# Patient Record
Sex: MALE | Race: WHITE | NOT HISPANIC OR LATINO | URBAN - METROPOLITAN AREA
[De-identification: names, ages, dates, MRNs, and addresses within clinical notes are randomized per-mention and may not be internally consistent; named-entity substitution may affect disease eponyms.]

---

## 2018-04-17 ENCOUNTER — OUTPATIENT (OUTPATIENT)
Dept: OUTPATIENT SERVICES | Facility: HOSPITAL | Age: 73
LOS: 1 days | End: 2018-04-17
Payer: MEDICARE

## 2018-04-17 DIAGNOSIS — R06.00 DYSPNEA, UNSPECIFIED: ICD-10-CM

## 2018-04-17 DIAGNOSIS — Z96.60 PRESENCE OF UNSPECIFIED ORTHOPEDIC JOINT IMPLANT: Chronic | ICD-10-CM

## 2018-04-17 PROCEDURE — 93017 CV STRESS TEST TRACING ONLY: CPT

## 2018-04-17 PROCEDURE — A9502: CPT

## 2018-04-17 PROCEDURE — 78452 HT MUSCLE IMAGE SPECT MULT: CPT

## 2021-04-28 ENCOUNTER — EMERGENCY (EMERGENCY)
Facility: HOSPITAL | Age: 76
LOS: 1 days | Discharge: HOME | End: 2021-04-28
Attending: EMERGENCY MEDICINE
Payer: MEDICARE

## 2021-04-28 VITALS
HEIGHT: 74 IN | HEART RATE: 69 BPM | RESPIRATION RATE: 18 BRPM | SYSTOLIC BLOOD PRESSURE: 159 MMHG | OXYGEN SATURATION: 97 % | WEIGHT: 98.11 LBS | TEMPERATURE: 98 F | DIASTOLIC BLOOD PRESSURE: 89 MMHG

## 2021-04-28 DIAGNOSIS — Z96.60 PRESENCE OF UNSPECIFIED ORTHOPEDIC JOINT IMPLANT: Chronic | ICD-10-CM

## 2021-04-28 LAB
ALBUMIN SERPL ELPH-MCNC: 4.2 G/DL — SIGNIFICANT CHANGE UP (ref 3.5–5)
ALP SERPL-CCNC: 67 U/L — SIGNIFICANT CHANGE UP (ref 40–120)
ALT FLD-CCNC: 22 U/L DA — SIGNIFICANT CHANGE UP (ref 10–60)
ANION GAP SERPL CALC-SCNC: 8 MMOL/L — SIGNIFICANT CHANGE UP (ref 5–17)
APTT BLD: 45.5 SEC — HIGH (ref 27.5–35.5)
AST SERPL-CCNC: 25 U/L — SIGNIFICANT CHANGE UP (ref 10–40)
BASOPHILS # BLD AUTO: 0.03 K/UL — SIGNIFICANT CHANGE UP (ref 0–0.2)
BASOPHILS NFR BLD AUTO: 0.4 % — SIGNIFICANT CHANGE UP (ref 0–2)
BILIRUB SERPL-MCNC: 0.5 MG/DL — SIGNIFICANT CHANGE UP (ref 0.2–1.2)
BUN SERPL-MCNC: 48 MG/DL — HIGH (ref 7–18)
CALCIUM SERPL-MCNC: 9 MG/DL — SIGNIFICANT CHANGE UP (ref 8.4–10.5)
CHLORIDE SERPL-SCNC: 106 MMOL/L — SIGNIFICANT CHANGE UP (ref 96–108)
CO2 SERPL-SCNC: 23 MMOL/L — SIGNIFICANT CHANGE UP (ref 22–31)
CREAT SERPL-MCNC: 1.52 MG/DL — HIGH (ref 0.5–1.3)
EOSINOPHIL # BLD AUTO: 0.03 K/UL — SIGNIFICANT CHANGE UP (ref 0–0.5)
EOSINOPHIL NFR BLD AUTO: 0.4 % — SIGNIFICANT CHANGE UP (ref 0–6)
GLUCOSE SERPL-MCNC: 155 MG/DL — HIGH (ref 70–99)
HCT VFR BLD CALC: 36.6 % — LOW (ref 39–50)
HGB BLD-MCNC: 11.7 G/DL — LOW (ref 13–17)
IMM GRANULOCYTES NFR BLD AUTO: 1.2 % — SIGNIFICANT CHANGE UP (ref 0–1.5)
INR BLD: 2.7 RATIO — HIGH (ref 0.88–1.16)
LIDOCAIN IGE QN: 178 U/L — SIGNIFICANT CHANGE UP (ref 73–393)
LYMPHOCYTES # BLD AUTO: 0.59 K/UL — LOW (ref 1–3.3)
LYMPHOCYTES # BLD AUTO: 7.6 % — LOW (ref 13–44)
MAGNESIUM SERPL-MCNC: 1.9 MG/DL — SIGNIFICANT CHANGE UP (ref 1.6–2.6)
MCHC RBC-ENTMCNC: 28.1 PG — SIGNIFICANT CHANGE UP (ref 27–34)
MCHC RBC-ENTMCNC: 32 GM/DL — SIGNIFICANT CHANGE UP (ref 32–36)
MCV RBC AUTO: 88 FL — SIGNIFICANT CHANGE UP (ref 80–100)
MONOCYTES # BLD AUTO: 0.32 K/UL — SIGNIFICANT CHANGE UP (ref 0–0.9)
MONOCYTES NFR BLD AUTO: 4.1 % — SIGNIFICANT CHANGE UP (ref 2–14)
NEUTROPHILS # BLD AUTO: 6.67 K/UL — SIGNIFICANT CHANGE UP (ref 1.8–7.4)
NEUTROPHILS NFR BLD AUTO: 86.3 % — HIGH (ref 43–77)
NRBC # BLD: 0 /100 WBCS — SIGNIFICANT CHANGE UP (ref 0–0)
NT-PROBNP SERPL-SCNC: 995 PG/ML — HIGH (ref 0–450)
PLATELET # BLD AUTO: 128 K/UL — LOW (ref 150–400)
POTASSIUM SERPL-MCNC: 4.7 MMOL/L — SIGNIFICANT CHANGE UP (ref 3.5–5.3)
POTASSIUM SERPL-SCNC: 4.7 MMOL/L — SIGNIFICANT CHANGE UP (ref 3.5–5.3)
PROT SERPL-MCNC: 8.2 G/DL — SIGNIFICANT CHANGE UP (ref 6–8.3)
PROTHROM AB SERPL-ACNC: 30.6 SEC — HIGH (ref 10.6–13.6)
RBC # BLD: 4.16 M/UL — LOW (ref 4.2–5.8)
RBC # FLD: 13.4 % — SIGNIFICANT CHANGE UP (ref 10.3–14.5)
SODIUM SERPL-SCNC: 137 MMOL/L — SIGNIFICANT CHANGE UP (ref 135–145)
TROPONIN I SERPL-MCNC: <0.015 NG/ML — SIGNIFICANT CHANGE UP (ref 0–0.04)
WBC # BLD: 7.73 K/UL — SIGNIFICANT CHANGE UP (ref 3.8–10.5)
WBC # FLD AUTO: 7.73 K/UL — SIGNIFICANT CHANGE UP (ref 3.8–10.5)

## 2021-04-28 PROCEDURE — 72125 CT NECK SPINE W/O DYE: CPT

## 2021-04-28 PROCEDURE — 83735 ASSAY OF MAGNESIUM: CPT

## 2021-04-28 PROCEDURE — 72131 CT LUMBAR SPINE W/O DYE: CPT

## 2021-04-28 PROCEDURE — 96374 THER/PROPH/DIAG INJ IV PUSH: CPT

## 2021-04-28 PROCEDURE — 73080 X-RAY EXAM OF ELBOW: CPT | Mod: 26,LT

## 2021-04-28 PROCEDURE — 36415 COLL VENOUS BLD VENIPUNCTURE: CPT

## 2021-04-28 PROCEDURE — 83880 ASSAY OF NATRIURETIC PEPTIDE: CPT

## 2021-04-28 PROCEDURE — 72131 CT LUMBAR SPINE W/O DYE: CPT | Mod: 26,MA

## 2021-04-28 PROCEDURE — 99284 EMERGENCY DEPT VISIT MOD MDM: CPT | Mod: 25

## 2021-04-28 PROCEDURE — 72125 CT NECK SPINE W/O DYE: CPT | Mod: 26

## 2021-04-28 PROCEDURE — 80053 COMPREHEN METABOLIC PANEL: CPT

## 2021-04-28 PROCEDURE — 70450 CT HEAD/BRAIN W/O DYE: CPT | Mod: 26

## 2021-04-28 PROCEDURE — 83690 ASSAY OF LIPASE: CPT

## 2021-04-28 PROCEDURE — 85610 PROTHROMBIN TIME: CPT

## 2021-04-28 PROCEDURE — 73502 X-RAY EXAM HIP UNI 2-3 VIEWS: CPT

## 2021-04-28 PROCEDURE — 85730 THROMBOPLASTIN TIME PARTIAL: CPT

## 2021-04-28 PROCEDURE — 99285 EMERGENCY DEPT VISIT HI MDM: CPT

## 2021-04-28 PROCEDURE — 84484 ASSAY OF TROPONIN QUANT: CPT

## 2021-04-28 PROCEDURE — 70450 CT HEAD/BRAIN W/O DYE: CPT

## 2021-04-28 PROCEDURE — 85025 COMPLETE CBC W/AUTO DIFF WBC: CPT

## 2021-04-28 PROCEDURE — 73502 X-RAY EXAM HIP UNI 2-3 VIEWS: CPT | Mod: 26,LT

## 2021-04-28 PROCEDURE — 73080 X-RAY EXAM OF ELBOW: CPT

## 2021-04-28 RX ORDER — MORPHINE SULFATE 50 MG/1
4 CAPSULE, EXTENDED RELEASE ORAL ONCE
Refills: 0 | Status: DISCONTINUED | OUTPATIENT
Start: 2021-04-28 | End: 2021-04-28

## 2021-04-28 RX ORDER — OXYCODONE AND ACETAMINOPHEN 5; 325 MG/1; MG/1
1 TABLET ORAL
Qty: 15 | Refills: 0
Start: 2021-04-28

## 2021-04-28 RX ORDER — TETANUS TOXOID, REDUCED DIPHTHERIA TOXOID AND ACELLULAR PERTUSSIS VACCINE, ADSORBED 5; 2.5; 8; 8; 2.5 [IU]/.5ML; [IU]/.5ML; UG/.5ML; UG/.5ML; UG/.5ML
0.5 SUSPENSION INTRAMUSCULAR ONCE
Refills: 0 | Status: DISCONTINUED | OUTPATIENT
Start: 2021-04-28 | End: 2021-05-02

## 2021-04-28 RX ADMIN — MORPHINE SULFATE 4 MILLIGRAM(S): 50 CAPSULE, EXTENDED RELEASE ORAL at 17:23

## 2021-04-28 NOTE — ED PROVIDER NOTE - CLINICAL SUMMARY MEDICAL DECISION MAKING FREE TEXT BOX
75 year old male with a mechanical fall here for back pain and hip pain, also noted to have an elbow injury. Patient is noted to be on a blood thinner. Will obtain CT imaging of the head and lumbar spine, X-rays of the left elbow, and reassess.

## 2021-04-28 NOTE — ED PROVIDER NOTE - PROGRESS NOTE DETAILS
Educated pt on results, pain meds, f/u. Answered q's. Pt ambulatory with cane which is his baseline. Return instructions verbalized by patient.

## 2021-04-28 NOTE — ED PROVIDER NOTE - MUSCULOSKELETAL, MLM
Abrasion with swelling to the left elbow. Full range of motion of the elbow. Nonspecific left-sided paraspinal lumbar tenderness.

## 2021-04-28 NOTE — ED PROVIDER NOTE - OBJECTIVE STATEMENT
75 year old male with PMHx of type II diabetes, atrial fibrilltion (on Coumadin), chronic back pain, hypertension, sciatica, osteoarthritis of the right hip presents to the ED with complaints of back pain and left hip pain S/P a trip and fall in his driveway. Patient reports that he needed assistance from his neighbor in order to get up after the fall, and notes that at his baseline he uses a cane to ambulate. Patient states that he is currently on Motrin for pain, however endorses that he has been on Oxycodone in the past. Patient otherwise denies any head trauma, chest pain, shortness of breath, fevers, bowel or bladder symptoms, and all other acute complaints. NKDA.

## 2021-04-28 NOTE — ED PROVIDER NOTE - PMH
Atrial fibrillation    Cataract  right eye  Diabetes    Hypertension    Osteoarthritis of right hip    Sciatica    Vertigo

## 2021-04-28 NOTE — ED PROVIDER NOTE - PATIENT PORTAL LINK FT
You can access the FollowMyHealth Patient Portal offered by [facility] by registering at the following website: [email or web address]. By joining [facility]’s FollowMyHealth portal, you will also be able to view your health information using other applications (apps) compatible with our system.

## 2021-04-28 NOTE — ED PROVIDER NOTE - NSFOLLOWUPINSTRUCTIONS_ED_ALL_ED_FT
Lumbar Radiculopathy    WHAT YOU NEED TO KNOW:    What is lumbar radiculopathy? Lumbar radiculopathy is a painful condition that happens when a nerve in your lumbar spine (lower back) is pinched or irritated. Nerves control feeling and movement in your body.    What causes lumbar radiculopathy? You may get a pinched nerve in your lumbar spine if you have disc damage. Discs are natural, spongy cushions between your vertebrae (back bones) that allow your spine to move. Your discs may move out of place and pinch the nerve in your spine. Your risk for a pinched nerve and lumbar radiculopathy increases if:   •You smoke.      •You have diabetes, a spinal infection, or a growth in your spine.      •You are overweight.      •You are male.      •You are elderly.      What are the signs and symptoms of lumbar radiculopathy? You may have any of the following:  •Pain that moves from your lower back to your buttocks, groin, and the back of your leg. The pain is often felt below your knee. Your pain may worsen when you cough, sneeze, stand, or sit.       •Numbness, weakness, or tingling in your back or legs.      How is lumbar radiculopathy diagnosed? Your healthcare provider will examine you and ask about your family history of back and leg pain. He may also test you for weakness, numbness, or tingling in your back, buttocks, and legs. Your healthcare provider may ask you to lie on your back and lift your leg to locate your pain. You may have any of the following:   •Blood tests: You may need blood taken to give healthcare providers information about how your body is working. The blood may be taken from your hand, arm, or IV.       •Magnetic resonance imaging (MRI): An MRI machine is used to take a picture of your lower back. Your healthcare provider will use this picture to check for problems and changes in your back bones, nerves, and discs. You will need to lie still during this test. The MRI machine contains a very powerful magnet. Never enter the MRI room with any metal objects. This can cause serious injury. Tell your healthcare provider if you have any metal implants in your body.      •X-ray: An x-ray is a picture of your lower back. A lumbar x-ray may show signs of infection or other problems with your spine.      •An electromyography (EMG) test measures the electrical activity of your muscles at rest and with movement.      •Computed tomography (CT) scan: A special x-ray machine uses a computer to take pictures of your lower back. It may be used to look at your bones, discs, and nerves. You may be given dye in your IV to help improve the pictures. Tell your healthcare provider if you are allergic to shellfish, or have other allergies or medical conditions. People who are allergic to iodine or shellfish (lobster, crab, or shrimp) may be allergic to some dyes.      How is lumbar radiculopathy treated? Treatment of lumbar radiculopathy may reduce pain and swelling, and improve your ability to walk and do your normal activities. Ask your healthcare provider for more information about these and other treatments for lumbar radiculopathy:   •Medicines:?NSAIDs, such as ibuprofen, help decrease swelling, pain, and fever. This medicine is available with or without a doctor's order. NSAIDs can cause stomach bleeding or kidney problems in certain people. If you take blood thinner medicine, always ask your healthcare provider if NSAIDs are safe for you. Always read the medicine label and follow directions.      ?Muscle relaxers help decrease pain and muscle spasms.      ?Opioids: This is a strong medicine given to reduce severe pain. It is also called narcotic pain medicine. Take this medicine exactly as directed by your healthcare provider.      ?Oral steroids: Steroids may be given to reduce swelling and pain.      ?Steroid injections: Healthcare providers may give you steroid medicine through a needle (shot) into your lumbar spine. This may help decrease your nerve pain and swelling. You may need more than 1 injection if your symptoms do not improve after the first treatment.       •Physical therapy: Your healthcare provider may suggest physical therapy. Your physical therapist may teach you certain exercises to improve posture (the way you stand and sit), flexibility, and strength in your lower back. He may also teach you how to remain safely active and avoid further injury. Follow the exercise plan given to you by your physical therapist.      •Transcutaneous electrical nerve stimulation: This treatment, called TENS, stimulates your nerves and may decrease your pain. Wires are attached to pads. The pads are attached to your skin. The wires send a mild current through your nerves.       •Surgery: You may need surgery to relieve your pinched nerve if your condition has not improved within 4 to 6 weeks. You may also need it if you have lumbar radiculopathy more than once.      What are the risks of treatment for lumbar radiculopathy?   •Without treatment, your pain may worsen. The pinched and swollen nerve may lead to problems when you walk or move. In severe cases, you may lose control of your urine or bowel movements. Bedrest can make your symptoms worse.       •Pain medicines can cause vomiting, upset stomach, constipation (large, hard bowel movements that are difficult to pass), or kidney or liver problems. Opioid medicine may be addictive (hard to quit taking once you start). Oral steroids and steroid injections may have side effects, such as facial redness, fluid retention (water weight gain), and mood changes. Steroid injections may be painful and can cause severe headaches, infections, allergic reactions, or nerve damage. Surgery risks include delayed problems with healing, spinal or bladder infection, damage to the spinal cord or other nerves, and ongoing pain. In rare cases, you could become paralyzed (not able to move your arms or legs).      How can I care for myself when I have lumbar radiculopathy?   •Stay active: It is best to be active when you have lumbar radiculopathy. Your healthcare provider may tell you to take walks to ease yourself back into your daily routine. Avoid long periods of bed rest. Bed rest could worsen your symptoms. Do not move in ways that increase your pain. Ask your healthcare provider for more information about the best ways to stay active.       •Use ice or heat packs: Use ice or heat packs on the sore area of your body to decrease the pain and swelling. Put ice in a plastic bag covered with a towel on your low back. Cover heated items with a towel to avoid burns. Use ice and heat as directed.      •Avoid heavy lifting: Your condition may worsen if you lift heavy things. Avoid lifting if possible.      •Maintain a healthy weight: Excess body weight may strain your back. Talk with your healthcare provider about ways to lose excess weight if you are overweight.       When should I contact my healthcare provider?   •Your pain does not improve within 1 to 3 weeks after treatment.       •Your pain and weakness keep you from your normal activities at work, home, or school.       •You lose more than 10 pounds in 6 months without trying.      •You become depressed or sad because of the pain.      •You have questions or concerns about your condition or care.      When should I seek immediate care or call 911?   •You have a fever greater than 100.4°F for longer than 2 days.      •You have new, severe back or leg pain, or your pain spreads to both legs.      •You have any new signs of numbness or weakness, especially in your lower back, legs, arms, or genital area.      •You have new trouble controlling your urine and bowel movements.      •You do not feel like your bladder empties when you urinate.       CARE AGREEMENT:    You have the right to help plan your care. Learn about your health condition and how it may be treated. Discuss treatment options with your healthcare providers to decide what care you want to receive. You always have the right to refuse treatment.

## 2021-12-19 ENCOUNTER — INPATIENT (INPATIENT)
Facility: HOSPITAL | Age: 76
LOS: 18 days | Discharge: NURSING, REHAB OR LONG-TERM CARE | DRG: 853 | End: 2022-01-07
Attending: HOSPITALIST | Admitting: HOSPITALIST
Payer: MEDICARE

## 2021-12-19 VITALS
RESPIRATION RATE: 16 BRPM | DIASTOLIC BLOOD PRESSURE: 77 MMHG | SYSTOLIC BLOOD PRESSURE: 143 MMHG | HEIGHT: 74 IN | HEART RATE: 103 BPM | OXYGEN SATURATION: 97 % | WEIGHT: 179.9 LBS

## 2021-12-19 DIAGNOSIS — Z96.60 PRESENCE OF UNSPECIFIED ORTHOPEDIC JOINT IMPLANT: Chronic | ICD-10-CM

## 2021-12-19 DIAGNOSIS — A41.9 SEPSIS, UNSPECIFIED ORGANISM: ICD-10-CM

## 2021-12-19 LAB
ALBUMIN SERPL ELPH-MCNC: 3 G/DL — LOW (ref 3.5–5)
ALP SERPL-CCNC: 82 U/L — SIGNIFICANT CHANGE UP (ref 40–120)
ALT FLD-CCNC: 11 U/L DA — SIGNIFICANT CHANGE UP (ref 10–60)
ANION GAP SERPL CALC-SCNC: 9 MMOL/L — SIGNIFICANT CHANGE UP (ref 5–17)
ANISOCYTOSIS BLD QL: SLIGHT — SIGNIFICANT CHANGE UP
APPEARANCE UR: CLEAR — SIGNIFICANT CHANGE UP
APTT BLD: 30.6 SEC — SIGNIFICANT CHANGE UP (ref 27.5–35.5)
AST SERPL-CCNC: 11 U/L — SIGNIFICANT CHANGE UP (ref 10–40)
BASE EXCESS BLDV CALC-SCNC: -0.2 MMOL/L — SIGNIFICANT CHANGE UP
BASOPHILS # BLD AUTO: 0 K/UL — SIGNIFICANT CHANGE UP (ref 0–0.2)
BASOPHILS NFR BLD AUTO: 0 % — SIGNIFICANT CHANGE UP (ref 0–2)
BILIRUB SERPL-MCNC: 0.6 MG/DL — SIGNIFICANT CHANGE UP (ref 0.2–1.2)
BILIRUB UR-MCNC: NEGATIVE — SIGNIFICANT CHANGE UP
BUN SERPL-MCNC: 49 MG/DL — HIGH (ref 7–18)
CALCIUM SERPL-MCNC: 9 MG/DL — SIGNIFICANT CHANGE UP (ref 8.4–10.5)
CHLORIDE SERPL-SCNC: 108 MMOL/L — SIGNIFICANT CHANGE UP (ref 96–108)
CK SERPL-CCNC: 46 U/L — SIGNIFICANT CHANGE UP (ref 35–232)
CO2 SERPL-SCNC: 22 MMOL/L — SIGNIFICANT CHANGE UP (ref 22–31)
COLOR SPEC: YELLOW — SIGNIFICANT CHANGE UP
CREAT SERPL-MCNC: 1.87 MG/DL — HIGH (ref 0.5–1.3)
DIFF PNL FLD: ABNORMAL
EOSINOPHIL # BLD AUTO: 0 K/UL — SIGNIFICANT CHANGE UP (ref 0–0.5)
EOSINOPHIL NFR BLD AUTO: 0 % — SIGNIFICANT CHANGE UP (ref 0–6)
EPI CELLS # UR: SIGNIFICANT CHANGE UP /HPF
ETHANOL SERPL-MCNC: <3 MG/DL — SIGNIFICANT CHANGE UP (ref 0–10)
GLUCOSE SERPL-MCNC: 269 MG/DL — HIGH (ref 70–99)
GLUCOSE UR QL: 250
HCO3 BLDV-SCNC: 24 MMOL/L — SIGNIFICANT CHANGE UP (ref 22–29)
HCT VFR BLD CALC: 30.4 % — LOW (ref 39–50)
HGB BLD-MCNC: 9.8 G/DL — LOW (ref 13–17)
HOROWITZ INDEX BLDV+IHG-RTO: 21 — SIGNIFICANT CHANGE UP
HYPOCHROMIA BLD QL: SLIGHT — SIGNIFICANT CHANGE UP
INR BLD: 1.25 RATIO — HIGH (ref 0.88–1.16)
KETONES UR-MCNC: NEGATIVE — SIGNIFICANT CHANGE UP
LACTATE SERPL-SCNC: 2.2 MMOL/L — HIGH (ref 0.7–2)
LEUKOCYTE ESTERASE UR-ACNC: NEGATIVE — SIGNIFICANT CHANGE UP
LYMPHOCYTES # BLD AUTO: 0.17 K/UL — LOW (ref 1–3.3)
LYMPHOCYTES # BLD AUTO: 3 % — LOW (ref 13–44)
MANUAL SMEAR VERIFICATION: SIGNIFICANT CHANGE UP
MCHC RBC-ENTMCNC: 28.2 PG — SIGNIFICANT CHANGE UP (ref 27–34)
MCHC RBC-ENTMCNC: 32.2 GM/DL — SIGNIFICANT CHANGE UP (ref 32–36)
MCV RBC AUTO: 87.6 FL — SIGNIFICANT CHANGE UP (ref 80–100)
MICROCYTES BLD QL: SLIGHT — SIGNIFICANT CHANGE UP
MONOCYTES # BLD AUTO: 0.11 K/UL — SIGNIFICANT CHANGE UP (ref 0–0.9)
MONOCYTES NFR BLD AUTO: 2 % — SIGNIFICANT CHANGE UP (ref 2–14)
NEUTROPHILS # BLD AUTO: 5.44 K/UL — SIGNIFICANT CHANGE UP (ref 1.8–7.4)
NEUTROPHILS NFR BLD AUTO: 84 % — HIGH (ref 43–77)
NEUTS BAND # BLD: 11 % — HIGH (ref 0–8)
NITRITE UR-MCNC: NEGATIVE — SIGNIFICANT CHANGE UP
NRBC # BLD: 0 /100 — SIGNIFICANT CHANGE UP (ref 0–0)
PCO2 BLDV: 37 MMHG — LOW (ref 42–55)
PH BLDV: 7.42 — SIGNIFICANT CHANGE UP (ref 7.32–7.43)
PH UR: 5 — SIGNIFICANT CHANGE UP (ref 5–8)
PLAT MORPH BLD: NORMAL — SIGNIFICANT CHANGE UP
PLATELET # BLD AUTO: 133 K/UL — LOW (ref 150–400)
PO2 BLDV: 40 MMHG — SIGNIFICANT CHANGE UP
POIKILOCYTOSIS BLD QL AUTO: SLIGHT — SIGNIFICANT CHANGE UP
POLYCHROMASIA BLD QL SMEAR: SLIGHT — SIGNIFICANT CHANGE UP
POTASSIUM SERPL-MCNC: 4.4 MMOL/L — SIGNIFICANT CHANGE UP (ref 3.5–5.3)
POTASSIUM SERPL-SCNC: 4.4 MMOL/L — SIGNIFICANT CHANGE UP (ref 3.5–5.3)
PROT SERPL-MCNC: 7.8 G/DL — SIGNIFICANT CHANGE UP (ref 6–8.3)
PROT UR-MCNC: 100
PROTHROM AB SERPL-ACNC: 14.7 SEC — HIGH (ref 10.6–13.6)
RAPID RVP RESULT: SIGNIFICANT CHANGE UP
RBC # BLD: 3.47 M/UL — LOW (ref 4.2–5.8)
RBC # FLD: 12.6 % — SIGNIFICANT CHANGE UP (ref 10.3–14.5)
RBC BLD AUTO: ABNORMAL
RBC CASTS # UR COMP ASSIST: SIGNIFICANT CHANGE UP /HPF (ref 0–2)
SAO2 % BLDV: 73 % — SIGNIFICANT CHANGE UP
SARS-COV-2 RNA SPEC QL NAA+PROBE: SIGNIFICANT CHANGE UP
SODIUM SERPL-SCNC: 139 MMOL/L — SIGNIFICANT CHANGE UP (ref 135–145)
SP GR SPEC: 1.01 — SIGNIFICANT CHANGE UP (ref 1.01–1.02)
SPHEROCYTES BLD QL SMEAR: SLIGHT — SIGNIFICANT CHANGE UP
UROBILINOGEN FLD QL: NEGATIVE — SIGNIFICANT CHANGE UP
WBC # BLD: 5.73 K/UL — SIGNIFICANT CHANGE UP (ref 3.8–10.5)
WBC # FLD AUTO: 5.73 K/UL — SIGNIFICANT CHANGE UP (ref 3.8–10.5)
WBC UR QL: SIGNIFICANT CHANGE UP /HPF (ref 0–5)

## 2021-12-19 PROCEDURE — 99223 1ST HOSP IP/OBS HIGH 75: CPT

## 2021-12-19 PROCEDURE — 73620 X-RAY EXAM OF FOOT: CPT | Mod: 26,LT

## 2021-12-19 PROCEDURE — 73590 X-RAY EXAM OF LOWER LEG: CPT | Mod: 26,LT

## 2021-12-19 PROCEDURE — 99285 EMERGENCY DEPT VISIT HI MDM: CPT

## 2021-12-19 PROCEDURE — 71045 X-RAY EXAM CHEST 1 VIEW: CPT | Mod: 26

## 2021-12-19 RX ORDER — SODIUM CHLORIDE 9 MG/ML
2500 INJECTION INTRAMUSCULAR; INTRAVENOUS; SUBCUTANEOUS ONCE
Refills: 0 | Status: COMPLETED | OUTPATIENT
Start: 2021-12-19 | End: 2021-12-19

## 2021-12-19 RX ORDER — ACETAMINOPHEN 500 MG
975 TABLET ORAL ONCE
Refills: 0 | Status: COMPLETED | OUTPATIENT
Start: 2021-12-19 | End: 2021-12-19

## 2021-12-19 RX ORDER — HYDROMORPHONE HYDROCHLORIDE 2 MG/ML
0.25 INJECTION INTRAMUSCULAR; INTRAVENOUS; SUBCUTANEOUS ONCE
Refills: 0 | Status: DISCONTINUED | OUTPATIENT
Start: 2021-12-19 | End: 2021-12-19

## 2021-12-19 RX ADMIN — HYDROMORPHONE HYDROCHLORIDE 0.25 MILLIGRAM(S): 2 INJECTION INTRAMUSCULAR; INTRAVENOUS; SUBCUTANEOUS at 23:59

## 2021-12-19 RX ADMIN — HYDROMORPHONE HYDROCHLORIDE 0.25 MILLIGRAM(S): 2 INJECTION INTRAMUSCULAR; INTRAVENOUS; SUBCUTANEOUS at 22:53

## 2021-12-19 RX ADMIN — Medication 975 MILLIGRAM(S): at 23:59

## 2021-12-19 RX ADMIN — SODIUM CHLORIDE 2500 MILLILITER(S): 9 INJECTION INTRAMUSCULAR; INTRAVENOUS; SUBCUTANEOUS at 20:06

## 2021-12-19 RX ADMIN — Medication 975 MILLIGRAM(S): at 21:05

## 2021-12-19 RX ADMIN — SODIUM CHLORIDE 2500 MILLILITER(S): 9 INJECTION INTRAMUSCULAR; INTRAVENOUS; SUBCUTANEOUS at 22:47

## 2021-12-19 NOTE — H&P ADULT - ASSESSMENT
Patient is a 76 year-old male from home with past medical history of atrial fibrillation (was on coumadin until 1month ago), hypertension, hyperlipidemia, diabetes mellitus presents to ED for AMS. Admitted for AMS and diabetic foot ulcer

## 2021-12-19 NOTE — H&P ADULT - PROBLEM SELECTOR PLAN 1
delirium vs dementia   Likely 2/2 infection  Follow Blood cultures and urine cultures  Will start on Unasyn and Vancomycin for foot ulcer   Monitor for mental status changes   Keep electrolytes wnl   Aspiration and fall precautions Acute transient confusional state  Likely 2/2 infection  Follow Blood cultures and urine cultures  Will start on Unasyn and Vancomycin for foot ulcer   Monitor for mental status changes   Keep electrolytes wnl   Aspiration and fall precautions

## 2021-12-19 NOTE — H&P ADULT - PROBLEM SELECTOR PLAN 2
Left foot ulcer  Will start on vanc and unasyn  Will need MRI to r/o osteoarthritis   Follow blood cultures   Podiatry consulted Acute transient confusional state  Likely 2/2 infection  Follow Blood cultures and urine cultures  Will start on Unasyn and Vancomycin for foot ulcer   Monitor for mental status changes   Keep electrolytes wnl   Aspiration and fall precautions

## 2021-12-19 NOTE — ED PROVIDER NOTE - WR ORDER ID 1
CP x a couple of weeks. Called pcp and was sent here today. Pt states pain happens at night or in the evening. Laying down makes it worse. Today pain is along the clavical does radiate along the upper chest and down left arm.
[id number]

## 2021-12-19 NOTE — ED PROVIDER NOTE - OBJECTIVE STATEMENT
76 year old male with PMHx of type II diabetes, atrial fibrilltion (on Coumadin), chronic back pain, hypertension, sciatica, osteoarthritis of the right hip presents to the ED with complaints of ams. pt states feeling off. not taking coumadin, no fever, no n/v, no cp, no leg pain.

## 2021-12-19 NOTE — H&P ADULT - ATTENDING COMMENTS
Pt seen at bedside.  Case discussed with MAR.  76 year old man with PMH of poorly controlled DM2, A-fib on OAC, HTN brought in on account of changes in mental status in addition to left foot swelling and redness. He was febrile in the ED.    Vital Signs Last 24 Hrs  T(C): 37.4 (19 Dec 2021 20:49), Max: 39 (19 Dec 2021 18:42)  T(F): 99.4 (19 Dec 2021 20:49), Max: 102.2 (19 Dec 2021 18:42)  HR: 91 (19 Dec 2021 20:49) (91 - 103)  BP: 110/68 (19 Dec 2021 20:49) (110/68 - 143/77)  RR: 18 (19 Dec 2021 20:49) (16 - 18)  SpO2: 96% (19 Dec 2021 20:49) (96% - 97%)    Labs                         9.8    5.73  )-----------( 133      ( 19 Dec 2021 19:15 )             30.4     ESR - 95    12-19    139  |  108  |  49<H>  ----------------------------<  269<H>  4.4   |  22  |  1.87<H>    Ca    9.0      19 Dec 2021 19:15    TPro  7.8  /  Alb  3.0<L>  /  TBili  0.6  /  DBili  x   /  AST  11  /  ALT  11  /  AlkPhos  82  12-19    Glycosuria - 250    X rays   Left foot and leg - unremarkable    Impression   - Acute encephalopathy likely septic   - Left foot soft tissue infection   - Hyperglycemia from poorly controlled DM2  - PATTY on CKD 3b  - Anemia - likely of chronic disease    Plan   - Admit to Medicine   - Sepsis work up   - Empiric antibiotics for soft tissue infection   - IV fluid 0.9% NS @ 125cc/hour  - Short acting insulin therapy; A1c,   - Renal U/S  - Podiatry consult   - MRI foot Pt seen at bedside.  Case discussed with MAR.  76 year old man with PMH of poorly controlled DM2, A-fib on OAC, HTN brought in on account of changes in mental status in addition to left foot swelling and redness. He was febrile in the ED.    Vital Signs Last 24 Hrs  T(C): 37.4 (19 Dec 2021 20:49), Max: 39 (19 Dec 2021 18:42)  T(F): 99.4 (19 Dec 2021 20:49), Max: 102.2 (19 Dec 2021 18:42)  HR: 91 (19 Dec 2021 20:49) (91 - 103)  BP: 110/68 (19 Dec 2021 20:49) (110/68 - 143/77)  RR: 18 (19 Dec 2021 20:49) (16 - 18)  SpO2: 96% (19 Dec 2021 20:49) (96% - 97%)    Labs                         9.8    5.73  )-----------( 133      ( 19 Dec 2021 19:15 )             30.4     ESR - 95    12-19    139  |  108  |  49<H>  ----------------------------<  269<H>  4.4   |  22  |  1.87<H>    Ca    9.0      19 Dec 2021 19:15    TPro  7.8  /  Alb  3.0<L>  /  TBili  0.6  /  DBili  x   /  AST  11  /  ALT  11  /  Alk Phos  82  12-19    Glycosuria - 250    X rays   Left foot and leg - unremarkable    Impression   - Acute encephalopathy likely septic   - Left foot cellulitis   - Diabetic foot ulcer infection  - Hyperglycemia from poorly controlled DM2  - PATTY on CKD 3b  - Anemia - likely of chronic disease    Plan   - Admit to Medicine   - Sepsis work up   - Empiric antibiotics for soft tissue infection   - IV fluid 0.9% NS @ 125cc/hour  - Short acting insulin therapy; A1c,   - Renal U/S  - Podiatry consult and ID consult  - Would consider MRI foot if okay per podiatry team  - Counselling on the need to resume and stay compliant on coumadin; no bridging needed  - Follow up with PCP and nephrologist in clinic for CKD management    All other plans for chronic medical conditions management as above

## 2021-12-19 NOTE — H&P ADULT - NSICDXFAMILYHX_GEN_ALL_CORE_FT
FAMILY HISTORY:  Sibling  Still living? Unknown  Family history of breast cancer in sister, Age at diagnosis: Age Unknown  Family history of coronary artery disease, Age at diagnosis: Age Unknown

## 2021-12-19 NOTE — ED ADULT NURSE NOTE - ED STAT RN HANDOFF DETAILS 3
pt.remained  stable.denies pain transfer to holding area report given to charlee galan. not in distress

## 2021-12-19 NOTE — H&P ADULT - PROBLEM SELECTOR PLAN 7
Continue home medication simvastatin On oral hypoglycemics at home  Hold OHA  Will start on sliding scale  Would calculate long acting insulin in AM  FS ACHS

## 2021-12-19 NOTE — ED ADULT NURSE NOTE - OBJECTIVE STATEMENT
Pt BIB EMS for AMS. Upon assessment, pt is A&OX3, resting in bed, left plantar foot diabetic foot ulcer noted, foot swollen and red. Area moist. No acute distress noted, denies chest pain, no shortness of breath indicated.

## 2021-12-19 NOTE — H&P ADULT - PROBLEM SELECTOR PLAN 10
IMPROVE VTE Individual Risk Assessment  RISK                                                                Points  [  ] Previous VTE                                                  3  [  ] Thrombophilia                                               2  [  ] Lower limb paralysis                                      2        (unable to hold up >15 seconds)    [  ] Current Cancer                                              2         (within 6 months)  [x  ] Immobilization > 24 hrs                                1  [  ] ICU/CCU stay > 24 hours                              1  [x  ] Age > 60                                                      1  IMPROVE VTE Score _________2, -- for DVT proph  Warfarin

## 2021-12-19 NOTE — H&P ADULT - NSHPPHYSICALEXAM_GEN_ALL_CORE
LOS: 1d    VITALS:   T(C): 36.4 (12-20-21 @ 00:01), Max: 39 (12-19-21 @ 18:42)  HR: 73 (12-20-21 @ 00:01) (73 - 103)  BP: 115/72 (12-20-21 @ 00:01) (110/68 - 143/77)  RR: 17 (12-20-21 @ 00:01) (16 - 18)  SpO2: 99% (12-20-21 @ 00:01) (96% - 99%)    GENERAL: NAD, lying in bed comfortably  HEAD:  Atraumatic, Normocephalic  EYES: EOMI, PERRLA, conjunctiva and sclera clear  ENT: Moist mucous membranes  NECK: Supple, No JVD  CHEST/LUNG: Clear to auscultation bilaterally; No rales, rhonchi, wheezing, or rubs. Unlabored respirations  HEART: Regular rate and rhythm; No murmurs, rubs, or gallops  ABDOMEN: BSx4; Soft, nontender, nondistended  EXTREMITIES: left foot ulcer 2 cm in diameter on medial side of the planter surface. Surrounded by erythema, warmth.   2+ Peripheral Pulses, brisk capillary refill. No clubbing, cyanosis  NERVOUS SYSTEM:  A&Ox3, no focal deficits   SKIN: No rashes or lesions

## 2021-12-19 NOTE — H&P ADULT - PROBLEM SELECTOR PLAN 5
On oral hypoglycemics at home  Will start on sliding scale  FS ACHS likely prerenal  s/p 2.5L IVF in ED  Monitor BMP daily  Avoid nephrotoxins  Continue gentle hydration

## 2021-12-19 NOTE — ED PROVIDER NOTE - PROGRESS NOTE DETAILS
mcneal: work up shows no acute oste or gas on xr. lactate 2.2 normal wbc. cxr clear. pt stable  spoke with podiatry and will see in am.  admit to med for sepsis possibly from bacteremia. pt with diabetic foot ulcer of left.

## 2021-12-19 NOTE — H&P ADULT - PROBLEM SELECTOR PLAN 6
On lisinopril and furosemide at home  Will hold lasix due to PATTY  Continue lisinopril Was on coumadin but discontinued by himself  Will order 5mg coumadin daily  Monitor INR daily

## 2021-12-19 NOTE — ED PROVIDER NOTE - CLINICAL SUMMARY MEDICAL DECISION MAKING FREE TEXT BOX
76 year old male with PMHx of type II diabetes, atrial fibrilltion (on Coumadin), chronic back pain, hypertension, sciatica, osteoarthritis of the right hip presents to the ED with complaints of ams. pt states feeling off. not taking coumadin, no fever, no n/v, no cp, no leg pain.     ams likely uti r/o pna vs lytes imbalance. clinically no meningeal sign to suggest encephalitis. sepsis work up and admit 76 year old male with PMHx of type II diabetes, atrial fibrilltion (on Coumadin), chronic back pain, hypertension, sciatica, osteoarthritis of the right hip presents to the ED with complaints of ams. pt states feeling off. not taking coumadin, no fever, no n/v, no cp, no leg pain.     ams likely bacteremia from left foot Dm ulcer vs uti r/o pna vs lytes imbalance. clinically no meningeal sign to suggest encephalitis. sepsis work up and admit. no sign of compartment sx.

## 2021-12-19 NOTE — H&P ADULT - PROBLEM SELECTOR PLAN 8
IMPROVE VTE Individual Risk Assessment  RISK                                                                Points  [  ] Previous VTE                                                  3  [  ] Thrombophilia                                               2  [  ] Lower limb paralysis                                      2        (unable to hold up >15 seconds)    [  ] Current Cancer                                              2         (within 6 months)  [x  ] Immobilization > 24 hrs                                1  [  ] ICU/CCU stay > 24 hours                              1  [x  ] Age > 60                                                      1  IMPROVE VTE Score _________2, -- for DVT proph  Warfarin On lisinopril and furosemide at home  Will hold Lasix and lisinopril until PATTY improves.

## 2021-12-19 NOTE — H&P ADULT - PROBLEM SELECTOR PLAN 3
likely prerenal  s/p 2.5L IVF in ED  Monitor BMP daily  Avoid nephrotoxins Left foot ulcer infection   Will start on vanc and unasyn  Will need MRI to r/o osteoarthritis   Follow blood cultures   Podiatry consulted

## 2021-12-19 NOTE — H&P ADULT - NSICDXPASTMEDICALHX_GEN_ALL_CORE_FT
PAST MEDICAL HISTORY:  Atrial fibrillation     Cataract right eye    Diabetes     Hypertension     Osteoarthritis of right hip     Sciatica     Vertigo

## 2021-12-19 NOTE — ED ADULT NURSE NOTE - ED STAT RN HANDOFF DETAILS
Report given to CHELE Chiu. Pt resting in bed, no acute distress noted, denies chest pain, no shortness of breath indicated.

## 2021-12-19 NOTE — H&P ADULT - HISTORY OF PRESENT ILLNESS
Patient is a 76 year-old male from home with past medical history of atrial fibrillation (was on coumadin until 1month ago), hypertension, hyperlipidemia, diabetes mellitus presents to ED for AMS. Pt states that today he started feeling freezing cold and was shivering. Talked to wife for more information and she stated that patient was feeling very cold though house was warm. He stopped shivering for a while and the started again. During his second episode of shivering from feeling extremely cold he got very confused and could not talk, understand what she was saying and was not making sense. After a while the shivering stopped and he regained his normal mental status. Denies fever, chest pain. abdominal pain, diarrhea, constipation. Pt states he has incontinence and an ulcer on his left foot. He states his podiatrist scraped a callus which caused the ulcer. He has diabetic neuropathy so does not feel pain around the ulcer. He did notice the left foot got more erythematous today.   He stopped taking warfarin 1 month ago as he states it was too much of an hassle to get INR checked and avoid eating certain foods.

## 2021-12-19 NOTE — ED PROVIDER NOTE - CARDIAC, MLM
tachy rate, regular rhythm.  Heart sounds S1, S2.  No murmurs, rubs or gallops. no leg swelling or redness, + varicose vein tachy rate, regular rhythm.  Heart sounds S1, S2.  No murmurs, rubs or gallops. no leg swelling or redness, + varicose vein, + foot medial DM ulcer and erythema/swelling, no crepitus

## 2021-12-20 DIAGNOSIS — I10 ESSENTIAL (PRIMARY) HYPERTENSION: ICD-10-CM

## 2021-12-20 DIAGNOSIS — E11.65 TYPE 2 DIABETES MELLITUS WITH HYPERGLYCEMIA: ICD-10-CM

## 2021-12-20 DIAGNOSIS — L03.116 CELLULITIS OF LEFT LOWER LIMB: ICD-10-CM

## 2021-12-20 DIAGNOSIS — E87.5 HYPERKALEMIA: ICD-10-CM

## 2021-12-20 DIAGNOSIS — L03.90 CELLULITIS, UNSPECIFIED: ICD-10-CM

## 2021-12-20 DIAGNOSIS — E11.9 TYPE 2 DIABETES MELLITUS WITHOUT COMPLICATIONS: ICD-10-CM

## 2021-12-20 DIAGNOSIS — G93.41 METABOLIC ENCEPHALOPATHY: ICD-10-CM

## 2021-12-20 DIAGNOSIS — E78.5 HYPERLIPIDEMIA, UNSPECIFIED: ICD-10-CM

## 2021-12-20 DIAGNOSIS — I48.91 UNSPECIFIED ATRIAL FIBRILLATION: ICD-10-CM

## 2021-12-20 DIAGNOSIS — Z29.9 ENCOUNTER FOR PROPHYLACTIC MEASURES, UNSPECIFIED: ICD-10-CM

## 2021-12-20 DIAGNOSIS — R41.82 ALTERED MENTAL STATUS, UNSPECIFIED: ICD-10-CM

## 2021-12-20 DIAGNOSIS — E11.621 TYPE 2 DIABETES MELLITUS WITH FOOT ULCER: ICD-10-CM

## 2021-12-20 DIAGNOSIS — N17.9 ACUTE KIDNEY FAILURE, UNSPECIFIED: ICD-10-CM

## 2021-12-20 LAB
ALBUMIN SERPL ELPH-MCNC: 2.8 G/DL — LOW (ref 3.5–5)
ALP SERPL-CCNC: 70 U/L — SIGNIFICANT CHANGE UP (ref 40–120)
ALT FLD-CCNC: 10 U/L DA — SIGNIFICANT CHANGE UP (ref 10–60)
ANION GAP SERPL CALC-SCNC: 5 MMOL/L — SIGNIFICANT CHANGE UP (ref 5–17)
APTT BLD: 33.8 SEC — SIGNIFICANT CHANGE UP (ref 27.5–35.5)
AST SERPL-CCNC: 12 U/L — SIGNIFICANT CHANGE UP (ref 10–40)
BILIRUB SERPL-MCNC: 0.4 MG/DL — SIGNIFICANT CHANGE UP (ref 0.2–1.2)
BUN SERPL-MCNC: 51 MG/DL — HIGH (ref 7–18)
CALCIUM SERPL-MCNC: 9 MG/DL — SIGNIFICANT CHANGE UP (ref 8.4–10.5)
CHLORIDE SERPL-SCNC: 110 MMOL/L — HIGH (ref 96–108)
CO2 SERPL-SCNC: 26 MMOL/L — SIGNIFICANT CHANGE UP (ref 22–31)
CREAT SERPL-MCNC: 1.8 MG/DL — HIGH (ref 0.5–1.3)
CRP SERPL-MCNC: 107 MG/L — HIGH
CRP SERPL-MCNC: 94 MG/L — HIGH
ERYTHROCYTE [SEDIMENTATION RATE] IN BLOOD: 86 MM/HR — HIGH (ref 0–20)
GLUCOSE BLDC GLUCOMTR-MCNC: 189 MG/DL — HIGH (ref 70–99)
GLUCOSE BLDC GLUCOMTR-MCNC: 229 MG/DL — HIGH (ref 70–99)
GLUCOSE BLDC GLUCOMTR-MCNC: 245 MG/DL — HIGH (ref 70–99)
GLUCOSE BLDC GLUCOMTR-MCNC: 259 MG/DL — HIGH (ref 70–99)
GLUCOSE BLDC GLUCOMTR-MCNC: 99 MG/DL — SIGNIFICANT CHANGE UP (ref 70–99)
GLUCOSE SERPL-MCNC: 228 MG/DL — HIGH (ref 70–99)
HCT VFR BLD CALC: 28.5 % — LOW (ref 39–50)
HCV AB S/CO SERPL IA: 0.11 S/CO — SIGNIFICANT CHANGE UP (ref 0–0.99)
HCV AB SERPL-IMP: SIGNIFICANT CHANGE UP
HGB BLD-MCNC: 9 G/DL — LOW (ref 13–17)
INR BLD: 1.36 RATIO — HIGH (ref 0.88–1.16)
LACTATE SERPL-SCNC: 1 MMOL/L — SIGNIFICANT CHANGE UP (ref 0.7–2)
MAGNESIUM SERPL-MCNC: 2.5 MG/DL — SIGNIFICANT CHANGE UP (ref 1.6–2.6)
MCHC RBC-ENTMCNC: 28.1 PG — SIGNIFICANT CHANGE UP (ref 27–34)
MCHC RBC-ENTMCNC: 31.6 GM/DL — LOW (ref 32–36)
MCV RBC AUTO: 89.1 FL — SIGNIFICANT CHANGE UP (ref 80–100)
NRBC # BLD: 0 /100 WBCS — SIGNIFICANT CHANGE UP (ref 0–0)
PHOSPHATE SERPL-MCNC: 3.8 MG/DL — SIGNIFICANT CHANGE UP (ref 2.5–4.5)
PLATELET # BLD AUTO: 116 K/UL — LOW (ref 150–400)
POTASSIUM SERPL-MCNC: 5.7 MMOL/L — HIGH (ref 3.5–5.3)
POTASSIUM SERPL-SCNC: 5.7 MMOL/L — HIGH (ref 3.5–5.3)
PROT SERPL-MCNC: 7.2 G/DL — SIGNIFICANT CHANGE UP (ref 6–8.3)
PROTHROM AB SERPL-ACNC: 16 SEC — HIGH (ref 10.6–13.6)
RBC # BLD: 3.2 M/UL — LOW (ref 4.2–5.8)
RBC # FLD: 12.7 % — SIGNIFICANT CHANGE UP (ref 10.3–14.5)
SODIUM SERPL-SCNC: 141 MMOL/L — SIGNIFICANT CHANGE UP (ref 135–145)
WBC # BLD: 8.23 K/UL — SIGNIFICANT CHANGE UP (ref 3.8–10.5)
WBC # FLD AUTO: 8.23 K/UL — SIGNIFICANT CHANGE UP (ref 3.8–10.5)

## 2021-12-20 PROCEDURE — 73718 MRI LOWER EXTREMITY W/O DYE: CPT | Mod: 26,LT

## 2021-12-20 PROCEDURE — 99233 SBSQ HOSP IP/OBS HIGH 50: CPT

## 2021-12-20 PROCEDURE — 99223 1ST HOSP IP/OBS HIGH 75: CPT

## 2021-12-20 RX ORDER — BUPROPION HYDROCHLORIDE 150 MG/1
150 TABLET, EXTENDED RELEASE ORAL DAILY
Refills: 0 | Status: DISCONTINUED | OUTPATIENT
Start: 2021-12-20 | End: 2022-01-07

## 2021-12-20 RX ORDER — WARFARIN SODIUM 2.5 MG/1
5 TABLET ORAL AT BEDTIME
Refills: 0 | Status: DISCONTINUED | OUTPATIENT
Start: 2021-12-20 | End: 2021-12-20

## 2021-12-20 RX ORDER — SODIUM CHLORIDE 9 MG/ML
1000 INJECTION INTRAMUSCULAR; INTRAVENOUS; SUBCUTANEOUS
Refills: 0 | Status: DISCONTINUED | OUTPATIENT
Start: 2021-12-20 | End: 2021-12-23

## 2021-12-20 RX ORDER — HEPARIN SODIUM 5000 [USP'U]/ML
5000 INJECTION INTRAVENOUS; SUBCUTANEOUS EVERY 8 HOURS
Refills: 0 | Status: DISCONTINUED | OUTPATIENT
Start: 2021-12-20 | End: 2021-12-21

## 2021-12-20 RX ORDER — SODIUM ZIRCONIUM CYCLOSILICATE 10 G/10G
10 POWDER, FOR SUSPENSION ORAL ONCE
Refills: 0 | Status: COMPLETED | OUTPATIENT
Start: 2021-12-20 | End: 2021-12-20

## 2021-12-20 RX ORDER — GABAPENTIN 400 MG/1
800 CAPSULE ORAL THREE TIMES A DAY
Refills: 0 | Status: DISCONTINUED | OUTPATIENT
Start: 2021-12-20 | End: 2022-01-07

## 2021-12-20 RX ORDER — AMPICILLIN SODIUM AND SULBACTAM SODIUM 250; 125 MG/ML; MG/ML
3 INJECTION, POWDER, FOR SUSPENSION INTRAMUSCULAR; INTRAVENOUS EVERY 6 HOURS
Refills: 0 | Status: DISCONTINUED | OUTPATIENT
Start: 2021-12-20 | End: 2021-12-24

## 2021-12-20 RX ORDER — OXYCODONE AND ACETAMINOPHEN 5; 325 MG/1; MG/1
1 TABLET ORAL EVERY 6 HOURS
Refills: 0 | Status: DISCONTINUED | OUTPATIENT
Start: 2021-12-20 | End: 2021-12-27

## 2021-12-20 RX ORDER — LIDOCAINE HCL 20 MG/ML
10 VIAL (ML) INJECTION ONCE
Refills: 0 | Status: COMPLETED | OUTPATIENT
Start: 2021-12-20 | End: 2021-12-20

## 2021-12-20 RX ORDER — LISINOPRIL 2.5 MG/1
2.5 TABLET ORAL DAILY
Refills: 0 | Status: DISCONTINUED | OUTPATIENT
Start: 2021-12-20 | End: 2022-01-06

## 2021-12-20 RX ORDER — ATORVASTATIN CALCIUM 80 MG/1
40 TABLET, FILM COATED ORAL AT BEDTIME
Refills: 0 | Status: DISCONTINUED | OUTPATIENT
Start: 2021-12-20 | End: 2022-01-07

## 2021-12-20 RX ORDER — INSULIN LISPRO 100/ML
VIAL (ML) SUBCUTANEOUS
Refills: 0 | Status: DISCONTINUED | OUTPATIENT
Start: 2021-12-20 | End: 2021-12-21

## 2021-12-20 RX ORDER — AMPICILLIN SODIUM AND SULBACTAM SODIUM 250; 125 MG/ML; MG/ML
INJECTION, POWDER, FOR SUSPENSION INTRAMUSCULAR; INTRAVENOUS
Refills: 0 | Status: DISCONTINUED | OUTPATIENT
Start: 2021-12-20 | End: 2021-12-24

## 2021-12-20 RX ORDER — VANCOMYCIN HCL 1 G
1250 VIAL (EA) INTRAVENOUS EVERY 24 HOURS
Refills: 0 | Status: DISCONTINUED | OUTPATIENT
Start: 2021-12-20 | End: 2021-12-21

## 2021-12-20 RX ORDER — ACETAMINOPHEN 500 MG
650 TABLET ORAL EVERY 6 HOURS
Refills: 0 | Status: DISCONTINUED | OUTPATIENT
Start: 2021-12-20 | End: 2022-01-07

## 2021-12-20 RX ORDER — AMPICILLIN SODIUM AND SULBACTAM SODIUM 250; 125 MG/ML; MG/ML
3 INJECTION, POWDER, FOR SUSPENSION INTRAMUSCULAR; INTRAVENOUS ONCE
Refills: 0 | Status: COMPLETED | OUTPATIENT
Start: 2021-12-20 | End: 2021-12-20

## 2021-12-20 RX ADMIN — AMPICILLIN SODIUM AND SULBACTAM SODIUM 200 GRAM(S): 250; 125 INJECTION, POWDER, FOR SUSPENSION INTRAMUSCULAR; INTRAVENOUS at 14:13

## 2021-12-20 RX ADMIN — BUPROPION HYDROCHLORIDE 150 MILLIGRAM(S): 150 TABLET, EXTENDED RELEASE ORAL at 11:11

## 2021-12-20 RX ADMIN — OXYCODONE AND ACETAMINOPHEN 1 TABLET(S): 5; 325 TABLET ORAL at 02:14

## 2021-12-20 RX ADMIN — Medication 166.67 MILLIGRAM(S): at 05:02

## 2021-12-20 RX ADMIN — GABAPENTIN 800 MILLIGRAM(S): 400 CAPSULE ORAL at 14:13

## 2021-12-20 RX ADMIN — Medication 2: at 08:23

## 2021-12-20 RX ADMIN — LISINOPRIL 2.5 MILLIGRAM(S): 2.5 TABLET ORAL at 05:09

## 2021-12-20 RX ADMIN — SODIUM ZIRCONIUM CYCLOSILICATE 10 GRAM(S): 10 POWDER, FOR SUSPENSION ORAL at 08:36

## 2021-12-20 RX ADMIN — ATORVASTATIN CALCIUM 40 MILLIGRAM(S): 80 TABLET, FILM COATED ORAL at 21:10

## 2021-12-20 RX ADMIN — AMPICILLIN SODIUM AND SULBACTAM SODIUM 200 GRAM(S): 250; 125 INJECTION, POWDER, FOR SUSPENSION INTRAMUSCULAR; INTRAVENOUS at 07:39

## 2021-12-20 RX ADMIN — OXYCODONE AND ACETAMINOPHEN 1 TABLET(S): 5; 325 TABLET ORAL at 20:13

## 2021-12-20 RX ADMIN — Medication 1: at 11:57

## 2021-12-20 RX ADMIN — OXYCODONE AND ACETAMINOPHEN 1 TABLET(S): 5; 325 TABLET ORAL at 20:45

## 2021-12-20 RX ADMIN — HEPARIN SODIUM 5000 UNIT(S): 5000 INJECTION INTRAVENOUS; SUBCUTANEOUS at 21:09

## 2021-12-20 RX ADMIN — AMPICILLIN SODIUM AND SULBACTAM SODIUM 200 GRAM(S): 250; 125 INJECTION, POWDER, FOR SUSPENSION INTRAMUSCULAR; INTRAVENOUS at 02:14

## 2021-12-20 RX ADMIN — GABAPENTIN 800 MILLIGRAM(S): 400 CAPSULE ORAL at 21:11

## 2021-12-20 RX ADMIN — Medication 650 MILLIGRAM(S): at 21:38

## 2021-12-20 RX ADMIN — GABAPENTIN 800 MILLIGRAM(S): 400 CAPSULE ORAL at 05:09

## 2021-12-20 RX ADMIN — OXYCODONE AND ACETAMINOPHEN 1 TABLET(S): 5; 325 TABLET ORAL at 02:44

## 2021-12-20 RX ADMIN — HEPARIN SODIUM 5000 UNIT(S): 5000 INJECTION INTRAVENOUS; SUBCUTANEOUS at 14:13

## 2021-12-20 RX ADMIN — AMPICILLIN SODIUM AND SULBACTAM SODIUM 200 GRAM(S): 250; 125 INJECTION, POWDER, FOR SUSPENSION INTRAMUSCULAR; INTRAVENOUS at 21:09

## 2021-12-20 NOTE — PROGRESS NOTE ADULT - PROBLEM SELECTOR PLAN 6
-rate controlled   -pt was on Coumadin and stopped taking for about a month   -cont to hold Coumadin for now since pt is likely to ne be noncompliant with the regimen   -consider adding a DOAC on discharge -rate controlled   -pt was on Coumadin and stopped taking for about a month   -cont to hold Coumadin for now since pt is likely to be  noncompliant with the regimen   -consider adding a DOAC on discharge

## 2021-12-20 NOTE — CONSULT NOTE ADULT - ASSESSMENT
Pt is a 76 year old male from home with PMHx of atrial fibrillation (was on coumadin until 1month ago), hypertension, hyperlipidemia, DM, who is admitted for acute encephalopathy secondary to diabetic foot ulcer. Pt received one dose of levaquine in ED and switched to vancomycin and amp/sulbactam since admission. Xray of left foot without evidence of osteomyelitis. Pt scheduled for MRI of left foot. Pt without elevated WBC or fever. Sed rate of 86; C-reactive of 107. Pt mental status significantly improved now.     #1 left foot cellulitis secondary to diabetic foot ulcer (osteomyelitis?)   -- decrease vancomycin 1g q24hr   -- obtain vancomycin trough around 3rd dose  -- continue amp/sulbactam 3g q6hr  -- f/u blood cultures   -- f/u MRI of left foot    #2 acute encephalopathy - resolved now    Pt is a 76 year old male from home with PMHx of atrial fibrillation (was on coumadin until 1month ago), hypertension, hyperlipidemia, DM, who is admitted for acute encephalopathy secondary to diabetic foot ulcer. Pt received one dose of levaquine in ED and switched to vancomycin and amp/sulbactam since admission. Xray of left foot without evidence of osteomyelitis. Pt scheduled for MRI of left foot. Pt without elevated WBC or fever. Sed rate of 86; C-reactive of 107. Pt mental status significantly improved now.     #1 left foot cellulitis secondary to diabetic foot ulcer (osteomyelitis?)   -- decrease vancomycin to 1g q24hr   -- obtain vancomycin trough around 3rd dose  -- continue amp/sulbactam 3g q6hr  -- f/u blood cultures   -- f/u MRI of left foot    #2 acute encephalopathy - resolved now    Pt is a 76 year old male from home with PMHx of atrial fibrillation, hypertension, hyperlipidemia, DM admitted for sepsis with acute encephalopathy secondary to diabetic foot ulcer. Pt received one dose of levaquine in ED and switched to vancomycin and amp/sulbactam. Xray of left foot with multiple metatarsal Fx but without radiographic evidence of osteomyelitis. Concerned about osteomyelitis in conjunction with cellulitis near the ulcer. Pt scheduled for MRI of left foot.     #1 left foot cellulitis secondary to diabetic foot ulcer with concern for osteomyelitis   -- decrease vancomycin to 1g q24hr   -- obtain vancomycin trough around 3rd dose  -- continue amp/sulbactam 3g q6hr  -- f/u blood cultures   -- MRI of left foot    #2 acute encephalopathy - resolved     Discussed above with medical team, including DR. Leiva.

## 2021-12-20 NOTE — PROGRESS NOTE ADULT - PROBLEM SELECTOR PLAN 7
-poorly controlled diabetes with vascular complication   -on Metformin and Glimepiride at home   -cont ISSC   -diabetic diet

## 2021-12-20 NOTE — PROGRESS NOTE ADULT - PROBLEM SELECTOR PLAN 5
-PATTY on baseline stage 3 CKD likely pre renal   -s/p IVF in ED   -avoid nephrotoxins   -mon renal function

## 2021-12-20 NOTE — CONSULT NOTE ADULT - SUBJECTIVE AND OBJECTIVE BOX
Patient is a 76y old  Male who presents with a chief complaint of AMS (20 Dec 2021 14:19)    Podiatry HPI: 77 y/o male with a PMHx of DM, HTN, A Fib, Sciatica, Cataract, OA of Right Hip presented to the ED for altered mental status. Podiatry was consulted for Left Foot Cellulitis/Wound. Patient states that his podiatrist was debriding a callus on the Left Foot and the podiatrist went too deep which caused the initial wound about 3 weeks ago and since then it has been getting worse. Denies any pain to the Left Foot. States that he cannot feel any painful stimuli to his left foot due to diabetic neuropathy. States that he would like to get back on his feet and exercise because that is how he is able to maintain his sugar levels and patient states that he has always had a left foot flat arch. Denies any trauma to the area. Patient states that yesterday he was having chills but today he denies any constitutional symptoms of N/V/C/F/SOB. Denies any other pedal complaints at this moment.       HPI:  Patient is a 76 year-old male from home with past medical history of atrial fibrillation (was on coumadin until 1month ago), hypertension, hyperlipidemia, diabetes mellitus presents to ED for AMS. Pt states that today he started feeling freezing cold and was shivering. Talked to wife for more information and she stated that patient was feeling very cold though house was warm. He stopped shivering for a while and the started again. During his second episode of shivering from feeling extremely cold he got very confused and could not talk, understand what she was saying and was not making sense. After a while the shivering stopped and he regained his normal mental status. Denies fever, chest pain. abdominal pain, diarrhea, constipation. Pt states he has incontinence and an ulcer on his left foot. He states his podiatrist scraped a callus which caused the ulcer. He has diabetic neuropathy so does not feel pain around the ulcer. He did notice the left foot got more erythematous today.   He stopped taking warfarin 1 month ago as he states it was too much of an hassle to get INR checked and avoid eating certain foods.   (19 Dec 2021 22:54)      PMH:Diabetes    Hypertension    Osteoarthritis of right hip    Cataract    Atrial fibrillation    Vertigo    Sciatica      Allergies: No Known Allergies    Medications: ampicillin/sulbactam  IVPB 3 Gram(s) IV Intermittent every 6 hours  ampicillin/sulbactam  IVPB      atorvastatin 40 milliGRAM(s) Oral at bedtime  buPROPion XL (24-Hour) . 150 milliGRAM(s) Oral daily  gabapentin 800 milliGRAM(s) Oral three times a day  heparin   Injectable 5000 Unit(s) SubCutaneous every 8 hours  insulin lispro (ADMELOG) corrective regimen sliding scale   SubCutaneous three times a day before meals  lidocaine 1% Injectable 10 milliLiter(s) Local Injection once  lisinopril 2.5 milliGRAM(s) Oral daily  oxycodone    5 mG/acetaminophen 325 mG 1 Tablet(s) Oral every 6 hours PRN  vancomycin  IVPB 1250 milliGRAM(s) IV Intermittent every 24 hours    FH:Family history of coronary artery disease (Sibling)    Family history of breast cancer in sister (Sibling)      PSX: S/P total hip arthroplasty      SH: Social History:  1 glass of wine/beer with diner (19 Dec 2021 22:54)      Vital Signs Last 24 Hrs  T(C): 36.7 (20 Dec 2021 16:00), Max: 37.4 (19 Dec 2021 20:49)  T(F): 98.1 (20 Dec 2021 16:00), Max: 99.4 (19 Dec 2021 20:49)  HR: 65 (20 Dec 2021 16:00) (51 - 91)  BP: 103/63 (20 Dec 2021 16:00) (103/63 - 121/69)  BP(mean): 76 (20 Dec 2021 16:00) (76 - 76)  RR: 18 (20 Dec 2021 16:00) (17 - 18)  SpO2: 98% (20 Dec 2021 16:00) (96% - 99%)    LABS                        9.0    8.23  )-----------( 116      ( 20 Dec 2021 06:15 )             28.5               12-20    141  |  110<H>  |  51<H>  ----------------------------<  228<H>  5.7<H>   |  26  |  1.80<H>    Ca    9.0      20 Dec 2021 06:15  Phos  3.8     12-20  Mg     2.5     12-20    TPro  7.2  /  Alb  2.8<L>  /  TBili  0.4  /  DBili  x   /  AST  12  /  ALT  10  /  AlkPhos  70  12-20     C-Reactive Protein, Serum: 107 mg/L (12-20-21 @ 09:36)  Sedimentation Rate, Erythrocyte: 86 mm/Hr (12-20-21 @ 06:15)  WBC Count: 8.23 K/uL (12-20-21 @ 06:15)  C-Reactive Protein, Serum: 94 mg/L (12-20-21 @ 04:01)  WBC Count: 5.73 K/uL (12-19-21 @ 19:15)  Sedimentation Rate, Erythrocyte: 95 mm/Hr (12-19-21 @ 19:15)      PHYSICAL EXAM    LE Focused:    Vasc:  DP/PT pulses were palpable, bilaterally. Generalized edema noted to the Left Foot  Derm:  Left Plantar medial arch wound noted measuring 3cm x 3cm x 3cm with 5cm tunnelling noted to the surrounding area with +ptb and mild fluctuance noted to the surrounding wound. Left Foot Wound has Redness, increased warmth noted, mild malodor, purulent drainage, streaking up to the left ankle and plantar distal/lateral arch.   Post Bedside Incision and Drainage (12/20  Neuro:  Protective sensation absent, bilaterally.   MSK: No pain on palpation to the Left Foot Wound. Denies any calf pain, bilaterally.     Imaging:     EXAM:  MR FOOT LT                          PROCEDURE DATE:  12/20/2021      INTERPRETATION:  LEFT FOOT MRI    CLINICAL INFORMATION: Left foot wound. Eval for osteomyelitis.  TECHNIQUE: Multiplanar, multisequence MRI was obtained of the left foot.    COMPARISON: Left foot MRI 12/23/2015.    FINDINGS:    Plantar soft tissue wound is present at the level of the first   tarsometatarsal articulation with tract extending to the bone. There is   edema along the plantar aspect of the distal medial cuneiform and base of   the first metatarsal with preservation of the T1 marrow signal. There is   moderate tarsometatarsal arthrosis and arthrosis of the articulation of   the navicular and lateral cuneiform. There are subchondral fractures of   the first second and third metatarsal heads. There is diffuse increased   muscle signal, most consistent with denervation. There is diffuse   subcutaneous edema.    IMPRESSION:    Plantar soft tissue wound with tract extending to the undersurface of the   first tarsometatarsal articulation.  Edema within the base of the first tarsometatarsal articulation adjacent   to the tract with preservation of the T1 marrow signal, which may   represent sequela of early osteomyelitis.  First second and third metatarsal head subchondral fractures.      Cultures:   Culture pending

## 2021-12-20 NOTE — PROGRESS NOTE ADULT - PROBLEM SELECTOR PLAN 1
-likely due to left foot diabetic foot ulcer   -cont Vanco and Unasyn   -f/u cultures   -cont local wound care   -ID Dr. Maxwell   -Podiatry consult

## 2021-12-20 NOTE — PROGRESS NOTE ADULT - ASSESSMENT
P76 year-old male from home with past medical history of atrial fibrillation (was on coumadin until 1month ago), hypertension, hyperlipidemia, diabetes mellitus presents to ED for AMS. Admitted for AMS and diabetic foot ulcer, started on abx, ID and Podiatry consult

## 2021-12-20 NOTE — PROGRESS NOTE ADULT - ATTENDING COMMENTS
A/P# Metabolic toxic encephalopathy # Uncontrolled DM # Afib # PATTY on CKD Stage 3 # Fracture Left Foot    -mental status back to normal, per patient and his partner he has been having memory loss for last 6 months now-out patient Neurology work up for early dementia.   -ID consult appreciated cw vanco and unasyn- renally dose all meds  -ivf  -MRI to r.o osteomyelitis  -podiatry consult- for foot fractures.- likely chronic in nature

## 2021-12-20 NOTE — PROGRESS NOTE ADULT - PROBLEM SELECTOR PLAN 8
-controlled  -on lisinopril and furosemide at home  -cont Lisinopril, hold Lasix for borderline BP, restart when feasible   -mon BP

## 2021-12-20 NOTE — CONSULT NOTE ADULT - ASSESSMENT
A:  Cellulitis, Left Foot and Ankle.  Abscess, Left Plantar Foot  Left Foot Plantar Wound  Diabetic Neuropathy, b/l  DM    Plan:  Patient examined and chart reviewed  Explained all clinical findings to the patient to the patient's satisfaction.  Educated the patient about the importance of glycemic control in wound healing  Xrays reviewed  MRI reviewed  Cultures pending  Verbal consent attained  Aseptic incision and drainage of left foot plantar arch wound was performed down to and including subcutaneous tissue using a sterile #15 blade with excisional debridement of non-viable soft tissue and upon expressed, 3cc's of purulent drainage was noted.  Flushed the Left Foot incision site with 100cc's of sterile saline and betadine.  Patient tolerated the procedure well.  Dressed the left foot with iodoform packing, DSD, gauze, dianna, and ace bandage.  Patient to keep the dressings clean, dry, and intact  Recommend Left Lower Extremity elevation.  Recommend antibiotics per ID team for Left Foot Cellulitis and Left Foot Wound  Podiatry will follow in house.  Discussed with Dr. Hart and seen bedside with Dr. Henson.      A:  Cellulitis, Left Foot and Ankle.  Abscess, Left Plantar Foot  Left Foot Plantar Wound  Diabetic Neuropathy, b/l  DM    Plan:  Patient examined and chart reviewed  Explained all clinical findings to the patient to the patient's satisfaction.  Educated the patient about the importance of glycemic control in wound healing  Xrays reviewed  MRI reviewed  Cultures pending  Verbal consent attained  Aseptic incision and drainage of left foot plantar arch wound was performed down to and including subcutaneous tissue using a sterile #15 blade with excisional debridement of non-viable soft tissue and upon expressed, 3cc's of purulent drainage was noted.  Flushed the Left Foot incision site with 100cc's of sterile saline and betadine.  Patient tolerated the procedure well.  Dressed the left foot with iodoform packing, DSD, gauze, dianna, and ace bandage.  Patient to keep the dressings clean, dry, and intact  Recommend Left Lower Extremity elevation.  Recommend antibiotics per ID team for Left Foot Cellulitis and Left Foot Wound  Podiatry will follow in house.  Discussed with Dr. Hart      A:  Cellulitis, Left Foot and Ankle.  Abscess, Left Plantar Foot  Left Foot Plantar Wound  Diabetic Neuropathy, b/l  DM    Plan:  Patient examined and chart reviewed  Explained all clinical findings to the patient to the patient's satisfaction.  Educated the patient about the importance of glycemic control in wound healing  Xrays reviewed  MRI reviewed  Cultures pending  Verbal consent attained  Aseptic incision and drainage of left foot plantar arch wound was performed down to and including subcutaneous tissue using a sterile #15 blade with excisional debridement of non-viable soft tissue and upon expressed, 3cc's of purulent drainage was noted.  Flushed the Left Foot incision site with 100cc's of sterile saline and betadine.  Patient tolerated the procedure well.  Dressed the left foot with iodoform packing, DSD, gauze, dianna, and ace bandage.  Patient to keep the dressings clean, dry, and intact  Recommend Left Lower Extremity elevation.  Recommend antibiotics per ID team for Left Foot Cellulitis and Left Foot Wound  Podiatry will follow in house.  Discussed with Dr. Hart and seen bedside with Dr. Henson

## 2021-12-20 NOTE — PROGRESS NOTE ADULT - SUBJECTIVE AND OBJECTIVE BOX
Patient is a 76y old  Male who presents with a chief complaint of AMS (19 Dec 2021 22:54)    OVERNIGHT EVENTS: no acute events overnight, sitting up in bed eating breakfast,  offering no new complaints     REVIEW OF SYSTEMS:  CONSTITUTIONAL: No fever, chills  NECK: No pain or stiffness  RESPIRATORY: No cough, SOB  CARDIOVASCULAR: No chest pain, palpitations  GASTROINTESTINAL: No abdominal pain. No nausea, vomiting, or diarrhea  GENITOURINARY: No dysuria  NEUROLOGICAL: No HA  MUSCULOSKELETAL: left foot ulcer       T(C): 36.6 (21 @ 08:00), Max: 39 (21 @ 18:42)  HR: 55 (21 @ 08:00) (51 - 103)  BP: 107/64 (21 @ 08:00) (105/66 - 143/77)  RR: 17 (21 @ 08:00) (16 - 18)  SpO2: 98% (21 @ 08:00) (96% - 99%)  Wt(kg): --Vital Signs Last 24 Hrs  T(C): 36.6 (20 Dec 2021 08:00), Max: 39 (19 Dec 2021 18:42)  T(F): 97.8 (20 Dec 2021 08:00), Max: 102.2 (19 Dec 2021 18:42)  HR: 55 (20 Dec 2021 08:00) (51 - 103)  BP: 107/64 (20 Dec 2021 08:00) (105/66 - 143/77)  BP(mean): --  RR: 17 (20 Dec 2021 08:00) (16 - 18)  SpO2: 98% (20 Dec 2021 08:00) (96% - 99%)    MEDICATIONS  (STANDING):  ampicillin/sulbactam  IVPB 3 Gram(s) IV Intermittent every 6 hours  ampicillin/sulbactam  IVPB      atorvastatin 40 milliGRAM(s) Oral at bedtime  buPROPion XL (24-Hour) . 150 milliGRAM(s) Oral daily  gabapentin 800 milliGRAM(s) Oral three times a day  insulin lispro (ADMELOG) corrective regimen sliding scale   SubCutaneous three times a day before meals  lisinopril 2.5 milliGRAM(s) Oral daily  vancomycin  IVPB 1250 milliGRAM(s) IV Intermittent every 24 hours    MEDICATIONS  (PRN):  oxycodone    5 mG/acetaminophen 325 mG 1 Tablet(s) Oral every 6 hours PRN Moderate Pain      PHYSICAL EXAM:  GENERAL: NAD  EYES: clear conjunctiva  ENMT: Moist mucous membranes  NECK: Supple, No JVD  CHEST/LUNG: Clear to auscultation bilaterally; No rales, rhonchi, wheezing, or rubs  HEART: S1, S2, Regular rate and rhythm  ABDOMEN: Soft, Nontender, Nondistended; Bowel sounds present  NEURO: Alert & Oriented X3, no focal neuro deficit   EXTREMITIES: No LE edema, no calf tenderness  SKIN: left foot ulcer with occlusive dsg     Consultant(s) Notes Reviewed:  [x ] YES  [ ] NO  Care Discussed with Consultants/Other Providers [ x] YES  [ ] NO    LABS:                        9.0    8.23  )-----------( 116      ( 20 Dec 2021 06:15 )             28.5     12-20    141  |  110<H>  |  51<H>  ----------------------------<  228<H>  5.7<H>   |  26  |  1.80<H>    Ca    9.0      20 Dec 2021 06:15  Phos  3.8     12-20  Mg     2.5     12-20    TPro  7.2  /  Alb  2.8<L>  /  TBili  0.4  /  DBili  x   /  AST  12  /  ALT  10  /  AlkPhos  70  12-20    PT/INR - ( 20 Dec 2021 06:15 )   PT: 16.0 sec;   INR: 1.36 ratio       PTT - ( 20 Dec 2021 06:15 )  PTT:33.8 sec  CAPILLARY BLOOD GLUCOSE    POCT Blood Glucose.: 229 mg/dL (20 Dec 2021 07:53)  POCT Blood Glucose.: 259 mg/dL (20 Dec 2021 02:10)    Urinalysis Basic - ( 19 Dec 2021 19:29 )    Color: Yellow / Appearance: Clear / S.015 / pH: x  Gluc: x / Ketone: Negative  / Bili: Negative / Urobili: Negative   Blood: x / Protein: 100 / Nitrite: Negative   Leuk Esterase: Negative / RBC: 0-2 /HPF / WBC 0-2 /HPF   Sq Epi: x / Non Sq Epi: Few /HPF / Bacteria: x      RADIOLOGY & ADDITIONAL TESTS:        Imaging Personally Reviewed:  [ ] YES  [ ] NO

## 2021-12-20 NOTE — PATIENT PROFILE ADULT - FALL HARM RISK - HARM RISK INTERVENTIONS

## 2021-12-20 NOTE — CONSULT NOTE ADULT - SUBJECTIVE AND OBJECTIVE BOX
HPI/course in hospital:   Pt is a 76 year old male from home with PMHx of atrial fibrillation (was on coumadin until 1month ago), hypertension, hyperlipidemia, DM, presented with [facility] ED for AMS. Pt states that he started feeling freezing cold and was shivering yesterday afternoon at home. He stopped shivering for unknown period of time and shivering started again. During his second episode of shivering, he felt extremely cold, became confused, unable to understand or talk to his girlfriend. After awhile the shivering stopped and he regained his normal mental status. Pt denies fever, chest pain, abdominal pain, diarrhea, constipation. Pt states he has incontinence and a diabetic ulcer on his left foot. He states his podiatrist scraped a callus which caused the ulcer approximately 3-4 weeks ago. He has diabetic neuropathy that he does not sensation around the ulcer. Pt ambulates with a cane and states he has no sacral or buttock breakdown.     Of note, patient stopped taking Coumadin 1 month ago as he states it was too much of an hassle to get INR checked and avoid eating certain foods.        PAST MEDICAL & SURGICAL HISTORY:  Diabetes  Hypertension  Osteoarthritis of right hip  Cataract, right eye  Atrial fibrillation (not on Coumadin over 1 month)  Vertigo  Chronic back pain  Sciatica    S/P total hip arthroplasty  S/p Abdominoplasty      MEDS:  ampicillin/sulbactam  IVPB 3 Gram(s) IV Intermittent every 6 hours (D1)  ampicillin/sulbactam  IVPB      atorvastatin 40 milliGRAM(s) Oral at bedtime  buPROPion XL (24-Hour) . 150 milliGRAM(s) Oral daily  gabapentin 800 milliGRAM(s) Oral three times a day  heparin   Injectable 5000 Unit(s) SubCutaneous every 8 hours  insulin lispro (ADMELOG) corrective regimen sliding scale   SubCutaneous three times a day before meals  lidocaine 1% Injectable 10 milliLiter(s) Local Injection once  lisinopril 2.5 milliGRAM(s) Oral daily  oxycodone    5 mG/acetaminophen 325 mG 1 Tablet(s) Oral every 6 hours PRN  vancomycin  IVPB 1250 milliGRAM(s) IV Intermittent every 24 hours (D1)    ALLERGIES  No Known Allergies    SOCIAL HISTORY: Currently on disability and lives with his girlfriend; denies smoking cigarette or illicit drug use; drinks 1 glass of wine or beer every night.     FAMILY HISTORY:  Family history of coronary artery disease (Sibling)  Family history of breast cancer in sister (Sibling)      ROS:     General: no fever or chills    Skin: no rashes  	  HEENT: no complaints    Respiratory and Thorax: no SOB or cough  	  Cardiovascular: no CP	    Gastrointestinal: no N, V, diarrhea, constipation	     Genitourinary: no c/o urinary urgency, frequency or pain    Musculoskeletal:	 + chronic lower back pain; no feet pain; see HPI      Endocrine: DM    PHYSICAL EXAM:    Vital Signs Last 24 Hrs  T(C): 36.7 (20 Dec 2021 11:13), Max: 39 (19 Dec 2021 18:42)  T(F): 98 (20 Dec 2021 11:13), Max: 102.2 (19 Dec 2021 18:42)  HR: 57 (20 Dec 2021 11:13) (51 - 103)  BP: 121/69 (20 Dec 2021 11:13) (105/66 - 143/77)  BP(mean): --  RR: 17 (20 Dec 2021 11:13) (16 - 18)  SpO2: 98% (20 Dec 2021 11:13) (96% - 99%)      Gen: WD WN W male in NAD    HEENT: NC/AT; conj. clear; mouth - good oral hygiene    Neck: supple    Lymph Nodes: no enlarged submand, cervical or supraclav LNs    Back: no verteb or CVAT tenderness    Chest/Thorax: clear to auscultation    Cardiovascular: S1 S2 irregular beats with no murmurs, gallops, rubs    Gastrointestinal: soft, nontender with BS present; scar noted on lower abd (tummy tuck scar)    Genitourinary: no diaz     Extremities: stasis changes noted on bilateral lower extremities; onychomycosis on bilateral big toes   LLE: centered ulcer with drainage on medial aspect of plantar surface with crusted surrounding; warmth and erythema extending 6cm from the base to superior and dorsum; no tenderness to palpation  RLE: no erythema or swelling    Neurological: A+O x3; Cr n grossly intact;     Skin: no rashes     Psychiatric: affect appropriate    LABS/DIAGNOSTIC TESTS:                          9.0    8.23  )-----------( 116      ( 20 Dec 2021 06:15 )             28.5     WBC Count: 8.23 K/uL (-20 @ 06:15)  WBC Count: 5.73 K/uL ( @ 19:15)          141  |  110<H>  |  51<H>  ----------------------------<  228<H>  5.7<H>   |  26  |  1.80<H>    Ca    9.0      20 Dec 2021 06:15  Phos  3.8       Mg     2.5         TPro  7.2  /  Alb  2.8<L>  /  TBili  0.4  /  DBili  x   /  AST  12  /  ALT  10  /  AlkPhos  70  12    Urinalysis Basic - ( 19 Dec 2021 19:29 )    Color: Yellow / Appearance: Clear / S.015 / pH: x  Gluc: x / Ketone: Negative  / Bili: Negative / Urobili: Negative   Blood: x / Protein: 100 / Nitrite: Negative   Leuk Esterase: Negative / RBC: 0-2 /HPF / WBC 0-2 /HPF   Sq Epi: x / Non Sq Epi: Few /HPF / Bacteria: x    LIVER FUNCTIONS - ( 20 Dec 2021 06:15 )  Alb: 2.8 g/dL / Pro: 7.2 g/dL / ALK PHOS: 70 U/L / ALT: 10 U/L DA / AST: 12 U/L / GGT: x           PT/INR - ( 20 Dec 2021 06:15 )   PT: 16.0 sec;   INR: 1.36 ratio      PTT - ( 20 Dec 2021 06:15 )  PTT:33.8 sec    LACTATE:Lactate, Blood: 1.0 mmol/L ( @ 00:18)  Lactate, Blood: 2.2 mmol/L ( @ 19:15)    Sedimentation Rate, Erythrocyte (21 @ 06:15)    Sedimentation Rate, Erythrocyte: 86 mm/Hr  Sedimentation Rate, Erythrocyte (21 @ 19:15)    Sedimentation Rate, Erythrocyte: 95 mm/Hr    C-Reactive Protein, Serum (21 @ 09:36)    C-Reactive Protein, Serum: 107 mg/L  C-Reactive Protein, Serum (21 @ 04:01)    C-Reactive Protein, Serum: 94 mg/L      RADIOLOGY  < from: Xray Chest 1 View- PORTABLE-Urgent (12.19.21 @ 21:09) >    ACC: [id number] EXAM:  XR FOOT 2 VIEWS LT                        ACC: [id number] EXAM:  XR CHEST PORTABLE URGENT 1V                        ACC: [id number] EXAM:  XR TIB FIB AP LAT 2 VIEWS LT                          PROCEDURE DATE:  2021        INTERPRETATION:  Left tib-fib, left foot, and chest. Patient has sepsis.   Concern is gas gangrene.    Left tib-fib. 2 views. 4 images.    Arterial calcifications. Slight degeneration in the knee.    No bone destruction or fracture.    Left foot. 3 views. Arterial calcification.    There is degeneration again seen of the tarsometatarsal junction.    There are small nondisplaced fractures of the distal aspects of the   second and third metatarsals which are new since 2015. These   fractures may not be acute.    There is first MP and IP degeneration.    There is an acute appearing fracture of the proximal anterior corner of   the proximal phalanx of the fifth left toe.    No visible tracking air.    AP chest on 2021 at 8:49 PM. 2 images.    Heart magnified by technique.    Lungs are clear.    IMPRESSION: Chest unremarkable.    There are fractures of the distal aspects of the second, third, and   tarsals and the proximal phalanx of the fifth toe.    The fifth toe fracture is acute. The other fractures could be subacute.                   HPI/course in hospital:   Pt is a 76 year old male from home with PMHx of atrial fibrillation (was on coumadin until 1month ago), hypertension, hyperlipidemia, DM, presented with [facility] ED for AMS. Pt states that he started feeling freezing cold and was shivering 1 day PTA. He stopped shivering for unknown period of time and shivering started again. During his second episode of shivering, he felt extremely cold, became confused, unable to understand or talk to his girlfriend. After awhile the shivering stopped and he regained his normal mental status. Pt denies fever, chest pain, abdominal pain, diarrhea, constipation. Pt states he has incontinence and a diabetic ulcer on his left foot. He states his podiatrist scraped a callus which caused the ulcer approximately 3-4 weeks ago. He has diabetic neuropathy with no pain around the ulcer. Pt ambulates with a cane or walker and states he has no sacral or buttock breakdown.     Of note, patient stopped taking Coumadin 1 month ago as he said it was too much of an hassle to get INR checked and avoid eating certain foods.  Dx with sepsis in the ED and given levofloxacin, which was since changed to vancomycin and ampicillin/sulbactam.       PAST MEDICAL & SURGICAL HISTORY:  Diabetes  Hypertension  Osteoarthritis of right hip  Cataract, right eye  Atrial fibrillation (not on Coumadin over 1 month)  Vertigo  Chronic back pain  Sciatica    S/P total hip arthroplasty  S/p Abdominoplasty      MEDS:  ampicillin/sulbactam  IVPB 3 Gram(s) IV Intermittent every 6 hours (D1)  ampicillin/sulbactam  IVPB      atorvastatin 40 milliGRAM(s) Oral at bedtime  buPROPion XL (24-Hour) . 150 milliGRAM(s) Oral daily  gabapentin 800 milliGRAM(s) Oral three times a day  heparin   Injectable 5000 Unit(s) SubCutaneous every 8 hours  insulin lispro (ADMELOG) corrective regimen sliding scale   SubCutaneous three times a day before meals  lidocaine 1% Injectable 10 milliLiter(s) Local Injection once  lisinopril 2.5 milliGRAM(s) Oral daily  oxycodone    5 mG/acetaminophen 325 mG 1 Tablet(s) Oral every 6 hours PRN  vancomycin  IVPB 1250 milliGRAM(s) IV Intermittent every 24 hours (D1)    ALLERGIES  No Known Allergies    SOCIAL HISTORY: Currently on disability and lives with his girlfriend; denies smoking cigarette or illicit drug use; drinks 1 glass of wine or beer every night.     FAMILY HISTORY:  Family history of coronary artery disease (Sibling)  Family history of breast cancer in sister (Sibling)      ROS:     General: no fever or chills    Skin: no rashes  	  HEENT: no complaints    Respiratory and Thorax: no SOB or cough  	  Cardiovascular: no CP	    Gastrointestinal: no N, V, diarrhea, constipation	     Genitourinary: no c/o urinary urgency, frequency or pain; + incontinence    Musculoskeletal:	 + chronic lower back pain; no feet pain; see HPI      Endocrine: DM      PHYSICAL EXAM:    Vital Signs Last 24 Hrs  T(C): 36.7 (20 Dec 2021 11:13), Max: 39 (19 Dec 2021 18:42)  T(F): 98 (20 Dec 2021 11:13), Max: 102.2 (19 Dec 2021 18:42)  HR: 57 (20 Dec 2021 11:13) (51 - 103)  BP: 121/69 (20 Dec 2021 11:13) (105/66 - 143/77)  BP(mean): --  RR: 17 (20 Dec 2021 11:13) (16 - 18)  SpO2: 98% (20 Dec 2021 11:13) (96% - 99%)      Gen: WD WN W male in NAD    HEENT: NC/AT; conj. clear; mouth - good oral hygiene    Neck: supple    Lymph Nodes: no enlarged submand, cervical or supraclav LNs    Back: no vertebral or CVA tenderness    Chest/Thorax: clear to auscultation    Cardiovascular: S1 S2 irregular beats with no murmurs, gallops, rubs    Gastrointestinal: soft, nontender with BS present; scar noted on lower abd (tummy tuck scar)    Genitourinary: no diaz     Extremities: hyperpigmented changes noted on bilateral lower extremities on tibia; onychomycosis on bilateral  toes   LLE: on medial aspect of foot near the 1st metatarsal is an ulcer measuring approx. [id number] with overlying exudate and crusting around margins of the ulcer; warmth and erythema extending about 6cm superiorly and into the dorsum of the foot; + erythema and warmth also extending inferiorly from the ulcer; no tenderness to palpation  RLE: no erythema or swelling    Neurological: A+O x3; Cr n grossly intact; toes downgoing    Skin: no rashes     Psychiatric: affect appropriate      LABS/DIAGNOSTIC TESTS:                        9.0    8.23  )-----------( 116      ( 20 Dec 2021 06:15 )             28.5     WBC Count: 8.23 K/uL ( @ 06:15)  WBC Count: 5.73 K/uL ( @ 19:15)          141  |  110<H>  |  51<H>  ----------------------------<  228<H>  5.7<H>   |  26  |  1.80<H>    Ca    9.0      20 Dec 2021 06:15  Phos  3.8       Mg     2.5         TPro  7.2  /  Alb  2.8<L>  /  TBili  0.4  /  DBili  x   /  AST  12  /  ALT  10  /  AlkPhos  70      Urinalysis Basic - ( 19 Dec 2021 19:29 )    Color: Yellow / Appearance: Clear / S.015 / pH: x  Gluc: x / Ketone: Negative  / Bili: Negative / Urobili: Negative   Blood: x / Protein: 100 / Nitrite: Negative   Leuk Esterase: Negative / RBC: 0-2 /HPF / WBC 0-2 /HPF   Sq Epi: x / Non Sq Epi: Few /HPF / Bacteria: x    LIVER FUNCTIONS - ( 20 Dec 2021 06:15 )  Alb: 2.8 g/dL / Pro: 7.2 g/dL / ALK PHOS: 70 U/L / ALT: 10 U/L DA / AST: 12 U/L / GGT: x           PT/INR - ( 20 Dec 2021 06:15 )   PT: 16.0 sec;   INR: 1.36 ratio      PTT - ( 20 Dec 2021 06:15 )  PTT:33.8 sec    LACTATE:Lactate, Blood: 1.0 mmol/L ( @ 00:18)  Lactate, Blood: 2.2 mmol/L ( @ 19:15)    Sedimentation Rate, Erythrocyte (21 @ 06:15)    Sedimentation Rate, Erythrocyte: 86 mm/Hr  Sedimentation Rate, Erythrocyte (21 @ 19:15)    Sedimentation Rate, Erythrocyte: 95 mm/Hr    C-Reactive Protein, Serum (21 @ 09:36)    C-Reactive Protein, Serum: 107 mg/L  C-Reactive Protein, Serum (21 @ 04:01)    C-Reactive Protein, Serum: 94 mg/L      RADIOLOGY  < from: Xray Chest 1 View- PORTABLE-Urgent (21 @ 21:09) >    ACC: [id number] EXAM:  XR FOOT 2 VIEWS LT                        ACC: [id number] EXAM:  XR CHEST PORTABLE URGENT 1V                        ACC: [id number] EXAM:  XR TIB FIB AP LAT 2 VIEWS LT                          PROCEDURE DATE:  2021        INTERPRETATION:  Left tib-fib, left foot, and chest. Patient has sepsis.   Concern is gas gangrene.    Left tib-fib. 2 views. 4 images.    Arterial calcifications. Slight degeneration in the knee.    No bone destruction or fracture.    Left foot. 3 views. Arterial calcification.    There is degeneration again seen of the tarsometatarsal junction.    There are small nondisplaced fractures of the distal aspects of the   second and third metatarsals which are new since 2015. These   fractures may not be acute.    There is first MP and IP degeneration.    There is an acute appearing fracture of the proximal anterior corner of   the proximal phalanx of the fifth left toe.    No visible tracking air.    AP chest on 2021 at 8:49 PM. 2 images.    Heart magnified by technique.    Lungs are clear.    IMPRESSION: Chest unremarkable.    There are fractures of the distal aspects of the second, third, and   tarsals and the proximal phalanx of the fifth toe.    The fifth toe fracture is acute. The other fractures could be subacute.

## 2021-12-21 ENCOUNTER — TRANSCRIPTION ENCOUNTER (OUTPATIENT)
Age: 76
End: 2021-12-21

## 2021-12-21 DIAGNOSIS — R78.81 BACTEREMIA: ICD-10-CM

## 2021-12-21 DIAGNOSIS — G93.40 ENCEPHALOPATHY, UNSPECIFIED: ICD-10-CM

## 2021-12-21 DIAGNOSIS — D64.9 ANEMIA, UNSPECIFIED: ICD-10-CM

## 2021-12-21 LAB
-  STREPTOCOCCUS SP. (NOT GRP A, B OR S PNEUMONIAE): SIGNIFICANT CHANGE UP
A1C WITH ESTIMATED AVERAGE GLUCOSE RESULT: 6.2 % — HIGH (ref 4–5.6)
ALBUMIN SERPL ELPH-MCNC: 2.7 G/DL — LOW (ref 3.5–5)
ALP SERPL-CCNC: 60 U/L — SIGNIFICANT CHANGE UP (ref 40–120)
ALT FLD-CCNC: 11 U/L DA — SIGNIFICANT CHANGE UP (ref 10–60)
ANION GAP SERPL CALC-SCNC: 7 MMOL/L — SIGNIFICANT CHANGE UP (ref 5–17)
AST SERPL-CCNC: 15 U/L — SIGNIFICANT CHANGE UP (ref 10–40)
BILIRUB SERPL-MCNC: 0.4 MG/DL — SIGNIFICANT CHANGE UP (ref 0.2–1.2)
BUN SERPL-MCNC: 33 MG/DL — HIGH (ref 7–18)
CALCIUM SERPL-MCNC: 8.8 MG/DL — SIGNIFICANT CHANGE UP (ref 8.4–10.5)
CHLORIDE SERPL-SCNC: 108 MMOL/L — SIGNIFICANT CHANGE UP (ref 96–108)
CO2 SERPL-SCNC: 24 MMOL/L — SIGNIFICANT CHANGE UP (ref 22–31)
CREAT SERPL-MCNC: 1.36 MG/DL — HIGH (ref 0.5–1.3)
CULTURE RESULTS: SIGNIFICANT CHANGE UP
ESTIMATED AVERAGE GLUCOSE: 131 MG/DL — HIGH (ref 68–114)
GLUCOSE BLDC GLUCOMTR-MCNC: 173 MG/DL — HIGH (ref 70–99)
GLUCOSE BLDC GLUCOMTR-MCNC: 188 MG/DL — HIGH (ref 70–99)
GLUCOSE BLDC GLUCOMTR-MCNC: 197 MG/DL — HIGH (ref 70–99)
GLUCOSE BLDC GLUCOMTR-MCNC: 203 MG/DL — HIGH (ref 70–99)
GLUCOSE SERPL-MCNC: 174 MG/DL — HIGH (ref 70–99)
GRAM STN FLD: SIGNIFICANT CHANGE UP
HCT VFR BLD CALC: 26.4 % — LOW (ref 39–50)
HGB BLD-MCNC: 8.3 G/DL — LOW (ref 13–17)
IRON SATN MFR SERPL: 16 UG/DL — LOW (ref 65–170)
IRON SATN MFR SERPL: 6 % — LOW (ref 20–55)
MCHC RBC-ENTMCNC: 28 PG — SIGNIFICANT CHANGE UP (ref 27–34)
MCHC RBC-ENTMCNC: 31.4 GM/DL — LOW (ref 32–36)
MCV RBC AUTO: 89.2 FL — SIGNIFICANT CHANGE UP (ref 80–100)
METHOD TYPE: SIGNIFICANT CHANGE UP
METHOD TYPE: SIGNIFICANT CHANGE UP
MSSA DNA SPEC QL NAA+PROBE: SIGNIFICANT CHANGE UP
NRBC # BLD: 0 /100 WBCS — SIGNIFICANT CHANGE UP (ref 0–0)
PLATELET # BLD AUTO: 112 K/UL — LOW (ref 150–400)
POTASSIUM SERPL-MCNC: 4.5 MMOL/L — SIGNIFICANT CHANGE UP (ref 3.5–5.3)
POTASSIUM SERPL-SCNC: 4.5 MMOL/L — SIGNIFICANT CHANGE UP (ref 3.5–5.3)
PROT SERPL-MCNC: 6.8 G/DL — SIGNIFICANT CHANGE UP (ref 6–8.3)
RBC # BLD: 2.96 M/UL — LOW (ref 4.2–5.8)
RBC # FLD: 12.6 % — SIGNIFICANT CHANGE UP (ref 10.3–14.5)
SODIUM SERPL-SCNC: 139 MMOL/L — SIGNIFICANT CHANGE UP (ref 135–145)
SPECIMEN SOURCE: SIGNIFICANT CHANGE UP
SPECIMEN SOURCE: SIGNIFICANT CHANGE UP
TIBC SERPL-MCNC: 281 UG/DL — SIGNIFICANT CHANGE UP (ref 250–450)
UIBC SERPL-MCNC: 265 UG/DL — SIGNIFICANT CHANGE UP (ref 110–370)
WBC # BLD: 4.89 K/UL — SIGNIFICANT CHANGE UP (ref 3.8–10.5)
WBC # FLD AUTO: 4.89 K/UL — SIGNIFICANT CHANGE UP (ref 3.8–10.5)

## 2021-12-21 PROCEDURE — 99233 SBSQ HOSP IP/OBS HIGH 50: CPT

## 2021-12-21 PROCEDURE — 99233 SBSQ HOSP IP/OBS HIGH 50: CPT | Mod: GC

## 2021-12-21 PROCEDURE — 72148 MRI LUMBAR SPINE W/O DYE: CPT | Mod: 26

## 2021-12-21 RX ORDER — BUPROPION HYDROCHLORIDE 150 MG/1
0 TABLET, EXTENDED RELEASE ORAL
Qty: 0 | Refills: 2 | DISCHARGE

## 2021-12-21 RX ORDER — FERROUS SULFATE 325(65) MG
325 TABLET ORAL
Refills: 0 | Status: DISCONTINUED | OUTPATIENT
Start: 2021-12-21 | End: 2022-01-02

## 2021-12-21 RX ORDER — POLYETHYLENE GLYCOL 3350 17 G/17G
17 POWDER, FOR SOLUTION ORAL DAILY
Refills: 0 | Status: DISCONTINUED | OUTPATIENT
Start: 2021-12-21 | End: 2022-01-07

## 2021-12-21 RX ORDER — GLIMEPIRIDE 1 MG
0 TABLET ORAL
Qty: 0 | Refills: 0 | DISCHARGE

## 2021-12-21 RX ORDER — SIMVASTATIN 20 MG/1
0 TABLET, FILM COATED ORAL
Qty: 0 | Refills: 1 | DISCHARGE

## 2021-12-21 RX ORDER — METFORMIN HYDROCHLORIDE 850 MG/1
0 TABLET ORAL
Qty: 0 | Refills: 1 | DISCHARGE

## 2021-12-21 RX ORDER — LISINOPRIL 2.5 MG/1
0 TABLET ORAL
Qty: 0 | Refills: 1 | DISCHARGE

## 2021-12-21 RX ORDER — SENNA PLUS 8.6 MG/1
2 TABLET ORAL AT BEDTIME
Refills: 0 | Status: DISCONTINUED | OUTPATIENT
Start: 2021-12-21 | End: 2022-01-07

## 2021-12-21 RX ORDER — INSULIN LISPRO 100/ML
VIAL (ML) SUBCUTANEOUS AT BEDTIME
Refills: 0 | Status: DISCONTINUED | OUTPATIENT
Start: 2021-12-21 | End: 2022-01-07

## 2021-12-21 RX ORDER — FUROSEMIDE 40 MG
0 TABLET ORAL
Qty: 0 | Refills: 0 | DISCHARGE

## 2021-12-21 RX ORDER — INSULIN LISPRO 100/ML
2 VIAL (ML) SUBCUTANEOUS ONCE
Refills: 0 | Status: COMPLETED | OUTPATIENT
Start: 2021-12-21 | End: 2021-12-21

## 2021-12-21 RX ORDER — ASCORBIC ACID 60 MG
500 TABLET,CHEWABLE ORAL DAILY
Refills: 0 | Status: DISCONTINUED | OUTPATIENT
Start: 2021-12-21 | End: 2022-01-07

## 2021-12-21 RX ORDER — INSULIN LISPRO 100/ML
VIAL (ML) SUBCUTANEOUS
Refills: 0 | Status: DISCONTINUED | OUTPATIENT
Start: 2021-12-21 | End: 2022-01-07

## 2021-12-21 RX ORDER — GABAPENTIN 400 MG/1
0 CAPSULE ORAL
Qty: 0 | Refills: 1 | DISCHARGE

## 2021-12-21 RX ADMIN — AMPICILLIN SODIUM AND SULBACTAM SODIUM 200 GRAM(S): 250; 125 INJECTION, POWDER, FOR SUSPENSION INTRAMUSCULAR; INTRAVENOUS at 21:00

## 2021-12-21 RX ADMIN — Medication 2 UNIT(S): at 08:26

## 2021-12-21 RX ADMIN — BUPROPION HYDROCHLORIDE 150 MILLIGRAM(S): 150 TABLET, EXTENDED RELEASE ORAL at 11:40

## 2021-12-21 RX ADMIN — AMPICILLIN SODIUM AND SULBACTAM SODIUM 200 GRAM(S): 250; 125 INJECTION, POWDER, FOR SUSPENSION INTRAMUSCULAR; INTRAVENOUS at 08:37

## 2021-12-21 RX ADMIN — SODIUM CHLORIDE 70 MILLILITER(S): 9 INJECTION INTRAMUSCULAR; INTRAVENOUS; SUBCUTANEOUS at 14:06

## 2021-12-21 RX ADMIN — HEPARIN SODIUM 5000 UNIT(S): 5000 INJECTION INTRAVENOUS; SUBCUTANEOUS at 13:55

## 2021-12-21 RX ADMIN — Medication 325 MILLIGRAM(S): at 17:59

## 2021-12-21 RX ADMIN — GABAPENTIN 800 MILLIGRAM(S): 400 CAPSULE ORAL at 06:49

## 2021-12-21 RX ADMIN — Medication 166.67 MILLIGRAM(S): at 06:51

## 2021-12-21 RX ADMIN — AMPICILLIN SODIUM AND SULBACTAM SODIUM 200 GRAM(S): 250; 125 INJECTION, POWDER, FOR SUSPENSION INTRAMUSCULAR; INTRAVENOUS at 14:02

## 2021-12-21 RX ADMIN — HEPARIN SODIUM 5000 UNIT(S): 5000 INJECTION INTRAVENOUS; SUBCUTANEOUS at 06:51

## 2021-12-21 RX ADMIN — GABAPENTIN 800 MILLIGRAM(S): 400 CAPSULE ORAL at 13:59

## 2021-12-21 RX ADMIN — AMPICILLIN SODIUM AND SULBACTAM SODIUM 200 GRAM(S): 250; 125 INJECTION, POWDER, FOR SUSPENSION INTRAMUSCULAR; INTRAVENOUS at 02:52

## 2021-12-21 RX ADMIN — Medication 1: at 11:39

## 2021-12-21 RX ADMIN — ATORVASTATIN CALCIUM 40 MILLIGRAM(S): 80 TABLET, FILM COATED ORAL at 23:25

## 2021-12-21 RX ADMIN — Medication 1: at 17:14

## 2021-12-21 RX ADMIN — Medication 500 MILLIGRAM(S): at 17:59

## 2021-12-21 RX ADMIN — GABAPENTIN 800 MILLIGRAM(S): 400 CAPSULE ORAL at 23:24

## 2021-12-21 NOTE — PROGRESS NOTE ADULT - SUBJECTIVE AND OBJECTIVE BOX
Patient is a 76y old  Male who presents with a chief complaint of AMS (20 Dec 2021 14:19)    Podiatry Interval HPI: Patient seen bedside in no acute distress. Pt denies any pain to the left foot. States that his body is feverish in the morning but denies any N/V/C/SOB. Denies any other pedal complaints.     Podiatry HPI: 77 y/o male with a PMHx of DM, HTN, A Fib, Sciatica, Cataract, OA of Right Hip presented to the ED for altered mental status. Podiatry was consulted for Left Foot Cellulitis/Wound. Patient states that his podiatrist was debriding a callus on the Left Foot and the podiatrist went too deep which caused the initial wound about 3 weeks ago and since then it has been getting worse. Denies any pain to the Left Foot. States that he cannot feel any painful stimuli to his left foot due to diabetic neuropathy. States that he would like to get back on his feet and exercise because that is how he is able to maintain his sugar levels and patient states that he has always had a left foot flat arch. Denies any trauma to the area. Patient states that yesterday he was having chills but today he denies any constitutional symptoms of N/V/C/F/SOB. Denies any other pedal complaints at this moment.       HPI:  Patient is a 76 year-old male from home with past medical history of atrial fibrillation (was on coumadin until 1month ago), hypertension, hyperlipidemia, diabetes mellitus presents to ED for AMS. Pt states that today he started feeling freezing cold and was shivering. Talked to wife for more information and she stated that patient was feeling very cold though house was warm. He stopped shivering for a while and the started again. During his second episode of shivering from feeling extremely cold he got very confused and could not talk, understand what she was saying and was not making sense. After a while the shivering stopped and he regained his normal mental status. Denies fever, chest pain. abdominal pain, diarrhea, constipation. Pt states he has incontinence and an ulcer on his left foot. He states his podiatrist scraped a callus which caused the ulcer. He has diabetic neuropathy so does not feel pain around the ulcer. He did notice the left foot got more erythematous today.   He stopped taking warfarin 1 month ago as he states it was too much of an hassle to get INR checked and avoid eating certain foods.   (19 Dec 2021 22:54)      PMH:Diabetes    Hypertension    Osteoarthritis of right hip    Cataract    Atrial fibrillation    Vertigo    Sciatica      Allergies: No Known Allergies    Medications acetaminophen     Tablet .. 650 milliGRAM(s) Oral every 6 hours PRN  ampicillin/sulbactam  IVPB      ampicillin/sulbactam  IVPB 3 Gram(s) IV Intermittent every 6 hours  ascorbic acid 500 milliGRAM(s) Oral daily  atorvastatin 40 milliGRAM(s) Oral at bedtime  buPROPion XL (24-Hour) . 150 milliGRAM(s) Oral daily  ferrous    sulfate 325 milliGRAM(s) Oral two times a day  gabapentin 800 milliGRAM(s) Oral three times a day  heparin   Injectable 5000 Unit(s) SubCutaneous every 8 hours  insulin lispro (ADMELOG) corrective regimen sliding scale   SubCutaneous three times a day before meals  insulin lispro (ADMELOG) corrective regimen sliding scale   SubCutaneous at bedtime  lidocaine 1% Injectable 10 milliLiter(s) Local Injection once  lisinopril 2.5 milliGRAM(s) Oral daily  oxycodone    5 mG/acetaminophen 325 mG 1 Tablet(s) Oral every 6 hours PRN  polyethylene glycol 3350 17 Gram(s) Oral daily  senna 2 Tablet(s) Oral at bedtime  sodium chloride 0.9%. 1000 milliLiter(s) IV Continuous <Continuous>    FHFamily history of coronary artery disease (Sibling)    Family history of breast cancer in sister (Sibling)    ,   PMHDiabetes    Hypertension  Osteoarthritis of right hip  Cataract  Atrial fibrillation  Vertigo  Sciatica  PSHS/P total hip arthroplasty      Labs                          8.3    4.89  )-----------( 112      ( 21 Dec 2021 08:16 )             26.4      12-21    139  |  108  |  33<H>  ----------------------------<  174<H>  4.5   |  24  |  1.36<H>    Ca    8.8      21 Dec 2021 08:16  Phos  3.8     12-20  Mg     2.5     12-20    TPro  6.8  /  Alb  2.7<L>  /  TBili  0.4  /  DBili  x   /  AST  15  /  ALT  11  /  AlkPhos  60  12-21     Vital Signs Last 24 Hrs  T(C): 37.8 (21 Dec 2021 14:27), Max: 39.2 (20 Dec 2021 20:48)  T(F): 100.1 (21 Dec 2021 14:27), Max: 102.5 (20 Dec 2021 20:48)  HR: 83 (21 Dec 2021 14:27) (73 - 95)  BP: 155/80 (21 Dec 2021 14:27) (105/64 - 155/80)  BP(mean): 81 (20 Dec 2021 20:48) (81 - 81)  RR: 18 (21 Dec 2021 14:27) (17 - 18)  SpO2: 98% (21 Dec 2021 14:27) (95% - 99%)  Sedimentation Rate, Erythrocyte: 86 mm/Hr (12-20-21 @ 06:15)  Sedimentation Rate, Erythrocyte: 95 mm/Hr (12-19-21 @ 19:15)         C-Reactive Protein, Serum: 107 mg/L (12-20-21 @ 09:36)  C-Reactive Protein, Serum: 94 mg/L (12-20-21 @ 04:01)  C-Reactive Protein, Serum: 107 mg/L (12-20-21 @ 09:36)  C-Reactive Protein, Serum: 94 mg/L (12-20-21 @ 04:01)  Sedimentation Rate, Erythrocyte: 86 mm/Hr (12-20-21 @ 06:15)  Sedimentation Rate, Erythrocyte: 95 mm/Hr (12-19-21 @ 19:15)  WBC Count: 4.89 K/uL (12-21-21 @ 08:16)  WBC Count: 8.23 K/uL (12-20-21 @ 06:15)  WBC Count: 5.73 K/uL (12-19-21 @ 19:15)        PHYSICAL EXAM  LE Focused:    Vasc:  DP/PT pulses were palpable, bilaterally. Generalized edema noted to the Left Foot  Derm: Left Plantar medial arch wound noted measuring 3cm x 3cm x 3cm with 5cm tunnelling noted to the surrounding area with +PTB. Redness and Warmth noted to the left foot with no malodor, no purulent drainage noted upon expression today. Streaking up to the left ankle and plantar distal/lateral arch of the left foot. Edema was noted to have decreased from yesterday to the left foot.    12/20/21: Left Plantar medial arch wound noted measuring 3cm x 3cm x 3cm with 5cm tunnelling noted to the surrounding area with +ptb and mild fluctuance noted to the surrounding wound. Left Foot Wound has Redness, increased warmth noted, mild malodor, purulent drainage, streaking up to the left ankle and plantar distal/lateral arch.   Post Bedside Incision and Drainage (12/20/21)  Neuro:  Protective sensation absent, bilaterally.   MSK: No pain on palpation to the Left Foot Wound. Denies any calf pain, bilaterally.     Imaging:     EXAM:  MR FOOT LT                          PROCEDURE DATE:  12/20/2021      INTERPRETATION:  LEFT FOOT MRI    CLINICAL INFORMATION: Left foot wound. Eval for osteomyelitis.  TECHNIQUE: Multiplanar, multisequence MRI was obtained of the left foot.    COMPARISON: Left foot MRI 12/23/2015.    FINDINGS:    Plantar soft tissue wound is present at the level of the first   tarsometatarsal articulation with tract extending to the bone. There is   edema along the plantar aspect of the distal medial cuneiform and base of   the first metatarsal with preservation of the T1 marrow signal. There is   moderate tarsometatarsal arthrosis and arthrosis of the articulation of   the navicular and lateral cuneiform. There are subchondral fractures of   the first second and third metatarsal heads. There is diffuse increased   muscle signal, most consistent with denervation. There is diffuse   subcutaneous edema.    IMPRESSION:    Plantar soft tissue wound with tract extending to the undersurface of the   first tarsometatarsal articulation.  Edema within the base of the first tarsometatarsal articulation adjacent   to the tract with preservation of the T1 marrow signal, which may   represent sequela of early osteomyelitis.  First second and third metatarsal head subchondral fractures.    Cultures:   Culture pending

## 2021-12-21 NOTE — PROGRESS NOTE ADULT - SUBJECTIVE AND OBJECTIVE BOX
PGY-1 Progress Note discussed with attending    PAGER #: [[phone]] TILL 5:00 PM  PLEASE CONTACT ON CALL TEAM:   - On Call Team (Please refer to Brie) FROM 5:00 PM - 8:30PM  - Nightfloat Team FROM 8:30 -7:30 AM    INTERVAL HPI/OVERNIGHT EVENTS: 76 y.o male was evaluated laying in bed comfortably. Pt denies pain to Left foot. Denies N/V/F/C/SOB. Pt states he has been having BW. Denies pain and burning upon urination.       REVIEW OF SYSTEMS:  CONSTITUTIONAL: No fever, weight loss, or fatigue  RESPIRATORY: No cough, wheezing, chills or hemoptysis; No shortness of breath  CARDIOVASCULAR: No chest pain, palpitations, dizziness, or leg swelling  GASTROINTESTINAL: No abdominal pain. No nausea, vomiting, or hematemesis; No diarrhea or constipation. No melena or hematochezia.  GENITOURINARY: No dysuria or hematuria, urinary frequency  NEUROLOGICAL: No headaches, memory loss, loss of strength, numbness, or tremors  SKIN: Left Foot wound, DSD intact     MEDICATIONS  (STANDING):  ampicillin/sulbactam  IVPB      ampicillin/sulbactam  IVPB 3 Gram(s) IV Intermittent every 6 hours  atorvastatin 40 milliGRAM(s) Oral at bedtime  buPROPion XL (24-Hour) . 150 milliGRAM(s) Oral daily  gabapentin 800 milliGRAM(s) Oral three times a day  heparin   Injectable 5000 Unit(s) SubCutaneous every 8 hours  insulin lispro (ADMELOG) corrective regimen sliding scale   SubCutaneous three times a day before meals  insulin lispro (ADMELOG) corrective regimen sliding scale   SubCutaneous at bedtime  lidocaine 1% Injectable 10 milliLiter(s) Local Injection once  lisinopril 2.5 milliGRAM(s) Oral daily  sodium chloride 0.9%. 1000 milliLiter(s) (70 mL/Hr) IV Continuous <Continuous>    MEDICATIONS  (PRN):  acetaminophen     Tablet .. 650 milliGRAM(s) Oral every 6 hours PRN Temp greater or equal to 38C (100.4F)  oxycodone    5 mG/acetaminophen 325 mG 1 Tablet(s) Oral every 6 hours PRN Moderate Pain      Vital Signs Last 24 Hrs  T(C): 37.1 (21 Dec 2021 05:26), Max: 39.2 (20 Dec 2021 20:48)  T(F): 98.8 (21 Dec 2021 05:26), Max: 102.5 (20 Dec 2021 20:48)  HR: 73 (21 Dec 2021 05:26) (65 - 95)  BP: 105/64 (21 Dec 2021 05:26) (103/63 - 133/65)  BP(mean): 81 (20 Dec 2021 20:48) (76 - 81)  RR: 17 (21 Dec 2021 05:26) (17 - 18)  SpO2: 99% (21 Dec 2021 05:26) (95% - 99%)    PHYSICAL EXAMINATION:  GENERAL: NAD, well built  HEAD:  Atraumatic, Normocephalic  EYES:  conjunctiva and sclera clear  NECK: Supple, No JVD, Normal thyroid  CHEST/LUNG: Clear to auscultation. Clear to percussion bilaterally; No rales, rhonchi, wheezing, or rubs  HEART: Regular rate and rhythm; No murmurs, rubs, or gallops  ABDOMEN: Soft, Nontender, Nondistended; Bowel sounds present  NERVOUS SYSTEM:  Alert & Oriented X3,    EXTREMITIES:  2+ Peripheral Pulses, No clubbing, cyanosis, or edema  SKIN: Left foot wound, DSD intact                           8.3    4.89  )-----------( 112      ( 21 Dec 2021 08:16 )             26.4     12-21    139  |  108  |  33<H>  ----------------------------<  174<H>  4.5   |  24  |  1.36<H>    Ca    8.8      21 Dec 2021 08:16  Phos  3.8     12-20  Mg     2.5     12-20    TPro  6.8  /  Alb  2.7<L>  /  TBili  0.4  /  DBili  x   /  AST  15  /  ALT  11  /  AlkPhos  60  12-21    LIVER FUNCTIONS - ( 21 Dec 2021 08:16 )  Alb: 2.7 g/dL / Pro: 6.8 g/dL / ALK PHOS: 60 U/L / ALT: 11 U/L DA / AST: 15 U/L / GGT: x           CARDIAC MARKERS ( 19 Dec 2021 19:15 )  x     / x     / 46 U/L / x     / x          PT/INR - ( 20 Dec 2021 06:15 )   PT: 16.0 sec;   INR: 1.36 ratio         PTT - ( 20 Dec 2021 06:15 )  PTT:33.8 sec      Culture - Blood (collected 20 Dec 2021 05:34)  Source: .Blood Blood-Peripheral  Gram Stain (21 Dec 2021 10:36):    Growth in aerobic bottle: Gram Positive Cocci in Clusters    Growth in anaerobic bottle: Gram positive cocci in pairs  Preliminary Report (21 Dec 2021 10:37):    Growth in aerobic bottle: Gram Positive Cocci in Clusters    Growth in anaerobic bottle: Gram positive cocci in pairs    ***Blood Panel PCR results on this specimen are available    approximately 3 hours after the Gram stain result.***    Gram stain, PCR, and/or culture results may not always    correspond due to difference in methodologies.    ************************************************************    This PCR assay was performed by multiplex PCR. This    Assay tests for 66 bacterial and resistance gene targets.    Please refer to the [facility] test directory    at [email or web address] for details.  Organism: Blood Culture PCR (21 Dec 2021 07:24)  Organism: Blood Culture PCR (21 Dec 2021 07:24)    Culture - Blood (collected 20 Dec 2021 05:34)  Source: .Blood Blood-Peripheral  Preliminary Report (21 Dec 2021 06:01):    No growth to date.    Culture - Urine (collected 20 Dec 2021 04:57)  Source: Clean Catch Clean Catch (Midstream)  Final Report (21 Dec 2021 08:24):    <10,000 CFU/mL Normal Urogenital Melsisa        I&O's Summary      CAPILLARY BLOOD GLUCOSE      POCT Blood Glucose.: 197 mg/dL (21 Dec 2021 11:06)    CAPILLARY BLOOD GLUCOSE      POCT Blood Glucose.: 197 mg/dL (21 Dec 2021 11:06)  POCT Blood Glucose.: 203 mg/dL (21 Dec 2021 07:38)  POCT Blood Glucose.: 245 mg/dL (20 Dec 2021 21:19)  POCT Blood Glucose.: 99 mg/dL (20 Dec 2021 17:33)      RADIOLOGY & ADDITIONAL TESTS:    PROCEDURE DATE:  12/20/2021      INTERPRETATION:  LEFT FOOT MRI    CLINICAL INFORMATION: Left foot wound. Eval for osteomyelitis.  TECHNIQUE: Multiplanar, multisequence MRI was obtained of the left foot.    COMPARISON: Left foot MRI 12/23/2015.    FINDINGS:    Plantar soft tissue wound is present at the level of the first   tarsometatarsal articulation with tract extending to the bone. There is   edema along the plantar aspect of the distal medial cuneiform and base of   the first metatarsal with preservation of the T1 marrow signal. There is   moderate tarsometatarsal arthrosis and arthrosis of the articulation of   the navicular and lateral cuneiform. There are subchondral fractures of   the first second and third metatarsal heads. There is diffuse increased   muscle signal, most consistent with denervation. There is diffuse   subcutaneous edema.    IMPRESSION:    Plantar soft tissue wound with tract extending to the undersurface of the   first tarsometatarsal articulation.  Edema within the base of the first tarsometatarsal articulation adjacent   to the tract with preservation of the T1 marrow signal, which may   represent sequela of early osteomyelitis.  First second and third metatarsal head subchondral fractures.    PROCEDURE DATE:  12/19/2021      INTERPRETATION:  Left tib-fib, left foot, and chest. Patient has sepsis.   Concern is gas gangrene.    Left tib-fib. 2 views. 4 images.    Arterial calcifications. Slight degeneration in the knee.    No bone destruction or fracture.    Left foot. 3 views. Arterial calcification.    There is degeneration again seen of the tarsometatarsal junction.    There are small nondisplaced fractures of the distal aspects of the   second and third metatarsals which are new since December 22, 2015. These   fractures may not be acute.    There is first MP and IP degeneration.    There is an acute appearing fracture of the proximal anterior corner of   the proximal phalanx of the fifth left toe.    No visible tracking air.    AP chest on December 19, 2021 at 8:49 PM. 2 images.    Heart magnified by technique.    Lungs are clear.    IMPRESSION: Chest unremarkable.    There are fractures of the distal aspects of the second, third, and   tarsals and the proximal phalanx of the fifth toe.    The fifth toe fracture is acute. The other fractures could be subacute.    Arterial calcification. A note was posted on the [facility] ED discrepancy   site on December 20 at 11:22 AM.             PGY-1 Progress Note discussed with attending    PAGER #: [[phone]] TILL 5:00 PM  PLEASE CONTACT ON CALL TEAM:   - On Call Team (Please refer to Brie) FROM 5:00 PM - 8:30PM  - Nightfloat Team FROM 8:30 -7:30 AM    INTERVAL HPI/OVERNIGHT EVENTS: 76 y.o male was evaluated laying in bed comfortably. Pt denies pain to Left foot. Denies N/V/F/C/SOB. Pt states he has been having BW. Denies pain and burning upon urination.       REVIEW OF SYSTEMS:  CONSTITUTIONAL: No fever, weight loss, or fatigue  RESPIRATORY: No cough, wheezing, chills or hemoptysis; No shortness of breath  CARDIOVASCULAR: No chest pain, palpitations, dizziness, or leg swelling  GASTROINTESTINAL: No abdominal pain. No nausea, vomiting, or hematemesis; No diarrhea or constipation. No melena or hematochezia.  GENITOURINARY: No dysuria or hematuria, urinary frequency  NEUROLOGICAL: No headaches, memory loss, loss of strength, numbness, or tremors  SKIN: Left Foot wound, DSD intact     MEDICATIONS  (STANDING):  ampicillin/sulbactam  IVPB      ampicillin/sulbactam  IVPB 3 Gram(s) IV Intermittent every 6 hours  atorvastatin 40 milliGRAM(s) Oral at bedtime  buPROPion XL (24-Hour) . 150 milliGRAM(s) Oral daily  gabapentin 800 milliGRAM(s) Oral three times a day  heparin   Injectable 5000 Unit(s) SubCutaneous every 8 hours  insulin lispro (ADMELOG) corrective regimen sliding scale   SubCutaneous three times a day before meals  insulin lispro (ADMELOG) corrective regimen sliding scale   SubCutaneous at bedtime  lidocaine 1% Injectable 10 milliLiter(s) Local Injection once  lisinopril 2.5 milliGRAM(s) Oral daily  sodium chloride 0.9%. 1000 milliLiter(s) (70 mL/Hr) IV Continuous <Continuous>    MEDICATIONS  (PRN):  acetaminophen     Tablet .. 650 milliGRAM(s) Oral every 6 hours PRN Temp greater or equal to 38C (100.4F)  oxycodone    5 mG/acetaminophen 325 mG 1 Tablet(s) Oral every 6 hours PRN Moderate Pain      Vital Signs Last 24 Hrs  T(C): 37.1 (21 Dec 2021 05:26), Max: 39.2 (20 Dec 2021 20:48)  T(F): 98.8 (21 Dec 2021 05:26), Max: 102.5 (20 Dec 2021 20:48)  HR: 73 (21 Dec 2021 05:26) (65 - 95)  BP: 105/64 (21 Dec 2021 05:26) (103/63 - 133/65)  BP(mean): 81 (20 Dec 2021 20:48) (76 - 81)  RR: 17 (21 Dec 2021 05:26) (17 - 18)  SpO2: 99% (21 Dec 2021 05:26) (95% - 99%)    PHYSICAL EXAMINATION:  GENERAL: NAD, well built, sat well on RA  HEAD:  Atraumatic, Normocephalic  EYES:  conjunctiva and sclera clear  NECK: Supple, No JVD, Normal thyroid  CHEST/LUNG: Clear to auscultation. Clear to percussion bilaterally; No rales, rhonchi, wheezing, or rubs  HEART: Regular rate and rhythm; No murmurs, rubs, or gallops  ABDOMEN: Soft, Nontender, Nondistended; Bowel sounds present  NERVOUS SYSTEM:  Alert & Oriented X3, no neuro deficits     EXTREMITIES:  2+ Peripheral Pulses, No clubbing, cyanosis, or edema  SKIN: Left foot wound, DSD intact                           8.3    4.89  )-----------( 112      ( 21 Dec 2021 08:16 )             26.4     12-21    139  |  108  |  33<H>  ----------------------------<  174<H>  4.5   |  24  |  1.36<H>    Ca    8.8      21 Dec 2021 08:16  Phos  3.8     12-20  Mg     2.5     12-20    TPro  6.8  /  Alb  2.7<L>  /  TBili  0.4  /  DBili  x   /  AST  15  /  ALT  11  /  AlkPhos  60  12-21    LIVER FUNCTIONS - ( 21 Dec 2021 08:16 )  Alb: 2.7 g/dL / Pro: 6.8 g/dL / ALK PHOS: 60 U/L / ALT: 11 U/L DA / AST: 15 U/L / GGT: x           CARDIAC MARKERS ( 19 Dec 2021 19:15 )  x     / x     / 46 U/L / x     / x          PT/INR - ( 20 Dec 2021 06:15 )   PT: 16.0 sec;   INR: 1.36 ratio         PTT - ( 20 Dec 2021 06:15 )  PTT:33.8 sec      Culture - Blood (collected 20 Dec 2021 05:34)  Source: .Blood Blood-Peripheral  Gram Stain (21 Dec 2021 10:36):    Growth in aerobic bottle: Gram Positive Cocci in Clusters    Growth in anaerobic bottle: Gram positive cocci in pairs  Preliminary Report (21 Dec 2021 10:37):    Growth in aerobic bottle: Gram Positive Cocci in Clusters    Growth in anaerobic bottle: Gram positive cocci in pairs    ***Blood Panel PCR results on this specimen are available    approximately 3 hours after the Gram stain result.***    Gram stain, PCR, and/or culture results may not always    correspond due to difference in methodologies.    ************************************************************    This PCR assay was performed by multiplex PCR. This    Assay tests for 66 bacterial and resistance gene targets.    Please refer to the [facility] test directory    at [email or web address] for details.  Organism: Blood Culture PCR (21 Dec 2021 07:24)  Organism: Blood Culture PCR (21 Dec 2021 07:24)    Culture - Blood (collected 20 Dec 2021 05:34)  Source: .Blood Blood-Peripheral  Preliminary Report (21 Dec 2021 06:01):    No growth to date.    Culture - Urine (collected 20 Dec 2021 04:57)  Source: Clean Catch Clean Catch (Midstream)  Final Report (21 Dec 2021 08:24):    <10,000 CFU/mL Normal Urogenital Melissa        I&O's Summary      CAPILLARY BLOOD GLUCOSE      POCT Blood Glucose.: 197 mg/dL (21 Dec 2021 11:06)    CAPILLARY BLOOD GLUCOSE      POCT Blood Glucose.: 197 mg/dL (21 Dec 2021 11:06)  POCT Blood Glucose.: 203 mg/dL (21 Dec 2021 07:38)  POCT Blood Glucose.: 245 mg/dL (20 Dec 2021 21:19)  POCT Blood Glucose.: 99 mg/dL (20 Dec 2021 17:33)      RADIOLOGY & ADDITIONAL TESTS:    PROCEDURE DATE:  12/20/2021      INTERPRETATION:  LEFT FOOT MRI    CLINICAL INFORMATION: Left foot wound. Eval for osteomyelitis.  TECHNIQUE: Multiplanar, multisequence MRI was obtained of the left foot.    COMPARISON: Left foot MRI 12/23/2015.    FINDINGS:    Plantar soft tissue wound is present at the level of the first   tarsometatarsal articulation with tract extending to the bone. There is   edema along the plantar aspect of the distal medial cuneiform and base of   the first metatarsal with preservation of the T1 marrow signal. There is   moderate tarsometatarsal arthrosis and arthrosis of the articulation of   the navicular and lateral cuneiform. There are subchondral fractures of   the first second and third metatarsal heads. There is diffuse increased   muscle signal, most consistent with denervation. There is diffuse   subcutaneous edema.    IMPRESSION:    Plantar soft tissue wound with tract extending to the undersurface of the   first tarsometatarsal articulation.  Edema within the base of the first tarsometatarsal articulation adjacent   to the tract with preservation of the T1 marrow signal, which may   represent sequela of early osteomyelitis.  First second and third metatarsal head subchondral fractures.    PROCEDURE DATE:  12/19/2021      INTERPRETATION:  Left tib-fib, left foot, and chest. Patient has sepsis.   Concern is gas gangrene.    Left tib-fib. 2 views. 4 images.    Arterial calcifications. Slight degeneration in the knee.    No bone destruction or fracture.    Left foot. 3 views. Arterial calcification.    There is degeneration again seen of the tarsometatarsal junction.    There are small nondisplaced fractures of the distal aspects of the   second and third metatarsals which are new since December 22, 2015. These   fractures may not be acute.    There is first MP and IP degeneration.    There is an acute appearing fracture of the proximal anterior corner of   the proximal phalanx of the fifth left toe.    No visible tracking air.    AP chest on December 19, 2021 at 8:49 PM. 2 images.    Heart magnified by technique.    Lungs are clear.    IMPRESSION: Chest unremarkable.    There are fractures of the distal aspects of the second, third, and   tarsals and the proximal phalanx of the fifth toe.    The fifth toe fracture is acute. The other fractures could be subacute.    Arterial calcification. A note was posted on the [facility] ED discrepancy   site on December 20 at 11:22 AM.

## 2021-12-21 NOTE — PROGRESS NOTE ADULT - ASSESSMENT
P76 year-old male from home with past medical history of atrial fibrillation (was on coumadin until 1month ago), hypertension, hyperlipidemia, diabetes mellitus presents to ED for AMS. Admitted for AMS and diabetic foot ulcer, started on abx, ID and Podiatry consult  76 year-old male has past medical history of atrial fibrillation, hypertension, hyperlipidemia, diabetes mellitus was admitted to medicine for AMS and left diabetic foot ulcer

## 2021-12-21 NOTE — PROGRESS NOTE ADULT - PROBLEM SELECTOR PLAN 2
-plan as above  -Resolved - Patient with cellulitis of left foot and Charcot foot in diabetes   - C/w Unasyn for now

## 2021-12-21 NOTE — PROGRESS NOTE ADULT - PROBLEM SELECTOR PLAN 5
-PATTY on baseline stage 3 CKD likely pre renal   -s/p IVF in ED   -avoid nephrotoxins   -mon renal function - On admission, patient with Cr 1.8  - PATTY most likely pre renal in the setting of acute infection, poor oral intake,   - Monitor I/O's daily  - Avoid nephrotoxins, NSAIDS, ACEI and ARBS  - Monitor BMP daily - On admission, patient was admitted with acute infectious encephalopathy  - Resolved, mental status back to baseline AAOx3

## 2021-12-21 NOTE — PROGRESS NOTE ADULT - PROBLEM SELECTOR PLAN 10
-cont home dose Statin IMPROVE VTE Individual Risk Assessment  RISK                                                                Points  [  ] Previous VTE                                                  3  [  ] Thrombophilia                                               2  [  ] Lower limb paralysis                                      2        (unable to hold up >15 seconds)    [  ] Current Cancer                                              2         (within 6 months)  [  ] Immobilization > 24 hrs                                1  [  ] ICU/CCU stay > 24 hours                              1  [  ] Age > 60                                                      1  IMPROVE VTE Score _________    PPI for GI prophylaxis  Heparin for DVT PPX - Patient has hx of hyperlipidemia on Statin at home   - C/w Statin

## 2021-12-21 NOTE — PROGRESS NOTE ADULT - PROBLEM SELECTOR PLAN 4
-plan as above - On admission, patient was admitted with acute infectious encephalopathy  - Resolved, mental status back to baseline AAOx3 - Patient noted to have low hgb 8-9  - Iron panel compatible with KISHOR  - Started on Ferrous sulfate and vitamin C  - Bowel regimen   - Monitor for any signs of bleeding

## 2021-12-21 NOTE — PROGRESS NOTE ADULT - ASSESSMENT
A:  Cellulitis, Left Foot and Ankle.  Abscess, Left Plantar Foot  Left Foot Plantar Wound  Diabetic Neuropathy, b/l  DM  S/P bedside I&D (12/20/21)    Plan:  -Patient examined and chart reviewed  -Explained all clinical findings to the patient to the patient's satisfaction.  -Educated the patient about the importance of glycemic control in wound healing  -Xrays reviewed  -MRI reviewed  -Cultures pending  -Left Foot was Flushed with 100cc's of sterile saline and betadine.  -Dressed the left foot with iodoform packing, DSD, gauze, dianna, and ace bandage.  -Patient to keep the dressings clean, dry, and intact  -Recommend Left Lower Extremity elevation.  -Recommend antibiotics per ID team for Left Foot Cellulitis and Left Foot Wound  -Podiatry will follow in house.  -Discussed with attending Dr. Hart

## 2021-12-21 NOTE — PROGRESS NOTE ADULT - SUBJECTIVE AND OBJECTIVE BOX
Subjective/objective: Pt c/o chronic back pain. Had elevated temp of 102.5 last night.       MEDS  acetaminophen     Tablet .. 650 milliGRAM(s) Oral every 6 hours PRN  ampicillin/sulbactam  IVPB      ampicillin/sulbactam  IVPB 3 Gram(s) IV Intermittent every 6 hours (D2)  atorvastatin 40 milliGRAM(s) Oral at bedtime  buPROPion XL (24-Hour) . 150 milliGRAM(s) Oral daily  gabapentin 800 milliGRAM(s) Oral three times a day  heparin   Injectable 5000 Unit(s) SubCutaneous every 8 hours  insulin lispro (ADMELOG) corrective regimen sliding scale   SubCutaneous three times a day before meals  insulin lispro (ADMELOG) corrective regimen sliding scale   SubCutaneous at bedtime  lidocaine 1% Injectable 10 milliLiter(s) Local Injection once  lisinopril 2.5 milliGRAM(s) Oral daily  oxycodone    5 mG/acetaminophen 325 mG 1 Tablet(s) Oral every 6 hours PRN  sodium chloride 0.9%. 1000 milliLiter(s) IV Continuous <Continuous>      PHYSICAL EXAM:    Vital Signs Last 24 Hrs  T(C): 37.1 (21 Dec 2021 05:26), Max: 39.2 (20 Dec 2021 20:48)  T(F): 98.8 (21 Dec 2021 05:26), Max: 102.5 (20 Dec 2021 20:48)  HR: 73 (21 Dec 2021 05:26) (57 - 95)  BP: 105/64 (21 Dec 2021 05:26) (103/63 - 133/65)  BP(mean): 81 (20 Dec 2021 20:48) (76 - 81)  RR: 17 (21 Dec 2021 05:26) (17 - 18)  SpO2: 99% (21 Dec 2021 05:26) (95% - 99%)    GEN: WD WN W male in NAD    HEENT: NC/AT    CHEST/Respiratory: clear to auscultation    Cardiovascular: S1 S2 irregular beats with no murmurs, gallops, rubs    Gastrointestinal: soft, nontender, BS present, minimal tenderness to LLQ on palpation; scar noted on lower abd (tummy tuck scar)    Genitourinary: no diaz     Extremities: hyperpigmented changes noted on bilateral lower extremities on tibia; onychomycosis on bilateral toes   LLE: covered with dressing; no tenderness to palpation  RLE: no erythema or swelling    Neurological: A+O x3    Skin: no rashes      LABS/DIAGNOSTIC TESTS                            8.3    4.89  )-----------( 112      ( 21 Dec 2021 08:16 )             26.4       WBC Count: 4.89 K/uL ( @ 08:16)  WBC Count: 8.23 K/uL ( @ 06:15)  WBC Count: 5.73 K/uL ( @ 19:15)      12    141  |  110<H>  |  51<H>  ----------------------------<  228<H>  5.7<H>   |  26  |  1.80<H>    Ca    9.0      20 Dec 2021 06:15  Phos  3.8       Mg     2.5         TPro  7.2  /  Alb  2.8<L>  /  TBili  0.4  /  DBili  x   /  AST  12  /  ALT  10  /  AlkPhos  70  12      Urinalysis Basic - ( 19 Dec 2021 19:29 )    Color: Yellow / Appearance: Clear / S.015 / pH: x  Gluc: x / Ketone: Negative  / Bili: Negative / Urobili: Negative   Blood: x / Protein: 100 / Nitrite: Negative   Leuk Esterase: Negative / RBC: 0-2 /HPF / WBC 0-2 /HPF   Sq Epi: x / Non Sq Epi: Few /HPF / Bacteria: x    PT/INR - ( 20 Dec 2021 06:15 )   PT: 16.0 sec;   INR: 1.36 ratio      PTT - ( 20 Dec 2021 06:15 )  PTT:33.8 sec    CULTURES      Culture - Blood (collected 21 @ 05:34)  Source: .Blood Blood-Peripheral  Gram Stain (21 @ 05:15):    Growth in aerobic bottle: Gram Positive Cocci in Clusters  Preliminary Report (21 @ 05:15):    Growth in aerobic bottle: Gram Positive Cocci in Clusters    ***Blood Panel PCR results on this specimen are available    approximately 3 hours after the Gram stain result.***    Gram stain, PCR, and/or culture results may not always    correspond due to difference in methodologies.    ************************************************************    This PCR assay was performed by multiplex PCR. This    Assay tests for 66 bacterial and resistance gene targets.    Please refer to the [facility] test directory    at [email or web address] for details.  Organism: Blood Culture PCR (21 @ 07:24)  Organism: Blood Culture PCR (21 @ 07:24)      -  Staphylococcus aureus: Detec Any isolate of Staphylococcus aureus from a blood culture is NOT considered a contaminant.      Method Type: PCR    Culture - Blood (collected 21 @ 05:34)  Source: .Blood Blood-Peripheral  Preliminary Report (21 @ 06:01):    No growth to date.    Culture - Urine (collected 21 @ 04:57)  Source: Clean Catch Clean Catch (Midstream)  Final Report (21 @ 08:24):    <10,000 CFU/mL Normal Urogenital Melissa      RADIOLOGY  < from: MR Foot No Cont, Left (21 @ 17:42) >    ACC: [id number] EXAM:  MR FOOT LT                          PROCEDURE DATE:  2021        INTERPRETATION:  LEFT FOOT MRI    CLINICAL INFORMATION: Left foot wound. Eval for osteomyelitis.  TECHNIQUE: Multiplanar, multisequence MRI was obtained of the left foot.    COMPARISON: Left foot MRI 2015.    FINDINGS:    Plantar soft tissue wound is present at the level of the first   tarsometatarsal articulation with tract extending to the bone. There is   edema along the plantar aspect of the distal medial cuneiform and base of   the first metatarsal with preservation of the T1 marrow signal. There is   moderate tarsometatarsal arthrosis and arthrosis of the articulation of   the navicular and lateral cuneiform. There are subchondral fractures of   the first second and third metatarsal heads. There is diffuse increased   muscle signal, most consistent with denervation. There is diffuse   subcutaneous edema.    IMPRESSION:    Plantar soft tissue wound with tract extending to the undersurface of the   first tarsometatarsal articulation.  Edema within the base of the first tarsometatarsal articulation adjacent   to the tract with preservation of the T1 marrow signal, which may   represent sequela of early osteomyelitis.  First second and third metatarsal head subchondral fractures.                   Subjective/objective: Pt c/o chronic back pain which is no worse than baseline.  Had elevated temp of 102.5 last night. Underwent debridement yesterday of L foot by Podiatry.       MEDS  acetaminophen     Tablet .. 650 milliGRAM(s) Oral every 6 hours PRN  ampicillin/sulbactam  IVPB      ampicillin/sulbactam  IVPB 3 Gram(s) IV Intermittent every 6 hours (D2)  atorvastatin 40 milliGRAM(s) Oral at bedtime  buPROPion XL (24-Hour) . 150 milliGRAM(s) Oral daily  gabapentin 800 milliGRAM(s) Oral three times a day  heparin   Injectable 5000 Unit(s) SubCutaneous every 8 hours  insulin lispro (ADMELOG) corrective regimen sliding scale   SubCutaneous three times a day before meals  insulin lispro (ADMELOG) corrective regimen sliding scale   SubCutaneous at bedtime  lidocaine 1% Injectable 10 milliLiter(s) Local Injection once  lisinopril 2.5 milliGRAM(s) Oral daily  oxycodone    5 mG/acetaminophen 325 mG 1 Tablet(s) Oral every 6 hours PRN  sodium chloride 0.9%. 1000 milliLiter(s) IV Continuous <Continuous>      PHYSICAL EXAM:    Vital Signs Last 24 Hrs  T(C): 37.1 (21 Dec 2021 05:26), Max: 39.2 (20 Dec 2021 20:48)  T(F): 98.8 (21 Dec 2021 05:26), Max: 102.5 (20 Dec 2021 20:48)  HR: 73 (21 Dec 2021 05:26) (57 - 95)  BP: 105/64 (21 Dec 2021 05:26) (103/63 - 133/65)  BP(mean): 81 (20 Dec 2021 20:48) (76 - 81)  RR: 17 (21 Dec 2021 05:26) (17 - 18)  SpO2: 99% (21 Dec 2021 05:26) (95% - 99%)    GEN: WD WN W male in NAD    HEENT: NC/AT    CHEST/Respiratory: clear to auscultation    Cardiovascular: S1 S2 irregular beats with no murmurs, gallops, rubs    Gastrointestinal: soft, nontender, BS present, minimal tenderness to LLQ on palpation; scar noted on lower abd (tummy tuck scar)    Genitourinary: no diaz     Extremities: hyperpigmented changes noted on bilateral lower extremities on tibia; onychomycosis on bilateral toes   LLE: open, clean site of debridement of ulcer on medial aspect of foot near the 1st metatarsal; "wick" in place; + erythema and edema extending about 6cm superiorly, into the dorsum of the foot, and inferiorly into foot; no tenderness to palpation  RLE: no erythema or swelling    Neurological: A+O x3    Skin: no rashes      LABS/DIAGNOSTIC TESTS                            8.3    4.89  )-----------( 112      ( 21 Dec 2021 08:16 )             26.4       WBC Count: 4.89 K/uL ( @ 08:16)  WBC Count: 8.23 K/uL ( @ 06:15)  WBC Count: 5.73 K/uL ( @ 19:15)      12    141  |  110<H>  |  51<H>  ----------------------------<  228<H>  5.7<H>   |  26  |  1.80<H>    Ca    9.0      20 Dec 2021 06:15  Phos  3.8       Mg     2.5         TPro  7.2  /  Alb  2.8<L>  /  TBili  0.4  /  DBili  x   /  AST  12  /  ALT  10  /  AlkPhos  70  12-      Urinalysis Basic - ( 19 Dec 2021 19:29 )    Color: Yellow / Appearance: Clear / S.015 / pH: x  Gluc: x / Ketone: Negative  / Bili: Negative / Urobili: Negative   Blood: x / Protein: 100 / Nitrite: Negative   Leuk Esterase: Negative / RBC: 0-2 /HPF / WBC 0-2 /HPF   Sq Epi: x / Non Sq Epi: Few /HPF / Bacteria: x    PT/INR - ( 20 Dec 2021 06:15 )   PT: 16.0 sec;   INR: 1.36 ratio      PTT - ( 20 Dec 2021 06:15 )  PTT:33.8 sec    CULTURES      Culture - Blood (collected 21 @ 05:34)  Source: .Blood Blood-Peripheral  Gram Stain (21 @ 05:15):    Growth in aerobic bottle: Gram Positive Cocci in Clusters  Preliminary Report (21 @ 05:15):    Growth in aerobic bottle: Gram Positive Cocci in Clusters    ***Blood Panel PCR results on this specimen are available    approximately 3 hours after the Gram stain result.***    Gram stain, PCR, and/or culture results may not always    correspond due to difference in methodologies.    ************************************************************    This PCR assay was performed by multiplex PCR. This    Assay tests for 66 bacterial and resistance gene targets.    Please refer to the [facility] test directory    at [email or web address] for details.  Organism: Blood Culture PCR (21 @ 07:24)  Organism: Blood Culture PCR (21 @ 07:24)      -  Staphylococcus aureus: Detec Any isolate of Staphylococcus aureus from a blood culture is NOT considered a contaminant.      Method Type: PCR    Culture - Blood (collected 21 @ 05:34)  Source: .Blood Blood-Peripheral  Preliminary Report (21 @ 06:01):    No growth to date.    Culture - Urine (collected 21 @ 04:57)  Source: Clean Catch Clean Catch (Midstream)  Final Report (21 @ 08:24):    <10,000 CFU/mL Normal Urogenital Melissa      RADIOLOGY  < from: MR Foot No Cont, Left (21 @ 17:42) >    ACC: [id number] EXAM:  MR FOOT LT                          PROCEDURE DATE:  2021        INTERPRETATION:  LEFT FOOT MRI    CLINICAL INFORMATION: Left foot wound. Eval for osteomyelitis.  TECHNIQUE: Multiplanar, multisequence MRI was obtained of the left foot.    COMPARISON: Left foot MRI 2015.    FINDINGS:    Plantar soft tissue wound is present at the level of the first   tarsometatarsal articulation with tract extending to the bone. There is   edema along the plantar aspect of the distal medial cuneiform and base of   the first metatarsal with preservation of the T1 marrow signal. There is   moderate tarsometatarsal arthrosis and arthrosis of the articulation of   the navicular and lateral cuneiform. There are subchondral fractures of   the first second and third metatarsal heads. There is diffuse increased   muscle signal, most consistent with denervation. There is diffuse   subcutaneous edema.    IMPRESSION:    Plantar soft tissue wound with tract extending to the undersurface of the   first tarsometatarsal articulation.  Edema within the base of the first tarsometatarsal articulation adjacent   to the tract with preservation of the T1 marrow signal, which may   represent sequela of early osteomyelitis.  First second and third metatarsal head subchondral fractures.

## 2021-12-21 NOTE — PHARMACOTHERAPY INTERVENTION NOTE - COMMENTS
75 y/o m on Unasyn for skin infx, as biofire came positive for MSSA the recommendation was to switch to cefazolin to improve coverage

## 2021-12-21 NOTE — PROGRESS NOTE ADULT - PROBLEM SELECTOR PLAN 7
-poorly controlled diabetes with vascular complication   -on Metformin and Glimepiride at home   -cont ISSC   -diabetic diet - Patient has hx of DM o  - Holding home po medications  - C/w HSS  - Fingerstick before meals and at bedtime  - DASH diet with consistent carb  - Target -180 - Patient has hx of atrial fibrillation not on any meds at home    - EKG A fib rate controlled  - Continue to monitor

## 2021-12-21 NOTE — PROGRESS NOTE ADULT - ASSESSMENT
Pt is a 76 year old male from home with PMHx of atrial fibrillation, hypertension, hyperlipidemia, DM admitted for sepsis with acute encephalopathy secondary to diabetic foot ulcer. Pt received one dose of levaquine in ED and switched to vancomycin and amp/sulbactam. Xray of left foot with multiple metatarsal Fx but without radiographic evidence of osteomyelitis. MRI of left foot with early osteomyelitis, multiple metatarsal Fx and plantar soft tissue wound. Concerned about osteomyelitis in conjunction with cellulitis near the ulcer. One set of blood culture growing Staphylococcus aureus (pending susceptibilities).     #1 left foot cellulitis secondary to diabetic foot ulcer with concern for osteomyelitis   -- continue vancomycin to 1g q24hr   -- obtain vancomycin trough around 3rd dose  -- continue amp/sulbactam 3g q6hr  -- f/u blood cultures     #2 acute encephalopathy - resolved  Pt is a 76 year old male from home with PMHx of atrial fibrillation, hypertension, hyperlipidemia, DM admitted for sepsis with acute encephalopathy secondary to diabetic foot ulcer. Pt received one dose of levaquine in ED and switched to vancomycin and amp/sulbactam. Xray of left foot with multiple metatarsal Fx but without radiographic evidence of osteomyelitis. MRI of left foot with early osteomyelitis, multiple metatarsal Fx and plantar soft tissue wound. Concerned about osteomyelitis in conjunction with cellulitis near the ulcer. One set of blood culture growing Staphylococcus aureus (pending susceptibilities).     #1 left foot cellulitis secondary to diabetic foot ulcer with concern for osteomyelitis   -- d/c vancomycin   -- continue amp/sulbactam 3g q6hr  -- f/u final result of blood cultures     #2 acute encephalopathy - resolved     #3 chronic back pain  -- further evaluation with imaging recommend Pt is a 76 year old male from home with PMHx of atrial fibrillation, hypertension, hyperlipidemia, DM admitted for sepsis with acute encephalopathy secondary to diabetic foot ulcer. Pt received one dose of levaquine in ED and switched to vancomycin and amp/sulbactam. Xray of left foot with multiple metatarsal Fx but without radiographic evidence of osteomyelitis. However, MRI of left foot with early osteomyelitis of the 1st tarsometarsal site, multiple metatarsal Fx and diffuse SQ edema. One set of blood culture growing Staphylococcus aureus (no MRSA detected on rapid BCID with final susceptibilities pending). In view of S. aureus in the BC, pt will need extended Rx with IV Ab as well as echocardiogram. In addition, although pt not c/o worsening back pain, wd consider MRI.    #1 left foot cellulitis secondary to diabetic foot ulcer with osteomyelitis   -- d/c vancomycin   -- continue amp/sulbactam 3g q6hr for now  -- f/u final result of blood cultures   --repeat BCs in AM to check for clearing.  --echocardiogram  --consider MRI of back     #2 acute encephalopathy - resolved     #3 chronic back pain  -- further evaluation with imaging recommended    Discussed above with medical team, including DR. Grijalva.

## 2021-12-21 NOTE — CHART NOTE - NSCHARTNOTEFT_GEN_A_CORE
Patient was seen bedside in no acute distress. Explained to the patient that having a left foot washout with incision and drainage of the abscess is important in trying to heal the left foot wound/infection. Patient is amendable for surgery tomorrow (12/22/21) for a Left Foot Washout with Incision and Drainage of the abscess as a add on case with the official time still pending. Will call the OR tomorrow morning to see if there are any earlier spots available.      Request Medical Optimization per medicine team for tomorrow's surgery.  NPO ordered  COVID PCR ordered.    Discussed with attending Dr. Hart Patient was seen bedside in no acute distress. Explained to the patient that having a left foot washout with incision and drainage of the abscess is important in trying to heal the left foot wound/infection. Patient is amendable for surgery tomorrow (12/22/21) for a Left Foot Washout with Incision and Drainage of the abscess as a add on case with the official time still pending. Will call the OR tomorrow morning to see if there are any earlier spots available.      Request Medical Optimization per medicine team for tomorrow's surgery.  NPO ordered  COVID PCR ordered.  Spoke with Dr. Avilez in regards to patient's surgery tomorrow and Dr. Avilez is aware  Discussed with attending Dr. Hart

## 2021-12-21 NOTE — PROGRESS NOTE ADULT - PROBLEM SELECTOR PLAN 8
-controlled  -on lisinopril and furosemide at home  -cont Lisinopril, hold Lasix for borderline BP, restart when feasible   -mon BP - Patient has history of Hypertension on lisinopril at home   - C/w home meds with parameters  - DASH diet  - Monitor BP and adjust meds as needed - Patient has hx of DM o  - Holding home po medications  - C/w HSS  - Fingerstick before meals and at bedtime  - DASH diet with consistent carb  - Target -180

## 2021-12-21 NOTE — PROGRESS NOTE ADULT - PROBLEM SELECTOR PLAN 1
-likely due to left foot diabetic foot ulcer   -d/c vanc  -C/w Unasyn   -f/u Urine cultures and blood cultures    -cont local wound care as per podiatry   -As per ID Dr. Maxwell- d/c vanc, continue with Unasyn, f/u on blood cultures   -Podiatry consult- f/u on cultures, MRI results, LWC - Patient with MSSA bacteremia most likely secondary to left foot cellulitis/abscess  - MR left foot showed Plantar soft tissue wound with tract extending to the undersurface of the   first tarsometatarsal articulation. Edema within the base of the first tarsometatarsal articulation may represent sequela of early osteomyelitis. First second and third metatarsal head subchondral fractures.  - Bcx MSSA, awaiting sensitivities   - Repeat Bcx in the am   - Vancomycin stopped as per ID recommendations  - C/w Unasyn for now   - F/u MRI lumbosacral to r/o any pathology related to bacteremia   - F/u echo to r/o endocarditis  - ID Dr Maxwell  - Podiatry on board   - Monitor for any signs of hemodynamic instability - Patient with MSSA bacteremia most likely secondary to left foot cellulitis/abscess  - MR left foot showed Plantar soft tissue wound with tract extending to the undersurface of the   first tarsometatarsal articulation. Edema within the base of the first tarsometatarsal articulation may represent sequela of early osteomyelitis. First second and third metatarsal head subchondral fractures.  - Bcx MSSA, awaiting sensitivities, ESR and CRP elevated    - Repeat Bcx in the am   - Vancomycin stopped as per ID recommendations  - C/w Unasyn for now   - F/u MRI lumbosacral to r/o any pathology related to bacteremia   - F/u echo to r/o endocarditis  - ID Dr Maxwell  - Podiatry on board   - Monitor for any signs of hemodynamic instability

## 2021-12-21 NOTE — PROGRESS NOTE ADULT - PROBLEM SELECTOR PLAN 9
-s/p Lokelma   -f/u repeat in AM, Potassium is 4.5 (12/21)  -Resolved - Patient has hx of hyperlipidemia on Statin at home   - C/w Statin - Patient has history of Hypertension on lisinopril at home   - C/w home meds with parameters  - DASH diet  - Monitor BP and adjust meds as needed

## 2021-12-21 NOTE — PROGRESS NOTE ADULT - PROBLEM SELECTOR PLAN 11
-dvt ppx: Heparin IMPROVE VTE Individual Risk Assessment  RISK                                                                Points  [  ] Previous VTE                                                  3  [  ] Thrombophilia                                               2  [  ] Lower limb paralysis                                      2        (unable to hold up >15 seconds)    [  ] Current Cancer                                              2         (within 6 months)  [  ] Immobilization > 24 hrs                                1  [  ] ICU/CCU stay > 24 hours                              1  [  ] Age > 60                                                      1  IMPROVE VTE Score _________    PPI for GI prophylaxis  Heparin for DVT PPX

## 2021-12-21 NOTE — PROGRESS NOTE ADULT - PROBLEM SELECTOR PROBLEM 7
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

## 2021-12-21 NOTE — CHART NOTE - NSCHARTNOTEFT_GEN_A_CORE
Notified by podiatry that patient is an OR add-on for tomorrow, possibly in the a.m.     Will make NPO after midnight and hold morning heparin.  Primary to follow-up for official medical clearance. Notified by podiatry that patient is an OR add-on for Left Foot Washout with Incision and Drainage of the abscess tomorrow, possibly in the a.m.    Will make NPO after midnight and hold morning heparin.  Primary to follow-up for official medical clearance.

## 2021-12-21 NOTE — PROGRESS NOTE ADULT - PROBLEM SELECTOR PLAN 6
-rate controlled   -pt was on Coumadin and stopped taking for about a month   -cont to hold Coumadin for now since pt is likely to be  noncompliant with the regimen   -consider adding a DOAC on discharge - Patient has hx of atrial fibrillation not on any meds at home    - EKG A fib rate controlled  - Continue to monitor - On admission, patient with Cr 1.8  - PATTY most likely pre renal in the setting of acute infection, poor oral intake,   - Monitor I/O's daily  - Avoid nephrotoxins, NSAIDS, ACEI and ARBS  - Monitor BMP daily

## 2021-12-22 ENCOUNTER — TRANSCRIPTION ENCOUNTER (OUTPATIENT)
Age: 76
End: 2021-12-22

## 2021-12-22 ENCOUNTER — RESULT REVIEW (OUTPATIENT)
Age: 76
End: 2021-12-22

## 2021-12-22 LAB
-  AMPICILLIN/SULBACTAM: SIGNIFICANT CHANGE UP
-  CEFAZOLIN: SIGNIFICANT CHANGE UP
-  CLINDAMYCIN: SIGNIFICANT CHANGE UP
-  ERYTHROMYCIN: SIGNIFICANT CHANGE UP
-  GENTAMICIN: SIGNIFICANT CHANGE UP
-  OXACILLIN: SIGNIFICANT CHANGE UP
-  RIFAMPIN: SIGNIFICANT CHANGE UP
-  TETRACYCLINE: SIGNIFICANT CHANGE UP
-  TRIMETHOPRIM/SULFAMETHOXAZOLE: SIGNIFICANT CHANGE UP
-  VANCOMYCIN: SIGNIFICANT CHANGE UP
A1C WITH ESTIMATED AVERAGE GLUCOSE RESULT: 6.3 % — HIGH (ref 4–5.6)
ANION GAP SERPL CALC-SCNC: 9 MMOL/L — SIGNIFICANT CHANGE UP (ref 5–17)
BUN SERPL-MCNC: 22 MG/DL — HIGH (ref 7–18)
CALCIUM SERPL-MCNC: 9.2 MG/DL — SIGNIFICANT CHANGE UP (ref 8.4–10.5)
CHLORIDE SERPL-SCNC: 109 MMOL/L — HIGH (ref 96–108)
CO2 SERPL-SCNC: 24 MMOL/L — SIGNIFICANT CHANGE UP (ref 22–31)
CREAT SERPL-MCNC: 1.22 MG/DL — SIGNIFICANT CHANGE UP (ref 0.5–1.3)
ESTIMATED AVERAGE GLUCOSE: 134 MG/DL — HIGH (ref 68–114)
FOLATE SERPL-MCNC: 7.6 NG/ML — SIGNIFICANT CHANGE UP
GLUCOSE BLDC GLUCOMTR-MCNC: 138 MG/DL — HIGH (ref 70–99)
GLUCOSE BLDC GLUCOMTR-MCNC: 152 MG/DL — HIGH (ref 70–99)
GLUCOSE BLDC GLUCOMTR-MCNC: 169 MG/DL — HIGH (ref 70–99)
GLUCOSE BLDC GLUCOMTR-MCNC: 176 MG/DL — HIGH (ref 70–99)
GLUCOSE SERPL-MCNC: 145 MG/DL — HIGH (ref 70–99)
HCT VFR BLD CALC: 22.7 % — LOW (ref 39–50)
HCT VFR BLD CALC: 24.8 % — LOW (ref 39–50)
HGB BLD-MCNC: 7.3 G/DL — LOW (ref 13–17)
HGB BLD-MCNC: 7.9 G/DL — LOW (ref 13–17)
MAGNESIUM SERPL-MCNC: 2.1 MG/DL — SIGNIFICANT CHANGE UP (ref 1.6–2.6)
MCHC RBC-ENTMCNC: 28.2 PG — SIGNIFICANT CHANGE UP (ref 27–34)
MCHC RBC-ENTMCNC: 28.3 PG — SIGNIFICANT CHANGE UP (ref 27–34)
MCHC RBC-ENTMCNC: 31.9 GM/DL — LOW (ref 32–36)
MCHC RBC-ENTMCNC: 32.2 GM/DL — SIGNIFICANT CHANGE UP (ref 32–36)
MCV RBC AUTO: 88 FL — SIGNIFICANT CHANGE UP (ref 80–100)
MCV RBC AUTO: 88.6 FL — SIGNIFICANT CHANGE UP (ref 80–100)
METHOD TYPE: SIGNIFICANT CHANGE UP
NRBC # BLD: 0 /100 WBCS — SIGNIFICANT CHANGE UP (ref 0–0)
NRBC # BLD: 0 /100 WBCS — SIGNIFICANT CHANGE UP (ref 0–0)
PHOSPHATE SERPL-MCNC: 2.7 MG/DL — SIGNIFICANT CHANGE UP (ref 2.5–4.5)
PLATELET # BLD AUTO: 109 K/UL — LOW (ref 150–400)
PLATELET # BLD AUTO: 118 K/UL — LOW (ref 150–400)
POTASSIUM SERPL-MCNC: 4.3 MMOL/L — SIGNIFICANT CHANGE UP (ref 3.5–5.3)
POTASSIUM SERPL-SCNC: 4.3 MMOL/L — SIGNIFICANT CHANGE UP (ref 3.5–5.3)
RBC # BLD: 2.58 M/UL — LOW (ref 4.2–5.8)
RBC # BLD: 2.8 M/UL — LOW (ref 4.2–5.8)
RBC # FLD: 12.6 % — SIGNIFICANT CHANGE UP (ref 10.3–14.5)
RBC # FLD: 12.8 % — SIGNIFICANT CHANGE UP (ref 10.3–14.5)
SARS-COV-2 RNA SPEC QL NAA+PROBE: SIGNIFICANT CHANGE UP
SODIUM SERPL-SCNC: 142 MMOL/L — SIGNIFICANT CHANGE UP (ref 135–145)
VIT B12 SERPL-MCNC: 236 PG/ML — SIGNIFICANT CHANGE UP (ref 232–1245)
WBC # BLD: 3.42 K/UL — LOW (ref 3.8–10.5)
WBC # BLD: 4.31 K/UL — SIGNIFICANT CHANGE UP (ref 3.8–10.5)
WBC # FLD AUTO: 3.42 K/UL — LOW (ref 3.8–10.5)
WBC # FLD AUTO: 4.31 K/UL — SIGNIFICANT CHANGE UP (ref 3.8–10.5)

## 2021-12-22 PROCEDURE — 88304 TISSUE EXAM BY PATHOLOGIST: CPT | Mod: 26

## 2021-12-22 PROCEDURE — 99233 SBSQ HOSP IP/OBS HIGH 50: CPT | Mod: GC

## 2021-12-22 PROCEDURE — 88311 DECALCIFY TISSUE: CPT | Mod: 26

## 2021-12-22 PROCEDURE — 88305 TISSUE EXAM BY PATHOLOGIST: CPT | Mod: 26

## 2021-12-22 RX ORDER — CHLORHEXIDINE GLUCONATE 213 G/1000ML
1 SOLUTION TOPICAL
Refills: 0 | Status: DISCONTINUED | OUTPATIENT
Start: 2021-12-22 | End: 2022-01-07

## 2021-12-22 RX ORDER — ONDANSETRON 8 MG/1
4 TABLET, FILM COATED ORAL ONCE
Refills: 0 | Status: DISCONTINUED | OUTPATIENT
Start: 2021-12-22 | End: 2021-12-23

## 2021-12-22 RX ORDER — SODIUM CHLORIDE 9 MG/ML
1000 INJECTION, SOLUTION INTRAVENOUS
Refills: 0 | Status: DISCONTINUED | OUTPATIENT
Start: 2021-12-22 | End: 2021-12-28

## 2021-12-22 RX ORDER — SODIUM CHLORIDE 9 MG/ML
1000 INJECTION, SOLUTION INTRAVENOUS
Refills: 0 | Status: DISCONTINUED | OUTPATIENT
Start: 2021-12-22 | End: 2021-12-23

## 2021-12-22 RX ORDER — HYDROMORPHONE HYDROCHLORIDE 2 MG/ML
0.5 INJECTION INTRAMUSCULAR; INTRAVENOUS; SUBCUTANEOUS
Refills: 0 | Status: DISCONTINUED | OUTPATIENT
Start: 2021-12-22 | End: 2021-12-23

## 2021-12-22 RX ADMIN — GABAPENTIN 800 MILLIGRAM(S): 400 CAPSULE ORAL at 06:46

## 2021-12-22 RX ADMIN — AMPICILLIN SODIUM AND SULBACTAM SODIUM 200 GRAM(S): 250; 125 INJECTION, POWDER, FOR SUSPENSION INTRAMUSCULAR; INTRAVENOUS at 14:31

## 2021-12-22 RX ADMIN — Medication 325 MILLIGRAM(S): at 06:46

## 2021-12-22 RX ADMIN — AMPICILLIN SODIUM AND SULBACTAM SODIUM 200 GRAM(S): 250; 125 INJECTION, POWDER, FOR SUSPENSION INTRAMUSCULAR; INTRAVENOUS at 20:12

## 2021-12-22 RX ADMIN — AMPICILLIN SODIUM AND SULBACTAM SODIUM 200 GRAM(S): 250; 125 INJECTION, POWDER, FOR SUSPENSION INTRAMUSCULAR; INTRAVENOUS at 01:57

## 2021-12-22 RX ADMIN — AMPICILLIN SODIUM AND SULBACTAM SODIUM 200 GRAM(S): 250; 125 INJECTION, POWDER, FOR SUSPENSION INTRAMUSCULAR; INTRAVENOUS at 08:06

## 2021-12-22 RX ADMIN — LISINOPRIL 2.5 MILLIGRAM(S): 2.5 TABLET ORAL at 06:46

## 2021-12-22 NOTE — PROGRESS NOTE ADULT - PROBLEM SELECTOR PLAN 7
- Patient has hx of atrial fibrillation not on any meds at home    - EKG A fib rate controlled  - Continue to monitor - Patient has hx of atrial fibrillation not on any meds at home    - EKG A fib rate controlled  - CHADSVASC2 4 points   - Will hold off on AC for now as patient is planned for I & D today  - Continue to monitor

## 2021-12-22 NOTE — PROGRESS NOTE ADULT - ATTENDING COMMENTS
Patient seen and examined with Housestaff this morning    76 year-old male has past medical history of atrial fibrillation, hypertension, hyperlipidemia, diabetes mellitus was admitted to medicine for Acute alteration in mentation and left diabetic foot ulcer noted to have plantar ulcer with bone destruction and fractures    Patient is comfortable in no acute distress. No significant pain in foot; has neuropathy. DM for more than 15-20 yrs;     Vital Signs Last 24 Hrs  T(C): 36.7 (22 Dec 2021 05:21), Max: 38.1 (21 Dec 2021 20:20)  T(F): 98 (22 Dec 2021 05:21), Max: 100.6 (21 Dec 2021 20:20)  HR: 73 (22 Dec 2021 05:21) (73 - 83)  BP: 134/75 (22 Dec 2021 05:21) (134/75 - 157/80)  RR: 17 (22 Dec 2021 05:21) (17 - 18)  SpO2: 100% (22 Dec 2021 05:21) (98% - 100%)    P/E: As above  Psych: AAO x3; baseline mentation   Neuro: No gross focal deficits; Power and sensation intact  CVS: S1S2 present, regular, no edema  Resp: BLAE+, No wheeze or Rhonchi  GI: Soft, BS+, Non tender, non distended  Extr: No  calf tenderness B/L Lower extremities  left foot plantar aspect ulcer with distorted anatomy;   Skin: Warm and moist without any rashes    Labs:                                 7.9    4.31  )-----------( 109      ( 22 Dec 2021 07:55 )             24.8   12-22    142  |  109<H>  |  22<H>  ----------------------------<  145<H>  4.3   |  24  |  1.22    Ca    9.2      22 Dec 2021 07:55  Phos  2.7     12-22  Mg     2.1     12-22    TPro  6.8  /  Alb  2.7<L>  /  TBili  0.4  /  DBili  x   /  AST  15  /  ALT  11  /  AlkPhos  60  12-21      Iron with Total Binding Capacity (12.21.21 @ 12:28)   Iron - Total Binding Capacity.: 281 ug/dL   % Saturation, Iron: 6 %   Iron Total, Serum: 16 ug/dL   Unsaturated Iron Binding Capacity: 265 ug/dL     Culture - Blood (12.20.21 @ 05:34)   - Staphylococcus aureus: Detec Any isolate of Staphylococcus aureus from a blood culture is NOT considered a contaminant.   - Streptococcus sp. (Not Grp A, B or S pneumoniae): Detec   Gram Stain:   Growth in aerobic bottle: Gram Positive Cocci in Clusters   Growth in anaerobic bottle: Gram positive cocci in pairs     MR Foot No Cont, Left (12.20.21 @ 17:42)   IMPRESSION:   Plantar soft tissue wound with tract extending to the undersurface of the first tarsometatarsal articulation.  Edema within the base of the first tarsometatarsal articulation adjacent to the tract with preservation of the T1 marrow signal, which may represent sequela of early osteomyelitis.  First second and third metatarsal head subchondral fractures.    D/D:  Sepsis with Bacteremia due to Gram positive Cocci Strep and staphylococcus (MSSA)  Diabetic foot ulcer with Charcot's joint concerning for Osteomyelitis Acute vs Chronic  Fracture Left Foot  Acute Encephalopathy likely infectious and metabolic resolved  Uncontrolled Type 2 DM   PATTY due to Sepsis resolved  Afib     Plan:  mental status back to normal, reported memory loss for last 6 months now-  Continue IVF  MRI findings noted; d/w Podiatry  Podiatry consult appreciated; d/w PGY1 Dr. Rosado at bedside  ID Consult d/w Dr. Maxwell recommend MRI Back/ TTE;   Advised to D/C Vanco and continue Unasyn; Repeat Blod Cultures AM  Likely source of Bacteremia is the foot infection in my opinion  Patient will likely need PICC line and prolonged antibiotics  DVT ppx    Discussed with Patient findings and plan of care  Discussed with PGY1 Dr. Alvarado/ PGY2 DR. Pham and MS3 and RN Patient seen and examined with Housestaff this morning    76 year-old male has past medical history of atrial fibrillation, hypertension, hyperlipidemia, diabetes mellitus was admitted to medicine for Acute alteration in mentation and left diabetic foot ulcer noted to have plantar ulcer with bone destruction and fractures    Patient is comfortable in no acute distress. No significant pain in foot; has neuropathy. DM for more than 15-20 yrs;     Vital Signs Last 24 Hrs  T(C): 36.7 (22 Dec 2021 05:21), Max: 38.1 (21 Dec 2021 20:20)  T(F): 98 (22 Dec 2021 05:21), Max: 100.6 (21 Dec 2021 20:20)  HR: 73 (22 Dec 2021 05:21) (73 - 83)  BP: 134/75 (22 Dec 2021 05:21) (134/75 - 157/80)  RR: 17 (22 Dec 2021 05:21) (17 - 18)  SpO2: 100% (22 Dec 2021 05:21) (98% - 100%)    P/E: As above  Psych: AAO x3; baseline mentation   Neuro: No gross focal deficits; Power and sensation intact  CVS: S1S2 present, regular, no edema  Resp: BLAE+, No wheeze or Rhonchi  GI: Soft, BS+, Non tender, non distended  Extr: No  calf tenderness B/L Lower extremities  left foot plantar aspect ulcer with distorted anatomy;   Skin: Warm and moist without any rashes    Labs:                                 7.9    4.31  )-----------( 109      ( 22 Dec 2021 07:55 )             24.8   12-22    142  |  109<H>  |  22<H>  ----------------------------<  145<H>  4.3   |  24  |  1.22    Ca    9.2      22 Dec 2021 07:55  Phos  2.7     12-22  Mg     2.1     12-22    TPro  6.8  /  Alb  2.7<L>  /  TBili  0.4  /  DBili  x   /  AST  15  /  ALT  11  /  AlkPhos  60  12-21      Iron with Total Binding Capacity (12.21.21 @ 12:28)   Iron - Total Binding Capacity.: 281 ug/dL   % Saturation, Iron: 6 %   Iron Total, Serum: 16 ug/dL   Unsaturated Iron Binding Capacity: 265 ug/dL     Culture - Blood (12.20.21 @ 05:34)   - Staphylococcus aureus: Detec Any isolate of Staphylococcus aureus from a blood culture is NOT considered a contaminant.   - Streptococcus sp. (Not Grp A, B or S pneumoniae): Detec   Gram Stain:   Growth in aerobic bottle: Gram Positive Cocci in Clusters   Growth in anaerobic bottle: Gram positive cocci in pairs     MR Foot No Cont, Left (12.20.21 @ 17:42)   IMPRESSION:   Plantar soft tissue wound with tract extending to the undersurface of the first tarsometatarsal articulation.  Edema within the base of the first tarsometatarsal articulation adjacent to the tract with preservation of the T1 marrow signal, which may represent sequela of early osteomyelitis.  First second and third metatarsal head subchondral fractures.    D/D:  Sepsis with Bacteremia due to Gram positive Cocci Strep and staphylococcus (MSSA)  Diabetic foot ulcer with Charcot's joint concerning for Osteomyelitis Acute vs Chronic  Fracture Left Foot  Acute Encephalopathy likely infectious and metabolic resolved  Uncontrolled Type 2 DM   PATTY due to Sepsis resolved  Afib     Plan:  Podiatry follow up noted; Planned for OR for I&D;   Continue Unasyn; d/w ID Dr. Maxwell; agree with I&D; Procedure is warranted as his source of Bacteremia is likely the foot.   Patient is optimized at intermediate risk for planned low risk procedure (I&D)   Continue IVF  Follow up TTE and MRI Back as per ID  mental status back to normal, reported memory loss for last 6 months now-  Repeat Blood cultures send this morning  Likely source of Bacteremia is the foot infection in my opinion  Patient will likely need PICC line and prolonged antibiotics  DVT ppx resume Lovenox once returns form OR    Discussed with Patient findings and plan of care  Discussed with PGY1 Dr. Way and / PGY2 DR. Pham and MS3 and RN    I will be away 12/24/21- 12/27/21; Dr. Shetty/ Hospitalist colleague to assume care Patient seen and examined with Housestaff this morning    76 year-old male has past medical history of atrial fibrillation, hypertension, hyperlipidemia, diabetes mellitus was admitted to medicine for Acute alteration in mentation and left diabetic foot ulcer noted to have plantar ulcer with bone destruction and fractures    Patient is comfortable in no acute distress. No significant pain in foot; has neuropathy. DM for more than 15-20 yrs;     Vital Signs Last 24 Hrs  T(C): 36.7 (22 Dec 2021 05:21), Max: 38.1 (21 Dec 2021 20:20)  T(F): 98 (22 Dec 2021 05:21), Max: 100.6 (21 Dec 2021 20:20)  HR: 73 (22 Dec 2021 05:21) (73 - 83)  BP: 134/75 (22 Dec 2021 05:21) (134/75 - 157/80)  RR: 17 (22 Dec 2021 05:21) (17 - 18)  SpO2: 100% (22 Dec 2021 05:21) (98% - 100%)    P/E: As above  Psych: AAO x3; baseline mentation   Neuro: No gross focal deficits; Power and sensation intact  CVS: S1S2 present, regular, no edema  Resp: BLAE+, No wheeze or Rhonchi  GI: Soft, BS+, Non tender, non distended  Extr: No  calf tenderness B/L Lower extremities  left foot plantar aspect ulcer with distorted anatomy;   Skin: Warm and moist without any rashes    Labs:                                 7.9    4.31  )-----------( 109      ( 22 Dec 2021 07:55 )             24.8   12-22    142  |  109<H>  |  22<H>  ----------------------------<  145<H>  4.3   |  24  |  1.22    Ca    9.2      22 Dec 2021 07:55  Phos  2.7     12-22  Mg     2.1     12-22    TPro  6.8  /  Alb  2.7<L>  /  TBili  0.4  /  DBili  x   /  AST  15  /  ALT  11  /  AlkPhos  60  12-21      Iron with Total Binding Capacity (12.21.21 @ 12:28)   Iron - Total Binding Capacity.: 281 ug/dL   % Saturation, Iron: 6 %   Iron Total, Serum: 16 ug/dL   Unsaturated Iron Binding Capacity: 265 ug/dL     Culture - Blood (12.20.21 @ 05:34)   - Staphylococcus aureus: Detec Any isolate of Staphylococcus aureus from a blood culture is NOT considered a contaminant.   - Streptococcus sp. (Not Grp A, B or S pneumoniae): Detec   Gram Stain:   Growth in aerobic bottle: Gram Positive Cocci in Clusters   Growth in anaerobic bottle: Gram positive cocci in pairs     MR Foot No Cont, Left (12.20.21 @ 17:42)   IMPRESSION:   Plantar soft tissue wound with tract extending to the undersurface of the first tarsometatarsal articulation.  Edema within the base of the first tarsometatarsal articulation adjacent to the tract with preservation of the T1 marrow signal, which may represent sequela of early osteomyelitis.  First second and third metatarsal head subchondral fractures.    MR Lumbar Spine No Cont (12.21.21 @ 18:36)     IMPRESSION: Degenerative changes described above  Grade 1 anterolisthesis seen involving L5-S1 level with associated lysis defects suspected.    D/D:  Sepsis with Bacteremia due to Gram positive Cocci Strep and staphylococcus (MSSA)  Diabetic foot ulcer with Charcot's joint concerning for Osteomyelitis Acute vs Chronic  Fracture Left Foot  Acute Encephalopathy likely infectious and metabolic resolved  Uncontrolled Type 2 DM   PATTY due to Sepsis resolved  Afib     Plan:  Podiatry follow up noted; Planned for OR for I&D;   Continue Unasyn; d/w ID Dr. Maxwell; agree with I&D; Procedure is warranted as his source of Bacteremia is likely the foot.   Patient is optimized at intermediate risk for planned low risk procedure (I&D)   Continue IVF  Follow up TTE and MRI Back as per ID  MRI negative for any acute pathology  mental status back to normal, reported memory loss for last 6 months now-  Repeat Blood cultures send this morning  Likely source of Bacteremia is the foot infection in my opinion  Patient will likely need PICC line and prolonged antibiotics  DVT ppx resume Lovenox once returns form OR    Discussed with Patient findings and plan of care  Discussed with PGY1 Dr. Way and / PGY2 DR. Pham and MS3 and RN    I will be away 12/23/21- 12/27/21; Dr. Shetty/ Hospitalist colleague to assume care

## 2021-12-22 NOTE — DISCHARGE NOTE PROVIDER - NSDCMRMEDTOKEN_GEN_ALL_CORE_FT
BUPROPION HCL  MG TABLET: each orally once a day  FUROSEMIDE 20MG TAB: tab(s) orally once a day  GABAPENTIN 800 MG TABLET: each orally 3 times a day  GLIMEPIRIDE 1 MG TABLET: each orally 2 times a day  LISINOPRIL 2.5 MG TABLET: each orally once a day  METFORMIN HCL 1,000 MG TABLET: each orally 2 times a day  NAPROXEN 500 MG TABLET: each orally 2 times a day  SIMVASTATIN 40 MG TABLET: each orally once a day   BUPROPION HCL  MG TABLET: each orally once a day  Eliquis 5 mg oral tablet: 1 tab(s) orally 2 times a day   FUROSEMIDE 20MG TAB: tab(s) orally once a day  GABAPENTIN 800 MG TABLET: each orally 3 times a day  GLIMEPIRIDE 1 MG TABLET: each orally 2 times a day  LISINOPRIL 2.5 MG TABLET: each orally once a day  METFORMIN HCL 1,000 MG TABLET: each orally 2 times a day  NAPROXEN 500 MG TABLET: each orally 2 times a day  SIMVASTATIN 40 MG TABLET: each orally once a day   apixaban 5 mg oral tablet: 1 tab(s) orally every 12 hours  ascorbic acid 500 mg oral tablet: 1 tab(s) orally once a day  buPROPion 150 mg/24 hours (XL) oral tablet, extended release: 1 tab(s) orally once a day  ceFAZolin: 2 gram(s) intravenous every 8 hours till January 19/2022  famotidine 20 mg oral tablet: 1 tab(s) orally once a day  ferrous sulfate 325 mg (65 mg elemental iron) oral tablet: 1 tab(s) orally 2 times a day  FUROSEMIDE 20MG TAB: tab(s) orally once a day  gabapentin 400 mg oral capsule: 2 cap(s) orally 3 times a day  GLIMEPIRIDE 1 MG TABLET: each orally 2 times a day  LISINOPRIL 2.5 MG TABLET: each orally once a day  melatonin 5 mg oral tablet: 1 tab(s) orally once a day (at bedtime)  METFORMIN HCL 1,000 MG TABLET: each orally 2 times a day  NAPROXEN 500 MG TABLET: each orally 2 times a day  ocular lubricant ophthalmic solution: 1 drop(s) to each affected eye 2 times a day  polyethylene glycol 3350 oral powder for reconstitution: 17 gram(s) orally once a day  senna oral tablet: 2 tab(s) orally once a day (at bedtime)  SIMVASTATIN 40 MG TABLET: each orally once a day   apixaban 5 mg oral tablet: 1 tab(s) orally every 12 hours  ascorbic acid 500 mg oral tablet: 1 tab(s) orally once a day  buPROPion 150 mg/24 hours (XL) oral tablet, extended release: 1 tab(s) orally once a day  ceFAZolin: 2 gram(s) intravenous every 8 hours till January 19/2022  famotidine 20 mg oral tablet: 1 tab(s) orally once a day  ferrous sulfate 325 mg (65 mg elemental iron) oral tablet: 1 tab(s) orally 2 times a day  gabapentin 400 mg oral capsule: 2 cap(s) orally 3 times a day  GLIMEPIRIDE 1 MG TABLET: each orally 2 times a day  levoFLOXacin 750 mg oral tablet: 1 tab(s) orally every 24 hours till January 21/2022  LISINOPRIL 2.5 MG TABLET: each orally once a day  melatonin 5 mg oral tablet: 1 tab(s) orally once a day (at bedtime)  METFORMIN HCL 1,000 MG TABLET: each orally 2 times a day  ocular lubricant ophthalmic solution: 1 drop(s) to each affected eye 2 times a day  polyethylene glycol 3350 oral powder for reconstitution: 17 gram(s) orally once a day  senna oral tablet: 2 tab(s) orally once a day (at bedtime)  SIMVASTATIN 40 MG TABLET: each orally once a day   apixaban 5 mg oral tablet: 1 tab(s) orally every 12 hours  ascorbic acid 500 mg oral tablet: 1 tab(s) orally once a day  buPROPion 150 mg/24 hours (XL) oral tablet, extended release: 1 tab(s) orally once a day  ceFAZolin: 2 gram(s) intravenous every 8 hours till January 1/23/2022  famotidine 20 mg oral tablet: 1 tab(s) orally once a day  ferrous sulfate 325 mg (65 mg elemental iron) oral tablet: 1 tab(s) orally 2 times a day  gabapentin 400 mg oral capsule: 2 cap(s) orally 3 times a day  GLIMEPIRIDE 1 MG TABLET: each orally 2 times a day  levoFLOXacin 750 mg oral tablet: 1 tab(s) orally once a day until 1/24/2022  LISINOPRIL 2.5 MG TABLET: each orally once a day  melatonin 5 mg oral tablet: 1 tab(s) orally once a day (at bedtime)  METFORMIN HCL 1,000 MG TABLET: each orally 2 times a day  ocular lubricant ophthalmic solution: 1 drop(s) to each affected eye 2 times a day  polyethylene glycol 3350 oral powder for reconstitution: 17 gram(s) orally once a day  senna oral tablet: 2 tab(s) orally once a day (at bedtime)  SIMVASTATIN 40 MG TABLET: each orally once a day

## 2021-12-22 NOTE — DISCHARGE NOTE PROVIDER - NSDCPNSUBOBJ_GEN_ALL_CORE
feels well, Wound vac delivered at [facility]  Stable to dc to SNF  complete atbx at [facility] till 1/19/22

## 2021-12-22 NOTE — DISCHARGE NOTE PROVIDER - HOSPITAL COURSE
76 year-old male from home with past medical history of atrial fibrillation (not on coumadin- self stopped), hypertension, hyperlipidemia, diabetes mellitus, anemia, CKD presented to ED for AMS admitted to medicine for acute encephalopathy secondary to bacteremia from left foot cellulitis/abscess, ID on board. Patient sees podiatrist as outpatient.      76 year-old male from home with past medical history of atrial fibrillation (not on coumadin- self stopped), hypertension, hyperlipidemia, diabetes mellitus, anemia, CKD presented to ED for AMS admitted to medicine for acute encephalopathy secondary to bacteremia from left foot cellulitis/abscess. In the ED, pt was afebrile. AMS resolved. Pt received Unasyn and vancomycin in the ED. Dr. Maxwell, Infectious Disease was consulted who recommended to conitnue with Unasyn, follow up on Spine MRI to r/o possible source, blood cultures, urine cultures, and MRI of the foot. Xrays of the left foot were done in the ED which demonstrated no visible tracking air. MRI of the left foot was done which depicted Plantar soft tissue wound with tract extending to the undersurface of the first tarsometatarsal articulation. Edema within the base of the first tarsometatarsal articulation adjacent to the tract with preservation of the T1 marrow signal, which may represent sequela of early osteomyelitis. First second and third metatarsal head subchondral fractures. As per ID recommendations, MRI of the spine was done which revealed Degenerative changes and grade 1 anterolisthesis seen involving L5-S1 level with associated lysis defects suspected. Blood cultures grew MSSA, pt was started on Unasyn for this. Blood cultures were repeated which showed ******. Urine cultures were negative for any growth of bacteria. An Echocardiogram was done to rule out endocarditis, as per results it recommended ORLY to r/o endocarditis. Podiatry was consulted for the left foot wound. Podiatry took the pt for incision and drainage on 12/22. Bone biopsy form the surgery revealed *******. Pt wsa noted to have a low hemoglobin value of 8-9. Iron panel was compatible with KISHOR and pt was started on ferrous sulfate and vitamin C. Upon admission, pts Creatinine level was 1.8, it downtrended to 1.2, The PATTY was most likely pre-renal in the setting of acute infection and poor oral intake. Nephrotoxins such as NSAIDS, ACEI, and ARBS were avoided during the course of admission. Pt has a history of A-fib. For this problem EKG was done which demonstrated A fib rate controlled. No medication was given for this problem as pt does not take medication for this problem at home. For Diabetes, pt was put on a sliding scale and blood sugar levels were monitored. For HTN, pt takes Lisinopril at home, this was continued during the hospital course. Pt was continued on a atorvastatin for hyperlipidemia.   AS per PT, *************  Pt will be discharged on **** antibiotics. Pt will follow up outpatient with Dr. Hart for continued management of the Left foot wound a with their respective PCP for continued management.      76 year-old male from home with past medical history of atrial fibrillation (not on coumadin- self stopped), hypertension, hyperlipidemia, diabetes mellitus, anemia, CKD presented to ED for AMS admitted to medicine for acute encephalopathy secondary to bacteremia from left foot cellulitis/abscess. In the ED, pt was afebrile. AMS resolved. Pt received Unasyn and vancomycin in the ED. Dr. Maxwell, Infectious Disease was consulted who recommended to conitnue with Unasyn, follow up on Spine MRI to r/o possible source, blood cultures, urine cultures, and MRI of the foot. Xrays of the left foot were done in the ED which demonstrated no visible tracking air. MRI of the left foot was done which depicted Plantar soft tissue wound with tract extending to the undersurface of the first tarsometatarsal articulation. Edema within the base of the first tarsometatarsal articulation adjacent to the tract with preservation of the T1 marrow signal, which may represent sequela of early osteomyelitis. First second and third metatarsal head subchondral fractures. As per ID recommendations, MRI of the spine was done which revealed Degenerative changes and grade 1 anterolisthesis seen involving L5-S1 level with associated lysis defects suspected. Blood cultures grew MSSA, pt was started on Unasyn for this. Blood cultures were repeated which showed ******. Urine cultures were negative for any growth of bacteria. An Echocardiogram was done to rule out endocarditis, as per results it recommended ORLY to r/o endocarditis. Podiatry was consulted for the left foot wound. Podiatry took the pt for incision and drainage on 12/22. Bone biopsy form the surgery revealed *******. Pt wsa noted to have a low hemoglobin value of 8-9. Iron panel was compatible with KISHOR and pt was started on ferrous sulfate and vitamin C. Upon admission, pts Creatinine level was 1.8, it downtrended to 1.2, The PATTY was most likely pre-renal in the setting of acute infection and poor oral intake. Nephrotoxins such as NSAIDS, ACEI, and ARBS were avoided during the course of admission. Pt has a history of A-fib. For this problem EKG was done which demonstrated A fib rate controlled. Pt was given Lovenox for this problem. Pt is to follow up with Dr. Liz, Cardiologist, for continued management of the A-fib. For Diabetes, pt was put on a sliding scale and blood sugar levels were monitored. For HTN, pt takes Lisinopril at home, this was continued during the hospital course. Pt was continued on a atorvastatin for hyperlipidemia.   AS per PT, *************  Pt will be discharged on **** antibiotics. Pt will follow up outpatient with Dr. Hart for continued management of the Left foot wound a with their respective PCP for continued management.      76 year-old male from home with past medical history of atrial fibrillation (not on coumadin- self stopped), hypertension, hyperlipidemia, diabetes mellitus, anemia, CKD presented to ED for AMS admitted to medicine for acute encephalopathy secondary to bacteremia from left foot cellulitis/abscess. In the ED, pt was afebrile. AMS resolved. Pt received Unasyn and vancomycin in the ED. MRI of the left foot was done which depicted Plantar soft tissue wound with tract extending to the undersurface of the first tarsometatarsal articulation. Edema within the base of the first tarsometatarsal articulation adjacent to the tract with preservation of the T1 marrow signal, which may represent sequela of early osteomyelitis. First second and third metatarsal head subchondral fractures, blood cultures grew Staph and Strep, Dr. Maxwell, Infectious Disease was consulted who recommended to continue with Unasyn, Lumbosacral Spine MRI showed degenerative changes and grade 1 anterolisthesis seen involving L5-S1 level with associated lysis defects suspected., TTE and ORLY showed no vegetations, negative for endocarditis, repeated blood cultures 12/22 negative, changed antibiotic to Cefazolin 2 grams q 8hrs till 1/19/21.  Podiatry on board, patient is s/p incision and drainage on 12/22. Bone biopsy form the surgery revealed *******. Pt wsa noted to have a low hemoglobin value of 8-9. Iron panel was compatible with KISHOR and pt was started on ferrous sulfate and vitamin C., hemoglobin dropped to <7 due to acute blood loss form left foot s/p 2 units of PRBC. For PATTY was most likely pre-renal in the setting of acute infection and poor oral intake, creatinine downtrended s/p IV fluids. For A-fib started on full dose Lovenox, no BB, NOAC Eliquis covered by insurance. For Diabetes, HTN, HLD continued on home meds.   PT recommends PRINCE.  Pt will be discharged on Cefazolin 2 grams q8 hrs till 1/19/21. Pt to follow up outpatient with Dr. Hart for continued management of the Left foot wound and PCP Dr Buchanan .      76 year-old male from home with past medical history of atrial fibrillation (not on coumadin- self stopped), hypertension, hyperlipidemia, diabetes mellitus, anemia, CKD presented to ED for AMS admitted to medicine for acute encephalopathy secondary to bacteremia from left foot cellulitis/abscess.   For AMS, patient received Unasyn and vancomycin in the ED, resolved after staring antibiotics and IV hydration.   For left foot cellulitis/abscess/OM, MRI of the left foot showed depicted Plantar soft tissue wound with tract extending to the undersurface of the first tarsometatarsal articulation. Edema within the base of the first tarsometatarsal articulation adjacent to the tract with preservation of the T1 marrow signal, which may represent sequela of early osteomyelitis. First second and third metatarsal head subchondral fractures, blood cultures grew Staph and Strep (gram positive bacteremia), Dr. Maxwell, Infectious Disease recommended to continue with Unasyn, Lumbosacral Spine MRI showed degenerative changes and grade 1 anterolisthesis seen involving L5-S1 level with associated lysis defects suspected,  no infection found, TTE and ORLY showed no vegetations, negative for endocarditis, repeated blood cultures 12/22 were negative, antibiotic was changed to Cefazolin 2 grams q 8hrs till 1/19/21.  Podiatry on board, patient is s/p incision and drainage on 12/22. Bone biopsy form the surgery revealed *******.   Pt was noted to have a low hemoglobin value of 8-9. Iron panel was compatible with KISHOR started on ferrous sulfate and vitamin C, hemoglobin dropped to <7 due to acute blood loss form left foot s/p 2 units of PRBC.   For PATTY was most likely pre-renal in the setting of acute infection and poor oral intake, creatinine downtrended s/p IV fluids.   For A-fib started on full dose Lovenox, no BB, NOAC Eliquis covered by insurance.   For Diabetes, HTN, HLD continued on home meds.   PT recommends PRINCE.  Pt will be discharged on Cefazolin 2 grams q8 hrs till 1/19/21. Pt to follow up outpatient with Dr. Hart for continued management of the Left foot wound and PCP Dr Buchanan .      76 year-old male from home with past medical history of atrial fibrillation (not on coumadin- self stopped), hypertension, hyperlipidemia, diabetes mellitus, anemia, CKD presented to ED for AMS admitted to medicine for acute encephalopathy secondary to bacteremia from left foot cellulitis/abscess.   For AMS, patient received Unasyn and vancomycin in the ED, resolved after staring antibiotics and IV hydration.   For left foot cellulitis/abscess/OM, MRI of the left foot showed depicted Plantar soft tissue wound with tract extending to the undersurface of the first tarsometatarsal articulation. Edema within the base of the first tarsometatarsal articulation adjacent to the tract with preservation of the T1 marrow signal, which may represent sequela of early osteomyelitis. First second and third metatarsal head subchondral fractures, blood cultures grew Staph and Strep (gram positive bacteremia), Dr. Maxwell, Infectious Disease recommended to continue with Unasyn, Lumbosacral Spine MRI showed degenerative changes and grade 1 anterolisthesis seen involving L5-S1 level with associated lysis defects suspected,  no infection found, TTE and ORLY showed no vegetations, negative for endocarditis, repeated blood cultures 12/22 were negative, antibiotic was changed to Cefazolin 2 grams q 8hrs till 1/19/21.  Podiatry on board, patient is s/p incision and drainage on 12/22. Bone biopsy form the surgery revealed Tendon, left foot, excision: Fibro-tendinous tissue with ischemic and degenerative changes. Bone, left foot, biopsy: Bone fragment with resorptive changes and focal myelofibrosis. Negative for acute osteomyelitis.   Pt was noted to have a low hemoglobin value of 8-9. Iron panel was compatible with KISHOR started on ferrous sulfate and vitamin C, hemoglobin dropped to <7 due to acute blood loss form left foot s/p 2 units of PRBC.   For PATTY was most likely pre-renal in the setting of acute infection and poor oral intake, creatinine downtrended s/p IV fluids.   For A-fib started on full dose Lovenox, no BB, NOAC Eliquis covered by insurance.   For Diabetes, HTN, HLD continued on home meds.   PT recommends PRINCE.  Pt will be discharged on Cefazolin 2 grams q8 hrs till 1/19/21. Pt to follow up outpatient with Dr. Hart for continued management of the Left foot wound and PCP Dr Buchanan.      76 year-old male from home with past medical history of atrial fibrillation (not on coumadin- self stopped), hypertension, hyperlipidemia, diabetes mellitus, anemia, CKD presented to ED for AMS admitted to medicine for acute encephalopathy secondary to bacteremia from left foot cellulitis/abscess.   For AMS, patient received Unasyn and vancomycin in the ED, resolved after staring antibiotics and IV hydration.   For left foot cellulitis/abscess/OM, X ray of left foot showed There are fractures of the distal aspects of the second, third, and tarsals and the proximal phalanx of the fifth toe. The fifth toe fracture is acute. The other fractures could be subacute. MRI of the left foot showed depicted Plantar soft tissue wound with tract extending to the undersurface of the first tarsometatarsal articulation. Edema within the base of the first tarsometatarsal articulation adjacent to the tract with preservation of the T1 marrow signal, which may represent sequela of early osteomyelitis. First second and third metatarsal head subchondral fractures, blood cultures grew Staph and Strep (gram positive bacteremia), Dr. Maxwell, Infectious Disease recommended to continue with Unasyn, Lumbosacral Spine MRI showed degenerative changes and grade 1 anterolisthesis seen involving L5-S1 level with associated lysis defects suspected,  no infection found, TTE and ORLY showed no vegetations, negative for endocarditis, repeated blood cultures 12/22 were negative, antibiotic was changed to Cefazolin 2 grams q 8hrs till 1/19/21.  Podiatry on board, patient is s/p incision and drainage on 12/22. Bone biopsy form the surgery revealed Tendon, left foot, excision: Fibro-tendinous tissue with ischemic and degenerative changes. Bone, left foot, biopsy: Bone fragment with resorptive changes and focal myelofibrosis. Negative for acute osteomyelitis.   Pt was noted to have a low hemoglobin value of 8-9. Iron panel was compatible with KISHOR started on ferrous sulfate and vitamin C, hemoglobin dropped to <7 due to acute blood loss form left foot s/p 2 units of PRBC.   For PATTY was most likely pre-renal in the setting of acute infection and poor oral intake, creatinine downtrended s/p IV fluids.   For A-fib started on full dose Lovenox, no BB, NOAC Eliquis covered by insurance.   For Diabetes, HTN, HLD continued on home meds.   PT recommends PRINCE.  Pt will be discharged on Cefazolin 2 grams q8 hrs till 1/19/21. Pt to follow up outpatient with Dr. Hart for continued management of the Left foot wound and PCP Dr Buchanan.      76 year-old male from home with past medical history of atrial fibrillation (not on coumadin- self stopped), hypertension, hyperlipidemia, diabetes mellitus, anemia, CKD presented to ED for AMS admitted to medicine for acute encephalopathy secondary to bacteremia from left foot cellulitis/abscess.   For AMS, patient received Unasyn and vancomycin in the ED, resolved after staring antibiotics and IV hydration.   For left foot cellulitis/abscess/OM, X ray of left foot showed There are fractures of the distal aspects of the second, third, and tarsals and the proximal phalanx of the fifth toe. The fifth toe fracture is acute. The other fractures could be subacute. MRI of the left foot showed depicted Plantar soft tissue wound with tract extending to the undersurface of the first tarsometatarsal articulation. Edema within the base of the first tarsometatarsal articulation adjacent to the tract with preservation of the T1 marrow signal, which may represent sequela of early osteomyelitis. First second and third metatarsal head subchondral fractures, blood cultures grew Staph and Strep (gram positive bacteremia), Dr. Maxwell, Infectious Disease recommended to continue with Unasyn, Lumbosacral Spine MRI showed degenerative changes and grade 1 anterolisthesis seen involving L5-S1 level with associated lysis defects suspected,  no infection found, TTE and ORLY showed no vegetations, negative for endocarditis, repeated blood cultures 12/22 were negative, antibiotic was changed to Cefazolin 2 grams q 8hrs for 4 weeks till 1/19/21 and Levaquin 750 mg daily for 4 weeks till 1/21/22.  Podiatry on board, patient is s/p incision and drainage on 12/22. Bone biopsy form the surgery revealed Tendon, left foot, excision: Fibro-tendinous tissue with ischemic and degenerative changes. Bone, left foot, biopsy: Bone fragment with resorptive changes and focal myelofibrosis. Negative for acute osteomyelitis.   Pt was noted to have a low hemoglobin value of 8-9. Iron panel was compatible with KISHOR started on ferrous sulfate and vitamin C, hemoglobin dropped to <7 due to acute blood loss form left foot s/p 2 units of PRBC.   For PATTY was most likely pre-renal in the setting of acute infection and poor oral intake, creatinine downtrended s/p IV fluids.   For A-fib started on full dose Lovenox, no BB, NOAC Eliquis covered by insurance.   For Diabetes, HTN, HLD continued on home meds.   PT recommends PRINCE.  Pt will be discharged on Cefazolin 2 grams q8 hrs till 1/19/21. Pt to follow up outpatient with Dr. Hart for continued management of the Left foot wound and PCP Dr Buchanan.      76 year-old male from home with past medical history of atrial fibrillation (not on coumadin- self stopped), hypertension, hyperlipidemia, diabetes mellitus, anemia, CKD presented to ED for AMS admitted to medicine for acute encephalopathy secondary to bacteremia from left foot cellulitis/abscess.   For AMS, patient received Unasyn and vancomycin in the ED, resolved after staring antibiotics and IV hydration.   For left foot cellulitis/abscess/OM, X ray of left foot showed There are fractures of the distal aspects of the second, third, and tarsals and the proximal phalanx of the fifth toe. The fifth toe fracture is acute. The other fractures could be subacute. MRI of the left foot showed depicted Plantar soft tissue wound with tract extending to the undersurface of the first tarsometatarsal articulation. Edema within the base of the first tarsometatarsal articulation adjacent to the tract with preservation of the T1 marrow signal, which may represent sequela of early osteomyelitis. First second and third metatarsal head subchondral fractures, blood cultures grew Staph and Strep (gram positive bacteremia), Dr. Maxwell, Infectious Disease recommended to continue with Unasyn, Lumbosacral Spine MRI showed degenerative changes and grade 1 anterolisthesis seen involving L5-S1 level with associated lysis defects suspected,  no infection found, TTE and ORLY showed no vegetations, negative for endocarditis, repeated blood cultures 12/22 were negative, antibiotic was changed to Cefazolin 2 grams q 8hrs for 4 weeks till 1/19/21 and Levaquin 750 mg daily for 4 weeks till 1/21/22.  Podiatry on board, patient is s/p incision and drainage on 12/22. Bone biopsy form the surgery revealed Tendon, left foot, excision: Fibro-tendinous tissue with ischemic and degenerative changes. Bone, left foot, biopsy: Bone fragment with resorptive changes and focal myelofibrosis. Negative for acute osteomyelitis.   Pt was noted to have a low hemoglobin value of 8-9. Iron panel was compatible with KISHOR started on ferrous sulfate and vitamin C, hemoglobin dropped to <7 due to acute blood loss form left foot s/p 2 units of PRBC.   For PATTY was most likely pre-renal in the setting of acute infection and poor oral intake, creatinine downtrended s/p IV fluids.   For A-fib started on full dose Lovenox, no BB, NOAC Eliquis covered by insurance.   For Diabetes, HTN, HLD continued on home meds.   PT recommends PRINCE.  Pt will be discharged on Cefazolin 2 grams q8 hrs till 1/19/21. Pt to follow up outpatient with Dr. Hart for continued management of the Left foot wound and PCP Dr Buchanan.   Wound care orders as follows:  Pt to have wound vac changed every 2 days including black foam with Tegaderm 4x4s, abd, Alyse and ACE. Pt to be nonwb to Left foot. Pt to keep dressing clean, dry and intact.        Pt is a 76 year old male from home with PMHx of atrial fibrillation, hypertension, hyperlipidemia, DM admitted for sepsis with acute encephalopathy secondary to diabetic foot ulcer. Pt received one dose of levaquine in ED and switched to vancomycin (d/c'ed 12/21) and amp/sulbactam (d/c'ed today). Xray of left foot with multiple metatarsal Fx but without radiographic evidence of osteomyelitis. However, MRI of left foot with early osteomyelitis of the 1st tarsometatarsal site, multiple metatarsal Fx and diffuse SQ edema. BCxs from 12/20 growing methicillin susceptible S. aureus, Streptococcus mitis/oralis group, and Alpha hemolytic strep. Pt underwent bedside debridement of L foot on 12/20 by Podiatry. Surgical swab 12/20 of left foot wound with Alpha hemolytic strep, S. aureus, Citrobacter koseri, Veillonella parvula. Pt underwent OR debridement of left foot 12/22. Bone Cx from OR with no growth to date. Repeat surgical swab no growth to date. Repeat BC 12/22 negative.  Pt will need at least 4wks of Ab from the time of 12/22 debridement to Rx bacteremia and osteomyelitis. ID Recommended cefazolin to cover  MSSA and strep from initial BC.  Pt had a LUE single lumen picc placed on 1/4 to complete Cefazolin 2 g q 8hrs for 4 weeks starting from 12/22 - 1/19/22.  Pt is otherwise stable for D/C with wound vac. Pt will follow up with Dr Hart at OP.   Pt is medically optimized for discharge.  Please note that this a brief summary of hospital course please refer to daily progress notes and consult notes for full course and events    Wound vac dressign was changed by podiatry day for discharge.   dressing recs from podiatry:  - Pt to WB to heel on left foot with surgical shoe    -C/w leg elevation   -WCO: Wound vac changed every other day using black foam with Tegaderm 4x4s, abd, dianna and ACE on the left foot surgical site

## 2021-12-22 NOTE — PROGRESS NOTE ADULT - PROBLEM SELECTOR PLAN 5
- On admission, patient was admitted with acute infectious encephalopathy  - Resolved, mental status back to baseline AAOx3

## 2021-12-22 NOTE — BRIEF OPERATIVE NOTE - NSICDXBRIEFPROCEDURE_GEN_ALL_CORE_FT
PROCEDURES:  Incision and drainage of abscess of left foot 22-Dec-2021 22:19:32  Diony Khan  Open biopsy, bone, foot 22-Dec-2021 22:19:51  Diony Khan

## 2021-12-22 NOTE — DISCHARGE NOTE PROVIDER - CARE PROVIDER_API CALL
Gurinder Hart (DPM)  Podiatric Medicine and Surgery  [address] [place] [address]  Phone: [phone]  Fax: [phone]  Follow Up Time: 1 week   Gurinder Hart (DPM)  Podiatric Medicine and Surgery  [address] [place] [address]  Phone: [phone]  Fax: [phone]  Follow Up Time: 1 week    Shania Liz  INTERNAL MEDICINE  [address] [place] [address]  Phone: [phone]  Fax: [phone]  Follow Up Time: 1 week   Gurinder Hart (DPM)  Podiatric Medicine and Surgery  [address] [place] [address]  Phone: [phone]  Fax: [phone]  Follow Up Time: 1 week    Shania Liz  INTERNAL MEDICINE  [address] [place] [address]  Phone: [phone]  Fax: [phone]  Follow Up Time: 1 week    Michelle Buchanan (MD; MPH)  Internal Medicine; Preventive Medicine  [address] [place] [address]  Phone: [phone]  Fax: [phone]  Follow Up Time: 1 week

## 2021-12-22 NOTE — PROGRESS NOTE ADULT - PROBLEM SELECTOR PLAN 11
IMPROVE VTE Individual Risk Assessment  RISK                                                                Points  [  ] Previous VTE                                                  3  [  ] Thrombophilia                                               2  [  ] Lower limb paralysis                                      2        (unable to hold up >15 seconds)    [  ] Current Cancer                                              2         (within 6 months)  [  ] Immobilization > 24 hrs                                1  [  ] ICU/CCU stay > 24 hours                              1  [  ] Age > 60                                                      1  IMPROVE VTE Score _________    PPI for GI prophylaxis  Heparin for DVT PPX

## 2021-12-22 NOTE — PROGRESS NOTE ADULT - ASSESSMENT
76 year-old male has past medical history of atrial fibrillation, hypertension, hyperlipidemia, diabetes mellitus was admitted to medicine for AMS and left diabetic foot ulcer

## 2021-12-22 NOTE — PROGRESS NOTE ADULT - SUBJECTIVE AND OBJECTIVE BOX
PGY-1 Progress Note discussed with attending    PAGER #: [[phone]] TILL 5:00 PM  PLEASE CONTACT ON CALL TEAM:   - On Call Team (Please refer to Brie) FROM 5:00 PM - 8:30PM  - Nightfloat Team FROM 8:30 -7:30 AM    INTERVAL HPI/OVERNIGHT EVENTS: 76 y.o male was evaluated resting comfortably in bed. Pt denies pain, N/V/F/C/SOB. Pt is amendable for I&D from Podiatry today.       REVIEW OF SYSTEMS:  CONSTITUTIONAL: No fever, weight loss, or fatigue  RESPIRATORY: No cough, wheezing, chills or hemoptysis; No shortness of breath  CARDIOVASCULAR: No chest pain, palpitations, dizziness, or leg swelling  GASTROINTESTINAL: No abdominal pain. No nausea, vomiting, or hematemesis; No diarrhea or constipation. No melena or hematochezia.  GENITOURINARY: No dysuria or hematuria, urinary frequency  NEUROLOGICAL: No headaches, memory loss, loss of strength, numbness, or tremors  SKIN: R Foot wound, DSD intact     MEDICATIONS  (STANDING):  ampicillin/sulbactam  IVPB      ampicillin/sulbactam  IVPB 3 Gram(s) IV Intermittent every 6 hours  ascorbic acid 500 milliGRAM(s) Oral daily  atorvastatin 40 milliGRAM(s) Oral at bedtime  buPROPion XL (24-Hour) . 150 milliGRAM(s) Oral daily  ferrous    sulfate 325 milliGRAM(s) Oral two times a day  gabapentin 800 milliGRAM(s) Oral three times a day  insulin lispro (ADMELOG) corrective regimen sliding scale   SubCutaneous three times a day before meals  insulin lispro (ADMELOG) corrective regimen sliding scale   SubCutaneous at bedtime  lidocaine 1% Injectable 10 milliLiter(s) Local Injection once  lisinopril 2.5 milliGRAM(s) Oral daily  polyethylene glycol 3350 17 Gram(s) Oral daily  senna 2 Tablet(s) Oral at bedtime  sodium chloride 0.9%. 1000 milliLiter(s) (70 mL/Hr) IV Continuous <Continuous>    MEDICATIONS  (PRN):  acetaminophen     Tablet .. 650 milliGRAM(s) Oral every 6 hours PRN Temp greater or equal to 38C (100.4F)  oxycodone    5 mG/acetaminophen 325 mG 1 Tablet(s) Oral every 6 hours PRN Moderate Pain      Vital Signs Last 24 Hrs  T(C): 36.7 (22 Dec 2021 05:21), Max: 38.1 (21 Dec 2021 20:20)  T(F): 98 (22 Dec 2021 05:21), Max: 100.6 (21 Dec 2021 20:20)  HR: 73 (22 Dec 2021 05:21) (73 - 83)  BP: 134/75 (22 Dec 2021 05:21) (134/75 - 157/80)  BP(mean): --  RR: 17 (22 Dec 2021 05:21) (17 - 18)  SpO2: 100% (22 Dec 2021 05:21) (98% - 100%)    PHYSICAL EXAMINATION:  GENERAL: NAD, well built  HEAD:  Atraumatic, Normocephalic  EYES:  conjunctiva and sclera clear  NECK: Supple, No JVD, Normal thyroid  CHEST/LUNG: Clear to auscultation. Clear to percussion bilaterally; No rales, rhonchi, wheezing, or rubs  HEART: Regular rate and rhythm; No murmurs, rubs, or gallops  ABDOMEN: Soft, Nontender, Nondistended; Bowel sounds present  NERVOUS SYSTEM:  Alert & Oriented X3,    EXTREMITIES:  2+ Peripheral Pulses, No clubbing, cyanosis, or edema  SKIN: warm dry                          7.9    4.31  )-----------( 109      ( 22 Dec 2021 07:55 )             24.8     12-22    142  |  109<H>  |  22<H>  ----------------------------<  145<H>  4.3   |  24  |  1.22    Ca    9.2      22 Dec 2021 07:55  Phos  2.7     12-22  Mg     2.1     12-22    TPro  6.8  /  Alb  2.7<L>  /  TBili  0.4  /  DBili  x   /  AST  15  /  ALT  11  /  AlkPhos  60  12-21    LIVER FUNCTIONS - ( 21 Dec 2021 08:16 )  Alb: 2.7 g/dL / Pro: 6.8 g/dL / ALK PHOS: 60 U/L / ALT: 11 U/L DA / AST: 15 U/L / GGT: x             Culture - Surgical Swab (collected 20 Dec 2021 21:08)  Source: .Surgical Swab Left foot wound  Preliminary Report (21 Dec 2021 17:28):    Few Alpha hemolytic strep "Susceptibilities not performed"    Culture - Blood (collected 20 Dec 2021 05:34)  Source: .Blood Blood-Peripheral  Gram Stain (21 Dec 2021 10:36):    Growth in aerobic bottle: Gram Positive Cocci in Clusters    Growth in anaerobic bottle: Gram positive cocci in pairs  Preliminary Report (21 Dec 2021 10:37):    Growth in aerobic bottle: Gram Positive Cocci in Clusters    Growth in anaerobic bottle: Gram positive cocci in pairs    ***Blood Panel PCR results on this specimen are available    approximately 3 hours after the Gram stain result.***    Gram stain, PCR, and/or culture results may not always    correspond due to difference in methodologies.    ************************************************************    This PCR assay was performed by multiplex PCR. This    Assay tests for 66 bacterial and resistance gene targets.    Please refer to the [facility] test directory    at [email or web address] for details.  Organism: Blood Culture PCR  Blood Culture PCR (21 Dec 2021 13:14)  Organism: Blood Culture PCR (21 Dec 2021 13:14)  Organism: Blood Culture PCR (21 Dec 2021 07:24)    Culture - Blood (collected 20 Dec 2021 05:34)  Source: .Blood Blood-Peripheral  Gram Stain (21 Dec 2021 22:53):    Growth in aerobic bottle: Gram positive cocci in pairs  Preliminary Report (21 Dec 2021 22:53):    Growth in aerobic bottle: Gram positive cocci in pairs    Culture - Urine (collected 20 Dec 2021 04:57)  Source: Clean Catch Clean Catch (Midstream)  Final Report (21 Dec 2021 08:24):    <10,000 CFU/mL Normal Urogenital Melissa        I&O's Summary      CAPILLARY BLOOD GLUCOSE      POCT Blood Glucose.: 169 mg/dL (22 Dec 2021 07:52)    CAPILLARY BLOOD GLUCOSE      POCT Blood Glucose.: 169 mg/dL (22 Dec 2021 07:52)  POCT Blood Glucose.: 173 mg/dL (21 Dec 2021 21:34)  POCT Blood Glucose.: 188 mg/dL (21 Dec 2021 16:15)  POCT Blood Glucose.: 197 mg/dL (21 Dec 2021 11:06)      RADIOLOGY & ADDITIONAL TESTS:    INTERPRETATION:  LEFT FOOT MRI    CLINICAL INFORMATION: Left foot wound. Eval for osteomyelitis.  TECHNIQUE: Multiplanar, multisequence MRI was obtained of the left foot.    COMPARISON: Left foot MRI 12/23/2015.    FINDINGS:    Plantar soft tissue wound is present at the level of the first   tarsometatarsal articulation with tract extending to the bone. There is   edema along the plantar aspect of the distal medial cuneiform and base of   the first metatarsal with preservation of the T1 marrow signal. There is   moderate tarsometatarsal arthrosis and arthrosis of the articulation of   the navicular and lateral cuneiform. There are subchondral fractures of   the first second and third metatarsal heads. There is diffuse increased   muscle signal, most consistent with denervation. There is diffuse   subcutaneous edema.    IMPRESSION:    Plantar soft tissue wound with tract extending to the undersurface of the   first tarsometatarsal articulation.  Edema within the base of the first tarsometatarsal articulation adjacent   to the tract with preservation of the T1 marrow signal, which may   represent sequela of early osteomyelitis.  First second and third metatarsal head subchondral fractures.    PROCEDURE DATE:  12/19/2021          INTERPRETATION:  Left tib-fib, left foot, and chest. Patient has sepsis.   Concern is gas gangrene.    Left tib-fib. 2 views. 4 images.    Arterial calcifications. Slight degeneration in the knee.    No bone destruction or fracture.    Left foot. 3 views. Arterial calcification.    There is degeneration again seen of the tarsometatarsal junction.    There are small nondisplaced fractures of the distal aspects of the   second and third metatarsals which are new since December 22, 2015. These   fractures may not be acute.    There is first MP and IP degeneration.    There is an acute appearing fracture of the proximal anterior corner of   the proximal phalanx of the fifth left toe.    No visible tracking air.    AP chest on December 19, 2021 at 8:49 PM. 2 images.    Heart magnified by technique.    Lungs are clear.    IMPRESSION: Chest unremarkable.    There are fractures of the distal aspects of the second, third, and   tarsals and the proximal phalanx of the fifth toe.    The fifth toe fracture is acute. The other fractures could be subacute.    Arterial calcification. A note was posted on the [facility] ED discrepancy   site on December 20 at 11:22 AM.               PGY-1 Progress Note discussed with attending    PAGER #: [[phone]] TILL 5:00 PM  PLEASE CONTACT ON CALL TEAM:   - On Call Team (Please refer to Brie) FROM 5:00 PM - 8:30PM  - Nightfloat Team FROM 8:30 -7:30 AM    INTERVAL HPI/OVERNIGHT EVENTS: 76 y.o male was evaluated resting comfortably in bed. Pt denies pain, N/V/F/C/SOB. Pt is amendable for I&D from Podiatry today.       MEDICATIONS  (STANDING):  ampicillin/sulbactam  IVPB      ampicillin/sulbactam  IVPB 3 Gram(s) IV Intermittent every 6 hours  ascorbic acid 500 milliGRAM(s) Oral daily  atorvastatin 40 milliGRAM(s) Oral at bedtime  buPROPion XL (24-Hour) . 150 milliGRAM(s) Oral daily  ferrous    sulfate 325 milliGRAM(s) Oral two times a day  gabapentin 800 milliGRAM(s) Oral three times a day  insulin lispro (ADMELOG) corrective regimen sliding scale   SubCutaneous three times a day before meals  insulin lispro (ADMELOG) corrective regimen sliding scale   SubCutaneous at bedtime  lidocaine 1% Injectable 10 milliLiter(s) Local Injection once  lisinopril 2.5 milliGRAM(s) Oral daily  polyethylene glycol 3350 17 Gram(s) Oral daily  senna 2 Tablet(s) Oral at bedtime  sodium chloride 0.9%. 1000 milliLiter(s) (70 mL/Hr) IV Continuous <Continuous>    MEDICATIONS  (PRN):  acetaminophen     Tablet .. 650 milliGRAM(s) Oral every 6 hours PRN Temp greater or equal to 38C (100.4F)  oxycodone    5 mG/acetaminophen 325 mG 1 Tablet(s) Oral every 6 hours PRN Moderate Pain      Vital Signs Last 24 Hrs  T(C): 36.7 (22 Dec 2021 05:21), Max: 38.1 (21 Dec 2021 20:20)  T(F): 98 (22 Dec 2021 05:21), Max: 100.6 (21 Dec 2021 20:20)  HR: 73 (22 Dec 2021 05:21) (73 - 83)  BP: 134/75 (22 Dec 2021 05:21) (134/75 - 157/80)  BP(mean): --  RR: 17 (22 Dec 2021 05:21) (17 - 18)  SpO2: 100% (22 Dec 2021 05:21) (98% - 100%)    PHYSICAL EXAMINATION:  GENERAL: NAD, well built, sat well on RA  HEAD:  Atraumatic, Normocephalic  EYES:  conjunctiva and sclera clear  NECK: Supple, No JVD, Normal thyroid  CHEST/LUNG: Clear to auscultation. Clear to percussion bilaterally; No rales, rhonchi, wheezing, or rubs  HEART: Regular rate and rhythm; No murmurs, rubs, or gallops  ABDOMEN: Soft, Nontender, Nondistended; Bowel sounds present  NERVOUS SYSTEM:  Alert & Oriented X3, no neuro deficits     EXTREMITIES:  2+ Peripheral Pulses, No clubbing, cyanosis, or edema  SKIN: Left foot wound, DSD intact                           7.9    4.31  )-----------( 109      ( 22 Dec 2021 07:55 )             24.8     12-22    142  |  109<H>  |  22<H>  ----------------------------<  145<H>  4.3   |  24  |  1.22    Ca    9.2      22 Dec 2021 07:55  Phos  2.7     12-22  Mg     2.1     12-22    TPro  6.8  /  Alb  2.7<L>  /  TBili  0.4  /  DBili  x   /  AST  15  /  ALT  11  /  AlkPhos  60  12-21    LIVER FUNCTIONS - ( 21 Dec 2021 08:16 )  Alb: 2.7 g/dL / Pro: 6.8 g/dL / ALK PHOS: 60 U/L / ALT: 11 U/L DA / AST: 15 U/L / GGT: x             Culture - Surgical Swab (collected 20 Dec 2021 21:08)  Source: .Surgical Swab Left foot wound  Preliminary Report (21 Dec 2021 17:28):    Few Alpha hemolytic strep "Susceptibilities not performed"    Culture - Blood (collected 20 Dec 2021 05:34)  Source: .Blood Blood-Peripheral  Gram Stain (21 Dec 2021 10:36):    Growth in aerobic bottle: Gram Positive Cocci in Clusters    Growth in anaerobic bottle: Gram positive cocci in pairs  Preliminary Report (21 Dec 2021 10:37):    Growth in aerobic bottle: Gram Positive Cocci in Clusters    Growth in anaerobic bottle: Gram positive cocci in pairs    ***Blood Panel PCR results on this specimen are available    approximately 3 hours after the Gram stain result.***    Gram stain, PCR, and/or culture results may not always    correspond due to difference in methodologies.    ************************************************************    This PCR assay was performed by multiplex PCR. This    Assay tests for 66 bacterial and resistance gene targets.    Please refer to the [facility] test directory    at [email or web address] for details.  Organism: Blood Culture PCR  Blood Culture PCR (21 Dec 2021 13:14)  Organism: Blood Culture PCR (21 Dec 2021 13:14)  Organism: Blood Culture PCR (21 Dec 2021 07:24)    Culture - Blood (collected 20 Dec 2021 05:34)  Source: .Blood Blood-Peripheral  Gram Stain (21 Dec 2021 22:53):    Growth in aerobic bottle: Gram positive cocci in pairs  Preliminary Report (21 Dec 2021 22:53):    Growth in aerobic bottle: Gram positive cocci in pairs    Culture - Urine (collected 20 Dec 2021 04:57)  Source: Clean Catch Clean Catch (Midstream)  Final Report (21 Dec 2021 08:24):    <10,000 CFU/mL Normal Urogenital Melissa        I&O's Summary      CAPILLARY BLOOD GLUCOSE      POCT Blood Glucose.: 169 mg/dL (22 Dec 2021 07:52)    CAPILLARY BLOOD GLUCOSE      POCT Blood Glucose.: 169 mg/dL (22 Dec 2021 07:52)  POCT Blood Glucose.: 173 mg/dL (21 Dec 2021 21:34)  POCT Blood Glucose.: 188 mg/dL (21 Dec 2021 16:15)  POCT Blood Glucose.: 197 mg/dL (21 Dec 2021 11:06)      RADIOLOGY & ADDITIONAL TESTS:    INTERPRETATION:  LEFT FOOT MRI    CLINICAL INFORMATION: Left foot wound. Eval for osteomyelitis.  TECHNIQUE: Multiplanar, multisequence MRI was obtained of the left foot.    COMPARISON: Left foot MRI 12/23/2015.    FINDINGS:    Plantar soft tissue wound is present at the level of the first   tarsometatarsal articulation with tract extending to the bone. There is   edema along the plantar aspect of the distal medial cuneiform and base of   the first metatarsal with preservation of the T1 marrow signal. There is   moderate tarsometatarsal arthrosis and arthrosis of the articulation of   the navicular and lateral cuneiform. There are subchondral fractures of   the first second and third metatarsal heads. There is diffuse increased   muscle signal, most consistent with denervation. There is diffuse   subcutaneous edema.    IMPRESSION:    Plantar soft tissue wound with tract extending to the undersurface of the   first tarsometatarsal articulation.  Edema within the base of the first tarsometatarsal articulation adjacent   to the tract with preservation of the T1 marrow signal, which may   represent sequela of early osteomyelitis.  First second and third metatarsal head subchondral fractures.    PROCEDURE DATE:  12/19/2021          INTERPRETATION:  Left tib-fib, left foot, and chest. Patient has sepsis.   Concern is gas gangrene.    Left tib-fib. 2 views. 4 images.    Arterial calcifications. Slight degeneration in the knee.    No bone destruction or fracture.    Left foot. 3 views. Arterial calcification.    There is degeneration again seen of the tarsometatarsal junction.    There are small nondisplaced fractures of the distal aspects of the   second and third metatarsals which are new since December 22, 2015. These   fractures may not be acute.    There is first MP and IP degeneration.    There is an acute appearing fracture of the proximal anterior corner of   the proximal phalanx of the fifth left toe.    No visible tracking air.    AP chest on December 19, 2021 at 8:49 PM. 2 images.    Heart magnified by technique.    Lungs are clear.    IMPRESSION: Chest unremarkable.    There are fractures of the distal aspects of the second, third, and   tarsals and the proximal phalanx of the fifth toe.    The fifth toe fracture is acute. The other fractures could be subacute.    Arterial calcification. A note was posted on the [email or web address] ED discrepancy   site on December 20 at 11:22 AM.               PGY-1 Progress Note discussed with attending    PAGER #: [[phone]] TILL 5:00 PM  PLEASE CONTACT ON CALL TEAM:   - On Call Team (Please refer to Brie) FROM 5:00 PM - 8:30PM  - Nightfloat Team FROM 8:30 -7:30 AM    INTERVAL HPI/OVERNIGHT EVENTS: 76 y.o male was evaluated resting comfortably in bed. Pt denies pain, N/V/F/C/SOB. Pt is amendable for I&D from Podiatry today.       MEDICATIONS  (STANDING):  ampicillin/sulbactam  IVPB      ampicillin/sulbactam  IVPB 3 Gram(s) IV Intermittent every 6 hours  ascorbic acid 500 milliGRAM(s) Oral daily  atorvastatin 40 milliGRAM(s) Oral at bedtime  buPROPion XL (24-Hour) . 150 milliGRAM(s) Oral daily  ferrous    sulfate 325 milliGRAM(s) Oral two times a day  gabapentin 800 milliGRAM(s) Oral three times a day  insulin lispro (ADMELOG) corrective regimen sliding scale   SubCutaneous three times a day before meals  insulin lispro (ADMELOG) corrective regimen sliding scale   SubCutaneous at bedtime  lidocaine 1% Injectable 10 milliLiter(s) Local Injection once  lisinopril 2.5 milliGRAM(s) Oral daily  polyethylene glycol 3350 17 Gram(s) Oral daily  senna 2 Tablet(s) Oral at bedtime  sodium chloride 0.9%. 1000 milliLiter(s) (70 mL/Hr) IV Continuous <Continuous>    MEDICATIONS  (PRN):  acetaminophen     Tablet .. 650 milliGRAM(s) Oral every 6 hours PRN Temp greater or equal to 38C (100.4F)  oxycodone    5 mG/acetaminophen 325 mG 1 Tablet(s) Oral every 6 hours PRN Moderate Pain      Vital Signs Last 24 Hrs  T(C): 36.7 (22 Dec 2021 05:21), Max: 38.1 (21 Dec 2021 20:20)  T(F): 98 (22 Dec 2021 05:21), Max: 100.6 (21 Dec 2021 20:20)  HR: 73 (22 Dec 2021 05:21) (73 - 83)  BP: 134/75 (22 Dec 2021 05:21) (134/75 - 157/80)  BP(mean): --  RR: 17 (22 Dec 2021 05:21) (17 - 18)  SpO2: 100% (22 Dec 2021 05:21) (98% - 100%)    PHYSICAL EXAMINATION:  GENERAL: NAD, well built, sat well on RA, pale  HEAD:  Atraumatic, Normocephalic  EYES:  conjunctiva and sclera clear  NECK: Supple, No JVD, Normal thyroid  CHEST/LUNG: Clear to auscultation. Clear to percussion bilaterally; No rales, rhonchi, wheezing, or rubs  HEART: Regular rate and rhythm; No murmurs, rubs, or gallops  ABDOMEN: Soft, Nontender, Nondistended; Bowel sounds present  NERVOUS SYSTEM:  Alert & Oriented X3, no neuro deficits     EXTREMITIES:  2+ Peripheral Pulses, No clubbing, cyanosis, or edema  SKIN: Left foot wound, DSD intact                           7.9    4.31  )-----------( 109      ( 22 Dec 2021 07:55 )             24.8     12-22    142  |  109<H>  |  22<H>  ----------------------------<  145<H>  4.3   |  24  |  1.22    Ca    9.2      22 Dec 2021 07:55  Phos  2.7     12-22  Mg     2.1     12-22    TPro  6.8  /  Alb  2.7<L>  /  TBili  0.4  /  DBili  x   /  AST  15  /  ALT  11  /  AlkPhos  60  12-21    LIVER FUNCTIONS - ( 21 Dec 2021 08:16 )  Alb: 2.7 g/dL / Pro: 6.8 g/dL / ALK PHOS: 60 U/L / ALT: 11 U/L DA / AST: 15 U/L / GGT: x             Culture - Surgical Swab (collected 20 Dec 2021 21:08)  Source: .Surgical Swab Left foot wound  Preliminary Report (21 Dec 2021 17:28):    Few Alpha hemolytic strep "Susceptibilities not performed"    Culture - Blood (collected 20 Dec 2021 05:34)  Source: .Blood Blood-Peripheral  Gram Stain (21 Dec 2021 10:36):    Growth in aerobic bottle: Gram Positive Cocci in Clusters    Growth in anaerobic bottle: Gram positive cocci in pairs  Preliminary Report (21 Dec 2021 10:37):    Growth in aerobic bottle: Gram Positive Cocci in Clusters    Growth in anaerobic bottle: Gram positive cocci in pairs    ***Blood Panel PCR results on this specimen are available    approximately 3 hours after the Gram stain result.***    Gram stain, PCR, and/or culture results may not always    correspond due to difference in methodologies.    ************************************************************    This PCR assay was performed by multiplex PCR. This    Assay tests for 66 bacterial and resistance gene targets.    Please refer to the [facility] test directory    at [email or web address] for details.  Organism: Blood Culture PCR  Blood Culture PCR (21 Dec 2021 13:14)  Organism: Blood Culture PCR (21 Dec 2021 13:14)  Organism: Blood Culture PCR (21 Dec 2021 07:24)    Culture - Blood (collected 20 Dec 2021 05:34)  Source: .Blood Blood-Peripheral  Gram Stain (21 Dec 2021 22:53):    Growth in aerobic bottle: Gram positive cocci in pairs  Preliminary Report (21 Dec 2021 22:53):    Growth in aerobic bottle: Gram positive cocci in pairs    Culture - Urine (collected 20 Dec 2021 04:57)  Source: Clean Catch Clean Catch (Midstream)  Final Report (21 Dec 2021 08:24):    <10,000 CFU/mL Normal Urogenital Melissa        I&O's Summary      CAPILLARY BLOOD GLUCOSE      POCT Blood Glucose.: 169 mg/dL (22 Dec 2021 07:52)    CAPILLARY BLOOD GLUCOSE      POCT Blood Glucose.: 169 mg/dL (22 Dec 2021 07:52)  POCT Blood Glucose.: 173 mg/dL (21 Dec 2021 21:34)  POCT Blood Glucose.: 188 mg/dL (21 Dec 2021 16:15)  POCT Blood Glucose.: 197 mg/dL (21 Dec 2021 11:06)      RADIOLOGY & ADDITIONAL TESTS:    INTERPRETATION:  LEFT FOOT MRI    CLINICAL INFORMATION: Left foot wound. Eval for osteomyelitis.  TECHNIQUE: Multiplanar, multisequence MRI was obtained of the left foot.    COMPARISON: Left foot MRI 12/23/2015.    FINDINGS:    Plantar soft tissue wound is present at the level of the first   tarsometatarsal articulation with tract extending to the bone. There is   edema along the plantar aspect of the distal medial cuneiform and base of   the first metatarsal with preservation of the T1 marrow signal. There is   moderate tarsometatarsal arthrosis and arthrosis of the articulation of   the navicular and lateral cuneiform. There are subchondral fractures of   the first second and third metatarsal heads. There is diffuse increased   muscle signal, most consistent with denervation. There is diffuse   subcutaneous edema.    IMPRESSION:    Plantar soft tissue wound with tract extending to the undersurface of the   first tarsometatarsal articulation.  Edema within the base of the first tarsometatarsal articulation adjacent   to the tract with preservation of the T1 marrow signal, which may   represent sequela of early osteomyelitis.  First second and third metatarsal head subchondral fractures.    PROCEDURE DATE:  12/19/2021          INTERPRETATION:  Left tib-fib, left foot, and chest. Patient has sepsis.   Concern is gas gangrene.    Left tib-fib. 2 views. 4 images.    Arterial calcifications. Slight degeneration in the knee.    No bone destruction or fracture.    Left foot. 3 views. Arterial calcification.    There is degeneration again seen of the tarsometatarsal junction.    There are small nondisplaced fractures of the distal aspects of the   second and third metatarsals which are new since December 22, 2015. These   fractures may not be acute.    There is first MP and IP degeneration.    There is an acute appearing fracture of the proximal anterior corner of   the proximal phalanx of the fifth left toe.    No visible tracking air.    AP chest on December 19, 2021 at 8:49 PM. 2 images.    Heart magnified by technique.    Lungs are clear.    IMPRESSION: Chest unremarkable.    There are fractures of the distal aspects of the second, third, and   tarsals and the proximal phalanx of the fifth toe.    The fifth toe fracture is acute. The other fractures could be subacute.    Arterial calcification. A note was posted on the [facility] ED discrepancy   site on December 20 at 11:22 AM.

## 2021-12-22 NOTE — PROGRESS NOTE ADULT - PROBLEM SELECTOR PLAN 1
- Patient with MSSA bacteremia most likely secondary to left foot cellulitis/abscess  - MR left foot showed Plantar soft tissue wound with tract extending to the undersurface of the   first tarsometatarsal articulation. Edema within the base of the first tarsometatarsal articulation may represent sequela of early osteomyelitis. First second and third metatarsal head subchondral fractures.  - Bcx MSSA, awaiting sensitivities, ESR and CRP elevated    - Repeat Bcx in the am   - Vancomycin stopped as per ID recommendations  - C/w Unasyn for now   - F/u MRI lumbosacral to r/o any pathology related to bacteremia   - F/u echo to r/o endocarditis  - ID Dr Maxwell  - Podiatry on board   -Plan for I&D for today by Podiatry   - Monitor for any signs of hemodynamic instability - Patient with MSSA and Streptococcus bacteremia most likely secondary to left foot cellulitis/abscess  - MR left foot showed Plantar soft tissue wound with tract extending to the undersurface of the first tarsometatarsal articulation. Edema within the base of the first tarsometatarsal articulation may represent sequela of early osteomyelitis. First second and third metatarsal head subchondral fractures.  - Bcx MSSA, Streptococcus, ESR and CRP elevated    - Repeat Bcx testing  - Off Vancomycin, c/w Unasyn for now   - MRI lumbosacral no acute findings   - F/u echo to r/o endocarditis  - ID Dr Maxwell  - Podiatry on board, planned for I & D for today by Podiatry   - Monitor for any signs of hemodynamic instability - Patient with MSSA and Streptococcus bacteremia most likely secondary to left foot cellulitis/abscess  - MR left foot showed Plantar soft tissue wound with tract extending to the undersurface of the first tarsometatarsal articulation. Edema within the base of the first tarsometatarsal articulation may represent sequela of early osteomyelitis. First second and third metatarsal head subchondral fractures.  - Bcx MSSA, Streptococcus, ESR and CRP elevated    - Repeat Bcx testing  - Off Vancomycin, c/w Unasyn for now   - MRI lumbosacral no acute findings   - F/u echo to r/o endocarditis  - ID Dr Maxwell on board  - Podiatry on board, planned for I & D for today by Podiatry   - Monitor for any signs of hemodynamic instability

## 2021-12-22 NOTE — PROGRESS NOTE ADULT - ASSESSMENT
Pt is a 76 year old male from home with PMHx of atrial fibrillation, hypertension, hyperlipidemia, DM admitted for sepsis with acute encephalopathy secondary to diabetic foot ulcer. Pt received one dose of levaquine in ED and switched to vancomycin and amp/sulbactam. Xray of left foot with multiple metatarsal Fx but without radiographic evidence of osteomyelitis. However, MRI of left foot with early osteomyelitis of the 1st tarsometatarsal site, multiple metatarsal Fx and diffuse SQ edema. BCxs from 12/20 growing Staphylococcus aureus (no MRSA detected on rapid BCID with final susceptibilities pending), Streptococcus mitis/oralis group, Alpha hemolytic strep. Surgical swab of left foot wound with Alpha hemolytic strep. In view of S. aureus in the BC, pt will need extended Rx with IV Ab as well as echocardiogram. Pt underwent bedside debridement of L foot on 12/20 by Podiatry.     #1 left foot cellulitis secondary to diabetic foot ulcer with osteomyelitis   -- continue amp/sulbactam 3g q6hr for now  -- f/u final result of blood cultures from 12/20   -- f/u blood Cx from 12/22 to check for clearing    -- echocardiogram    #2 acute encephalopathy - resolved     #3 chronic back pain -- MRI of back showing degenerative changes   Pt is a 76 year old male from home with PMHx of atrial fibrillation, hypertension, hyperlipidemia, DM admitted for sepsis with acute encephalopathy secondary to diabetic foot ulcer. Pt received one dose of levaquine in ED and switched to vancomycin (d/c'ed yesterday) and amp/sulbactam. Xray of left foot with multiple metatarsal Fx but without radiographic evidence of osteomyelitis. However, MRI of left foot with early osteomyelitis of the 1st tarsometatarsal site, multiple metatarsal Fx and diffuse SQ edema. BCxs from 12/20 growing Staphylococcus aureus (no MRSA detected on rapid BCID with final susceptibilities pending), Streptococcus mitis/oralis group, Alpha hemolytic strep. Surgical swab of left foot wound with Alpha hemolytic strep. In view of S. aureus in the BC, pt will need extended Rx with IV Ab as well as echocardiogram. Pt underwent bedside debridement of L foot on 12/20 by Podiatry.     #1 left foot cellulitis secondary to diabetic foot ulcer with osteomyelitis   -- continue amp/sulbactam 3g q6hr for now  -- f/u final result of blood cultures from 12/20   -- f/u blood Cx from 12/22 to check for clearing    -- echocardiogram    #2 acute encephalopathy - resolved     #3 chronic back pain -- MRI of back showing degenerative changes   Pt is a 76 year old male from home with PMHx of atrial fibrillation, hypertension, hyperlipidemia, DM admitted for sepsis with acute encephalopathy secondary to diabetic foot ulcer. Pt received one dose of levaquine in ED and switched to vancomycin (d/c'ed yesterday) and amp/sulbactam. Xray of left foot with multiple metatarsal Fx but without radiographic evidence of osteomyelitis. However, MRI of left foot with early osteomyelitis of the 1st tarsometatarsal site, multiple metatarsal Fx and diffuse SQ edema. BCxs from 12/20 growing Staphylococcus aureus (no MRSA detected on rapid BCID with final susceptibilities pending), Streptococcus mitis/oralis group, and Alpha hemolytic strep. Pt underwent bedside debridement of L foot on 12/20 by Podiatry. Surgical swab of left foot wound with Alpha hemolytic strep. In view of S. aureus and strep in the BC, wd recommend echocardiogram to evaluate heart valves. Scheduled for OR debridement which should include removal and Cx of infected bone.  MRI of back showing degenerative changes and no osteo. Pt will need at least 4wks of IV Ab from the time of today's debridement.     #1 left foot cellulitis secondary to diabetic foot ulcer with osteomyelitis.   -- continue amp/sulbactam 3g q6hr for now   -- f/u blood Cx from 12/22 to check for clearing    -- echocardiogram  --OR specimens for Cx/path    #2 acute encephalopathy - resolved     #3 chronic back pain -- MRI of back showing degenerative changes    Discussed above with medical team, including Dr. Grijalva.

## 2021-12-22 NOTE — BRIEF OPERATIVE NOTE - NSICDXBRIEFPREOP_GEN_ALL_CORE_FT
PRE-OP DIAGNOSIS:  Abscess 22-Dec-2021 22:20:05  Diony Khan  Osteomyelitis 22-Dec-2021 22:21:23  Diony Khan

## 2021-12-22 NOTE — PROGRESS NOTE ADULT - PROBLEM SELECTOR PLAN 8
- Patient has hx of DM o  - Holding home po medications  - C/w HSS  - Fingerstick before meals and at bedtime  - DASH diet with consistent carb  - [employer] -180 - Patient has hx of DM   - Holding home po medications  - C/w HSS  - Fingerstick before meals and at bedtime  - DASH diet with consistent carb  - [employer] -180

## 2021-12-22 NOTE — PROGRESS NOTE ADULT - ASSESSMENT
A:  Cellulitis, Left Foot and Ankle.  Abscess, Left Plantar Foot  Left Foot Plantar Wound  Diabetic Neuropathy, b/l  DM  S/P bedside I&D (12/20/21)    Plan:  -Patient examined and chart reviewed  -Explained all clinical findings to the patient to the patient's satisfaction.  -Educated the patient about the importance of glycemic control in wound healing  -Xrays reviewed  -MRI reviewed  -Cultures - hemolytic strep   -Kept dressing intact to L foot  Pt to go to OR this evening 12/22 around 530-6pm with Dr. Hart for L foot incision and drainage   Pt medically optimized  Pt NPO since midnight 12/21  Pt covid negative 12/21  -Patient to keep the dressings clean, dry, and intact  -Recommend Left Lower Extremity elevation.  -Recommend antibiotics per ID team for Left Foot Cellulitis and Left Foot Wound  -Podiatry will follow in house.  -Discussed with attending Dr. Hart

## 2021-12-22 NOTE — PROGRESS NOTE ADULT - SUBJECTIVE AND OBJECTIVE BOX
Subjective/objective: Pt reports feeling tired today. Had elevated temp of 100.6 last night. Pt scheduled for left foot washout with I&D today.         MEDS  acetaminophen     Tablet .. 650 milliGRAM(s) Oral every 6 hours PRN  ampicillin/sulbactam  IVPB      ampicillin/sulbactam  IVPB 3 Gram(s) IV Intermittent every 6 hours (D3)  ascorbic acid 500 milliGRAM(s) Oral daily  atorvastatin 40 milliGRAM(s) Oral at bedtime  buPROPion XL (24-Hour) . 150 milliGRAM(s) Oral daily  ferrous    sulfate 325 milliGRAM(s) Oral two times a day  gabapentin 800 milliGRAM(s) Oral three times a day  insulin lispro (ADMELOG) corrective regimen sliding scale   SubCutaneous three times a day before meals  insulin lispro (ADMELOG) corrective regimen sliding scale   SubCutaneous at bedtime  lidocaine 1% Injectable 10 milliLiter(s) Local Injection once  lisinopril 2.5 milliGRAM(s) Oral daily  oxycodone    5 mG/acetaminophen 325 mG 1 Tablet(s) Oral every 6 hours PRN  polyethylene glycol 3350 17 Gram(s) Oral daily  senna 2 Tablet(s) Oral at bedtime  sodium chloride 0.9%. 1000 milliLiter(s) IV Continuous <Continuous>      PHYSICAL EXAM:    Vital Signs Last 24 Hrs  T(C): 36.7 (22 Dec 2021 05:21), Max: 38.1 (21 Dec 2021 20:20)  T(F): 98 (22 Dec 2021 05:21), Max: 100.6 (21 Dec 2021 20:20)  HR: 73 (22 Dec 2021 05:21) (73 - 83)  BP: 134/75 (22 Dec 2021 05:21) (134/75 - 157/80)  BP(mean): --  RR: 17 (22 Dec 2021 05:21) (17 - 18)  SpO2: 100% (22 Dec 2021 05:21) (98% - 100%)    GEN: WD WN W male in NAD    HEENT: NC/AT    CHEST/Respiratory: clear to auscultation    Cardiovascular: S1 S2 irregular beats with no murmurs, gallops, rubs    Gastrointestinal: soft, nontender, BS present, no tenderness; scar noted on lower abd (tummy tuck scar)    Genitourinary: no diaz     Extremities: hyperpigmented changes noted on bilateral lower extremities on tibia; onychomycosis on bilateral toes   LLE: covered with dressing; no tenderness to palpation  RLE: no erythema or swelling    Neurological: A+O x3    Skin: no rashes    LABS/DIAGNOSTIC TESTS                            7.9    4.31  )-----------( 109      ( 22 Dec 2021 07:55 )             24.8       WBC Count: 4.31 K/uL (12-22 @ 07:55)  WBC Count: 4.89 K/uL (12-21 @ 08:16)  WBC Count: 8.23 K/uL (12-20 @ 06:15)  WBC Count: 5.73 K/uL (12-19 @ 19:15)      12-22    142  |  109<H>  |  22<H>  ----------------------------<  145<H>  4.3   |  24  |  1.22    Ca    9.2      22 Dec 2021 07:55  Phos  2.7     12-22  Mg     2.1     12-22    TPro  6.8  /  Alb  2.7<L>  /  TBili  0.4  /  DBili  x   /  AST  15  /  ALT  11  /  AlkPhos  60  12-21      CULTURES    Culture - Surgical Swab (collected 12-20-21 @ 21:08)  Source: .Surgical Swab Left foot wound  Preliminary Report (12-21-21 @ 17:28):    Few Alpha hemolytic strep "Susceptibilities not performed"    Culture - Blood (collected 12-20-21 @ 05:34)  Source: .Blood Blood-Peripheral  Gram Stain (12-21-21 @ 10:36):    Growth in aerobic bottle: Gram Positive Cocci in Clusters    Growth in anaerobic bottle: Gram positive cocci in pairs  Preliminary Report (12-22-21 @ 10:27):    Growth in aerobic bottle: Staphylococcus aureus    Growth in anaerobic bottle: Streptococcus mitis/oralis group    Alpha hemolytic strep    (not Strep. pneumoniae or Enterococcus)    Single set isolate, possible contaminant. Contact    Microbiology if susceptibility testing clinically    indicated.    ***Blood Panel PCR results on this specimen are available    approximately 3 hours after the Gram stain result.***    Gram stain, PCR, and/or culture results may not always    correspond due to difference in methodologies.    ************************************************************    This PCR assay was performed by multiplex PCR. This    Assay tests for 66 bacterial and resistance gene targets.    Please refer to the [facility] test directory    at [email or web address] for details.  Organism: Blood Culture PCR  Blood Culture PCR (12-21-21 @ 13:14)  Organism: Blood Culture PCR (12-21-21 @ 13:14)      -  Streptococcus sp. (Not Grp A, B or S pneumoniae): Detec      Method Type: PCR  Organism: Blood Culture PCR (12-21-21 @ 07:24)      -  Staphylococcus aureus: Detec Any isolate of Staphylococcus aureus from a blood culture is NOT considered a contaminant.      Method Type: PCR    Culture - Blood (collected 12-20-21 @ 05:34)  Source: .Blood Blood-Peripheral  Gram Stain (12-21-21 @ 22:53):    Growth in aerobic bottle: Gram positive cocci in pairs  Preliminary Report (12-21-21 @ 22:53):    Growth in aerobic bottle: Gram positive cocci in pairs    Culture - Urine (collected 12-20-21 @ 04:57)  Source: Clean Catch Clean Catch (Midstream)  Final Report (12-21-21 @ 08:24):    <10,000 CFU/mL Normal Urogenital Melissa    RADIOLOGY  < from: MR Lumbar Spine No Cont (12.21.21 @ 18:36) >    ACC: [id number] EXAM:  MR SPINE LUMBAR                          PROCEDURE DATE:  12/21/2021      INTERPRETATION:  Clinical indication: Bacteremia. Back pain.    MRI of the lumbar spine was performed using sagittal T1-T2 and STIR   sequence. AxialT1-T2 and coronal T1-weighted sequence was performed.    Loss of the normal lumbar lordosis is seen. This could be due to   positioning or muscle spasm.    There is evidence of a grade 1 anterolisthesis seen involving L5 and S1   with bilateral lysisdefects suspected. This can be confirmed with plain   film.    Disc desiccation is seen involving the L1-2, L3-4 and L5-S1 levels. These   findings are secondary to chronic degenerative change    T12-L1: Normal.    L1-2: Bilateral hypertrophic facet joint change are seen. Moderate   narrowing of both neural foramen.    L2-3: Bilateral hypertrophic facet joint changes. No significant commas   of the spinal canal or either neural foramen.    L3-4: Disc bulge and bilateral hypertrophic facet joint changes. Mild   narrowing of the spinal canal. Moderate narrowing of both neural foramen    L4-5: Disc bulge and bilateral hypertrophic facet changes. No significant   commas of the spinal canal or either neural foramen    L5-S1: Bilateral hypertrophicfacet changes are seen. Severe narrowing of   both neural foramen    The conus ends at L2 and appears normal    Evaluation of the paraspinal soft tissues is limited by lack of IV   contrast though grossly normal    IMPRESSION: Degenerative changes described above    Grade 1 anterolisthesis seen involving L5-S1 level with associated lysis   defects suspected.                   Subjective/objective: Pt reports feeling tired today. Had elevated temp of 100.6 last night. Pt scheduled for left foot washout with I&D today.       MEDS  acetaminophen     Tablet .. 650 milliGRAM(s) Oral every 6 hours PRN  ampicillin/sulbactam  IVPB      ampicillin/sulbactam  IVPB 3 Gram(s) IV Intermittent every 6 hours (D3)  ascorbic acid 500 milliGRAM(s) Oral daily  atorvastatin 40 milliGRAM(s) Oral at bedtime  buPROPion XL (24-Hour) . 150 milliGRAM(s) Oral daily  ferrous    sulfate 325 milliGRAM(s) Oral two times a day  gabapentin 800 milliGRAM(s) Oral three times a day  insulin lispro (ADMELOG) corrective regimen sliding scale   SubCutaneous three times a day before meals  insulin lispro (ADMELOG) corrective regimen sliding scale   SubCutaneous at bedtime  lidocaine 1% Injectable 10 milliLiter(s) Local Injection once  lisinopril 2.5 milliGRAM(s) Oral daily  oxycodone    5 mG/acetaminophen 325 mG 1 Tablet(s) Oral every 6 hours PRN  polyethylene glycol 3350 17 Gram(s) Oral daily  senna 2 Tablet(s) Oral at bedtime  sodium chloride 0.9%. 1000 milliLiter(s) IV Continuous <Continuous>      PHYSICAL EXAM:    Vital Signs Last 24 Hrs  T(C): 36.7 (22 Dec 2021 05:21), Max: 38.1 (21 Dec 2021 20:20)  T(F): 98 (22 Dec 2021 05:21), Max: 100.6 (21 Dec 2021 20:20)  HR: 73 (22 Dec 2021 05:21) (73 - 83)  BP: 134/75 (22 Dec 2021 05:21) (134/75 - 157/80)  BP(mean): --  RR: 17 (22 Dec 2021 05:21) (17 - 18)  SpO2: 100% (22 Dec 2021 05:21) (98% - 100%)    GEN: WD WN W male in NAD    HEENT: NC/AT    CHEST/Respiratory: clear to auscultation    Cardiovascular: S1 S2 irregular beats with no murmurs, gallops, rubs    Gastrointestinal: soft, nontender, BS present; scar noted on lower abd (tummy tuck scar)    Genitourinary: no diaz     Extremities: hyperpigmented changes noted on bilateral lower extremities on tibia; onychomycosis on bilateral toes   LLE: covered with dressing; no tenderness to palpation  RLE: no erythema or swelling    Neurological: A+O x3    Skin: no rashes    LABS/DIAGNOSTIC TESTS                            7.9    4.31  )-----------( 109      ( 22 Dec 2021 07:55 )             24.8       WBC Count: 4.31 K/uL (12-22 @ 07:55)  WBC Count: 4.89 K/uL (12-21 @ 08:16)  WBC Count: 8.23 K/uL (12-20 @ 06:15)  WBC Count: 5.73 K/uL (12-19 @ 19:15)      12-22    142  |  109<H>  |  22<H>  ----------------------------<  145<H>  4.3   |  24  |  1.22    Ca    9.2      22 Dec 2021 07:55  Phos  2.7     12-22  Mg     2.1     12-22    TPro  6.8  /  Alb  2.7<L>  /  TBili  0.4  /  DBili  x   /  AST  15  /  ALT  11  /  AlkPhos  60  12-21      CULTURES    Culture - Surgical Swab (collected 12-20-21 @ 21:08)  Source: .Surgical Swab Left foot wound  Preliminary Report (12-21-21 @ 17:28):    Few Alpha hemolytic strep "Susceptibilities not performed"    Culture - Blood (collected 12-20-21 @ 05:34)  Source: .Blood Blood-Peripheral  Gram Stain (12-21-21 @ 10:36):    Growth in aerobic bottle: Gram Positive Cocci in Clusters    Growth in anaerobic bottle: Gram positive cocci in pairs  Preliminary Report (12-22-21 @ 10:27):    Growth in aerobic bottle: Staphylococcus aureus    Growth in anaerobic bottle: Streptococcus mitis/oralis group    Alpha hemolytic strep    (not Strep. pneumoniae or Enterococcus)    Single set isolate, possible contaminant. Contact    Microbiology if susceptibility testing clinically    indicated.    ***Blood Panel PCR results on this specimen are available    approximately 3 hours after the Gram stain result.***    Gram stain, PCR, and/or culture results may not always    correspond due to difference in methodologies.    ************************************************************    This PCR assay was performed by multiplex PCR. This    Assay tests for 66 bacterial and resistance gene targets.    Please refer to the [facility] test directory    at [email or web address] for details.  Organism: Blood Culture PCR  Blood Culture PCR (12-21-21 @ 13:14)  Organism: Blood Culture PCR (12-21-21 @ 13:14)      -  Streptococcus sp. (Not Grp A, B or S pneumoniae): Detec      Method Type: PCR  Organism: Blood Culture PCR (12-21-21 @ 07:24)      -  Staphylococcus aureus: Detec Any isolate of Staphylococcus aureus from a blood culture is NOT considered a contaminant.      Method Type: PCR    Culture - Blood (collected 12-20-21 @ 05:34)  Source: .Blood Blood-Peripheral  Gram Stain (12-21-21 @ 22:53):    Growth in aerobic bottle: Gram positive cocci in pairs  Preliminary Report (12-21-21 @ 22:53):    Growth in aerobic bottle: Gram positive cocci in pairs    Culture - Urine (collected 12-20-21 @ 04:57)  Source: Clean Catch Clean Catch (Midstream)  Final Report (12-21-21 @ 08:24):    <10,000 CFU/mL Normal Urogenital Melissa    RADIOLOGY  < from: MR Lumbar Spine No Cont (12.21.21 @ 18:36) >    ACC: [id number] EXAM:  MR SPINE LUMBAR                          PROCEDURE DATE:  12/21/2021      INTERPRETATION:  Clinical indication: Bacteremia. Back pain.    MRI of the lumbar spine was performed using sagittal T1-T2 and STIR   sequence. AxialT1-T2 and coronal T1-weighted sequence was performed.    Loss of the normal lumbar lordosis is seen. This could be due to   positioning or muscle spasm.    There is evidence of a grade 1 anterolisthesis seen involving L5 and S1   with bilateral lysisdefects suspected. This can be confirmed with plain   film.    Disc desiccation is seen involving the L1-2, L3-4 and L5-S1 levels. These   findings are secondary to chronic degenerative change    T12-L1: Normal.    L1-2: Bilateral hypertrophic facet joint change are seen. Moderate   narrowing of both neural foramen.    L2-3: Bilateral hypertrophic facet joint changes. No significant commas   of the spinal canal or either neural foramen.    L3-4: Disc bulge and bilateral hypertrophic facet joint changes. Mild   narrowing of the spinal canal. Moderate narrowing of both neural foramen    L4-5: Disc bulge and bilateral hypertrophic facet changes. No significant   commas of the spinal canal or either neural foramen    L5-S1: Bilateral hypertrophicfacet changes are seen. Severe narrowing of   both neural foramen    The conus ends at L2 and appears normal    Evaluation of the paraspinal soft tissues is limited by lack of IV   contrast though grossly normal    IMPRESSION: Degenerative changes described above    Grade 1 anterolisthesis seen involving L5-S1 level with associated lysis   defects suspected.                   Subjective/objective: Pt reports feeling tired today. Had elevated temp of 100.6 last night. Pt scheduled for left foot I&D today in the OR.       MEDS  acetaminophen     Tablet .. 650 milliGRAM(s) Oral every 6 hours PRN  ampicillin/sulbactam  IVPB      ampicillin/sulbactam  IVPB 3 Gram(s) IV Intermittent every 6 hours (D3)  ascorbic acid 500 milliGRAM(s) Oral daily  atorvastatin 40 milliGRAM(s) Oral at bedtime  buPROPion XL (24-Hour) . 150 milliGRAM(s) Oral daily  ferrous    sulfate 325 milliGRAM(s) Oral two times a day  gabapentin 800 milliGRAM(s) Oral three times a day  insulin lispro (ADMELOG) corrective regimen sliding scale   SubCutaneous three times a day before meals  insulin lispro (ADMELOG) corrective regimen sliding scale   SubCutaneous at bedtime  lidocaine 1% Injectable 10 milliLiter(s) Local Injection once  lisinopril 2.5 milliGRAM(s) Oral daily  oxycodone    5 mG/acetaminophen 325 mG 1 Tablet(s) Oral every 6 hours PRN  polyethylene glycol 3350 17 Gram(s) Oral daily  senna 2 Tablet(s) Oral at bedtime  sodium chloride 0.9%. 1000 milliLiter(s) IV Continuous <Continuous>      PHYSICAL EXAM:    Vital Signs Last 24 Hrs  T(C): 36.7 (22 Dec 2021 05:21), Max: 38.1 (21 Dec 2021 20:20)  T(F): 98 (22 Dec 2021 05:21), Max: 100.6 (21 Dec 2021 20:20)  HR: 73 (22 Dec 2021 05:21) (73 - 83)  BP: 134/75 (22 Dec 2021 05:21) (134/75 - 157/80)  BP(mean): --  RR: 17 (22 Dec 2021 05:21) (17 - 18)  SpO2: 100% (22 Dec 2021 05:21) (98% - 100%)    GEN: WD WN W male in NAD    HEENT: NC/AT    CHEST/Respiratory: clear to auscultation    Cardiovascular: S1 S2 irregular beats with no murmurs, gallops, rubs    Gastrointestinal: soft, nontender, BS present; scar noted on lower abd (tummy tuck scar)    Genitourinary: no diaz     Extremities: hyperpigmented changes noted on bilateral lower extremities on tibia; onychomycosis on bilateral toes   LLE: covered with dressing; no tenderness to palpation  RLE: no erythema or swelling    Neurological: A+O x3    Skin: no rashes      LABS/DIAGNOSTIC TESTS                        7.9    4.31  )-----------( 109      ( 22 Dec 2021 07:55 )             24.8       WBC Count: 4.31 K/uL (12-22 @ 07:55)  WBC Count: 4.89 K/uL (12-21 @ 08:16)  WBC Count: 8.23 K/uL (12-20 @ 06:15)  WBC Count: 5.73 K/uL (12-19 @ 19:15)      12-22    142  |  109<H>  |  22<H>  ----------------------------<  145<H>  4.3   |  24  |  1.22    Ca    9.2      22 Dec 2021 07:55  Phos  2.7     12-22  Mg     2.1     12-22    TPro  6.8  /  Alb  2.7<L>  /  TBili  0.4  /  DBili  x   /  AST  15  /  ALT  11  /  AlkPhos  60  12-21      CULTURES    Culture - Surgical Swab (collected 12-20-21 @ 21:08)  Source: .Surgical Swab Left foot wound  Preliminary Report (12-21-21 @ 17:28):    Few Alpha hemolytic strep "Susceptibilities not performed"    Culture - Blood (collected 12-20-21 @ 05:34)  Source: .Blood Blood-Peripheral  Gram Stain (12-21-21 @ 10:36):    Growth in aerobic bottle: Gram Positive Cocci in Clusters    Growth in anaerobic bottle: Gram positive cocci in pairs  Preliminary Report (12-22-21 @ 10:27):    Growth in aerobic bottle: Staphylococcus aureus    Growth in anaerobic bottle: Streptococcus mitis/oralis group    Alpha hemolytic strep    (not Strep. pneumoniae or Enterococcus)    Single set isolate, possible contaminant. Contact    Microbiology if susceptibility testing clinically    indicated.    ***Blood Panel PCR results on this specimen are available    approximately 3 hours after the Gram stain result.***    Gram stain, PCR, and/or culture results may not always    correspond due to difference in methodologies.    ************************************************************    This PCR assay was performed by multiplex PCR. This    Assay tests for 66 bacterial and resistance gene targets.    Please refer to the [facility] test directory    at [email or web address] for details.  Organism: Blood Culture PCR  Blood Culture PCR (12-21-21 @ 13:14)  Organism: Blood Culture PCR (12-21-21 @ 13:14)      -  Streptococcus sp. (Not Grp A, B or S pneumoniae): Detec      Method Type: PCR  Organism: Blood Culture PCR (12-21-21 @ 07:24)      -  Staphylococcus aureus: Detec Any isolate of Staphylococcus aureus from a blood culture is NOT considered a contaminant.      Method Type: PCR    Culture - Blood (collected 12-20-21 @ 05:34)  Source: .Blood Blood-Peripheral  Gram Stain (12-21-21 @ 22:53):    Growth in aerobic bottle: Gram positive cocci in pairs  Preliminary Report (12-21-21 @ 22:53):    Growth in aerobic bottle: Gram positive cocci in pairs    Culture - Urine (collected 12-20-21 @ 04:57)  Source: Clean Catch Clean Catch (Midstream)  Final Report (12-21-21 @ 08:24):    <10,000 CFU/mL Normal Urogenital Melissa    RADIOLOGY  < from: MR Lumbar Spine No Cont (12.21.21 @ 18:36) >    ACC: [id number] EXAM:  MR SPINE LUMBAR                          PROCEDURE DATE:  12/21/2021      INTERPRETATION:  Clinical indication: Bacteremia. Back pain.    MRI of the lumbar spine was performed using sagittal T1-T2 and STIR   sequence. AxialT1-T2 and coronal T1-weighted sequence was performed.    Loss of the normal lumbar lordosis is seen. This could be due to   positioning or muscle spasm.    There is evidence of a grade 1 anterolisthesis seen involving L5 and S1   with bilateral lysisdefects suspected. This can be confirmed with plain   film.    Disc desiccation is seen involving the L1-2, L3-4 and L5-S1 levels. These   findings are secondary to chronic degenerative change    T12-L1: Normal.    L1-2: Bilateral hypertrophic facet joint change are seen. Moderate   narrowing of both neural foramen.    L2-3: Bilateral hypertrophic facet joint changes. No significant commas   of the spinal canal or either neural foramen.    L3-4: Disc bulge and bilateral hypertrophic facet joint changes. Mild   narrowing of the spinal canal. Moderate narrowing of both neural foramen    L4-5: Disc bulge and bilateral hypertrophic facet changes. No significant   commas of the spinal canal or either neural foramen    L5-S1: Bilateral hypertrophicfacet changes are seen. Severe narrowing of   both neural foramen    The conus ends at L2 and appears normal    Evaluation of the paraspinal soft tissues is limited by lack of IV   contrast though grossly normal    IMPRESSION: Degenerative changes described above    Grade 1 anterolisthesis seen involving L5-S1 level with associated lysis   defects suspected.

## 2021-12-22 NOTE — DISCHARGE NOTE PROVIDER - CARE PROVIDERS DIRECT ADDRESSES
,[email or web address]

## 2021-12-22 NOTE — DISCHARGE NOTE PROVIDER - NSDCCPCAREPLAN_GEN_ALL_CORE_FT
PRINCIPAL DISCHARGE DIAGNOSIS  Diagnosis: Bacteremia  Assessment and Plan of Treatment:       SECONDARY DISCHARGE DIAGNOSES  Diagnosis: Cellulitis of left foot  Assessment and Plan of Treatment:     Diagnosis: Acute osteomyelitis  Assessment and Plan of Treatment:     Diagnosis: Diabetes mellitus  Assessment and Plan of Treatment:     Diagnosis: Hypertension  Assessment and Plan of Treatment:     Diagnosis: Acute kidney injury superimposed on CKD  Assessment and Plan of Treatment:     Diagnosis: Atrial fibrillation  Assessment and Plan of Treatment:      PRINCIPAL DISCHARGE DIAGNOSIS  Diagnosis: Cellulitis of left foot  Assessment and Plan of Treatment: You presented with an infection in your left foot. For this an xray was done of your foot which showed      SECONDARY DISCHARGE DIAGNOSES  Diagnosis: Acute osteomyelitis  Assessment and Plan of Treatment:     Diagnosis: Diabetes mellitus  Assessment and Plan of Treatment:     Diagnosis: Hypertension  Assessment and Plan of Treatment:     Diagnosis: Acute kidney injury superimposed on CKD  Assessment and Plan of Treatment:     Diagnosis: Atrial fibrillation  Assessment and Plan of Treatment:     Diagnosis: Cellulitis of left foot  Assessment and Plan of Treatment:      PRINCIPAL DISCHARGE DIAGNOSIS  Diagnosis: Cellulitis of left foot  Assessment and Plan of Treatment: You presented with an infection in your left foot. For this an xray was done of your foot which showed no visible tracking air. MRI of the left foot was done which depicted sequela of early osteomyelitis (bone infection). Infectious disease was consultted.  As per ID recommendations, MRI of the spine was done which revealed Degenerative changes. There was no infection in your back. Blood cultures grew MSSA, you were started on Unasyn for this.  Podiatry was consulted who took you for an incision and drainage of the infection. A bone sample was taken, which grew ***. For this ID recommended ***** antibtioc. You will take this for a total of **** days. Please follow up with Dr. Hart, the podiatrist for continued management.      SECONDARY DISCHARGE DIAGNOSES  Diagnosis: Encephalopathy acute  Assessment and Plan of Treatment: You presented with altered mental status. It was most likely due to the infection in your foot. This problem resolved during the course of your admission.    Diagnosis: Acute osteomyelitis  Assessment and Plan of Treatment: You have a left foot infection. For this an MRI was done. MRI showed early sequele of osteomyeltis (bone infection). For this you were given Unasyn antibitocs for a total of ** days. Podiatry was consulted. Podiatry did and Incision and drainage on your left foot. You will be discharged on *** antibiotics for a total of *** days. DRESSING CHANGE INSTRUCTIONS- please clean the area with sterile saline. Apply 4x4 gauze, ABD pad, secure with dianna. Apply ACE over the dressing. Physical therapy evaluated you. They recommended **********.  Please follow with your podiatrist, Dr. Hart, for continued care.    Diagnosis: Diabetes mellitus  Assessment and Plan of Treatment: You have diabetes. You hemaglobin A1c value is 6.3%. For this problem, you were taking Metformin and Glimepiride at home. You will continue taking these at home. Please follow up with your PCP for continued management.    Diagnosis: Hypertension  Assessment and Plan of Treatment: You have high blood pressure. For this problem, you were treated with Lisinopril. You will continue your home medication for this problem. Please follow up with your PCP for continued management.    Diagnosis: Acute kidney injury superimposed on CKD  Assessment and Plan of Treatment: You presented to the hospital with a Creantine level of 1.8. Your creatnine levels improved to a 1.2. You poor kindey function was most likely due to the infection.  This issue resolved. Any NSAIDS, ACEI, and ARBS were avoided. Please follow up with your PCP for continued management.    Diagnosis: Atrial fibrillation  Assessment and Plan of Treatment: You have a history of Atrial fibirillation. For this problem you were given Lovenox. You will be discharged on *****. Please follow with the cardiologist ****** for this problem. Please follow up with your PCP for continued management.    Diagnosis: Hyperlipemia  Assessment and Plan of Treatment: You have hyperlipidemia. For this problem you were given atorvastatin during the course of your admission. Please continue taking your home medication for this problem. Please follow up with your PCP for continued management.    Diagnosis: Cellulitis of left foot  Assessment and Plan of Treatment:      PRINCIPAL DISCHARGE DIAGNOSIS  Diagnosis: Gram-positive bacteremia  Assessment and Plan of Treatment: You were diagnosed with Gram positive bacteremia Staphylococcus and Sterptococcus which means presence on bacteria in your blood.      SECONDARY DISCHARGE DIAGNOSES  Diagnosis: Encephalopathy acute  Assessment and Plan of Treatment: You presented with altered mental status. It was most likely due to the infection in your left foot. This problem resolved during the course of your admission while being managed with IV antibiotics.    Diagnosis: Cellulitis of left foot  Assessment and Plan of Treatment: You presented with an infection in your left foot. For this an xray was done of your foot which showed no visible tracking air. MRI of the left foot was done which depicted sequela of early osteomyelitis (bone infection). Infectious disease was consultted.  As per ID recommendations, MRI of the spine was done which revealed Degenerative changes. There was no infection in your back. Blood cultures grew MSSA, you were started on Unasyn for this.  Podiatry was consulted who took you for an incision and drainage of the infection. A bone sample was taken, which grew ***. For this ID recommended ***** antibtioc. You will take this for a total of **** days. Please follow up with Dr. Hart, the podiatrist for continued management.    Diagnosis: Acute osteomyelitis  Assessment and Plan of Treatment: You have a left foot infection. For this an MRI was done. MRI showed early sequele of osteomyeltis (bone infection). For this you were given Unasyn antibitocs for a total of ** days. Podiatry was consulted. Podiatry did and Incision and drainage on your left foot. You will be discharged on *** antibiotics for a total of *** days. DRESSING CHANGE INSTRUCTIONS- please clean the area with sterile saline. Apply 4x4 gauze, ABD pad, secure with dianna. Apply ACE over the dressing. Physical therapy evaluated you. They recommended **********.  Please follow with your podiatrist, Dr. Hart, for continued care.    Diagnosis: Anemia  Assessment and Plan of Treatment: You have anemia of blood loss secondary to open left foot wound status post I & D on 12/22/21. Your hemoglobin dropped from 9 to <7. You were transfused with 2 units of Packed red blood cells to help improve your hemoglobin.    Diagnosis: Diabetes mellitus  Assessment and Plan of Treatment: You have diabetes. You hemaglobin A1c value was 6.3%. You are taking Metformin and Glimepiride at home. You need to continue monitoring your blood sugar levels closely and maintain healthy lifestyle by eating healthy diabetic regimen, weight loss and exercise regularly as tolerated.  Please continue to take your HOME medications and follow up with your PCP/Endocrinologist within a week of discharge.      Diagnosis: Hypertension  Assessment and Plan of Treatment: You have high blood pressure. On this admission, your Blood Pressure was adequately controlled with LISINOPRIL. Your blood pressure target is 120/80, maintain healthy lifestyle, low salt diet, avoid fatty food, weight loss, stay active as tolerated 30 mins X 3 times per week.  Notify your doctor if you have any of the following symptoms:   (Dizziness, Lightheadedness, Blurry vision, Headache, Chest pain, Shortness of breath.)  Please continue taking your HOME medications and follow-up with your PCP in 1 week from discharge to adjust medications as needed.      Diagnosis: Acute kidney injury superimposed on CKD  Assessment and Plan of Treatment: You presented to the hospital with a Creantine level of 1.8. Your creatnine level improved to a 1.2. You poor kindey function was most likely due to active infection and dehydration. Please follow up with your PCP in a week from discharge.      Diagnosis: Atrial fibrillation  Assessment and Plan of Treatment: You have a history of Atrial fibirillation not on any BETA BLOCKER OR ANTICOAGULATION at home. You were seen by Cardiologist who recommended    Diagnosis: Hyperlipemia  Assessment and Plan of Treatment: You have history of Hyperlipidemia. Please continue to take your HOME medication and maintain healthy lifestyle, low fat diet, exercise regularly and check your lipid levels routinely.   Please follow up with your PCP in 1 week from discharge.       PRINCIPAL DISCHARGE DIAGNOSIS  Diagnosis: Gram-positive bacteremia  Assessment and Plan of Treatment: You were diagnosed with Gram positive bacteremia Staphylococcus and Sterptococcus which means presence on bacteria in your blood. You had an ultrasound of heart which showed normal pumping function of the heart and no vegetations meaning negative for infection of your heart valves. You had MRI of lumbosacral spine which was negative for any spine infection. You were treated with IV antibiotics CEFAZOLIN 2 GRAM EVERY 8 HOURS FOR 3 WEEKS TILL 1/19/22. Please follow up with Dr Buchanan and Podiatry Dr Hart in a week from discharge.      SECONDARY DISCHARGE DIAGNOSES  Diagnosis: Encephalopathy acute  Assessment and Plan of Treatment: You presented with altered mental status. It was most likely due to the infection in your left foot. This problem resolved during the course of your admission while being managed with IV antibiotics.    Diagnosis: Cellulitis of left foot  Assessment and Plan of Treatment: You presented with an infection in your left foot. Xray of left foot showed no visible tracking air. MRI of the left foot showed depicted sequela of early osteomyelitis (bone infection). Infectious disease was consultted.  As per ID recommendations, MRI of the spine was done which revealed Degenerative changes. There was no infection in your back. Blood cultures grew Staphylococcus and Streptococcus manaaged with IV antibiotics.  Podiatry was consulted who took you for an incision and drainage of the infection. A bone sample was taken, which grew ***. For this, ID recommended CEFAZOLIN 2 GRAM EVERY 8 HOURS FOR 3 WEEKS TILL 1/19/22. You will take this for a total of 3 weeks. Please follow up with Dr. Hart, the podiatrist for continued management.    Diagnosis: Acute osteomyelitis  Assessment and Plan of Treatment: You have a left foot infection. For this an MRI was done. MRI showed early sequele of osteomyeltis (bone infection). For this you were given Unasyn antibitocs for a total of ** days. Podiatry was consulted. Podiatry did and Incision and drainage on your left foot. You will be discharged onCEFAZOLIN 2 GRAM EVERY 8 HOURS FOR 3 WEEKS TILL 1/19/22 for a total of 3 weeks. DRESSING CHANGE INSTRUCTIONS- please clean the area with sterile saline. Apply 4x4 gauze, ABD pad, secure with dianna. Apply ACE over the dressing. Physical therapy evaluated you. They recommended Rehab.  Please follow with your podiatrist, Dr. Hart, for continued care.    Diagnosis: Anemia  Assessment and Plan of Treatment: You have anemia of blood loss secondary to open left foot wound status post Incision & Drainage on 12/22/21. Your hemoglobin dropped from 9 to <7. You were transfused with 2 units of Packed red blood cells to help improve your hemoglobin.    Diagnosis: Atrial fibrillation  Assessment and Plan of Treatment: You have a history of Atrial fibirillation not on any BETA BLOCKER OR ANTICOAGULATION at home. You were seen by Cardiologist who recommended full dose anticoagulation with ELIQUIS 5 MG TWICE A DAY. Your insurance covers this prescription. Please follow up with your PCP and Cardiologist in a week from discharge.      Diagnosis: Acute kidney injury superimposed on CKD  Assessment and Plan of Treatment: You presented to the hospital with a Creantine level of 1.8. Your creatnine level improved to a 1.2. You poor kindey function was most likely due to active infection and dehydration. Please follow up with your PCP in a week from discharge.      Diagnosis: Diabetes mellitus  Assessment and Plan of Treatment: You have diabetes. You hemaglobin A1c value was 6.3%. You are taking Metformin and Glimepiride at home. You need to continue monitoring your blood sugar levels closely and maintain healthy lifestyle by eating healthy diabetic regimen, weight loss and exercise regularly as tolerated.  Please continue to take your HOME medications and follow up with your PCP/Endocrinologist within a week of discharge.      Diagnosis: Hypertension  Assessment and Plan of Treatment: You have high blood pressure. On this admission, your Blood Pressure was adequately controlled with LISINOPRIL. Your blood pressure target is 120/80, maintain healthy lifestyle, low salt diet, avoid fatty food, weight loss, stay active as tolerated 30 mins X 3 times per week.  Notify your doctor if you have any of the following symptoms:   (Dizziness, Lightheadedness, Blurry vision, Headache, Chest pain, Shortness of breath.)  Please continue taking your HOME medications and follow-up with your PCP in 1 week from discharge to adjust medications as needed.      Diagnosis: Hyperlipemia  Assessment and Plan of Treatment: You have history of Hyperlipidemia. Please continue to take your HOME medication and maintain healthy lifestyle, low fat diet, exercise regularly and check your lipid levels routinely.   Please follow up with your PCP in 1 week from discharge.       PRINCIPAL DISCHARGE DIAGNOSIS  Diagnosis: Gram-positive bacteremia  Assessment and Plan of Treatment: You were diagnosed with Gram positive bacteremia Staphylococcus and Sterptococcus which means presence on bacteria in your blood. You had an ultrasound of heart which showed normal pumping function of the heart and no vegetations meaning negative for infection of your heart valves. You had MRI of lumbosacral spine which was negative for any spine infection. You were treated with IV antibiotics CEFAZOLIN 2 GRAM EVERY 8 HOURS FOR 3 WEEKS TILL 1/19/22. Please follow up with Dr Buchanan and Podiatry Dr Hart in a week from discharge.      SECONDARY DISCHARGE DIAGNOSES  Diagnosis: Encephalopathy acute  Assessment and Plan of Treatment: You presented with altered mental status. It was most likely due to the infection in your left foot. This problem resolved during the course of your admission while being managed with IV antibiotics.    Diagnosis: Cellulitis of left foot  Assessment and Plan of Treatment: You presented with an infection in your left foot. Xray of left foot showed no visible tracking air. MRI of the left foot showed depicted sequela of early osteomyelitis (bone infection). Infectious disease was consultted.  As per ID recommendations, MRI of the spine was done which revealed Degenerative changes. There was no infection in your back. Blood cultures grew Staphylococcus and Streptococcus manaaged with IV antibiotics.  Podiatry was consulted who took you for an incision and drainage of the infection. Bone sample showed tendon, left foot, excision: Fibro-tendinous tissue with ischemic and degenerative changes. Bone, left foot, biopsy: Bone fragment with resorptive changes and focal myelofibrosis. Negative for acute osteomyelitis. For this, ID recommended CEFAZOLIN 2 GRAM EVERY 8 HOURS FOR 3 WEEKS TILL 1/19/22. You will take this for a total of 3 weeks. Please follow up with Dr. Hart, the podiatrist for continued management.    Diagnosis: Acute osteomyelitis  Assessment and Plan of Treatment: You have a left foot infection. For this an MRI was done. MRI showed early sequele of osteomyeltis (bone infection). For this you were given Unasyn antibitocs for a total of ** days. Podiatry was consulted. Podiatry did and Incision and drainage on your left foot. You will be discharged onCEFAZOLIN 2 GRAM EVERY 8 HOURS FOR 3 WEEKS TILL 1/19/22 for a total of 3 weeks. DRESSING CHANGE INSTRUCTIONS- please clean the area with sterile saline. Apply 4x4 gauze, ABD pad, secure with dianna. Apply ACE over the dressing. Physical therapy evaluated you. They recommended Rehab.  Please follow with your podiatrist, Dr. Hart, for continued care.    Diagnosis: Anemia  Assessment and Plan of Treatment: You have anemia of blood loss secondary to open left foot wound status post Incision & Drainage on 12/22/21. Your hemoglobin dropped from 9 to <7. You were transfused with 2 units of Packed red blood cells to help improve your hemoglobin.    Diagnosis: Atrial fibrillation  Assessment and Plan of Treatment: You have a history of Atrial fibirillation not on any BETA BLOCKER OR ANTICOAGULATION at home. You were seen by Cardiologist who recommended full dose anticoagulation with ELIQUIS 5 MG TWICE A DAY. Your insurance covers this prescription. Please follow up with your PCP and Cardiologist in a week from discharge.      Diagnosis: Acute kidney injury superimposed on CKD  Assessment and Plan of Treatment: You presented to the hospital with a Creantine level of 1.8. Your creatnine level improved to a 1.2. You poor kindey function was most likely due to active infection and dehydration. Please follow up with your PCP in a week from discharge.      Diagnosis: Diabetes mellitus  Assessment and Plan of Treatment: You have diabetes. You hemaglobin A1c value was 6.3%. You are taking Metformin and Glimepiride at home. You need to continue monitoring your blood sugar levels closely and maintain healthy lifestyle by eating healthy diabetic regimen, weight loss and exercise regularly as tolerated.  Please continue to take your HOME medications and follow up with your PCP/Endocrinologist within a week of discharge.      Diagnosis: Hypertension  Assessment and Plan of Treatment: You have high blood pressure. On this admission, your Blood Pressure was adequately controlled with LISINOPRIL. Your blood pressure target is 120/80, maintain healthy lifestyle, low salt diet, avoid fatty food, weight loss, stay active as tolerated 30 mins X 3 times per week.  Notify your doctor if you have any of the following symptoms:   (Dizziness, Lightheadedness, Blurry vision, Headache, Chest pain, Shortness of breath.)  Please continue taking your HOME medications and follow-up with your PCP in 1 week from discharge to adjust medications as needed.      Diagnosis: Hyperlipemia  Assessment and Plan of Treatment: You have history of Hyperlipidemia. Please continue to take your HOME medication and maintain healthy lifestyle, low fat diet, exercise regularly and check your lipid levels routinely.   Please follow up with your PCP in 1 week from discharge.       PRINCIPAL DISCHARGE DIAGNOSIS  Diagnosis: Gram-positive bacteremia  Assessment and Plan of Treatment: You were diagnosed with Gram positive bacteremia Staphylococcus and Sterptococcus which means presence on bacteria in your blood. You had an ultrasound of heart which showed normal pumping function of the heart and no vegetations meaning negative for infection of your heart valves. You had MRI of lumbosacral spine which was negative for any spine infection. You were treated with IV antibiotics CEFAZOLIN 2 GRAM EVERY 8 HOURS FOR 3 WEEKS TILL 1/19/22. Please follow up with Dr Buchanan and Podiatry Dr Hart in a week from discharge.      SECONDARY DISCHARGE DIAGNOSES  Diagnosis: Encephalopathy acute  Assessment and Plan of Treatment: You presented with altered mental status. It was most likely due to the infection in your left foot. This problem resolved during the course of your admission while being managed with IV antibiotics.    Diagnosis: Cellulitis of left foot  Assessment and Plan of Treatment: You presented with an infection in your left foot. Xray of left foot showed no visible tracking air. MRI of the left foot showed depicted sequela of early osteomyelitis (bone infection). Infectious disease was consulted.  As per ID recommendations, MRI of the spine was done which revealed Degenerative changes. There was no infection in your back. Blood cultures grew Staphylococcus and Streptococcus manaaged with IV antibiotics.  Podiatry was consulted who took you for an incision and drainage of the infection. Bone sample showed tendon, left foot, excision: Fibro-tendinous tissue with ischemic and degenerative changes. Bone, left foot, biopsy: Bone fragment with resorptive changes and focal myelofibrosis. Negative for acute osteomyelitis. For this, ID recommended CEFAZOLIN 2 GRAM EVERY 8 HOURS FOR 3 WEEKS TO BE COMPLETED ON 1/19/22.  Please follow up with Dr. Hart, the podiatrist for continued management.    Diagnosis: Acute osteomyelitis  Assessment and Plan of Treatment: You have a left foot infection. For this an MRI was done. MRI showed early sequelae of osteomyeltis (bone infection). For this you were given IV antibitocs for a total of 3 weeks. Podiatry was consulted who did Incision and drainage on your left foot on 12/23/21. Tendon, left foot, excision: Fibro-tendinous tissue with ischemic and degenerative changes. Bone, left foot, biopsy: Bone fragment with resorptive changes and focal myelofibrosis. Negative for acute osteomyelitis. You will be discharged on CEFAZOLIN 2 GRAM EVERY 8 HOURS FOR 3 WEEKS TO BE COMPLETED ON 1/19/22.   DRESSING CHANGE INSTRUCTIONS- please clean the area with sterile saline. Apply 4x4 gauze, ABD pad, secure with dianna. Apply ACE over the dressing. Physical therapy evaluated you. They recommended Rehab.  Please follow with your podiatrist, Dr. Hart, for continued care.    Diagnosis: Anemia  Assessment and Plan of Treatment: You have anemia of blood loss secondary to open left foot wound status post Incision & Drainage on 12/23/21. Your hemoglobin dropped from 9 to <7. You were transfused with 2 units of Packed red blood cells to help improve your hemoglobin.    Diagnosis: Atrial fibrillation  Assessment and Plan of Treatment: You have a history of Atrial fibirillation not on any BETA BLOCKER OR ANTICOAGULATION at home. You were seen by Cardiologist who recommended full dose anticoagulation with ELIQUIS 5 MG TWICE A DAY. Your insurance covers this prescription. Please follow up with your PCP and Cardiologist in a week from discharge.      Diagnosis: Acute kidney injury superimposed on CKD  Assessment and Plan of Treatment: You presented to the hospital with a Creantine level of 1.8. Your creatnine level improved to a 1.2. You poor kindey function was most likely due to active infection and dehydration. Please follow up with your PCP in a week from discharge.      Diagnosis: Diabetes mellitus  Assessment and Plan of Treatment: You have diabetes. You hemaglobin A1c value was 6.3%. You are taking Metformin and Glimepiride at home. You need to continue monitoring your blood sugar levels closely and maintain healthy lifestyle by eating healthy diabetic regimen, weight loss and exercise regularly as tolerated.  Please continue to take your HOME medications and follow up with your PCP/Endocrinologist within a week of discharge.      Diagnosis: Hypertension  Assessment and Plan of Treatment: You have high blood pressure. On this admission, your Blood Pressure was adequately controlled with LISINOPRIL. Your blood pressure target is 120/80, maintain healthy lifestyle, low salt diet, avoid fatty food, weight loss, stay active as tolerated 30 mins X 3 times per week.  Notify your doctor if you have any of the following symptoms:   (Dizziness, Lightheadedness, Blurry vision, Headache, Chest pain, Shortness of breath.)  Please continue taking your HOME medications and follow-up with your PCP in 1 week from discharge to adjust medications as needed.      Diagnosis: Hyperlipemia  Assessment and Plan of Treatment: You have history of Hyperlipidemia. Please continue to take your HOME medication and maintain healthy lifestyle, low fat diet, exercise regularly and check your lipid levels routinely.   Please follow up with your PCP in 1 week from discharge.       PRINCIPAL DISCHARGE DIAGNOSIS  Diagnosis: Gram-positive bacteremia  Assessment and Plan of Treatment: You were diagnosed with Gram positive bacteremia Staphylococcus and Sterptococcus which means presence on bacteria in your blood. You had an ultrasound of heart which showed normal pumping function of the heart and no vegetations meaning negative for infection of your heart valves. You had MRI of lumbosacral spine which was negative for any spine infection. You were treated with IV antibiotics CEFAZOLIN 2 GRAMS EVERY 8 HOURS FOR A TOTAL OF 3 WEEKS TO BE COMPLETED ON 1/19/22.  Please follow up with Dr Buchanan and Podiatry Dr Hart in a week from discharge.      SECONDARY DISCHARGE DIAGNOSES  Diagnosis: Cellulitis of left foot  Assessment and Plan of Treatment: You presented with an infection of your left foot. Xray of left foot showed no visible tracking air. MRI of the left foot showed depicted sequela of early osteomyelitis (bone infection). Infectious disease was consulted.  As per ID recommendations, MRI of the spine was done which revealed Degenerative changes. There was no infection in your back. Blood cultures grew Staphylococcus and Streptococcus manaaged with IV antibiotics.  Podiatry was consulted who took you for an incision and drainage of the infection on 12/23/21. Bone sample showed tendon, left foot, excision: Fibro-tendinous tissue with ischemic and degenerative changes. Bone, left foot, biopsy: Bone fragment with resorptive changes and focal myelofibrosis. Negative for acute osteomyelitis. ID recommended CEFAZOLIN 2 GRAMS EVERY 8 HOURS FOR A TOTAL OF 3 WEEKS TO BE COMPLETED ON 1/19/22.   Please follow up with Dr. Hart, the podiatrist for Dr. Hart for continuity of care.    Diagnosis: Acute osteomyelitis  Assessment and Plan of Treatment: You have a left foot bone infection. MRI of left foot showed early sequelae of osteomyeltis (bone infection). You were managed with IV antibitocs to complete 3 weeks. Podiatry was consulted who did Incision and drainage on your left foot on 12/23/21. Surgery pathology showed Tendon, left foot, excision: Fibro-tendinous tissue with ischemic and degenerative changes. Bone, left foot, biopsy: Bone fragment with resorptive changes and focal myelofibrosis. Negative for acute osteomyelitis.   You are being discharged on CEFAZOLIN 2 GRAMS EVERY 8 HOURS FOR A TOTAL OF 3 WEEKS TO BE COMPLETED ON 1/19/22.   DRESSING CHANGE INSTRUCTIONS AS FOLLOWS- please clean the area with sterile saline. Apply 4x4 gauze, ABD pad, secure with dianna. Apply ACE over the dressing. Physical therapy evaluated you. They recommended Rehab.  Please follow with your podiatrist, Dr. Hart for continuity of care.    Diagnosis: Encephalopathy acute  Assessment and Plan of Treatment: You presented with altered mental status. It was most likely due to the infection in your left foot. This problem resolved during the course of your admission while being managed with IV antibiotics.    Diagnosis: Anemia  Assessment and Plan of Treatment: You have anemia of blood loss secondary to open left foot wound status post Incision & Drainage on 12/23/21. Your hemoglobin dropped from 9 to <7. You were transfused with 2 units of Packed Red Blood cells to help improve your hemoglobin.  Please follow up with your PCP in a week from discharge.      Diagnosis: Atrial fibrillation  Assessment and Plan of Treatment: You have a history of Atrial fibirillation not on any BETA BLOCKER OR ANTICOAGULATION at home. You were seen by Cardiologist who recommended full dose anticoagulation with ELIQUIS 5 MG TWICE A DAY. Your insurance covers this prescription. Please follow up with your PCP and Cardiologist in a week from discharge to adjust medications as needed.      Diagnosis: Acute kidney injury superimposed on CKD  Assessment and Plan of Treatment: You presented to the hospital with a Creantine level of 1.8. Your creatnine level improved to 1.2. You poor kindey function was most likely due to active infection and dehydration. Please follow up with your PCP in a week from discharge.      Diagnosis: Diabetes mellitus  Assessment and Plan of Treatment: You have diabetes. You hemaglobin A1c value was 6.3%. You are taking Metformin and Glimepiride at home. You need to continue monitoring your blood sugar levels closely and maintain healthy lifestyle by eating healthy diabetic regimen, weight loss and exercise regularly as tolerated.  Please continue to take your HOME medications and follow up with your PCP/Endocrinologist within a week of discharge.      Diagnosis: Hypertension  Assessment and Plan of Treatment: You have high blood pressure. On this admission, your Blood Pressure was adequately controlled with LISINOPRIL. Your blood pressure target is 120/80, maintain healthy lifestyle, low salt diet, avoid fatty food, weight loss, stay active as tolerated 30 mins X 3 times per week.  Notify your doctor if you have any of the following symptoms:   (Dizziness, Lightheadedness, Blurry vision, Headache, Chest pain, Shortness of breath.)  Please continue taking your HOME medications and follow-up with your PCP in 1 week from discharge to adjust medications as needed.      Diagnosis: Hyperlipemia  Assessment and Plan of Treatment: You have history of Hyperlipidemia. Please continue to take your HOME medication and maintain healthy lifestyle, low fat diet, exercise regularly and check your lipid levels routinely.   Please follow up with your PCP in 1 week from discharge.       PRINCIPAL DISCHARGE DIAGNOSIS  Diagnosis: Gram-positive bacteremia  Assessment and Plan of Treatment: You presented to hospital with worsening infection in your left foot. You have prior known infection to the left foot and has been treated for infection of the bone with prolonged IV antibiotic via PICC line.   You were diagnosed with Gram positive bacteremia Staphylococcus and Sterptococcus which means presence on bacteria in your blood. You had an ultrasound of heart which showed normal pumping function of the heart and no vegetations meaning negative for infection of your heart valves. You had MRI of lumbosacral spine which was negative for any spine infection. You were treated with IV antibiotics CEFAZOLIN 2 GRAMS EVERY 8 HOURS FOR A TOTAL OF 3 WEEKS TO BE COMPLETED ON 1/19/22.  Please follow up with Dr Buchanan and Podiatry Dr Hart in a week from       SECONDARY DISCHARGE DIAGNOSES  Diagnosis: Cellulitis of left foot  Assessment and Plan of Treatment: You presented to hospital with worsening infection in your left foot. You have prior known infection to the left foot and has been treated for infection of the bone with prolonged IV antibiotic via PICC line.   For this an X-Ray was done of your foot which showed no visible tracking air. MRI of the left foot was done which depicted sequela of early osteomyelitis (bone infection). Infectious disease Dr. Ale Maxwell was consulted. Your Blood cultures were positive for Staphylococcus Aureus. You also has chronic low back pain As per ID recommendations, MRI of the spine was done which revealed Degenerative changes. There was no infection in your back. Blood cultures grew Staphylococcus and Streptococcus manaaged with IV antibiotics whiuch eventually grew MSSA, you were started on Unasyn for this.  Podiatry was consulted who took you for an incision and drainage of the infection. You also had a Transesophageal Echo (ORLY) which did not show any evidence of infection in your heart valves (No Endocarditis). ID recommended completing antibiotics for 4 weeks with Cefazolin every 8 hrs until ;  Please follow up with Dr. Hart, the podiatrist for continued management.  YOU WILL NEED DELAYED CLOSURE OF WOUND IN FEW WEEKS AS PER PODIATRY  .  Podiatry was consulted who took you for an incision and drainage of the infection on 12/23/21. Bone sample showed tendon, left foot, excision: Fibro-tendinous tissue with ischemic and degenerative changes. Bone, left foot, biopsy: Bone fragment with resorptive changes and focal myelofibrosis. Negative for acute osteomyelitis. ID recommended CEFAZOLIN 2 GRAMS EVERY 8 HOURS FOR A TOTAL OF 3 WEEKS TO BE COMPLETED ON 1/19/22.   Please follow up with Dr. Hart, the podiatrist for Dr. Hart for continuity of care.    Diagnosis: Acute osteomyelitis  Assessment and Plan of Treatment: You have a left foot bone infection. MRI of left foot showed early sequelae of osteomyeltis (bone infection). You were managed with IV antibitocs to complete 3 weeks. Podiatry was consulted who did Incision and drainage on your left foot on 12/23/21. Surgery pathology showed Tendon, left foot, excision: Fibro-tendinous tissue with ischemic and degenerative changes. Bone, left foot, biopsy: Bone fragment with resorptive changes and focal myelofibrosis. Negative for acute osteomyelitis.   You are being discharged on CEFAZOLIN 2 GRAMS EVERY 8 HOURS FOR A TOTAL OF 3 WEEKS TO BE COMPLETED ON 1/19/22.   DRESSING CHANGE INSTRUCTIONS AS FOLLOWS- please clean the area with sterile saline. Apply 4x4 gauze, ABD pad, secure with dianna. Apply ACE over the dressing. Physical therapy evaluated you. They recommended Rehab.  Please follow with your podiatrist, Dr. Hart for continuity of care.    Diagnosis: Diabetes mellitus  Assessment and Plan of Treatment: You have diabetes. You hemaglobin A1c value was 6.3%. You are taking Metformin and Glimepiride at home. You need to continue monitoring your blood sugar levels closely and maintain healthy lifestyle by eating healthy diabetic regimen, weight loss and exercise regularly as tolerated.  Please continue to take your HOME medications and follow up with your PCP/Endocrinologist within a week of discharge.      Diagnosis: Hypertension  Assessment and Plan of Treatment: You have high blood pressure. On this admission, your Blood Pressure was adequately controlled with LISINOPRIL. Your blood pressure target is 120/80, maintain healthy lifestyle, low salt diet, avoid fatty food, weight loss, stay active as tolerated 30 mins X 3 times per week.  Notify your doctor if you have any of the following symptoms:   (Dizziness, Lightheadedness, Blurry vision, Headache, Chest pain, Shortness of breath.)  Please continue taking your HOME medications and follow-up with your PCP in 1 week from discharge to adjust medications as needed.      Diagnosis: Acute kidney injury superimposed on CKD  Assessment and Plan of Treatment: You presented to the hospital with a Creantine level of 1.8. Your creatnine level improved to 1.2. You poor kindey function was most likely due to active infection and dehydration. Please follow up with your PCP in a week from discharge.      Diagnosis: Atrial fibrillation  Assessment and Plan of Treatment: You have a history of Atrial fibirillation not on any BETA BLOCKER OR ANTICOAGULATION at home. You were seen by Cardiologist who recommended full dose anticoagulation with ELIQUIS 5 MG TWICE A DAY. Your insurance covers this prescription. Please follow up with your PCP and Cardiologist in a week from discharge to adjust medications as needed.      Diagnosis: Hyperlipemia  Assessment and Plan of Treatment: You have history of Hyperlipidemia. Please continue to take your HOME medication and maintain healthy lifestyle, low fat diet, exercise regularly and check your lipid levels routinely.   Please follow up with your PCP in 1 week from discharge.      Diagnosis: Encephalopathy acute  Assessment and Plan of Treatment: You presented with altered mental status. It was most likely due to the infection in your left foot. This problem resolved during the course of your admission while being managed with IV antibiotics.    Diagnosis: Anemia  Assessment and Plan of Treatment: You have anemia of blood loss secondary to open left foot wound status post Incision & Drainage on 12/23/21. Your hemoglobin dropped from 9 to <7. You were transfused with 2 units of Packed Red Blood cells to help improve your hemoglobin.  Please follow up with your PCP in a week from discharge.       PRINCIPAL DISCHARGE DIAGNOSIS  Diagnosis: Gram-positive bacteremia  Assessment and Plan of Treatment: You presented to hospital with worsening infection in your left foot. You have prior known infection to the left foot and has been treated for infection of the bone with prolonged IV antibiotic via PICC line.   You were diagnosed with Gram positive bacteremia Staphylococcus and Sterptococcus which means presence on bacteria in your blood. You had an ultrasound of heart which showed normal pumping function of the heart and no vegetations meaning negative for infection of your heart valves. You had MRI of lumbosacral spine which was negative for any spine infection. You were treated with IV antibiotics CEFAZOLIN 2 GRAMS EVERY 8 HOURS FOR A TOTAL OF 3 WEEKS TO BE COMPLETED ON 1/19/22.  Please follow up with Dr Buchanan and Podiatry Dr Hart in a week from   Please follow up at [address] [facility] [address] Floor [address]. Please call [phone] to make an appointment. You can also follow up at [phone] [place] [address]. Call [phone] to make an appointment.      SECONDARY DISCHARGE DIAGNOSES  Diagnosis: Cellulitis of left foot  Assessment and Plan of Treatment: You presented to hospital with worsening infection of your left foot. You have prior known infection to the left foot and has been treated for infection of the bone with prolonged IV antibiotic via PICC line.   For this an X-Ray was done of your foot which showed no visible tracking air. MRI of the left foot was done which depicted sequela of early osteomyelitis (bone infection). Infectious disease Dr. Ale Maxwell was consulted. Your Blood cultures were positive for Staphylococcus Aureus and Streptococcus. You also has chronic low back pain As per ID recommendations, MRI of the spine was done which revealed Degenerative changes. There was no infection in your back.   Since your Blood cultures grew Staphylococcus and Streptococcus, you were managed with IV antibiotics. Podiatry was consulted who took you for an incision and drainage of the infection 12/23/21. Bone sample showed tendon, left foot, excision: Fibro-tendinous tissue with ischemic and degenerative changes. Bone, left foot, biopsy: Bone fragment with resorptive changes and focal myelofibrosis. Negative for acute osteomyelitis.   You also had a Transesophageal Echo (ORLY) which did not show any evidence of infection in your heart valves (No Endocarditis).  Infectious disease recommended completing antibiotics for 4 weeks with CEFAZOLIN 2 GRAMS EVERY 8 HOURS TO BE COMPLETED ON 1/19/22.  Please follow up with Podiatry Dr. Hart to continue management.  YOU WILL NEED DELAYED CLOSURE OF WOUND IN FEW WEEKS AS PER PODIATRY.   Please follow up at [address] [place] [address] [address]. Please call [phone] to make an appointment. You can also follow up at [address] [place] [address]. Call [phone] to make an appointment.    Diagnosis: Acute osteomyelitis  Assessment and Plan of Treatment: You have a left foot bone infection. MRI of left foot showed early sequelae of osteomyeltis (bone infection). You were managed with IV antibitocs to complete 4 weeks. Podiatry was consulted who did Incision and drainage on your left foot on 12/23/21. Surgery pathology showed Tendon, left foot, excision: Fibro-tendinous tissue with ischemic and degenerative changes. Bone, left foot, biopsy: Bone fragment with resorptive changes and focal myelofibrosis. Negative for acute osteomyelitis.   You are being discharged on CEFAZOLIN 2 GRAMS EVERY 8 HOURS FOR A TOTAL OF 4 WEEKS TO BE COMPLETED ON 1/19/22.   DRESSING CHANGE INSTRUCTIONS AS FOLLOWS- wound vac to be change every 2 days including black foam with tegaderm, 4x4s, abd pad, dianna and ACE. NO weight bearing to Left Foot. Pt to keep dressing clean, dry and intact.    Physical therapy evaluated you. They recommended Rehab.  Please follow with your podiatrist, Dr. Hart for continuity of care.  Please follow up at [address] [facility] [address] Floor [address]. Please call [phone] to make an appointment. You can also follow up at [address] [place] [address]. Call [phone] to make an appointment.       Diagnosis: Encephalopathy acute  Assessment and Plan of Treatment: You presented with altered mental status. It was most likely due to the infection in your left foot. This problem resolved during the course of your admission while being managed with IV antibiotics.    Diagnosis: Anemia  Assessment and Plan of Treatment: You have anemia of blood loss secondary to open left foot wound status post Incision & Drainage on 12/23/21. Your hemoglobin dropped from 9 to <7. You were transfused with 2 units of Packed Red Blood cells to help improve your hemoglobin.  Please follow up with your PCP in a week from discharge.      Diagnosis: Atrial fibrillation  Assessment and Plan of Treatment: You have a history of Atrial fibirillation not on any BETA BLOCKER OR ANTICOAGULATION at home. You were seen by Cardiologist who recommended full dose anticoagulation with ELIQUIS 5 MG TWICE A DAY. Your insurance covers this prescription. Please follow up with your PCP and Cardiologist in a week from discharge to adjust medications as needed.      Diagnosis: Acute kidney injury superimposed on CKD  Assessment and Plan of Treatment: You presented to the hospital with a Creantine level of 1.8. Your creatnine level improved to 1.2. You poor kindey function was most likely due to active infection and dehydration. Please follow up with your PCP in a week from discharge.      Diagnosis: Diabetes mellitus  Assessment and Plan of Treatment: You have diabetes. You hemaglobin A1c value was 6.3%. You are taking Metformin and Glimepiride at home. You need to continue monitoring your blood sugar levels closely and maintain healthy lifestyle by eating healthy diabetic regimen, weight loss and exercise regularly as tolerated.  Please continue to take your HOME medications and follow up with your PCP/Endocrinologist within a week of discharge.      Diagnosis: Hypertension  Assessment and Plan of Treatment: You have high blood pressure. On this admission, your Blood Pressure was adequately controlled with LISINOPRIL. Your blood pressure target is 120/80, maintain healthy lifestyle, low salt diet, avoid fatty food, weight loss, stay active as tolerated 30 mins X 3 times per week.  Notify your doctor if you have any of the following symptoms:   (Dizziness, Lightheadedness, Blurry vision, Headache, Chest pain, Shortness of breath.)  Please continue taking your HOME medications and follow-up with your PCP in 1 week from discharge to adjust medications as needed.      Diagnosis: Hyperlipemia  Assessment and Plan of Treatment: You have history of Hyperlipidemia. Please continue to take your HOME medication and maintain healthy lifestyle, low fat diet, exercise regularly and check your lipid levels routinely.   Please follow up with your PCP in 1 week from discharge.       PRINCIPAL DISCHARGE DIAGNOSIS  Diagnosis: Gram-positive bacteremia  Assessment and Plan of Treatment: You presented to hospital with worsening infection in your left foot. You have prior known infection to the left foot and has been treated for infection of the bone with prolonged IV antibiotic via PICC line.   You were diagnosed with Gram positive bacteremia Staphylococcus and Sterptococcus which means presence on bacteria in your blood. You had an ultrasound of heart which showed normal pumping function of the heart and no vegetations meaning negative for infection of your heart valves. You had MRI of lumbosacral spine which was negative for any spine infection. You were treated with IV antibiotics CEFAZOLIN 2 GRAMS EVERY 8 HOURS FOR A TOTAL OF 3 WEEKS TO BE COMPLETED ON 1/19/22.  Please follow up with Dr Buchanan and Podiatry Dr Hart in a week from   Please follow up at [phone] [facility] [address] Floor [address]. Please call [phone] to make an appointment. You can also follow up at [phone] [place] [address]. Call [phone] to make an appointment.      SECONDARY DISCHARGE DIAGNOSES  Diagnosis: Cellulitis of left foot  Assessment and Plan of Treatment: You presented to hospital with worsening infection of your left foot. You have prior known infection to the left foot and has been treated for infection of the bone with prolonged IV antibiotic via PICC line.   For this an X-Ray was done of your foot which showed no visible tracking air. MRI of the left foot was done which depicted sequela of early osteomyelitis (bone infection). Infectious disease Dr. Ale Maxwell was consulted. Your Blood cultures were positive for Staphylococcus Aureus and Streptococcus. You also has chronic low back pain As per ID recommendations, MRI of the spine was done which revealed Degenerative changes. There was no infection in your back.   Since your Blood cultures grew Staphylococcus and Streptococcus, you were managed with IV antibiotics. Podiatry was consulted who took you for an incision and drainage of the infection 12/23/21. Bone sample showed tendon, left foot, excision: Fibro-tendinous tissue with ischemic and degenerative changes. Bone, left foot, biopsy: Bone fragment with resorptive changes and focal myelofibrosis. Negative for acute osteomyelitis.   You also had a Transesophageal Echo (ORLY) which did not show any evidence of infection in your heart valves (No Endocarditis).  Infectious disease recommended completing antibiotics for 4 weeks with CEFAZOLIN 2 GRAMS EVERY 8 HOURS TO BE COMPLETED ON 1/19/22 AND LEVAQUIN 750 MG DAILY FOR 4 WEEKS TILL 1/21/22. Please follow up with Podiatry Dr. Hart to continue management.  YOU WILL NEED DELAYED CLOSURE OF WOUND IN FEW WEEKS AS PER PODIATRY.   Please follow up at [address] [place] [address] [address]. Please call [phone] to make an appointment. You can also follow up at [facility] [place] [address]. Call [phone] to make an appointment.    Diagnosis: Acute osteomyelitis  Assessment and Plan of Treatment: You have a left foot bone infection. MRI of left foot showed early sequelae of osteomyeltis (bone infection). You were managed with IV antibitocs to complete 4 weeks. Podiatry was consulted who did Incision and drainage on your left foot on 12/23/21. Surgery pathology showed Tendon, left foot, excision: Fibro-tendinous tissue with ischemic and degenerative changes. Bone, left foot, biopsy: Bone fragment with resorptive changes and focal myelofibrosis. Negative for acute osteomyelitis.   You are being discharged on CEFAZOLIN 2 GRAMS EVERY 8 HOURS FOR A TOTAL OF 4 WEEKS TO BE COMPLETED ON 1/19/22.   DRESSING CHANGE INSTRUCTIONS AS FOLLOWS- wound vac to be change every 2 days including black foam with tegaderm, 4x4s, abd pad, dianna and ACE. NO weight bearing to Left Foot. Pt to keep dressing clean, dry and intact.    Physical therapy evaluated you. They recommended Rehab.  Please follow with your podiatrist, Dr. Hart for continuity of care.  Please follow up at [address] [place] [address] [address]. Please call [phone] to make an appointment. You can also follow up at [phone] [place] [address]. Call [phone] to make an appointment.       Diagnosis: Encephalopathy acute  Assessment and Plan of Treatment: You presented with altered mental status. It was most likely due to the infection in your left foot. This problem resolved during the course of your admission while being managed with IV antibiotics.    Diagnosis: Anemia  Assessment and Plan of Treatment: You have anemia of blood loss secondary to open left foot wound status post Incision & Drainage on 12/23/21. Your hemoglobin dropped from 9 to <7. You were transfused with 2 units of Packed Red Blood cells to help improve your hemoglobin.  Please follow up with your PCP in a week from discharge.      Diagnosis: Atrial fibrillation  Assessment and Plan of Treatment: You have a history of Atrial fibirillation not on any BETA BLOCKER OR ANTICOAGULATION at home. You were seen by Cardiologist who recommended full dose anticoagulation with ELIQUIS 5 MG TWICE A DAY. Your insurance covers this prescription. Please follow up with your PCP and Cardiologist in a week from discharge to adjust medications as needed.      Diagnosis: Acute kidney injury superimposed on CKD  Assessment and Plan of Treatment: You presented to the hospital with a Creantine level of 1.8. Your creatnine level improved to 1.2. You poor kindey function was most likely due to active infection and dehydration. Please follow up with your PCP in a week from discharge.      Diagnosis: Diabetes mellitus  Assessment and Plan of Treatment: You have diabetes. You hemaglobin A1c value was 6.3%. You are taking Metformin and Glimepiride at home. You need to continue monitoring your blood sugar levels closely and maintain healthy lifestyle by eating healthy diabetic regimen, weight loss and exercise regularly as tolerated.  Please continue to take your HOME medications and follow up with your PCP/Endocrinologist within a week of discharge.      Diagnosis: Hypertension  Assessment and Plan of Treatment: You have high blood pressure. On this admission, your Blood Pressure was adequately controlled with LISINOPRIL. Your blood pressure target is 120/80, maintain healthy lifestyle, low salt diet, avoid fatty food, weight loss, stay active as tolerated 30 mins X 3 times per week.  Notify your doctor if you have any of the following symptoms:   (Dizziness, Lightheadedness, Blurry vision, Headache, Chest pain, Shortness of breath.)  Please continue taking your HOME medications and follow-up with your PCP in 1 week from discharge to adjust medications as needed.      Diagnosis: Hyperlipemia  Assessment and Plan of Treatment: You have history of Hyperlipidemia. Please continue to take your HOME medication and maintain healthy lifestyle, low fat diet, exercise regularly and check your lipid levels routinely.   Please follow up with your PCP in 1 week from discharge.       PRINCIPAL DISCHARGE DIAGNOSIS  Diagnosis: Gram-positive bacteremia  Assessment and Plan of Treatment: You presented to hospital with worsening infection in your left foot. You have prior known infection to the left foot and has been treated for infection of the bone with prolonged IV antibiotic via PICC line.   You were diagnosed with Gram positive bacteremia Staphylococcus and Sterptococcus which means presence on bacteria in your blood. You had an ultrasound of heart which showed normal pumping function of the heart and no vegetations meaning negative for infection of your heart valves. You had MRI of lumbosacral spine which was negative for any spine infection. You were treated with IV antibiotics CEFAZOLIN 2 GRAMS EVERY 8 HOURS FOR A TOTAL OF 4 WEEKS TO BE COMPLETED ON 1/19/22.  Please follow up with Dr Buchanan and Podiatry Dr Hart in a week from   Please follow up at 05[address] [facility] Second Floor [address]. Please call [phone] to make an appointment. You can also follow up at [phone] [place] [address]. Call [phone] to make an appointment.      SECONDARY DISCHARGE DIAGNOSES  Diagnosis: Cellulitis of left foot  Assessment and Plan of Treatment: You presented to hospital with worsening infection of your left foot. You have prior known infection to the left foot and has been treated for infection of the bone with prolonged IV antibiotic via PICC line.   For this an X-Ray was done of your foot which showed no visible tracking air. MRI of the left foot was done which depicted sequela of early osteomyelitis (bone infection). Infectious disease Dr. Ale Maxwell was consulted. Your Blood cultures were positive for Staphylococcus Aureus and Streptococcus. You also has chronic low back pain As per ID recommendations, MRI of the spine was done which revealed Degenerative changes. There was no infection in your back.   Since your Blood cultures grew Staphylococcus and Streptococcus, you were managed with IV antibiotics. Podiatry was consulted who took you for an incision and drainage of the infection 12/23/21. Bone sample showed tendon, left foot, excision: Fibro-tendinous tissue with ischemic and degenerative changes. Bone, left foot, biopsy: Bone fragment with resorptive changes and focal myelofibrosis. Negative for acute osteomyelitis.   You also had a Transesophageal Echo (ORLY) which did not show any evidence of infection in your heart valves (No Endocarditis).  Infectious disease recommended completing antibiotics for 4 WEEKS WITH CEFAZOLIN 2 GRAMS EVERY 8 HOURS TO BE COMPLETED ON 1/19/22 AND LEVAQUIN 750 MG DAILY TILL 1/21/22. Please follow up with Podiatry Dr. Hart to continue management.  YOU WILL NEED DELAYED CLOSURE OF WOUND IN FEW WEEKS AS PER PODIATRY.   Please follow up at [address] [facility] Floor [address]. Please call [phone] to make an appointment. You can also follow up at [phone] [place] [address]. Call [phone] to make an appointment.    Diagnosis: Acute osteomyelitis  Assessment and Plan of Treatment: You have a left foot bone infection. MRI of left foot showed early sequelae of osteomyeltis (bone infection). You were managed with IV antibitocs to complete 4 weeks. Podiatry was consulted who did Incision and drainage on your left foot on 12/23/21. Surgery pathology showed Tendon, left foot, excision: Fibro-tendinous tissue with ischemic and degenerative changes. Bone, left foot, biopsy: Bone fragment with resorptive changes and focal myelofibrosis. Negative for acute osteomyelitis.   You are being discharged on CEFAZOLIN 2 GRAMS EVERY 8 HOURS FOR A TOTAL OF 4 WEEKS TO BE COMPLETED ON 1/19/22.   DRESSING CHANGE INSTRUCTIONS AS FOLLOWS- wound vac to be change every 2 days including black foam with tegaderm, 4x4s, abd pad, dianna and ACE. NO weight bearing to Left Foot. Pt to keep dressing clean, dry and intact.    Physical therapy evaluated you. They recommended Rehab.  Please follow with your podiatrist, Dr. Hart for continuity of care.  Please follow up at [address] [facility] Floor [address]. Please call [phone] to make an appointment. You can also follow up at [phone] [place] [address]. Call [phone] to make an appointment.       Diagnosis: Encephalopathy acute  Assessment and Plan of Treatment: You presented with altered mental status. It was most likely due to the infection in your left foot. This problem resolved during the course of your admission while being managed with IV antibiotics.    Diagnosis: Anemia  Assessment and Plan of Treatment: You have anemia of blood loss secondary to open left foot wound status post Incision & Drainage on 12/23/21. Your hemoglobin dropped from 9 to <7. You were transfused with 2 units of Packed Red Blood cells to help improve your hemoglobin.  Please follow up with your PCP in a week from discharge.      Diagnosis: Atrial fibrillation  Assessment and Plan of Treatment: You have a history of Atrial fibirillation not on any BETA BLOCKER OR ANTICOAGULATION at home. You were seen by Cardiologist who recommended full dose anticoagulation with ELIQUIS 5 MG TWICE A DAY. Your insurance covers this prescription. Please follow up with your PCP and Cardiologist in a week from discharge to adjust medications as needed.      Diagnosis: Acute kidney injury superimposed on CKD  Assessment and Plan of Treatment: You presented to the hospital with a Creantine level of 1.8. Your creatnine level improved to 1.2. You poor kindey function was most likely due to active infection and dehydration. Please follow up with your PCP in a week from discharge.      Diagnosis: Diabetes mellitus  Assessment and Plan of Treatment: You have diabetes. You hemaglobin A1c value was 6.3%. You are taking Metformin and Glimepiride at home. You need to continue monitoring your blood sugar levels closely and maintain healthy lifestyle by eating healthy diabetic regimen, weight loss and exercise regularly as tolerated.  Please continue to take your HOME medications and follow up with your PCP/Endocrinologist within a week of discharge.      Diagnosis: Hypertension  Assessment and Plan of Treatment: You have high blood pressure. On this admission, your Blood Pressure was adequately controlled with LISINOPRIL. Your blood pressure target is 120/80, maintain healthy lifestyle, low salt diet, avoid fatty food, weight loss, stay active as tolerated 30 mins X 3 times per week.  Notify your doctor if you have any of the following symptoms:   (Dizziness, Lightheadedness, Blurry vision, Headache, Chest pain, Shortness of breath.)  Please continue taking your HOME medications and follow-up with your PCP in 1 week from discharge to adjust medications as needed.      Diagnosis: Hyperlipemia  Assessment and Plan of Treatment: You have history of Hyperlipidemia. Please continue to take your HOME medication and maintain healthy lifestyle, low fat diet, exercise regularly and check your lipid levels routinely.   Please follow up with your PCP in 1 week from discharge.       PRINCIPAL DISCHARGE DIAGNOSIS  Diagnosis: Gram-positive bacteremia  Assessment and Plan of Treatment: You presented to hospital with worsening infection in your left foot. You have prior known infection to the left foot and has been treated for infection of the bone with prolonged IV antibiotic via PICC line.   You were diagnosed with Gram positive bacteremia Staphylococcus and Sterptococcus which means presence on bacteria in your blood. You had an ultrasound of heart which showed normal pumping function of the heart and no vegetations meaning negative for infection of your heart valves. You had MRI of lumbosacral spine which was negative for any spine infection. You were treated with IV antibiotics CEFAZOLIN 2 GRAMS EVERY 8 HOURS FOR A TOTAL OF 4 WEEKS TO BE COMPLETED ON 1/19/22.  Please follow up with Dr Buchanan and Podiatry Dr Hart in a week from   Please follow up at [address] [facility] Second Floor [address]. Please call [phone] to make an appointment. You can also follow up at [phone] [place] [address]. Call [phone] to make an appointment.      SECONDARY DISCHARGE DIAGNOSES  Diagnosis: Cellulitis of left foot  Assessment and Plan of Treatment: You presented to hospital with worsening infection of your left foot. You have prior known infection to the left foot and has been treated for infection of the bone with prolonged IV antibiotic via PICC line.   For this an X-Ray was done of your foot which showed no visible tracking air. MRI of the left foot was done which depicted sequela of early osteomyelitis (bone infection). Infectious disease Dr. Ale Maxwell was consulted. Your Blood cultures were positive for Staphylococcus Aureus and Streptococcus. You also has chronic low back pain As per ID recommendations, MRI of the spine was done which revealed Degenerative changes. There was no infection in your back.   Since your Blood cultures grew Staphylococcus and Streptococcus, you were managed with IV antibiotics. Podiatry was consulted who took you for an incision and drainage of the infection 12/23/21. Bone sample showed tendon, left foot, excision: Fibro-tendinous tissue with ischemic and degenerative changes. Bone, left foot, biopsy: Bone fragment with resorptive changes and focal myelofibrosis. Negative for acute osteomyelitis.   You also had a Transesophageal Echo (ORLY) which did not show any evidence of infection in your heart valves (No Endocarditis).  Infectious disease recommended completing antibiotics for 4 WEEKS WITH CEFAZOLIN 2 GRAMS EVERY 8 HOURS TO BE COMPLETED ON 1/19/22 AND LEVAQUIN 750 MG DAILY TILL 1/21/22. Please follow up with Podiatry Dr. Hart to continue management.  YOU WILL NEED DELAYED CLOSURE OF WOUND IN FEW WEEKS AS PER PODIATRY.   WOUND CARE ORDER AS FOLLOWS:  Have wound vac changed every 2 days including black foam with Tegaderm 4x4s, abd, Alyse and ACE. Pt to be nonwb to Left foot. Pt to keep dressing clean, dry and intact.   Please follow up at [address] [facility] Second Floor [address]. Please call [phone] to make an appointment. You can also follow up at [phone] [place] [address]. Call [phone] to make an appointment.    Diagnosis: Acute osteomyelitis  Assessment and Plan of Treatment: You have a left foot bone infection. MRI of left foot showed early sequelae of osteomyeltis (bone infection). You were managed with IV antibitocs to complete 4 weeks. Podiatry was consulted who did Incision and drainage on your left foot on 12/23/21. Surgery pathology showed Tendon, left foot, excision: Fibro-tendinous tissue with ischemic and degenerative changes. Bone, left foot, biopsy: Bone fragment with resorptive changes and focal myelofibrosis. Negative for acute osteomyelitis.   You are being discharged on CEFAZOLIN 2 GRAMS EVERY 8 HOURS FOR A TOTAL OF 4 WEEKS TO BE COMPLETED ON 1/19/22.   DRESSING CHANGE INSTRUCTIONS AS FOLLOWS- wound vac to be change every 2 days including black foam with tegaderm, 4x4s, abd pad, alyse and ACE. NO weight bearing to Left Foot. Pt to keep dressing clean, dry and intact.    Physical therapy evaluated you. They recommended Rehab.  Please follow with your podiatrist, Dr. Hart for continuity of care.  Please follow up at 06[address] [facility] Second Floor [address]. Please call [phone] to make an appointment. You can also follow up at [phone] [place] [address]. Call [phone] to make an appointment.       Diagnosis: Encephalopathy acute  Assessment and Plan of Treatment: You presented with altered mental status. It was most likely due to the infection in your left foot. This problem resolved during the course of your admission while being managed with IV antibiotics.    Diagnosis: Anemia  Assessment and Plan of Treatment: You have anemia of blood loss secondary to open left foot wound status post Incision & Drainage on 12/23/21. Your hemoglobin dropped from 9 to <7. You were transfused with 2 units of Packed Red Blood cells to help improve your hemoglobin.  Please follow up with your PCP in a week from discharge.      Diagnosis: Atrial fibrillation  Assessment and Plan of Treatment: You have a history of Atrial fibirillation not on any BETA BLOCKER OR ANTICOAGULATION at home. You were seen by Cardiologist who recommended full dose anticoagulation with ELIQUIS 5 MG TWICE A DAY. Your insurance covers this prescription. Please follow up with your PCP and Cardiologist in a week from discharge to adjust medications as needed.      Diagnosis: Acute kidney injury superimposed on CKD  Assessment and Plan of Treatment: You presented to the hospital with a Creantine level of 1.8. Your creatnine level improved to 1.2. You poor kindey function was most likely due to active infection and dehydration. Please follow up with your PCP in a week from discharge.      Diagnosis: Diabetes mellitus  Assessment and Plan of Treatment: You have diabetes. You hemaglobin A1c value was 6.3%. You are taking Metformin and Glimepiride at home. You need to continue monitoring your blood sugar levels closely and maintain healthy lifestyle by eating healthy diabetic regimen, weight loss and exercise regularly as tolerated.  Please continue to take your HOME medications and follow up with your PCP/Endocrinologist within a week of discharge.      Diagnosis: Hypertension  Assessment and Plan of Treatment: You have high blood pressure. On this admission, your Blood Pressure was adequately controlled with LISINOPRIL. Your blood pressure target is 120/80, maintain healthy lifestyle, low salt diet, avoid fatty food, weight loss, stay active as tolerated 30 mins X 3 times per week.  Notify your doctor if you have any of the following symptoms:   (Dizziness, Lightheadedness, Blurry vision, Headache, Chest pain, Shortness of breath.)  Please continue taking your HOME medications and follow-up with your PCP in 1 week from discharge to adjust medications as needed.      Diagnosis: Hyperlipemia  Assessment and Plan of Treatment: You have history of Hyperlipidemia. Please continue to take your HOME medication and maintain healthy lifestyle, low fat diet, exercise regularly and check your lipid levels routinely.   Please follow up with your PCP in 1 week from discharge.       PRINCIPAL DISCHARGE DIAGNOSIS  Diagnosis: Sepsis due to methicillin susceptible Staphylococcus aureus  Assessment and Plan of Treatment: You presented with sepsis due to MSSA and Streptococcus bacteremia secondary to left foot cellulitis/abscess, you underwent multiple debridement. Wound culture also showed that you have bacteria growing.   - MR left foot showed Plantar soft tissue wound with tract extending to the undersurface of the first tarsometatarsal articulation. Edema within the base of the first tarsometatarsal articulation may represent sequela of early osteomyelitis. First second and third metatarsal head subchondral fractures.  - Surgical pathology showed Tendon, left foot, excision: Fibro-tendinous tissue with ischemic and degenerative changes. Bone, left foot, biopsy: Bone fragment with resorptive changes and focal myelofibrosis. Negative for acute osteomyelitis  - Bcx from 12/20 methicillin susceptible S. aureus, Streptococcus mitis/oralis group, and Alpha hemolytic strep, ESR and CRP elevated    - Repeat Bcx from 12/22 final no growth  - Started on Cefazolin 2 g q8hers and Levaquin 750 mg daily (12/22) as per ID recommendations   - Echo showed aortic valve leaflets appear thickened, cannot exclude a vegetation. Mild aortic  regurgitation. Normal ventricular function, mild pulmonary hypertension  - ORLY no vegetations /negative for endocarditis   - Picc line Placement of a 41 cm 4 French single-lumen via the left brachial vein on 1/4/2022, for Cefazolin 12/22/21-until 1/23/22  Please follow up with Dr Buchanan and Podiatry Dr Hart in a week from   Please follow up at [address] [facility] Second Floor [address]. Please call [phone] to make an appointment. You can also follow up at [phone] [place] [address]. Call [phone] to make an appointment.      SECONDARY DISCHARGE DIAGNOSES  Diagnosis: Gram-positive bacteremia  Assessment and Plan of Treatment: You presented to hospital with worsening infection in your left foot. You have prior known infection to the left foot and has been treated for infection of the bone with prolonged IV antibiotic via PICC line.   You were diagnosed with Gram positive bacteremia Staphylococcus and Sterptococcus which means presence on bacteria in your blood. You had an ultrasound of heart which showed normal pumping function of the heart and no vegetations meaning negative for infection of your heart valves. You had MRI of lumbosacral spine which was negative for any spine infection. You were treated with IV antibiotics CEFAZOLIN 2 GRAMS EVERY 8 HOURS FOR A TOTAL OF 4 WEEKS TO BE COMPLETED ON 1/23/22.  Please follow up with Dr Buchanan and Podiatry Dr Hart in a week from   Please follow up at [address] [place] [address] [address]. Please call [phone] to make an appointment. You can also follow up at [phone] [place] [address]. Call [phone] to make an appointment.    Diagnosis: Acute osteomyelitis  Assessment and Plan of Treatment: You have a left foot bone infection. MRI of left foot showed early sequelae of osteomyeltis (bone infection). You were managed with IV antibitocs to complete 4 weeks. Podiatry was consulted who did Incision and drainage on your left foot on 12/23/21. Surgery pathology showed Tendon, left foot, excision: Fibro-tendinous tissue with ischemic and degenerative changes. Bone, left foot, biopsy: Bone fragment with resorptive changes and focal myelofibrosis. Negative for acute osteomyelitis.   You are being discharged on CEFAZOLIN 2 GRAMS EVERY 8 HOURS FOR A TOTAL OF 4 WEEKS TO BE COMPLETED ON 1/23/22.   DRESSING CHANGE INSTRUCTIONS AS FOLLOWS- wound vac to be change every 2 days including black foam with tegaderm, 4x4s, abd pad, alyse and ACE. NO weight bearing to Left Foot. Pt to keep dressing clean, dry and intact.    Physical therapy evaluated you. They recommended Rehab.  Please follow with your podiatrist, Dr. Hart for continuity of care.  Please follow up at [address] [place] [address] [address]. Please call [phone] to make an appointment. You can also follow up at [address] [place] [address]. Call [phone] to make an appointment.   Wound vac dressign was changed by podiatry day for discharge.   dressing recs from podiatry:  - Pt to WB to heel on left foot with surgical shoe    -C/w leg elevation   -WCO: Wound vac changed every other day using black foam with Tegaderm 4x4s, abd, alyse and ACE on the left foot surgical site       Diagnosis: Diabetes mellitus  Assessment and Plan of Treatment: You have diabetes. You hemaglobin A1c value was 6.3%. You are taking Metformin and Glimepiride at home. You need to continue monitoring your blood sugar levels closely and maintain healthy lifestyle by eating healthy diabetic regimen, weight loss and exercise regularly as tolerated.  Please continue to take your HOME medications and follow up with your PCP/Endocrinologist within a week of discharge.      Diagnosis: Hypertension  Assessment and Plan of Treatment: You have high blood pressure. On this admission, your Blood Pressure was adequately controlled with LISINOPRIL. Your blood pressure target is 120/80, maintain healthy lifestyle, low salt diet, avoid fatty food, weight loss, stay active as tolerated 30 mins X 3 times per week.  Notify your doctor if you have any of the following symptoms:   (Dizziness, Lightheadedness, Blurry vision, Headache, Chest pain, Shortness of breath.)  Please continue taking your HOME medications and follow-up with your PCP in 1 week from discharge to adjust medications as needed.      Diagnosis: Acute kidney injury superimposed on CKD  Assessment and Plan of Treatment: You presented to the hospital with a Creantine level of 1.8. Your creatnine level improved to 1.2. You poor kindey function was most likely due to active infection and dehydration. Please follow up with your PCP in a week from discharge.      Diagnosis: Atrial fibrillation  Assessment and Plan of Treatment: You have a history of Atrial fibirillation not on any BETA BLOCKER OR ANTICOAGULATION at home. You were seen by Cardiologist who recommended full dose anticoagulation with ELIQUIS 5 MG TWICE A DAY. Your insurance covers this prescription. Please follow up with your PCP and Cardiologist in a week from discharge to adjust medications as needed.      Diagnosis: Hyperlipemia  Assessment and Plan of Treatment: You have history of Hyperlipidemia. Please continue to take your HOME medication and maintain healthy lifestyle, low fat diet, exercise regularly and check your lipid levels routinely.   Please follow up with your PCP in 1 week from discharge.      Diagnosis: Encephalopathy acute  Assessment and Plan of Treatment: You presented with altered mental status. It was most likely due to the infection in your left foot. This problem resolved during the course of your admission while being managed with IV antibiotics.    Diagnosis: Anemia  Assessment and Plan of Treatment: You have anemia of blood loss secondary to open left foot wound status post Incision & Drainage on 12/23/21. Your hemoglobin dropped from 9 to <7. You were transfused with 2 units of Packed Red Blood cells to help improve your hemoglobin.  Please follow up with your PCP in a week from discharge.      Diagnosis: Cellulitis of left foot  Assessment and Plan of Treatment: You presented to hospital with worsening infection of your left foot. You have prior known infection to the left foot and has been treated for infection of the bone with prolonged IV antibiotic via PICC line.   For this an X-Ray was done of your foot which showed no visible tracking air. MRI of the left foot was done which depicted sequela of early osteomyelitis (bone infection). Infectious disease Dr. Ale Maxwell was consulted. Your Blood cultures were positive for Staphylococcus Aureus and Streptococcus. You also has chronic low back pain As per ID recommendations, MRI of the spine was done which revealed Degenerative changes. There was no infection in your back.   Since your Blood cultures grew Staphylococcus and Streptococcus, you were managed with IV antibiotics. Podiatry was consulted who took you for an incision and drainage of the infection 12/23/21. Bone sample showed tendon, left foot, excision: Fibro-tendinous tissue with ischemic and degenerative changes. Bone, left foot, biopsy: Bone fragment with resorptive changes and focal myelofibrosis. Negative for acute osteomyelitis.   You also had a Transesophageal Echo (ORLY) which did not show any evidence of infection in your heart valves (No Endocarditis).  Infectious disease recommended completing antibiotics for 4 WEEKS WITH CEFAZOLIN 2 GRAMS EVERY 8 HOURS TO BE COMPLETED ON 1/23/22 AND LEVAQUIN 750 MG DAILY TILL 1/24/22. Please follow up with Podiatry Dr. Hart to continue management.  YOU WILL NEED DELAYED CLOSURE OF WOUND IN FEW WEEKS AS PER PODIATRY.   WOUND CARE ORDER AS FOLLOWS:  Have wound vac changed every 2 days including black foam with Tegaderm 4x4s, abd, Alyse and ACE. Pt to be nonwb to Left foot. Pt to keep dressing clean, dry and intact.   Please follow up at [address] [facility] [address]. Please call [phone] to make an appointment. You can also follow up at [phone] [place] [address]. Call [phone] to make an appointment.

## 2021-12-22 NOTE — DISCHARGE NOTE PROVIDER - PROVIDER TOKENS
PROVIDER:[TOKEN:[[id number]],FOLLOWUP:[1 week]] PROVIDER:[TOKEN:[[id number]],FOLLOWUP:[1 week]],PROVIDER:[TOKEN:[[id number]],FOLLOWUP:[1 week]] PROVIDER:[TOKEN:[[id number]],FOLLOWUP:[1 week]],PROVIDER:[TOKEN:[[id number]],FOLLOWUP:[1 week]],PROVIDER:[TOKEN:[[id number]],FOLLOWUP:[1 week]]

## 2021-12-22 NOTE — PROGRESS NOTE ADULT - PROBLEM SELECTOR PLAN 4
- Patient noted to have low hgb 8-9  - Iron panel compatible with KISHOR  - Started on Ferrous sulfate and vitamin C  - Bowel regimen   - Monitor for any signs of bleeding - Patient noted to have low hgb 8-9  - Iron panel compatible with KISHOR  - C/w Ferrous sulfate and vitamin C  - Bowel regimen   - Monitor for any signs of bleeding - Patient noted to have low hgb 8-9 on admission  - Iron panel compatible with KISHOR however anemia from blood loss as well  - C/w Ferrous sulfate and vitamin C  - Type and screen, transfuse if hgb <7  - Bowel regimen   - Monitor CBC daily   - Monitor for any signs of bleeding

## 2021-12-22 NOTE — PROGRESS NOTE ADULT - PROBLEM SELECTOR PLAN 6
- On admission, patient with Cr 1.8  - PATTY most likely pre renal in the setting of acute infection, poor oral intake,   - Monitor I/O's daily  - Avoid nephrotoxins, NSAIDS, ACEI and ARBS  - Monitor BMP daily - On admission, patient with Cr 1.8, downtrended 1.2  - PATTY most likely pre renal in the setting of acute infection, poor oral intake,   - Monitor I/O's daily  - Avoid nephrotoxins, NSAIDS, ACEI and ARBS  - Monitor BMP daily

## 2021-12-22 NOTE — BRIEF OPERATIVE NOTE - NSICDXBRIEFPOSTOP_GEN_ALL_CORE_FT
POST-OP DIAGNOSIS:  Abscess 22-Dec-2021 22:20:15  Diony Khan  Osteomyelitis 22-Dec-2021 22:21:44  Diony Khan

## 2021-12-22 NOTE — PROGRESS NOTE ADULT - ATTENDING COMMENTS
OR alanis for Incision and drainage with bone biopsy, pt is NPO and consented for procedure  risks and benefits of procedure discussed  no guarantees were given or implied

## 2021-12-22 NOTE — PROGRESS NOTE ADULT - SUBJECTIVE AND OBJECTIVE BOX
Patient is a 76y old  Male who presents with a chief complaint of AMS (20 Dec 2021 14:19)    Podiatry Interval HPI: Patient seen bedside in no acute distress. Pt denies any pain to the left foot. Pt is amendable for Left foot wound debridement this evening around 6pm. Pt has been consented, NPO since midnight 12/21 and covid negative 12/21. No other pedal concerns at this time.    Podiatry HPI: 75 y/o male with a PMHx of DM, HTN, A Fib, Sciatica, Cataract, OA of Right Hip presented to the ED for altered mental status. Podiatry was consulted for Left Foot Cellulitis/Wound. Patient states that his podiatrist was debriding a callus on the Left Foot and the podiatrist went too deep which caused the initial wound about 3 weeks ago and since then it has been getting worse. Denies any pain to the Left Foot. States that he cannot feel any painful stimuli to his left foot due to diabetic neuropathy. States that he would like to get back on his feet and exercise because that is how he is able to maintain his sugar levels and patient states that he has always had a left foot flat arch. Denies any trauma to the area. Patient states that yesterday he was having chills but today he denies any constitutional symptoms of N/V/C/F/SOB. Denies any other pedal complaints at this moment.       HPI:  Patient is a 76 year-old male from home with past medical history of atrial fibrillation (was on coumadin until 1month ago), hypertension, hyperlipidemia, diabetes mellitus presents to ED for AMS. Pt states that today he started feeling freezing cold and was shivering. Talked to wife for more information and she stated that patient was feeling very cold though house was warm. He stopped shivering for a while and the started again. During his second episode of shivering from feeling extremely cold he got very confused and could not talk, understand what she was saying and was not making sense. After a while the shivering stopped and he regained his normal mental status. Denies fever, chest pain. abdominal pain, diarrhea, constipation. Pt states he has incontinence and an ulcer on his left foot. He states his podiatrist scraped a callus which caused the ulcer. He has diabetic neuropathy so does not feel pain around the ulcer. He did notice the left foot got more erythematous today.   He stopped taking warfarin 1 month ago as he states it was too much of an hassle to get INR checked and avoid eating certain foods.   (19 Dec 2021 22:54)      Medications acetaminophen     Tablet .. 650 milliGRAM(s) Oral every 6 hours PRN  ampicillin/sulbactam  IVPB      ampicillin/sulbactam  IVPB 3 Gram(s) IV Intermittent every 6 hours  ascorbic acid 500 milliGRAM(s) Oral daily  atorvastatin 40 milliGRAM(s) Oral at bedtime  buPROPion XL (24-Hour) . 150 milliGRAM(s) Oral daily  chlorhexidine 2% Cloths 1 Application(s) Topical <User Schedule>  ferrous    sulfate 325 milliGRAM(s) Oral two times a day  gabapentin 800 milliGRAM(s) Oral three times a day  insulin lispro (ADMELOG) corrective regimen sliding scale   SubCutaneous three times a day before meals  insulin lispro (ADMELOG) corrective regimen sliding scale   SubCutaneous at bedtime  lidocaine 1% Injectable 10 milliLiter(s) Local Injection once  lisinopril 2.5 milliGRAM(s) Oral daily  oxycodone    5 mG/acetaminophen 325 mG 1 Tablet(s) Oral every 6 hours PRN  polyethylene glycol 3350 17 Gram(s) Oral daily  senna 2 Tablet(s) Oral at bedtime  sodium chloride 0.9%. 1000 milliLiter(s) IV Continuous <Continuous>    FHFamily history of coronary artery disease (Sibling)    Family history of breast cancer in sister (Sibling)    ,   PMHDiabetes    Hypertension    Osteoarthritis of right hip    Cataract    Atrial fibrillation    Vertigo    Sciatica       PSHS/P total hip arthroplasty        Labs                          7.9    4.31  )-----------( 109      ( 22 Dec 2021 07:55 )             24.8      12-22    142  |  109<H>  |  22<H>  ----------------------------<  145<H>  4.3   |  24  |  1.22    Ca    9.2      22 Dec 2021 07:55  Phos  2.7     12-22  Mg     2.1     12-22    TPro  6.8  /  Alb  2.7<L>  /  TBili  0.4  /  DBili  x   /  AST  15  /  ALT  11  /  AlkPhos  60  12-21     Vital Signs Last 24 Hrs  T(C): 36.7 (22 Dec 2021 05:21), Max: 38.1 (21 Dec 2021 20:20)  T(F): 98 (22 Dec 2021 05:21), Max: 100.6 (21 Dec 2021 20:20)  HR: 73 (22 Dec 2021 05:21) (73 - 83)  BP: 134/75 (22 Dec 2021 05:21) (134/75 - 157/80)  BP(mean): --  RR: 17 (22 Dec 2021 05:21) (17 - 18)  SpO2: 100% (22 Dec 2021 05:21) (98% - 100%)  Sedimentation Rate, Erythrocyte: 86 mm/Hr (12-20-21 @ 06:15)  Sedimentation Rate, Erythrocyte: 95 mm/Hr (12-19-21 @ 19:15)         C-Reactive Protein, Serum: 107 mg/L (12-20-21 @ 09:36)  C-Reactive Protein, Serum: 94 mg/L (12-20-21 @ 04:01)   WBC Count: 4.31 K/uL (12-22-21 @ 07:55)    PHYSICAL EXAM  LE Focused:    Vasc:  DP/PT pulses were palpable, bilaterally. Generalized edema noted to the Left Foot  Derm: Left Plantar medial arch wound noted measuring 3cm x 3cm x 3cm with 5cm tunnelling noted to the surrounding area with +PTB. Redness and Warmth noted to the left foot with no malodor, no purulent drainage noted upon expression today. Streaking up to the left ankle and plantar distal/lateral arch of the left foot. Edema was noted to have decreased from yesterday to the left foot.  12/20/21: Left Plantar medial arch wound noted measuring 3cm x 3cm x 3cm with 5cm tunnelling noted to the surrounding area with +ptb and mild fluctuance noted to the surrounding wound. Left Foot Wound has Redness, increased warmth noted, mild malodor, purulent drainage, streaking up to the left ankle and plantar distal/lateral arch.   Post Bedside Incision and Drainage (12/20/21)  Neuro:  Protective sensation absent, bilaterally.   MSK: No pain on palpation to the Left Foot Wound. Denies any calf pain, bilaterally.     Imaging:     EXAM:  MR FOOT LT                          PROCEDURE DATE:  12/20/2021      INTERPRETATION:  LEFT FOOT MRI    CLINICAL INFORMATION: Left foot wound. Eval for osteomyelitis.  TECHNIQUE: Multiplanar, multisequence MRI was obtained of the left foot.    COMPARISON: Left foot MRI 12/23/2015.    FINDINGS:    Plantar soft tissue wound is present at the level of the first   tarsometatarsal articulation with tract extending to the bone. There is   edema along the plantar aspect of the distal medial cuneiform and base of   the first metatarsal with preservation of the T1 marrow signal. There is   moderate tarsometatarsal arthrosis and arthrosis of the articulation of   the navicular and lateral cuneiform. There are subchondral fractures of   the first second and third metatarsal heads. There is diffuse increased   muscle signal, most consistent with denervation. There is diffuse   subcutaneous edema.    IMPRESSION:    Plantar soft tissue wound with tract extending to the undersurface of the   first tarsometatarsal articulation.  Edema within the base of the first tarsometatarsal articulation adjacent   to the tract with preservation of the T1 marrow signal, which may   represent sequela of early osteomyelitis.  First second and third metatarsal head subchondral fractures.    Cultures:   Culture pending

## 2021-12-23 LAB
-  AMIKACIN: SIGNIFICANT CHANGE UP
-  AMOXICILLIN/CLAVULANIC ACID: SIGNIFICANT CHANGE UP
-  AMPICILLIN/SULBACTAM: SIGNIFICANT CHANGE UP
-  AMPICILLIN/SULBACTAM: SIGNIFICANT CHANGE UP
-  AMPICILLIN: SIGNIFICANT CHANGE UP
-  AZTREONAM: SIGNIFICANT CHANGE UP
-  CEFAZOLIN: SIGNIFICANT CHANGE UP
-  CEFAZOLIN: SIGNIFICANT CHANGE UP
-  CEFEPIME: SIGNIFICANT CHANGE UP
-  CEFOXITIN: SIGNIFICANT CHANGE UP
-  CEFTRIAXONE: SIGNIFICANT CHANGE UP
-  CIPROFLOXACIN: SIGNIFICANT CHANGE UP
-  CLINDAMYCIN: SIGNIFICANT CHANGE UP
-  ERTAPENEM: SIGNIFICANT CHANGE UP
-  ERYTHROMYCIN: SIGNIFICANT CHANGE UP
-  GENTAMICIN: SIGNIFICANT CHANGE UP
-  GENTAMICIN: SIGNIFICANT CHANGE UP
-  IMIPENEM: SIGNIFICANT CHANGE UP
-  LEVOFLOXACIN: SIGNIFICANT CHANGE UP
-  MEROPENEM: SIGNIFICANT CHANGE UP
-  OXACILLIN: SIGNIFICANT CHANGE UP
-  PIPERACILLIN/TAZOBACTAM: SIGNIFICANT CHANGE UP
-  RIFAMPIN: SIGNIFICANT CHANGE UP
-  TETRACYCLINE: SIGNIFICANT CHANGE UP
-  TOBRAMYCIN: SIGNIFICANT CHANGE UP
-  TRIMETHOPRIM/SULFAMETHOXAZOLE: SIGNIFICANT CHANGE UP
-  TRIMETHOPRIM/SULFAMETHOXAZOLE: SIGNIFICANT CHANGE UP
-  VANCOMYCIN: SIGNIFICANT CHANGE UP
BLD GP AB SCN SERPL QL: SIGNIFICANT CHANGE UP
CULTURE RESULTS: SIGNIFICANT CHANGE UP
GLUCOSE BLDC GLUCOMTR-MCNC: 146 MG/DL — HIGH (ref 70–99)
GLUCOSE BLDC GLUCOMTR-MCNC: 162 MG/DL — HIGH (ref 70–99)
GLUCOSE BLDC GLUCOMTR-MCNC: 181 MG/DL — HIGH (ref 70–99)
GLUCOSE BLDC GLUCOMTR-MCNC: 211 MG/DL — HIGH (ref 70–99)
GLUCOSE BLDC GLUCOMTR-MCNC: 245 MG/DL — HIGH (ref 70–99)
GRAM STN FLD: SIGNIFICANT CHANGE UP
HCT VFR BLD CALC: 21.2 % — LOW (ref 39–50)
HCT VFR BLD CALC: 23 % — LOW (ref 39–50)
HGB BLD-MCNC: 6.7 G/DL — CRITICAL LOW (ref 13–17)
HGB BLD-MCNC: 7.5 G/DL — LOW (ref 13–17)
MCHC RBC-ENTMCNC: 28.2 PG — SIGNIFICANT CHANGE UP (ref 27–34)
MCHC RBC-ENTMCNC: 28.3 PG — SIGNIFICANT CHANGE UP (ref 27–34)
MCHC RBC-ENTMCNC: 31.6 GM/DL — LOW (ref 32–36)
MCHC RBC-ENTMCNC: 32.6 GM/DL — SIGNIFICANT CHANGE UP (ref 32–36)
MCV RBC AUTO: 86.8 FL — SIGNIFICANT CHANGE UP (ref 80–100)
MCV RBC AUTO: 89.1 FL — SIGNIFICANT CHANGE UP (ref 80–100)
METHOD TYPE: SIGNIFICANT CHANGE UP
METHOD TYPE: SIGNIFICANT CHANGE UP
NRBC # BLD: 0 /100 WBCS — SIGNIFICANT CHANGE UP (ref 0–0)
NRBC # BLD: 0 /100 WBCS — SIGNIFICANT CHANGE UP (ref 0–0)
ORGANISM # SPEC MICROSCOPIC CNT: SIGNIFICANT CHANGE UP
PLATELET # BLD AUTO: 124 K/UL — LOW (ref 150–400)
PLATELET # BLD AUTO: 126 K/UL — LOW (ref 150–400)
RBC # BLD: 2.38 M/UL — LOW (ref 4.2–5.8)
RBC # BLD: 2.65 M/UL — LOW (ref 4.2–5.8)
RBC # FLD: 12.7 % — SIGNIFICANT CHANGE UP (ref 10.3–14.5)
RBC # FLD: 12.8 % — SIGNIFICANT CHANGE UP (ref 10.3–14.5)
SPECIMEN SOURCE: SIGNIFICANT CHANGE UP
WBC # BLD: 4.73 K/UL — SIGNIFICANT CHANGE UP (ref 3.8–10.5)
WBC # BLD: 4.78 K/UL — SIGNIFICANT CHANGE UP (ref 3.8–10.5)
WBC # FLD AUTO: 4.73 K/UL — SIGNIFICANT CHANGE UP (ref 3.8–10.5)
WBC # FLD AUTO: 4.78 K/UL — SIGNIFICANT CHANGE UP (ref 3.8–10.5)

## 2021-12-23 PROCEDURE — 99233 SBSQ HOSP IP/OBS HIGH 50: CPT

## 2021-12-23 PROCEDURE — 99233 SBSQ HOSP IP/OBS HIGH 50: CPT | Mod: GC

## 2021-12-23 PROCEDURE — 73620 X-RAY EXAM OF FOOT: CPT | Mod: 26,LT

## 2021-12-23 RX ORDER — ENOXAPARIN SODIUM 100 MG/ML
80 INJECTION SUBCUTANEOUS
Refills: 0 | Status: DISCONTINUED | OUTPATIENT
Start: 2021-12-23 | End: 2021-12-23

## 2021-12-23 RX ORDER — LIDOCAINE HYDROCHLORIDE AND EPINEPHRINE 10; 10 MG/ML; UG/ML
1 INJECTION, SOLUTION INFILTRATION; PERINEURAL ONCE
Refills: 0 | Status: DISCONTINUED | OUTPATIENT
Start: 2021-12-23 | End: 2021-12-23

## 2021-12-23 RX ORDER — LANOLIN ALCOHOL/MO/W.PET/CERES
3 CREAM (GRAM) TOPICAL ONCE
Refills: 0 | Status: COMPLETED | OUTPATIENT
Start: 2021-12-23 | End: 2021-12-23

## 2021-12-23 RX ORDER — ASPIRIN/CALCIUM CARB/MAGNESIUM 324 MG
81 TABLET ORAL DAILY
Refills: 0 | Status: DISCONTINUED | OUTPATIENT
Start: 2021-12-23 | End: 2021-12-23

## 2021-12-23 RX ADMIN — OXYCODONE AND ACETAMINOPHEN 1 TABLET(S): 5; 325 TABLET ORAL at 12:23

## 2021-12-23 RX ADMIN — Medication 325 MILLIGRAM(S): at 18:02

## 2021-12-23 RX ADMIN — BUPROPION HYDROCHLORIDE 150 MILLIGRAM(S): 150 TABLET, EXTENDED RELEASE ORAL at 11:53

## 2021-12-23 RX ADMIN — CHLORHEXIDINE GLUCONATE 1 APPLICATION(S): 213 SOLUTION TOPICAL at 06:13

## 2021-12-23 RX ADMIN — GABAPENTIN 800 MILLIGRAM(S): 400 CAPSULE ORAL at 06:13

## 2021-12-23 RX ADMIN — ATORVASTATIN CALCIUM 40 MILLIGRAM(S): 80 TABLET, FILM COATED ORAL at 21:55

## 2021-12-23 RX ADMIN — GABAPENTIN 800 MILLIGRAM(S): 400 CAPSULE ORAL at 21:54

## 2021-12-23 RX ADMIN — Medication 500 MILLIGRAM(S): at 11:50

## 2021-12-23 RX ADMIN — GABAPENTIN 800 MILLIGRAM(S): 400 CAPSULE ORAL at 15:07

## 2021-12-23 RX ADMIN — SODIUM CHLORIDE 75 MILLILITER(S): 9 INJECTION, SOLUTION INTRAVENOUS at 00:52

## 2021-12-23 RX ADMIN — Medication 2: at 07:57

## 2021-12-23 RX ADMIN — SENNA PLUS 2 TABLET(S): 8.6 TABLET ORAL at 21:58

## 2021-12-23 RX ADMIN — LISINOPRIL 2.5 MILLIGRAM(S): 2.5 TABLET ORAL at 06:13

## 2021-12-23 RX ADMIN — Medication 3 MILLIGRAM(S): at 21:54

## 2021-12-23 RX ADMIN — Medication 2: at 11:49

## 2021-12-23 RX ADMIN — AMPICILLIN SODIUM AND SULBACTAM SODIUM 200 GRAM(S): 250; 125 INJECTION, POWDER, FOR SUSPENSION INTRAMUSCULAR; INTRAVENOUS at 07:56

## 2021-12-23 RX ADMIN — AMPICILLIN SODIUM AND SULBACTAM SODIUM 200 GRAM(S): 250; 125 INJECTION, POWDER, FOR SUSPENSION INTRAMUSCULAR; INTRAVENOUS at 15:07

## 2021-12-23 RX ADMIN — OXYCODONE AND ACETAMINOPHEN 1 TABLET(S): 5; 325 TABLET ORAL at 11:53

## 2021-12-23 RX ADMIN — AMPICILLIN SODIUM AND SULBACTAM SODIUM 200 GRAM(S): 250; 125 INJECTION, POWDER, FOR SUSPENSION INTRAMUSCULAR; INTRAVENOUS at 02:08

## 2021-12-23 RX ADMIN — Medication 325 MILLIGRAM(S): at 06:13

## 2021-12-23 RX ADMIN — AMPICILLIN SODIUM AND SULBACTAM SODIUM 200 GRAM(S): 250; 125 INJECTION, POWDER, FOR SUSPENSION INTRAMUSCULAR; INTRAVENOUS at 21:54

## 2021-12-23 NOTE — PROGRESS NOTE ADULT - SUBJECTIVE AND OBJECTIVE BOX
Patient is a 76y old  Male who presents with a chief complaint of AMS (20 Dec 2021 14:19)    Podiatry Interval HPI: Patient seen bedside in no acute distress. Pt is s/p Left Foot I&D and bone biopsy. Pt denies any pain to the left foot. Patient states that he is aware that it is important to not put weight to the left foot and states that he only puts weight on the left foot when he is peeing. Denies any constitutional symptoms of N/V/C/F/SOB. Denies any other pedal complaints.    Podiatry HPI: 75 y/o male with a PMHx of DM, HTN, A Fib, Sciatica, Cataract, OA of Right Hip presented to the ED for altered mental status. Podiatry was consulted for Left Foot Cellulitis/Wound. Patient states that his podiatrist was debriding a callus on the Left Foot and the podiatrist went too deep which caused the initial wound about 3 weeks ago and since then it has been getting worse. Denies any pain to the Left Foot. States that he cannot feel any painful stimuli to his left foot due to diabetic neuropathy. States that he would like to get back on his feet and exercise because that is how he is able to maintain his sugar levels and patient states that he has always had a left foot flat arch. Denies any trauma to the area. Patient states that yesterday he was having chills but today he denies any constitutional symptoms of N/V/C/F/SOB. Denies any other pedal complaints at this moment.     HPI:  Patient is a 76 year-old male from home with past medical history of atrial fibrillation (was on coumadin until 1month ago), hypertension, hyperlipidemia, diabetes mellitus presents to ED for AMS. Pt states that today he started feeling freezing cold and was shivering. Talked to wife for more information and she stated that patient was feeling very cold though house was warm. He stopped shivering for a while and the started again. During his second episode of shivering from feeling extremely cold he got very confused and could not talk, understand what she was saying and was not making sense. After a while the shivering stopped and he regained his normal mental status. Denies fever, chest pain. abdominal pain, diarrhea, constipation. Pt states he has incontinence and an ulcer on his left foot. He states his podiatrist scraped a callus which caused the ulcer. He has diabetic neuropathy so does not feel pain around the ulcer. He did notice the left foot got more erythematous today.   He stopped taking warfarin 1 month ago as he states it was too much of an hassle to get INR checked and avoid eating certain foods.   (19 Dec 2021 22:54)      Medications acetaminophen     Tablet .. 650 milliGRAM(s) Oral every 6 hours PRN  ampicillin/sulbactam  IVPB      ampicillin/sulbactam  IVPB 3 Gram(s) IV Intermittent every 6 hours  ascorbic acid 500 milliGRAM(s) Oral daily  atorvastatin 40 milliGRAM(s) Oral at bedtime  buPROPion XL (24-Hour) . 150 milliGRAM(s) Oral daily  chlorhexidine 2% Cloths 1 Application(s) Topical <User Schedule>  dextrose 5% + sodium chloride 0.45%. 1000 milliLiter(s) IV Continuous <Continuous>  ferrous    sulfate 325 milliGRAM(s) Oral two times a day  gabapentin 800 milliGRAM(s) Oral three times a day  insulin lispro (ADMELOG) corrective regimen sliding scale   SubCutaneous three times a day before meals  insulin lispro (ADMELOG) corrective regimen sliding scale   SubCutaneous at bedtime  lidocaine 1% Injectable 10 milliLiter(s) Local Injection once  lisinopril 2.5 milliGRAM(s) Oral daily  oxycodone    5 mG/acetaminophen 325 mG 1 Tablet(s) Oral every 6 hours PRN  polyethylene glycol 3350 17 Gram(s) Oral daily  senna 2 Tablet(s) Oral at bedtime    FHFamily history of coronary artery disease (Sibling)    Family history of breast cancer in sister (Sibling)    ,   PMHDiabetes    Hypertension    Osteoarthritis of right hip    Cataract    Atrial fibrillation    Vertigo    Sciatica       PSHS/P total hip arthroplasty        Labs                          7.3    3.42  )-----------( 118      ( 22 Dec 2021 23:39 )             22.7      12-22    142  |  109<H>  |  22<H>  ----------------------------<  145<H>  4.3   |  24  |  1.22    Ca    9.2      22 Dec 2021 07:55  Phos  2.7     12-22  Mg     2.1     12-22       Vital Signs Last 24 Hrs  T(C): 36.6 (23 Dec 2021 05:21), Max: 36.9 (22 Dec 2021 22:44)  T(F): 97.9 (23 Dec 2021 05:21), Max: 98.4 (22 Dec 2021 22:44)  HR: 76 (23 Dec 2021 05:21) (65 - 80)  BP: 125/77 (23 Dec 2021 05:21) (125/77 - 162/82)  BP(mean): 88 (22 Dec 2021 23:49) (82 - 95)  RR: 16 (23 Dec 2021 05:21) (13 - 18)  SpO2: 99% (23 Dec 2021 05:21) (95% - 100%)  Sedimentation Rate, Erythrocyte: 86 mm/Hr (12-20-21 @ 06:15)  Sedimentation Rate, Erythrocyte: 95 mm/Hr (12-19-21 @ 19:15)         C-Reactive Protein, Serum: 107 mg/L (12-20-21 @ 09:36)  C-Reactive Protein, Serum: 94 mg/L (12-20-21 @ 04:01)  WBC Count: 3.42 K/uL *L* (12-22-21 @ 23:39)      PHYSICAL EXAM  LE Focused:    Vasc:  DP/PT pulses were palpable, bilaterally. Generalized edema noted to the Left Foot  Derm: Left Plantar medial arch incision site shows decreased redness and erythema to the area as well as the left foot in general. No malodor, no purulent drainage, +PTB was noted. Edema was noted to be slightly less to the left foot.     12/22/21: Left Plantar medial arch wound noted measuring 3cm x 3cm x 3cm with 5cm tunnelling noted to the surrounding area with +PTB. Redness and Warmth noted to the left foot with no malodor, no purulent drainage noted upon expression today. Streaking up to the left ankle and plantar distal/lateral arch of the left foot. Edema was noted to have decreased from yesterday to the left foot.  12/20/21: Left Plantar medial arch wound noted measuring 3cm x 3cm x 3cm with 5cm tunnelling noted to the surrounding area with +ptb and mild fluctuance noted to the surrounding wound. Left Foot Wound has Redness, increased warmth noted, mild malodor, purulent drainage, streaking up to the left ankle and plantar distal/lateral arch.   Post Bedside Incision and Drainage (12/20/21)  Neuro:  Protective sensation absent, bilaterally.   MSK: No pain on palpation to the Left Foot Wound. Denies any calf pain, bilaterally.     Imaging:     EXAM:  MR FOOT LT                          PROCEDURE DATE:  12/20/2021      INTERPRETATION:  LEFT FOOT MRI    CLINICAL INFORMATION: Left foot wound. Eval for osteomyelitis.  TECHNIQUE: Multiplanar, multisequence MRI was obtained of the left foot.    COMPARISON: Left foot MRI 12/23/2015.    FINDINGS:    Plantar soft tissue wound is present at the level of the first   tarsometatarsal articulation with tract extending to the bone. There is   edema along the plantar aspect of the distal medial cuneiform and base of   the first metatarsal with preservation of the T1 marrow signal. There is   moderate tarsometatarsal arthrosis and arthrosis of the articulation of   the navicular and lateral cuneiform. There are subchondral fractures of   the first second and third metatarsal heads. There is diffuse increased   muscle signal, most consistent with denervation. There is diffuse   subcutaneous edema.    IMPRESSION:    Plantar soft tissue wound with tract extending to the undersurface of the   first tarsometatarsal articulation.  Edema within the base of the first tarsometatarsal articulation adjacent   to the tract with preservation of the T1 marrow signal, which may   represent sequela of early osteomyelitis.  First second and third metatarsal head subchondral fractures.    Cultures:   Culture pending

## 2021-12-23 NOTE — CHART NOTE - NSCHARTNOTEFT_GEN_A_CORE
Podiatry paged for active Left foot bleed. Pt is s/p I&D (12/22). Attempted the following in order to seize bleeding- applied compression, bleeders were tied off with absorbable suture, hand held cautery was used, 6 cc of Lidocaine with epi was applied, followed by Surgicel and compressive dressing was applied. Bleeding was identified to have stopped. Recommend leg elevation for pt and STRICT NON WB to LEFT FOOT. Pt showed verbal understanding.

## 2021-12-23 NOTE — PROGRESS NOTE ADULT - ASSESSMENT
Pt is a 76 year old male from home with PMHx of atrial fibrillation, hypertension, hyperlipidemia, DM admitted for sepsis with acute encephalopathy secondary to diabetic foot ulcer. Pt received one dose of levaquine in ED and switched to vancomycin (d/c'ed 12/21) and amp/sulbactam. Xray of left foot with multiple metatarsal Fx but without radiographic evidence of osteomyelitis. However, MRI of left foot with early osteomyelitis of the 1st tarsometatarsal site, multiple metatarsal Fx and diffuse SQ edema. BCxs from 12/20 growing S. aureus (no MRSA detected), Streptococcus mitis/oralis group, and Alpha hemolytic strep. Pt underwent bedside debridement of L foot on 12/20 by Podiatry. Surgical swab of left foot wound with Alpha hemolytic strep, S. aureus, Citrobacter koseri, Veillonella parvula. Pt underwent OR debridement of left foot. Bone Cx (sent from OR) with no growth to date. In view of S. aureus and strep in the BC, wd recommend echocardiogram to evaluate heart valves. TTE cannot exclude the presence of a vegetation. Pt will need at least 4wks of IV Ab from the time of today's debridement.     #1 left foot cellulitis secondary to diabetic foot ulcer with osteomyelitis.   -- continue amp/sulbactam 3g q6hr for now   -- f/u blood Cx from 12/22 to check for clearing    -- f/u OR specimens for Cx/path  -- ORLY     #2 acute encephalopathy - resolved     #3 chronic back pain -- MRI of back showing degenerative changes and no osteo.     #4 anemia - pt lost 50cc of blood during the surgery last night per podiatry.   -- f/u H/H     Pt is a 76 year old male from home with PMHx of atrial fibrillation, hypertension, hyperlipidemia, DM admitted for sepsis with acute encephalopathy secondary to diabetic foot ulcer. Pt received one dose of levaquine in ED and switched to vancomycin (d/c'ed 12/21) and amp/sulbactam. Xray of left foot with multiple metatarsal Fx but without radiographic evidence of osteomyelitis. However, MRI of left foot with early osteomyelitis of the 1st tarsometatarsal site, multiple metatarsal Fx and diffuse SQ edema. BCxs from 12/20 growing S. aureus (no MRSA detected), Streptococcus mitis/oralis group, and Alpha hemolytic strep. Pt underwent bedside debridement of L foot on 12/20 by Podiatry. Surgical swab of left foot wound with Alpha hemolytic strep, S. aureus, Citrobacter koseri, Veillonella parvula. Pt underwent OR debridement of left foot yesterday. Bone Cx (sent from OR) with no growth to date. In view of S. aureus and strep in the BC, wd recommend echocardiogram to evaluate heart valves. TTE cannot exclude the presence of a vegetation. Pt will need at least 4wks of IV Ab from the time of yesterday's debridement.     #1 left foot cellulitis secondary to diabetic foot ulcer with osteomyelitis.   -- continue amp/sulbactam 3g q6hr for now   -- f/u blood Cx from 12/22 to check for clearing    -- f/u OR specimens for Cx/path  -- ORLY     #2 acute encephalopathy - resolved     #3 chronic back pain -- MRI of back showing degenerative changes and no osteo.     #4 anemia - pt lost 50cc of blood during the surgery last night per podiatry.   -- f/u H/H     Pt is a 76 year old male from home with PMHx of atrial fibrillation, hypertension, hyperlipidemia, DM admitted for sepsis with acute encephalopathy secondary to diabetic foot ulcer. Pt received one dose of levaquine in ED and switched to vancomycin (d/c'ed 12/21) and amp/sulbactam. Xray of left foot with multiple metatarsal Fx but without radiographic evidence of osteomyelitis. However, MRI of left foot with early osteomyelitis of the 1st tarsometatarsal site, multiple metatarsal Fx and diffuse SQ edema. BCxs from 12/20 growing S. aureus (no MRSA detected), Streptococcus mitis/oralis group, and Alpha hemolytic strep. Pt underwent bedside debridement of L foot on 12/20 by Podiatry. Surgical swab of left foot wound with Alpha hemolytic strep, S. aureus, Citrobacter koseri, Veillonella parvula. Pt underwent OR debridement of left foot yesterday. Bone Cx (sent from OR) with no growth to date. In view of S. aureus and strep in the BC, wd recommend echocardiogram to evaluate heart valves. TTE cannot exclude the presence of a vegetation. Pt will need at least 4wks of IV Ab from the time of yesterday's debridement.     #1 left foot cellulitis secondary to diabetic foot ulcer with osteomyelitis.   -- continue amp/sulbactam 3g q6hr for now   -- f/u blood Cx from 12/22 to check for clearing    -- f/u OR specimens for Cx/path  -- ORLY     #2 acute encephalopathy - resolved     #3 chronic back pain -- MRI of back showing degenerative changes and no osteo.     #4 anemia -   -- f/u H/H     Pt is a 76 year old male from home with PMHx of atrial fibrillation, hypertension, hyperlipidemia, DM admitted for sepsis with acute encephalopathy secondary to diabetic foot ulcer. Pt received one dose of levaquine in ED and switched to vancomycin (d/c'ed 12/21) and amp/sulbactam. Xray of left foot with multiple metatarsal Fx but without radiographic evidence of osteomyelitis. However, MRI of left foot with early osteomyelitis of the 1st tarsometatarsal site, multiple metatarsal Fx and diffuse SQ edema. BCxs from 12/20 growing methicillin susceptible S. aureus, Streptococcus mitis/oralis group, and Alpha hemolytic strep. Pt underwent bedside debridement of L foot on 12/20 by Podiatry. Surgical swab of left foot wound with Alpha hemolytic strep, S. aureus, Citrobacter koseri, Veillonella parvula. Pt underwent OR debridement of left foot yesterday. Bone Cx (sent from OR) with no growth to date. Repeat BC negative to date. Pt will need at least 4wks of IV Ab from the time of yesterday's debridement to Rx osteo and possible endocarditis (particularly n view of S. aureus in initial BCs).     #1 left foot cellulitis secondary to diabetic foot ulcer with osteomyelitis.   -- continue amp/sulbactam 3g q6hr for now   -- f/u final repeat blood Cx result from 12/22     -- f/u OR specimens Cx/path  -- ORLY     #2 acute encephalopathy - resolved     #3 chronic back pain -- MRI of back showing degenerative changes and no osteo.     #4 anemia - bleeding from OR site with dressing reapplied by Podiatry  -- f/u H/H    Above discussed with the medical team, including DR. Shetty.

## 2021-12-23 NOTE — PROGRESS NOTE ADULT - PROBLEM SELECTOR PLAN 1
- Patient with MSSA and Streptococcus bacteremia most likely secondary to left foot cellulitis/abscess  - MR left foot showed Plantar soft tissue wound with tract extending to the undersurface of the first tarsometatarsal articulation. Edema within the base of the first tarsometatarsal articulation may represent sequela of early osteomyelitis. First second and third metatarsal head subchondral fractures.  - Bcx MSSA, Streptococcus, ESR and CRP elevated    - Repeat Bcx pending from 12/22   - Off Vancomycin, c/w Unasyn for now   - MRI lumbosacral no acute findings   - F/u echo to r/o endocarditis  - ID Dr Maxwell  - Podiatry on board, s/p I&D (12/22)  - Monitor for any signs of hemodynamic instability - Patient with MSSA and Streptococcus bacteremia most likely secondary to left foot cellulitis/abscess  - MR left foot showed Plantar soft tissue wound with tract extending to the undersurface of the first tarsometatarsal articulation. Edema within the base of the first tarsometatarsal articulation may represent sequela of early osteomyelitis. First second and third metatarsal head subchondral fractures.  - Bcx MSSA, Streptococcus, ESR and CRP elevated    - Repeat Bcx pending from 12/22   - Off Vancomycin, c/w Unasyn for now   - MRI lumbosacral no acute findings   - Echo showed aortic valve leaflets appear thickened, cannot exclude a vegetation. Mild aortic  regurgitation. Normal ventricular function, mild pulmonary hypertension  - F/u ORLY  - Cardio Dr Liz consulted  - ID Dr Maxwell  - Podiatry on board, s/p I&D (12/22)  - Monitor for any signs of hemodynamic instability - Patient with MSSA and Streptococcus bacteremia most likely secondary to left foot cellulitis/abscess  - MR left foot showed Plantar soft tissue wound with tract extending to the undersurface of the first tarsometatarsal articulation. Edema within the base of the first tarsometatarsal articulation may represent sequela of early osteomyelitis. First second and third metatarsal head subchondral fractures.  - Bcx MSSA, Streptococcus, ESR and CRP elevated    - Repeat Bcx pending from 12/22   - Off Vancomycin, c/w Unasyn for now   - MRI lumbosacral no acute findings   - Echo showed aortic valve leaflets appear thickened, cannot exclude a vegetation. Mild aortic  regurgitation. Normal ventricular function, mild pulmonary hypertension  - F/u ORLY  - Cardio Dr Liz consulted  - ID Dr Maxwell on board   - Podiatry on board, s/p I&D (12/22)  - Monitor for any signs of hemodynamic instability - Patient with MSSA and Streptococcus bacteremia most likely secondary to left foot cellulitis/abscess  - MR left foot showed Plantar soft tissue wound with tract extending to the undersurface of the first tarsometatarsal articulation. Edema within the base of the first tarsometatarsal articulation may represent sequela of early osteomyelitis. First second and third metatarsal head subchondral fractures.  - Bcx MSSA, Streptococcus, ESR and CRP elevated    - Repeat Bcx pending from 12/22   - Off Vancomycin, c/w Unasyn for now   - MRI lumbosacral no acute findings   - Echo showed aortic valve leaflets appear thickened, cannot exclude a vegetation. Mild aortic  regurgitation. Normal ventricular function, mild pulmonary hypertension  - F/u ORLY  - Cardio Dr Liz consulted  - ID Dr Maxwell on board   - Podiatry on board, s/p I&D (12/23)  - Monitor for any signs of hemodynamic instability - RESOLVED  - Patient with MSSA and Streptococcus bacteremia most likely secondary to left foot cellulitis/abscess  - MR left foot showed Plantar soft tissue wound with tract extending to the undersurface of the first tarsometatarsal articulation. Edema within the base of the first tarsometatarsal articulation may represent sequela of early osteomyelitis. First second and third metatarsal head subchondral fractures.  - Bcx MSSA, Streptococcus, ESR and CRP elevated    - Repeat Bcx pending from 12/22   - Off Vancomycin, c/w Unasyn for now   - MRI lumbosacral no acute findings   - Echo showed aortic valve leaflets appear thickened, cannot exclude a vegetation. Mild aortic  regurgitation. Normal ventricular function, mild pulmonary hypertension  - F/u ORLY  - Cardio Dr Liz consulted  - ID Dr Maxwell on board   - Podiatry on board, s/p I&D (12/23)  - Monitor for any signs of hemodynamic instability

## 2021-12-23 NOTE — CONSULT NOTE ADULT - SUBJECTIVE AND OBJECTIVE BOX
CHIEF COMPLAINT:Patient is a 76y old  Male who presents with a chief complaint of AMS.      HPI:  Patient is a 76 year-old male from home with past medical history of atrial fibrillation (was on coumadin until 1month ago), hypertension, hyperlipidemia, diabetes mellitus presents to ED for AMS. Pt states that today he started feeling freezing cold and was shivering. Talked to wife for more information and she stated that patient was feeling very cold though house was warm. He stopped shivering for a while and the started again. During his second episode of shivering from feeling extremely cold he got very confused and could not talk, understand what she was saying and was not making sense. After a while the shivering stopped and he regained his normal mental status. Denies fever, chest pain. abdominal pain, diarrhea, constipation. Pt states he has incontinence and an ulcer on his left foot. He states his podiatrist scraped a callus which caused the ulcer. He has diabetic neuropathy so does not feel pain around the ulcer. He did notice the left foot got more erythematous today.   He stopped taking warfarin 1 month ago as he states it was too much of an hassle to get INR checked and avoid eating certain foods. Pt with foot infection and bacteremia, needs ORLY-r/o endocarditis.      PAST MEDICAL & SURGICAL HISTORY:  Diabetes    Hypertension    Osteoarthritis of right hip    Cataract  right eye    Atrial fibrillation    Vertigo    Sciatica    S/P total hip arthroplasty        MEDICATIONS  (STANDING):  ampicillin/sulbactam  IVPB      ampicillin/sulbactam  IVPB 3 Gram(s) IV Intermittent every 6 hours  ascorbic acid 500 milliGRAM(s) Oral daily  atorvastatin 40 milliGRAM(s) Oral at bedtime  buPROPion XL (24-Hour) . 150 milliGRAM(s) Oral daily  chlorhexidine 2% Cloths 1 Application(s) Topical <User Schedule>  dextrose 5% + sodium chloride 0.45%. 1000 milliLiter(s) (75 mL/Hr) IV Continuous <Continuous>  ferrous    sulfate 325 milliGRAM(s) Oral two times a day  gabapentin 800 milliGRAM(s) Oral three times a day  insulin lispro (ADMELOG) corrective regimen sliding scale   SubCutaneous three times a day before meals  insulin lispro (ADMELOG) corrective regimen sliding scale   SubCutaneous at bedtime  lidocaine 0.5%/epinephrine 1:200,000 Inj 1 milliLiter(s) Local Injection once  lidocaine 1% Injectable 10 milliLiter(s) Local Injection once  lisinopril 2.5 milliGRAM(s) Oral daily  polyethylene glycol 3350 17 Gram(s) Oral daily  senna 2 Tablet(s) Oral at bedtime    MEDICATIONS  (PRN):  acetaminophen     Tablet .. 650 milliGRAM(s) Oral every 6 hours PRN Temp greater or equal to 38C (100.4F)  oxycodone    5 mG/acetaminophen 325 mG 1 Tablet(s) Oral every 6 hours PRN Moderate Pain      FAMILY HISTORY:  Family history of coronary artery disease (Sibling)    Family history of breast cancer in sister (Sibling)        SOCIAL HISTORY:    [x ] Non-smoker    [x ] Alcohol-denies    Allergies    No Known Allergies    Intolerances    	    REVIEW OF SYSTEMS:  CONSTITUTIONAL: No fever, weight loss, or fatigue  EYES: No eye pain, visual disturbances, or discharge  ENT:  No difficulty hearing, tinnitus, vertigo; No sinus or throat pain  NECK: No pain or stiffness  RESPIRATORY: No cough, wheezing, chills or hemoptysis; No Shortness of Breath  CARDIOVASCULAR: No chest pain, palpitations, passing out, dizziness, or leg swelling  GASTROINTESTINAL: No abdominal or epigastric pain. No nausea, vomiting, or hematemesis; No diarrhea or constipation. No melena or hematochezia.  GENITOURINARY: No dysuria, frequency, hematuria, or incontinence  NEUROLOGICAL: No headaches, memory loss, loss of strength, numbness, or tremors  SKIN: No itching, burning, rashes, or lesions   LYMPH Nodes: No enlarged glands  ENDOCRINE: No heat or cold intolerance; No hair loss  MUSCULOSKELETAL: No joint pain or swelling; No muscle, back, or extremity pain  PSYCHIATRIC: No depression, anxiety, mood swings, or difficulty sleeping  HEME/LYMPH: No easy bruising, or bleeding gums  ALLERGY AND IMMUNOLOGIC: No hives or eczema	      PHYSICAL EXAM:  T(C): 36.6 (12-23-21 @ 05:21), Max: 36.9 (12-22-21 @ 22:44)  HR: 76 (12-23-21 @ 05:21) (65 - 80)  BP: 125/77 (12-23-21 @ 05:21) (125/77 - 162/82)  RR: 16 (12-23-21 @ 05:21) (13 - 18)  SpO2: 99% (12-23-21 @ 05:21) (95% - 100%)  Wt(kg): --  I&O's Summary      Appearance: Normal	  HEENT:   Normal oral mucosa, PERRL, EOMI	  Lymphatic: No lymphadenopathy  Cardiovascular: Normal S1 S2, No JVD, No murmurs, No edema  Respiratory: Lungs clear to auscultation	  Psychiatry: A & O x 3, Mood & affect appropriate  Gastrointestinal:  Soft, Non-tender, + BS	  Skin: No rashes, No ecchymoses, No cyanosis	  Neurologic: Non-focal  Extremities: Normal range of motion, No clubbing, cyanosis or edema      	    ECG:  	afib with non-spevific st-t changes  	  	  LABS:	 	                       7.3    3.42  )-----------( 118      ( 22 Dec 2021 23:39 )             22.7     12-22    142  |  109<H>  |  22<H>  ----------------------------<  145<H>  4.3   |  24  |  1.22    Ca    9.2      22 Dec 2021 07:55  Phos  2.7     12-22  Mg     2.1     12-22      Culture - Tissue with Gram Stain (12.23.21 @ 04:15)   Gram Stain:   Rare polymorphonuclear leukocytes seen per low power field   No organisms seen per oil power field   Specimen Source: Bone Left foot bone biopsy culture Culture - Surgical Swab (12.20.21 @ 21:08)   - Trimethoprim/Sulfamethoxazole: S <=0.5/9.5   - Vancomycin: S 2   - Ampicillin/Sulbactam: S <=8/4   - Cefazolin: S <=4   - Clindamycin: R <=0.25 This isolate is presumed to be clindamycin resistant based on detection of inducible resistance. Clindamycin may still be effective in some patients.   - Erythromycin: R >4   - Gentamicin: S 2 Should not be used as monotherapy   - Oxacillin: S <=0.25   - Rifampin: S <=1 Should not be used as monotherapy   - Tetra/Doxy: S <=1   Specimen Source: .Surgical Swab Left foot wound   Culture Results:   Few Alpha hemolytic strep "Susceptibilities not performed"   Rare Staphylococcus aureus   Rare Citrobacter koseri   Few Veillonella parvula "Susceptibilities not performed"   Organism Identification: Staphylococcus aureus   Organism: Staphylococcus aureus   Method Type: CHATO     Culture - Blood (12.20.21 @ 05:34)   - Staphylococcus aureus: Detec Any isolate of Staphylococcus aureus from a blood culture is NOT considered a contaminant.   - Streptococcus sp. (Not Grp A, B or S pneumoniae): Detec   - Trimethoprim/Sulfamethoxazole: S <=0.5/9.5   - Vancomycin: S 1   Gram Stain:   Growth in aerobic bottle: Gram Positive Cocci in Clusters   Growth in anaerobic bottle: Gram positive cocci in pairs   - Cefazolin: S <=4   - Rifampin: S <=1 Should not be used as monotherapy   - Tetra/Doxy: S <=1   - Oxacillin: S <=0.25   - Erythromycin: R >4   - Gentamicin: S <=1 Should not be used as monotherapy   - Clindamycin: R <=0.25 This isolate is presumed to be clindamycin resistant based on detection of inducible resistance. Clindamycin may still be effective in some patients.   - Ampicillin/Sulbactam: S <=8/4   Specimen Source: .Blood Blood-Peripheral   Organism: Blood Culture PCR   Organism: Blood Culture PCR   Organism: Staphylococcus aureus   Culture Results:   Growth in aerobic bottle: Staphylococcus aureus   Growth in anaerobic bottle: Streptococcus mitis/oralis group   Alpha hemolytic strep   (not Strep. pneumoniae or Enterococcus) Culture - Blood (12.20.21 @ 05:34)   Gram Stain:   Growth in aerobic bottle: Gram positive cocci in pairs   Specimen Source: .Blood Blood-Peripheral   Culture Results:   Growth in aerobic bottle: Staphylococcus aureus   See previous culture [id number] Culture - Surgical Swab (12.25.15 @ 01:12)   - Vancomycin: S 2   - Vancomycin: S 4   - Trimethoprim/Sulfamethoxazole: S <=0.5/9.5   - Oxacillin: S <=0.25   - Tetra/Doxy: S <=4   - Tetra/Doxy: R >8   - Ampicillin: S <=2   - Ampicillin/Sulbactam: S <=8/4   - Cefazolin: S <=4   - Ciprofloxacin: S <=1   - Ciprofloxacin: S <=1   - Clindamycin: S <=0.5   - Erythromycin: S <=0.5   - Erythromycin: I 2   - Gentamicin: S <=4   - Moxifloxacin(Aerobic): S <=0.5   - Levofloxacin: S <=1   - RIF- Rifampin: S <=1   Specimen Source: .Surgical Swab left third digit   Culture Results:   Rare Enterococcus faecalis   Rare Staphylococcus aureus   Organism Identification: Enterococcus faecalis   Staphylococcus aureus   Organism: Enterococcus faecalis   Organism: Staphylococcus aureus        Transthoracic Echocardiogram (12.22.21 @ 07:28) >  OBSERVATIONS:  Mitral Valve: Mitral annular calcification. Moderate mitral  regurgitation.  Aortic Root: Normal aortic root.  Aortic Valve: Trileaflet aortic valve.  The aortic valve  leaflets appear thickened, cannot exclude a vegetation.  Mild aortic regurgitation.  Left Atrium: Moderate left atrial enlargement.  Left Ventricle: Endocardium not well visualized; grossly  normal left ventricular systolic function. Normal left  ventricular internal dimensions and wallthicknesses.  Right Heart: Normal right atrium. Normal right ventricular  size and function. There is mild-moderate tricuspid  regurgitation. There is trace pulmonic regurgitation.  Pericardium/PleuraSmall pericardial effusion.   No  echocardiographic evidence of tamponade physiology.  Hemodynamic: RA Pressure is 10 mm Hg. RV systolic pressure  is 44 mm Hg.   Mild pulmonary hypertension.

## 2021-12-23 NOTE — PROGRESS NOTE ADULT - PROBLEM SELECTOR PLAN 9
- Patient has history of Hypertension on lisinopril at home   - C/w home meds with parameters  - DASH diet  - Monitor BP and adjust meds as needed

## 2021-12-23 NOTE — PROGRESS NOTE ADULT - PROBLEM SELECTOR PLAN 8
- Patient has hx of DM   - Holding home po medications  - C/w HSS  - Fingerstick before meals and at bedtime  - DASH diet with consistent carb  - [employer] -180

## 2021-12-23 NOTE — PROGRESS NOTE ADULT - SUBJECTIVE AND OBJECTIVE BOX
PGY-1 Progress Note discussed with attending    PAGER #: [[phone]] TILL 5:00 PM  PLEASE CONTACT ON CALL TEAM:   - On Call Team (Please refer to Brie) FROM 5:00 PM - 8:30PM  - Nightfloat Team FROM 8:30 -7:30 AM      INTERVAL HPI/OVERNIGHT EVENTS: 76 y.o male was evaluated resting comfortably in bed. Pt denies pain, N/V/F/C/SOB. Pt is s/p L foot I&D by Podiatry (12/23/21).       REVIEW OF SYSTEMS:  CONSTITUTIONAL: No fever, weight loss, or fatigue  RESPIRATORY: No cough, wheezing, chills or hemoptysis; No shortness of breath  CARDIOVASCULAR: No chest pain, palpitations, dizziness, or leg swelling  GASTROINTESTINAL: No abdominal pain. No nausea, vomiting, or hematemesis; No diarrhea or constipation. No melena or hematochezia.  GENITOURINARY: No dysuria or hematuria, urinary frequency  NEUROLOGICAL: No headaches, memory loss, loss of strength, numbness, or tremors  SKIN: L foot wound, DSD intact     MEDICATIONS  (STANDING):  ampicillin/sulbactam  IVPB      ampicillin/sulbactam  IVPB 3 Gram(s) IV Intermittent every 6 hours  ascorbic acid 500 milliGRAM(s) Oral daily  atorvastatin 40 milliGRAM(s) Oral at bedtime  buPROPion XL (24-Hour) . 150 milliGRAM(s) Oral daily  chlorhexidine 2% Cloths 1 Application(s) Topical <User Schedule>  dextrose 5% + sodium chloride 0.45%. 1000 milliLiter(s) (75 mL/Hr) IV Continuous <Continuous>  ferrous    sulfate 325 milliGRAM(s) Oral two times a day  gabapentin 800 milliGRAM(s) Oral three times a day  insulin lispro (ADMELOG) corrective regimen sliding scale   SubCutaneous three times a day before meals  insulin lispro (ADMELOG) corrective regimen sliding scale   SubCutaneous at bedtime  lidocaine 1% Injectable 10 milliLiter(s) Local Injection once  lisinopril 2.5 milliGRAM(s) Oral daily  polyethylene glycol 3350 17 Gram(s) Oral daily  senna 2 Tablet(s) Oral at bedtime    MEDICATIONS  (PRN):  acetaminophen     Tablet .. 650 milliGRAM(s) Oral every 6 hours PRN Temp greater or equal to 38C (100.4F)  oxycodone    5 mG/acetaminophen 325 mG 1 Tablet(s) Oral every 6 hours PRN Moderate Pain      Vital Signs Last 24 Hrs  T(C): 36.6 (23 Dec 2021 05:21), Max: 36.9 (22 Dec 2021 22:44)  T(F): 97.9 (23 Dec 2021 05:21), Max: 98.4 (22 Dec 2021 22:44)  HR: 76 (23 Dec 2021 05:21) (65 - 80)  BP: 125/77 (23 Dec 2021 05:21) (125/77 - 162/82)  BP(mean): 88 (22 Dec 2021 23:49) (82 - 95)  RR: 16 (23 Dec 2021 05:21) (13 - 18)  SpO2: 99% (23 Dec 2021 05:21) (95% - 100%)    PHYSICAL EXAMINATION:  GENERAL: NAD, well built  HEAD:  Atraumatic, Normocephalic  EYES:  conjunctiva and sclera clear  NECK: Supple, No JVD, Normal thyroid  CHEST/LUNG: Clear to auscultation. Clear to percussion bilaterally; No rales, rhonchi, wheezing, or rubs  HEART: Regular rate and rhythm; No murmurs, rubs, or gallops  ABDOMEN: Soft, Nontender, Nondistended; Bowel sounds present  NERVOUS SYSTEM:  Alert & Oriented X3,    EXTREMITIES:  2+ Peripheral Pulses, No clubbing, cyanosis, L foot foot, DSD intact   SKIN: warm dry                          7.3    3.42  )-----------( 118      ( 22 Dec 2021 23:39 )             22.7     12-22    142  |  109<H>  |  22<H>  ----------------------------<  145<H>  4.3   |  24  |  1.22    Ca    9.2      22 Dec 2021 07:55  Phos  2.7     12-22  Mg     2.1     12-22      Culture - Tissue with Gram Stain (collected 23 Dec 2021 04:15)  Source: Bone Left foot bone biopsy culture  Gram Stain (23 Dec 2021 07:02):    Rare polymorphonuclear leukocytes seen per low power field    No organisms seen per oil power field    Culture - Surgical Swab (collected 20 Dec 2021 21:08)  Source: .Surgical Swab Left foot wound  Preliminary Report (22 Dec 2021 19:55):    Few Alpha hemolytic strep "Susceptibilities not performed"    Rare Staphylococcus aureus    Rare Citrobacter koseri    Few Veillonella parvula "Susceptibilities not performed"  Organism: Staphylococcus aureus (22 Dec 2021 17:52)  Organism: Staphylococcus aureus (22 Dec 2021 17:52)        I&O's Summary      CAPILLARY BLOOD GLUCOSE      POCT Blood Glucose.: 211 mg/dL (23 Dec 2021 07:46)    CAPILLARY BLOOD GLUCOSE      POCT Blood Glucose.: 211 mg/dL (23 Dec 2021 07:46)  POCT Blood Glucose.: 146 mg/dL (23 Dec 2021 00:18)  POCT Blood Glucose.: 138 mg/dL (22 Dec 2021 22:47)  POCT Blood Glucose.: 176 mg/dL (22 Dec 2021 16:34)  POCT Blood Glucose.: 152 mg/dL (22 Dec 2021 11:47)      RADIOLOGY & ADDITIONAL TESTS:    PROCEDURE DATE:  12/21/2021          INTERPRETATION:  Clinical indication: Bacteremia. Back pain.    MRI of the lumbar spine was performed using sagittal T1-T2 and STIR   sequence. AxialT1-T2 and coronal T1-weighted sequence was performed.    Loss of the normal lumbar lordosis is seen. This could be due to   positioning or muscle spasm.    There is evidence of a grade 1 anterolisthesis seen involving L5 and S1   with bilateral lysisdefects suspected. This can be confirmed with plain   film.    Disc desiccation is seen involving the L1-2, L3-4 and L5-S1 levels. These   findings are secondary to chronic degenerative change    T12-L1: Normal.    L1-2: Bilateral hypertrophic facet joint change are seen. Moderate   narrowing of both neural foramen.    L2-3: Bilateral hypertrophic facet joint changes. No significant commas   of the spinal canal or either neural foramen.    L3-4: Disc bulge and bilateral hypertrophic facet joint changes. Mild   narrowing of the spinal canal. Moderate narrowing of both neural foramen    L4-5: Disc bulge and bilateral hypertrophic facet changes. No significant   commas of the spinal canal or either neural foramen    L5-S1: Bilateral hypertrophicfacet changes are seen. Severe narrowing of   both neural foramen    The conus ends at L2 and appears normal    Evaluation of the paraspinal soft tissues is limited by lack of IV   contrast though grossly normal    IMPRESSION: Degenerative changes described above    Grade 1 anterolisthesis seen involving L5-S1 level with associated lysis   defects suspected.    PROCEDURE DATE:  12/20/2021          INTERPRETATION:  LEFT FOOT MRI    CLINICAL INFORMATION: Left foot wound. Eval for osteomyelitis.  TECHNIQUE: Multiplanar, multisequence MRI was obtained of the left foot.    COMPARISON: Left foot MRI 12/23/2015.    FINDINGS:    Plantar soft tissue wound is present at the level of the first   tarsometatarsal articulation with tract extending to the bone. There is   edema along the plantar aspect of the distal medial cuneiform and base of   the first metatarsal with preservation of the T1 marrow signal. There is   moderate tarsometatarsal arthrosis and arthrosis of the articulation of   the navicular and lateral cuneiform. There are subchondral fractures of   the first second and third metatarsal heads. There is diffuse increased   muscle signal, most consistent with denervation. There is diffuse   subcutaneous edema.    IMPRESSION:    Plantar soft tissue wound with tract extending to the undersurface of the   first tarsometatarsal articulation.  Edema within the base of the first tarsometatarsal articulation adjacent   to the tract with preservation of the T1 marrow signal, which may   represent sequela of early osteomyelitis.  First second and third metatarsal head subchondral fractures.    PROCEDURE DATE:  12/19/2021          INTERPRETATION:  Left tib-fib, left foot, and chest. Patient has sepsis.   Concern is gas gangrene.    Left tib-fib. 2 views. 4 images.    Arterial calcifications. Slight degeneration in the knee.    No bone destruction or fracture.    Left foot. 3 views. Arterial calcification.    There is degeneration again seen of the tarsometatarsal junction.    There are small nondisplaced fractures of the distal aspects of the   second and third metatarsals which are new since December 22, 2015. These   fractures may not be acute.    There is first MP and IP degeneration.    There is an acute appearing fracture of the proximal anterior corner of   the proximal phalanx of the fifth left toe.    No visible tracking air.    AP chest on December 19, 2021 at 8:49 PM. 2 images.    Heart magnified by technique.    Lungs are clear.    IMPRESSION: Chest unremarkable.    There are fractures of the distal aspects of the second, third, and   tarsals and the proximal phalanx of the fifth toe.    The fifth toe fracture is acute. The other fractures could be subacute.    Arterial calcification. A note was posted on the [facility] ED discrepancy   site on December 20 at 11:22 AM.                     PGY-1 Progress Note discussed with attending    PAGER #: [[phone]] TILL 5:00 PM  PLEASE CONTACT ON CALL TEAM:   - On Call Team (Please refer to Brie) FROM 5:00 PM - 8:30PM  - Nightfloat Team FROM 8:30 -7:30 AM      INTERVAL HPI/OVERNIGHT EVENTS: 76 y.o male was evaluated resting comfortably in bed. Pt denies pain, N/V/F/C/SOB. Pt is s/p L foot I&D by Podiatry (12/23/21).       MEDICATIONS  (STANDING):  ampicillin/sulbactam  IVPB      ampicillin/sulbactam  IVPB 3 Gram(s) IV Intermittent every 6 hours  ascorbic acid 500 milliGRAM(s) Oral daily  atorvastatin 40 milliGRAM(s) Oral at bedtime  buPROPion XL (24-Hour) . 150 milliGRAM(s) Oral daily  chlorhexidine 2% Cloths 1 Application(s) Topical <User Schedule>  dextrose 5% + sodium chloride 0.45%. 1000 milliLiter(s) (75 mL/Hr) IV Continuous <Continuous>  ferrous    sulfate 325 milliGRAM(s) Oral two times a day  gabapentin 800 milliGRAM(s) Oral three times a day  insulin lispro (ADMELOG) corrective regimen sliding scale   SubCutaneous three times a day before meals  insulin lispro (ADMELOG) corrective regimen sliding scale   SubCutaneous at bedtime  lidocaine 1% Injectable 10 milliLiter(s) Local Injection once  lisinopril 2.5 milliGRAM(s) Oral daily  polyethylene glycol 3350 17 Gram(s) Oral daily  senna 2 Tablet(s) Oral at bedtime    MEDICATIONS  (PRN):  acetaminophen     Tablet .. 650 milliGRAM(s) Oral every 6 hours PRN Temp greater or equal to 38C (100.4F)  oxycodone    5 mG/acetaminophen 325 mG 1 Tablet(s) Oral every 6 hours PRN Moderate Pain      Vital Signs Last 24 Hrs  T(C): 36.6 (23 Dec 2021 05:21), Max: 36.9 (22 Dec 2021 22:44)  T(F): 97.9 (23 Dec 2021 05:21), Max: 98.4 (22 Dec 2021 22:44)  HR: 76 (23 Dec 2021 05:21) (65 - 80)  BP: 125/77 (23 Dec 2021 05:21) (125/77 - 162/82)  BP(mean): 88 (22 Dec 2021 23:49) (82 - 95)  RR: 16 (23 Dec 2021 05:21) (13 - 18)  SpO2: 99% (23 Dec 2021 05:21) (95% - 100%)    PHYSICAL EXAMINATION:  GENERAL: NAD, well built, sat well on RA  HEAD:  Atraumatic, Normocephalic  EYES:  conjunctiva and sclera clear  NECK: Supple, No JVD, Normal thyroid  CHEST/LUNG: Clear to auscultation. Clear to percussion bilaterally; No rales, rhonchi, wheezing, or rubs  HEART: Regular rate and rhythm; No murmurs, rubs, or gallops  ABDOMEN: Soft, Nontender, Nondistended; Bowel sounds present  NERVOUS SYSTEM:  Alert & Oriented X3, no neuro deficits     EXTREMITIES:  2+ Peripheral Pulses, No clubbing, cyanosis, or edema  SKIN: Left foot wound, DSD intact                           7.3    3.42  )-----------( 118      ( 22 Dec 2021 23:39 )             22.7     12-22    142  |  109<H>  |  22<H>  ----------------------------<  145<H>  4.3   |  24  |  1.22    Ca    9.2      22 Dec 2021 07:55  Phos  2.7     12-22  Mg     2.1     12-22      Culture - Tissue with Gram Stain (collected 23 Dec 2021 04:15)  Source: Bone Left foot bone biopsy culture  Gram Stain (23 Dec 2021 07:02):    Rare polymorphonuclear leukocytes seen per low power field    No organisms seen per oil power field    Culture - Surgical Swab (collected 20 Dec 2021 21:08)  Source: .Surgical Swab Left foot wound  Preliminary Report (22 Dec 2021 19:55):    Few Alpha hemolytic strep "Susceptibilities not performed"    Rare Staphylococcus aureus    Rare Citrobacter koseri    Few Veillonella parvula "Susceptibilities not performed"  Organism: Staphylococcus aureus (22 Dec 2021 17:52)  Organism: Staphylococcus aureus (22 Dec 2021 17:52)        I&O's Summary      CAPILLARY BLOOD GLUCOSE      POCT Blood Glucose.: 211 mg/dL (23 Dec 2021 07:46)    CAPILLARY BLOOD GLUCOSE      POCT Blood Glucose.: 211 mg/dL (23 Dec 2021 07:46)  POCT Blood Glucose.: 146 mg/dL (23 Dec 2021 00:18)  POCT Blood Glucose.: 138 mg/dL (22 Dec 2021 22:47)  POCT Blood Glucose.: 176 mg/dL (22 Dec 2021 16:34)  POCT Blood Glucose.: 152 mg/dL (22 Dec 2021 11:47)      RADIOLOGY & ADDITIONAL TESTS:    PROCEDURE DATE:  12/21/2021          INTERPRETATION:  Clinical indication: Bacteremia. Back pain.    MRI of the lumbar spine was performed using sagittal T1-T2 and STIR   sequence. AxialT1-T2 and coronal T1-weighted sequence was performed.    Loss of the normal lumbar lordosis is seen. This could be due to   positioning or muscle spasm.    There is evidence of a grade 1 anterolisthesis seen involving L5 and S1   with bilateral lysisdefects suspected. This can be confirmed with plain   film.    Disc desiccation is seen involving the L1-2, L3-4 and L5-S1 levels. These   findings are secondary to chronic degenerative change    T12-L1: Normal.    L1-2: Bilateral hypertrophic facet joint change are seen. Moderate   narrowing of both neural foramen.    L2-3: Bilateral hypertrophic facet joint changes. No significant commas   of the spinal canal or either neural foramen.    L3-4: Disc bulge and bilateral hypertrophic facet joint changes. Mild   narrowing of the spinal canal. Moderate narrowing of both neural foramen    L4-5: Disc bulge and bilateral hypertrophic facet changes. No significant   commas of the spinal canal or either neural foramen    L5-S1: Bilateral hypertrophicfacet changes are seen. Severe narrowing of   both neural foramen    The conus ends at L2 and appears normal    Evaluation of the paraspinal soft tissues is limited by lack of IV   contrast though grossly normal    IMPRESSION: Degenerative changes described above    Grade 1 anterolisthesis seen involving L5-S1 level with associated lysis   defects suspected.    PROCEDURE DATE:  12/20/2021          INTERPRETATION:  LEFT FOOT MRI    CLINICAL INFORMATION: Left foot wound. Eval for osteomyelitis.  TECHNIQUE: Multiplanar, multisequence MRI was obtained of the left foot.    COMPARISON: Left foot MRI 12/23/2015.    FINDINGS:    Plantar soft tissue wound is present at the level of the first   tarsometatarsal articulation with tract extending to the bone. There is   edema along the plantar aspect of the distal medial cuneiform and base of   the first metatarsal with preservation of the T1 marrow signal. There is   moderate tarsometatarsal arthrosis and arthrosis of the articulation of   the navicular and lateral cuneiform. There are subchondral fractures of   the first second and third metatarsal heads. There is diffuse increased   muscle signal, most consistent with denervation. There is diffuse   subcutaneous edema.    IMPRESSION:    Plantar soft tissue wound with tract extending to the undersurface of the   first tarsometatarsal articulation.  Edema within the base of the first tarsometatarsal articulation adjacent   to the tract with preservation of the T1 marrow signal, which may   represent sequela of early osteomyelitis.  First second and third metatarsal head subchondral fractures.    PROCEDURE DATE:  12/19/2021          INTERPRETATION:  Left tib-fib, left foot, and chest. Patient has sepsis.   Concern is gas gangrene.    Left tib-fib. 2 views. 4 images.    Arterial calcifications. Slight degeneration in the knee.    No bone destruction or fracture.    Left foot. 3 views. Arterial calcification.    There is degeneration again seen of the tarsometatarsal junction.    There are small nondisplaced fractures of the distal aspects of the   second and third metatarsals which are new since December 22, 2015. These   fractures may not be acute.    There is first MP and IP degeneration.    There is an acute appearing fracture of the proximal anterior corner of   the proximal phalanx of the fifth left toe.    No visible tracking air.    AP chest on December 19, 2021 at 8:49 PM. 2 images.    Heart magnified by technique.    Lungs are clear.    IMPRESSION: Chest unremarkable.    There are fractures of the distal aspects of the second, third, and   tarsals and the proximal phalanx of the fifth toe.    The fifth toe fracture is acute. The other fractures could be subacute.    Arterial calcification. [facility] December 20 at 11:22 AM.                     PGY-1 Progress Note discussed with attending    PAGER #: [[phone]] TILL 5:00 PM  PLEASE CONTACT ON CALL TEAM:   - On Call Team (Please refer to Brie) FROM 5:00 PM - 8:30PM  - Nightfloat Team FROM 8:30 -7:30 AM      INTERVAL HPI/OVERNIGHT EVENTS: 76 y.o male was evaluated resting comfortably in bed. Pt denies pain, N/V/F/C/SOB. Pt is s/p L foot I&D by Podiatry (12/23/21).       MEDICATIONS  (STANDING):  ampicillin/sulbactam  IVPB      ampicillin/sulbactam  IVPB 3 Gram(s) IV Intermittent every 6 hours  ascorbic acid 500 milliGRAM(s) Oral daily  atorvastatin 40 milliGRAM(s) Oral at bedtime  buPROPion XL (24-Hour) . 150 milliGRAM(s) Oral daily  chlorhexidine 2% Cloths 1 Application(s) Topical <User Schedule>  dextrose 5% + sodium chloride 0.45%. 1000 milliLiter(s) (75 mL/Hr) IV Continuous <Continuous>  ferrous    sulfate 325 milliGRAM(s) Oral two times a day  gabapentin 800 milliGRAM(s) Oral three times a day  insulin lispro (ADMELOG) corrective regimen sliding scale   SubCutaneous three times a day before meals  insulin lispro (ADMELOG) corrective regimen sliding scale   SubCutaneous at bedtime  lidocaine 1% Injectable 10 milliLiter(s) Local Injection once  lisinopril 2.5 milliGRAM(s) Oral daily  polyethylene glycol 3350 17 Gram(s) Oral daily  senna 2 Tablet(s) Oral at bedtime    MEDICATIONS  (PRN):  acetaminophen     Tablet .. 650 milliGRAM(s) Oral every 6 hours PRN Temp greater or equal to 38C (100.4F)  oxycodone    5 mG/acetaminophen 325 mG 1 Tablet(s) Oral every 6 hours PRN Moderate Pain      Vital Signs Last 24 Hrs  T(C): 36.6 (23 Dec 2021 05:21), Max: 36.9 (22 Dec 2021 22:44)  T(F): 97.9 (23 Dec 2021 05:21), Max: 98.4 (22 Dec 2021 22:44)  HR: 76 (23 Dec 2021 05:21) (65 - 80)  BP: 125/77 (23 Dec 2021 05:21) (125/77 - 162/82)  BP(mean): 88 (22 Dec 2021 23:49) (82 - 95)  RR: 16 (23 Dec 2021 05:21) (13 - 18)  SpO2: 99% (23 Dec 2021 05:21) (95% - 100%)    PHYSICAL EXAMINATION:  GENERAL: NAD, well built, sat well on RA, pale  HEAD:  Atraumatic, Normocephalic  EYES:  conjunctiva and sclera clear  NECK: Supple, No JVD, Normal thyroid  CHEST/LUNG: Clear to auscultation. Clear to percussion bilaterally; No rales, rhonchi, wheezing, or rubs  HEART: Regular rate and rhythm; No murmurs, rubs, or gallops  ABDOMEN: Soft, Nontender, Nondistended; Bowel sounds present  NERVOUS SYSTEM:  Alert & Oriented X3, no neuro deficits     EXTREMITIES:  2+ Peripheral Pulses, No clubbing, cyanosis, or edema  SKIN: Left foot wound, DSD intact                           7.3    3.42  )-----------( 118      ( 22 Dec 2021 23:39 )             22.7     12-22    142  |  109<H>  |  22<H>  ----------------------------<  145<H>  4.3   |  24  |  1.22    Ca    9.2      22 Dec 2021 07:55  Phos  2.7     12-22  Mg     2.1     12-22      Culture - Tissue with Gram Stain (collected 23 Dec 2021 04:15)  Source: Bone Left foot bone biopsy culture  Gram Stain (23 Dec 2021 07:02):    Rare polymorphonuclear leukocytes seen per low power field    No organisms seen per oil power field    Culture - Surgical Swab (collected 20 Dec 2021 21:08)  Source: .Surgical Swab Left foot wound  Preliminary Report (22 Dec 2021 19:55):    Few Alpha hemolytic strep "Susceptibilities not performed"    Rare Staphylococcus aureus    Rare Citrobacter koseri    Few Veillonella parvula "Susceptibilities not performed"  Organism: Staphylococcus aureus (22 Dec 2021 17:52)  Organism: Staphylococcus aureus (22 Dec 2021 17:52)        I&O's Summary      CAPILLARY BLOOD GLUCOSE      POCT Blood Glucose.: 211 mg/dL (23 Dec 2021 07:46)    CAPILLARY BLOOD GLUCOSE      POCT Blood Glucose.: 211 mg/dL (23 Dec 2021 07:46)  POCT Blood Glucose.: 146 mg/dL (23 Dec 2021 00:18)  POCT Blood Glucose.: 138 mg/dL (22 Dec 2021 22:47)  POCT Blood Glucose.: 176 mg/dL (22 Dec 2021 16:34)  POCT Blood Glucose.: 152 mg/dL (22 Dec 2021 11:47)      RADIOLOGY & ADDITIONAL TESTS:    PROCEDURE DATE:  12/21/2021          INTERPRETATION:  Clinical indication: Bacteremia. Back pain.    MRI of the lumbar spine was performed using sagittal T1-T2 and STIR   sequence. AxialT1-T2 and coronal T1-weighted sequence was performed.    Loss of the normal lumbar lordosis is seen. This could be due to   positioning or muscle spasm.    There is evidence of a grade 1 anterolisthesis seen involving L5 and S1   with bilateral lysisdefects suspected. This can be confirmed with plain   film.    Disc desiccation is seen involving the L1-2, L3-4 and L5-S1 levels. These   findings are secondary to chronic degenerative change    T12-L1: Normal.    L1-2: Bilateral hypertrophic facet joint change are seen. Moderate   narrowing of both neural foramen.    L2-3: Bilateral hypertrophic facet joint changes. No significant commas   of the spinal canal or either neural foramen.    L3-4: Disc bulge and bilateral hypertrophic facet joint changes. Mild   narrowing of the spinal canal. Moderate narrowing of both neural foramen    L4-5: Disc bulge and bilateral hypertrophic facet changes. No significant   commas of the spinal canal or either neural foramen    L5-S1: Bilateral hypertrophicfacet changes are seen. Severe narrowing of   both neural foramen    The conus ends at L2 and appears normal    Evaluation of the paraspinal soft tissues is limited by lack of IV   contrast though grossly normal    IMPRESSION: Degenerative changes described above    Grade 1 anterolisthesis seen involving L5-S1 level with associated lysis   defects suspected.    PROCEDURE DATE:  12/20/2021          INTERPRETATION:  LEFT FOOT MRI    CLINICAL INFORMATION: Left foot wound. Eval for osteomyelitis.  TECHNIQUE: Multiplanar, multisequence MRI was obtained of the left foot.    COMPARISON: Left foot MRI 12/23/2015.    FINDINGS:    Plantar soft tissue wound is present at the level of the first   tarsometatarsal articulation with tract extending to the bone. There is   edema along the plantar aspect of the distal medial cuneiform and base of   the first metatarsal with preservation of the T1 marrow signal. There is   moderate tarsometatarsal arthrosis and arthrosis of the articulation of   the navicular and lateral cuneiform. There are subchondral fractures of   the first second and third metatarsal heads. There is diffuse increased   muscle signal, most consistent with denervation. There is diffuse   subcutaneous edema.    IMPRESSION:    Plantar soft tissue wound with tract extending to the undersurface of the   first tarsometatarsal articulation.  Edema within the base of the first tarsometatarsal articulation adjacent   to the tract with preservation of the T1 marrow signal, which may   represent sequela of early osteomyelitis.  First second and third metatarsal head subchondral fractures.    PROCEDURE DATE:  12/19/2021          INTERPRETATION:  Left tib-fib, left foot, and chest. Patient has sepsis.   Concern is gas gangrene.    Left tib-fib. 2 views. 4 images.    Arterial calcifications. Slight degeneration in the knee.    No bone destruction or fracture.    Left foot. 3 views. Arterial calcification.    There is degeneration again seen of the tarsometatarsal junction.    There are small nondisplaced fractures of the distal aspects of the   second and third metatarsals which are new since December 22, 2015. These   fractures may not be acute.    There is first MP and IP degeneration.    There is an acute appearing fracture of the proximal anterior corner of   the proximal phalanx of the fifth left toe.    No visible tracking air.    AP chest on December 19, 2021 at 8:49 PM. 2 images.    Heart magnified by technique.    Lungs are clear.    IMPRESSION: Chest unremarkable.    There are fractures of the distal aspects of the second, third, and   tarsals and the proximal phalanx of the fifth toe.    The fifth toe fracture is acute. The other fractures could be subacute.    Arterial calcification. A note was posted on the [facility] ED discrepancy   site on December 20 at 11:22 AM.                     PGY-1 Progress Note discussed with attending    PAGER #: [phone]] TILL 5:00 PM  PLEASE CONTACT ON CALL TEAM:   - On Call Team (Please refer to [facility]) FROM 5:00 PM - 8:30PM  - Nightfloat Team FROM 8:30 -7:30 AM      INTERVAL HPI/OVERNIGHT EVENTS: 76 y.o male was evaluated resting comfortably in bed. Pt denies pain, N/V/F/C/SOB. Pt is s/p L foot I&D by Podiatry (12/23/21).       MEDICATIONS  (STANDING):  ampicillin/sulbactam  IVPB      ampicillin/sulbactam  IVPB 3 Gram(s) IV Intermittent every 6 hours  ascorbic acid 500 milliGRAM(s) Oral daily  atorvastatin 40 milliGRAM(s) Oral at bedtime  buPROPion XL (24-Hour) . 150 milliGRAM(s) Oral daily  chlorhexidine 2% Cloths 1 Application(s) Topical <User Schedule>  dextrose 5% + sodium chloride 0.45%. 1000 milliLiter(s) (75 mL/Hr) IV Continuous <Continuous>  ferrous    sulfate 325 milliGRAM(s) Oral two times a day  gabapentin 800 milliGRAM(s) Oral three times a day  insulin lispro (ADMELOG) corrective regimen sliding scale   SubCutaneous three times a day before meals  insulin lispro (ADMELOG) corrective regimen sliding scale   SubCutaneous at bedtime  lidocaine 1% Injectable 10 milliLiter(s) Local Injection once  lisinopril 2.5 milliGRAM(s) Oral daily  polyethylene glycol 3350 17 Gram(s) Oral daily  senna 2 Tablet(s) Oral at bedtime    MEDICATIONS  (PRN):  acetaminophen     Tablet .. 650 milliGRAM(s) Oral every 6 hours PRN Temp greater or equal to 38C (100.4F)  oxycodone    5 mG/acetaminophen 325 mG 1 Tablet(s) Oral every 6 hours PRN Moderate Pain      Vital Signs Last 24 Hrs  T(C): 36.6 (23 Dec 2021 05:21), Max: 36.9 (22 Dec 2021 22:44)  T(F): 97.9 (23 Dec 2021 05:21), Max: 98.4 (22 Dec 2021 22:44)  HR: 76 (23 Dec 2021 05:21) (65 - 80)  BP: 125/77 (23 Dec 2021 05:21) (125/77 - 162/82)  BP(mean): 88 (22 Dec 2021 23:49) (82 - 95)  RR: 16 (23 Dec 2021 05:21) (13 - 18)  SpO2: 99% (23 Dec 2021 05:21) (95% - 100%)    PHYSICAL EXAMINATION:  GENERAL: NAD, well built, sat well on RA, pale  HEAD:  Atraumatic, Normocephalic  EYES:  conjunctiva and sclera clear  NECK: Supple, No JVD, Normal thyroid  CHEST/LUNG: Clear to auscultation. Clear to percussion bilaterally; No rales, rhonchi, wheezing, or rubs  HEART: Regular rate and rhythm; No murmurs, rubs, or gallops  ABDOMEN: Soft, Nontender, Nondistended; Bowel sounds present  NERVOUS SYSTEM:  Alert & Oriented X3, no neuro deficits     EXTREMITIES:  2+ Peripheral Pulses, No clubbing, cyanosis, or edema  SKIN: Left foot wound, DSD blood stained                         7.3    3.42  )-----------( 118      ( 22 Dec 2021 23:39 )             22.7     12-22    142  |  109<H>  |  22<H>  ----------------------------<  145<H>  4.3   |  24  |  1.22    Ca    9.2      22 Dec 2021 07:55  Phos  2.7     12-22  Mg     2.1     12-22      Culture - Tissue with Gram Stain (collected 23 Dec 2021 04:15)  Source: Bone Left foot bone biopsy culture  Gram Stain (23 Dec 2021 07:02):    Rare polymorphonuclear leukocytes seen per low power field    No organisms seen per oil power field    Culture - Surgical Swab (collected 20 Dec 2021 21:08)  Source: .Surgical Swab Left foot wound  Preliminary Report (22 Dec 2021 19:55):    Few Alpha hemolytic strep "Susceptibilities not performed"    Rare Staphylococcus aureus    Rare Citrobacter koseri    Few Veillonella parvula "Susceptibilities not performed"  Organism: Staphylococcus aureus (22 Dec 2021 17:52)  Organism: Staphylococcus aureus (22 Dec 2021 17:52)        I&O's Summary      CAPILLARY BLOOD GLUCOSE      POCT Blood Glucose.: 211 mg/dL (23 Dec 2021 07:46)    CAPILLARY BLOOD GLUCOSE      POCT Blood Glucose.: 211 mg/dL (23 Dec 2021 07:46)  POCT Blood Glucose.: 146 mg/dL (23 Dec 2021 00:18)  POCT Blood Glucose.: 138 mg/dL (22 Dec 2021 22:47)  POCT Blood Glucose.: 176 mg/dL (22 Dec 2021 16:34)  POCT Blood Glucose.: 152 mg/dL (22 Dec 2021 11:47)      RADIOLOGY & ADDITIONAL TESTS:    PROCEDURE DATE:  12/21/2021          INTERPRETATION:  Clinical indication: Bacteremia. Back pain.    MRI of the lumbar spine was performed using sagittal T1-T2 and STIR   sequence. AxialT1-T2 and coronal T1-weighted sequence was performed.    Loss of the normal lumbar lordosis is seen. This could be due to   positioning or muscle spasm.    There is evidence of a grade 1 anterolisthesis seen involving L5 and S1   with bilateral lysisdefects suspected. This can be confirmed with plain   film.    Disc desiccation is seen involving the L1-2, L3-4 and L5-S1 levels. These   findings are secondary to chronic degenerative change    T12-L1: Normal.    L1-2: Bilateral hypertrophic facet joint change are seen. Moderate   narrowing of both neural foramen.    L2-3: Bilateral hypertrophic facet joint changes. No significant commas   of the spinal canal or either neural foramen.    L3-4: Disc bulge and bilateral hypertrophic facet joint changes. Mild   narrowing of the spinal canal. Moderate narrowing of both neural foramen    L4-5: Disc bulge and bilateral hypertrophic facet changes. No significant   commas of the spinal canal or either neural foramen    L5-S1: Bilateral hypertrophicfacet changes are seen. Severe narrowing of   both neural foramen    The conus ends at L2 and appears normal    Evaluation of the paraspinal soft tissues is limited by lack of IV   contrast though grossly normal    IMPRESSION: Degenerative changes described above    Grade 1 anterolisthesis seen involving L5-S1 level with associated lysis   defects suspected.    PROCEDURE DATE:  12/20/2021          INTERPRETATION:  LEFT FOOT MRI    CLINICAL INFORMATION: Left foot wound. Eval for osteomyelitis.  TECHNIQUE: Multiplanar, multisequence MRI was obtained of the left foot.    COMPARISON: Left foot MRI 12/23/2015.    FINDINGS:    Plantar soft tissue wound is present at the level of the first   tarsometatarsal articulation with tract extending to the bone. There is   edema along the plantar aspect of the distal medial cuneiform and base of   the first metatarsal with preservation of the T1 marrow signal. There is   moderate tarsometatarsal arthrosis and arthrosis of the articulation of   the navicular and lateral cuneiform. There are subchondral fractures of   the first second and third metatarsal heads. There is diffuse increased   muscle signal, most consistent with denervation. There is diffuse   subcutaneous edema.    IMPRESSION:    Plantar soft tissue wound with tract extending to the undersurface of the   first tarsometatarsal articulation.  Edema within the base of the first tarsometatarsal articulation adjacent   to the tract with preservation of the T1 marrow signal, which may   represent sequela of early osteomyelitis.  First second and third metatarsal head subchondral fractures.    PROCEDURE DATE:  12/19/2021          INTERPRETATION:  Left tib-fib, left foot, and chest. Patient has sepsis.   Concern is gas gangrene.    Left tib-fib. 2 views. 4 images.    Arterial calcifications. Slight degeneration in the knee.    No bone destruction or fracture.    Left foot. 3 views. Arterial calcification.    There is degeneration again seen of the tarsometatarsal junction.    There are small nondisplaced fractures of the distal aspects of the   second and third metatarsals which are new since December 22, 2015. These   fractures may not be acute.    There is first MP and IP degeneration.    There is an acute appearing fracture of the proximal anterior corner of   the proximal phalanx of the fifth left toe.    No visible tracking air.    AP chest on December 19, 2021 at 8:49 PM. 2 images.    Heart magnified by technique.    Lungs are clear.    IMPRESSION: Chest unremarkable.    There are fractures of the distal aspects of the second, third, and   tarsals and the proximal phalanx of the fifth toe.    The fifth toe fracture is acute. The other fractures could be subacute.    Arterial calcification. A note was posted on the [facility] ED discrepancy   site on December 20 at 11:22 AM.

## 2021-12-23 NOTE — CONSULT NOTE ADULT - ASSESSMENT
76 year-old male from home with past medical history of atrial fibrillation (was on coumadin until 1month ago), hypertension, hyperlipidemia, diabetes mellitus presents to ED for AMS,bacteremia, anemia.  1.ORLY-r/o endocarditis.  2.Bacteremia-ABX.Repaet blood cx-p.  3.Afib-off ac,add asa for now.  4.Chech Hg if less than 7,transfuse 1 prbc.  5.DM-Insulin.  6.HTN-ace.  7.PPI.  8.Lipid d/o-statin.  9.DVT prophylaxis.

## 2021-12-23 NOTE — PROGRESS NOTE ADULT - ATTENDING COMMENTS
Chart reviewed and patient evaluated again at the bedside. Agree with ID NP PE, assessment and plan.

## 2021-12-23 NOTE — PROGRESS NOTE ADULT - SUBJECTIVE AND OBJECTIVE BOX
Subjective/objective: No elevated temp noted since 12/21. Pt underwent I&D of left foot yesterday. Pt c/o chronic back pain.       MEDS  acetaminophen     Tablet .. 650 milliGRAM(s) Oral every 6 hours PRN  ampicillin/sulbactam  IVPB      ampicillin/sulbactam  IVPB 3 Gram(s) IV Intermittent every 6 hours (D4)  ascorbic acid 500 milliGRAM(s) Oral daily  atorvastatin 40 milliGRAM(s) Oral at bedtime  buPROPion XL (24-Hour) . 150 milliGRAM(s) Oral daily  chlorhexidine 2% Cloths 1 Application(s) Topical <User Schedule>  dextrose 5% + sodium chloride 0.45%. 1000 milliLiter(s) IV Continuous <Continuous>  ferrous    sulfate 325 milliGRAM(s) Oral two times a day  gabapentin 800 milliGRAM(s) Oral three times a day  insulin lispro (ADMELOG) corrective regimen sliding scale   SubCutaneous three times a day before meals  insulin lispro (ADMELOG) corrective regimen sliding scale   SubCutaneous at bedtime  lidocaine 1% Injectable 10 milliLiter(s) Local Injection once  lisinopril 2.5 milliGRAM(s) Oral daily  oxycodone    5 mG/acetaminophen 325 mG 1 Tablet(s) Oral every 6 hours PRN  polyethylene glycol 3350 17 Gram(s) Oral daily  senna 2 Tablet(s) Oral at bedtime      PHYSICAL EXAM:    Vital Signs Last 24 Hrs  T(C): 36.6 (23 Dec 2021 05:21), Max: 36.9 (22 Dec 2021 22:44)  T(F): 97.9 (23 Dec 2021 05:21), Max: 98.4 (22 Dec 2021 22:44)  HR: 76 (23 Dec 2021 05:21) (65 - 80)  BP: 125/77 (23 Dec 2021 05:21) (125/77 - 162/82)  BP(mean): 88 (22 Dec 2021 23:49) (82 - 95)  RR: 16 (23 Dec 2021 05:21) (13 - 18)  SpO2: 99% (23 Dec 2021 05:21) (95% - 100%)      GEN: WD WN W male in NAD    CHEST/Respiratory: clear to auscultation    Cardiovascular: S1S2 irregular beats with no murmurs, gallops, rubs    Gastrointestinal: soft, nontender with BS present; scar noted on lower abd (tummy tuck scar)    Genitourinary: no diaz     Extremities: hyperpigmented changes noted on bilateral lower extremities on tibia; onychomycosis on bilateral toes   LLE: covered with dressing; no tenderness to palpation  RLE: no erythema or swelling    Neurological: A+O x3    Skin: no rashes      LABS/DIAGNOSTIC TESTS                            7.3    3.42  )-----------( 118      ( 22 Dec 2021 23:39 )             22.7       WBC Count: 3.42 K/uL (12-22 @ 23:39)  WBC Count: 4.31 K/uL (12-22 @ 07:55)  WBC Count: 4.89 K/uL (12-21 @ 08:16)      12-22    142  |  109<H>  |  22<H>  ----------------------------<  145<H>  4.3   |  24  |  1.22    Ca    9.2      22 Dec 2021 07:55  Phos  2.7     12-22  Mg     2.1     12-22      CULTURES      Culture - Tissue with Gram Stain (collected 12-23-21 @ 04:15)  Source: Bone Left foot bone biopsy culture  Gram Stain (12-23-21 @ 07:02):    Rare polymorphonuclear leukocytes seen per low power field    No organisms seen per oil power field    Culture - Surgical Swab (collected 12-20-21 @ 21:08)  Source: .Surgical Swab Left foot wound  Preliminary Report (12-22-21 @ 19:55):    Few Alpha hemolytic strep "Susceptibilities not performed"    Rare Staphylococcus aureus    Rare Citrobacter koseri    Few Veillonella parvula "Susceptibilities not performed"  Organism: Staphylococcus aureus (12-22-21 @ 17:52)  Organism: Staphylococcus aureus (12-22-21 @ 17:52)      -  Ampicillin/Sulbactam: S <=8/4      -  Cefazolin: S <=4      -  Clindamycin: R <=0.25 This isolate is presumed to be clindamycin resistant based on detection of inducible resistance. Clindamycin may still be effective in some patients.      -  Erythromycin: R >4      -  Gentamicin: S 2 Should not be used as monotherapy      -  Oxacillin: S <=0.25      -  Rifampin: S <=1 Should not be used as monotherapy      -  Tetra/Doxy: S <=1      -  Trimethoprim/Sulfamethoxazole: S <=0.5/9.5      -  Vancomycin: S 2      Method Type: CHATO    Culture - Blood (collected 12-20-21 @ 05:34)  Source: .Blood Blood-Peripheral  Gram Stain (12-21-21 @ 10:36):    Growth in aerobic bottle: Gram Positive Cocci in Clusters    Growth in anaerobic bottle: Gram positive cocci in pairs  Final Report (12-23-21 @ 06:23):    Growth in aerobic bottle: Staphylococcus aureus    Growth in anaerobic bottle: Streptococcus mitis/oralis group    Alpha hemolytic strep    (not Strep. pneumoniae or Enterococcus)    Single set isolate, possible contaminant. Contact    Microbiology if susceptibility testing clinically    indicated.    ***Blood Panel PCR results on this specimen are available    approximately 3 hours after the Gram stain result.***    Gram stain, PCR, and/or culture results may not always    correspond due to difference in methodologies.    ************************************************************    This PCR assay was performed by multiplex PCR. This    Assay tests for 66 bacterial and resistance gene targets.    Please refer to the [facility] test directory    at [email or web address] for details.  Organism: Blood Culture PCR  Blood Culture PCR  Staphylococcus aureus (12-23-21 @ 06:23)  Organism: Staphylococcus aureus (12-23-21 @ 06:23)      -  Ampicillin/Sulbactam: S <=8/4      -  Cefazolin: S <=4      -  Clindamycin: R <=0.25 This isolate is presumed to be clindamycin resistant based on detection of inducible resistance. Clindamycin may still be effective in some patients.      -  Erythromycin: R >4      -  Gentamicin: S <=1 Should not be used as monotherapy      -  Oxacillin: S <=0.25      -  Rifampin: S <=1 Should not be used as monotherapy      -  Tetra/Doxy: S <=1      -  Trimethoprim/Sulfamethoxazole: S <=0.5/9.5      -  Vancomycin: S 1      Method Type: CHATO  Organism: Blood Culture PCR (12-23-21 @ 06:23)      -  Streptococcus sp. (Not Grp A, B or S pneumoniae): Detec      Method Type: PCR  Organism: Blood Culture PCR (12-23-21 @ 06:23)      -  Staphylococcus aureus: Detec Any isolate of Staphylococcus aureus from a blood culture is NOT considered a contaminant.      Method Type: PCR    Culture - Blood (collected 12-20-21 @ 05:34)  Source: .Blood Blood-Peripheral  Gram Stain (12-21-21 @ 22:53):    Growth in aerobic bottle: Gram positive cocci in pairs  Final Report (12-23-21 @ 06:23):    Growth in aerobic bottle: Staphylococcus aureus    See previous culture [id number]    Culture - Urine (collected 12-20-21 @ 04:57)  Source: Clean Catch Clean Catch (Midstream)  Final Report (12-21-21 @ 08:24):    <10,000 CFU/mL Normal Urogenital Melissa    RADIOLOGY  < from: Transthoracic Echocardiogram (12.22.21 @ 07:28) >  PROCEDURE: Transthoracic echocardiogram with 2-D, M-Mode  and complete spectral and color flow Doppler.  INDICATION: Acute and subacute infective endocarditis  (I33.0)  HISTORY:  ------------------------------------------------------------------------  DIMENSIONS:  Dimensions:     Normal Values:  LA:     4.6 cm    2.0 - 4.0 cm  Ao:     4.0 cm    2.0 - 3.8 cm  SEPTUM: 1.0 cm   0.6 - 1.2 cm  PWT:    0.9 cm    0.6 - 1.1 cm  LVIDd:  5.4 cm    3.0 - 5.6 cm  LVIDs:  3.5 cm    1.8 - 4.0 cm      Derived Variables:  LVMI: 93 g/m2  RWT: 0.33  Ejection Fraction Visual Estimate: >55 %    ------------------------------------------------------------------------  OBSERVATIONS:  Mitral Valve: Mitral annular calcification. Moderate mitral  regurgitation.  Aortic Root: Normal aortic root.  Aortic Valve: Trileaflet aortic valve.  The aortic valve  leaflets appear thickened, cannot exclude a vegetation.  Mild aortic regurgitation.  Left Atrium: Moderate left atrial enlargement.  Left Ventricle: Endocardium not well visualized; grossly  normal left ventricular systolic function. Normal left  ventricular internal dimensions and wallthicknesses.  Right Heart: Normal right atrium. Normal right ventricular  size and function. There is mild-moderate tricuspid  regurgitation. There is trace pulmonic regurgitation.  Pericardium/PleuraSmall pericardial effusion.   No  echocardiographic evidence of tamponade physiology.  Hemodynamic: RA Pressure is 10 mm Hg. RV systolic pressure  is 44 mm Hg.   Mild pulmonary hypertension.  ------------------------------------------------------------------------  CONCLUSIONS:  1. Mitral annular calcification. Moderate mitral  regurgitation.  2. Trileaflet aortic valve.  The aortic valve leaflets  appear thickened, cannot exclude a vegetation. Mild aortic  regurgitation.  3. Moderate left atrial enlargement.  4. Normal left ventricular internal dimensions and wall  thicknesses.  5. Endocardium not well visualized; grossly normal left  ventricular systolic function.  6. Normal right ventricular size and function.  7. RV systolic pressure is 44 mm Hg.   Mild pulmonary  hypertension.  8. Small pericardial effusion.   No echocardiographic  evidence of tamponade physiology.  9. Cannot exclude the presence of a vegetation on this  study.  Consider ORLY if clinically appropriate.     Subjective/objective: No elevated temp noted since 12/21. Pt underwent I&D of left foot yesterday. Pt c/o chronic back pain.       MEDS  acetaminophen     Tablet .. 650 milliGRAM(s) Oral every 6 hours PRN  ampicillin/sulbactam  IVPB      ampicillin/sulbactam  IVPB 3 Gram(s) IV Intermittent every 6 hours (D4)  ascorbic acid 500 milliGRAM(s) Oral daily  atorvastatin 40 milliGRAM(s) Oral at bedtime  buPROPion XL (24-Hour) . 150 milliGRAM(s) Oral daily  chlorhexidine 2% Cloths 1 Application(s) Topical <User Schedule>  dextrose 5% + sodium chloride 0.45%. 1000 milliLiter(s) IV Continuous <Continuous>  ferrous    sulfate 325 milliGRAM(s) Oral two times a day  gabapentin 800 milliGRAM(s) Oral three times a day  insulin lispro (ADMELOG) corrective regimen sliding scale   SubCutaneous three times a day before meals  insulin lispro (ADMELOG) corrective regimen sliding scale   SubCutaneous at bedtime  lidocaine 1% Injectable 10 milliLiter(s) Local Injection once  lisinopril 2.5 milliGRAM(s) Oral daily  oxycodone    5 mG/acetaminophen 325 mG 1 Tablet(s) Oral every 6 hours PRN  polyethylene glycol 3350 17 Gram(s) Oral daily  senna 2 Tablet(s) Oral at bedtime      PHYSICAL EXAM:    Vital Signs Last 24 Hrs  T(C): 36.6 (23 Dec 2021 05:21), Max: 36.9 (22 Dec 2021 22:44)  T(F): 97.9 (23 Dec 2021 05:21), Max: 98.4 (22 Dec 2021 22:44)  HR: 76 (23 Dec 2021 05:21) (65 - 80)  BP: 125/77 (23 Dec 2021 05:21) (125/77 - 162/82)  BP(mean): 88 (22 Dec 2021 23:49) (82 - 95)  RR: 16 (23 Dec 2021 05:21) (13 - 18)  SpO2: 99% (23 Dec 2021 05:21) (95% - 100%)      GEN: WD WN W male in NAD    HEENT: NC/AT, blisters noted on upper lip     CHEST/Respiratory: clear to auscultation    Cardiovascular: S1S2 irregular beats with no murmurs, gallops, rubs    Gastrointestinal: soft, nontender with BS present; scar noted on lower abd (tummy tuck scar)    Genitourinary: no diaz     Extremities: hyperpigmented changes noted on bilateral lower extremities on tibia; onychomycosis on bilateral toes   LLE: covered with dressing; no tenderness to palpation  RLE: no erythema or swelling    Neurological: A+O x3    Skin: no rashes      LABS/DIAGNOSTIC TESTS                            7.3    3.42  )-----------( 118      ( 22 Dec 2021 23:39 )             22.7       WBC Count: 3.42 K/uL (12-22 @ 23:39)  WBC Count: 4.31 K/uL (12-22 @ 07:55)  WBC Count: 4.89 K/uL (12-21 @ 08:16)      12-22    142  |  109<H>  |  22<H>  ----------------------------<  145<H>  4.3   |  24  |  1.22    Ca    9.2      22 Dec 2021 07:55  Phos  2.7     12-22  Mg     2.1     12-22      CULTURES      Culture - Tissue with Gram Stain (collected 12-23-21 @ 04:15)  Source: Bone Left foot bone biopsy culture  Gram Stain (12-23-21 @ 07:02):    Rare polymorphonuclear leukocytes seen per low power field    No organisms seen per oil power field    Culture - Surgical Swab (collected 12-20-21 @ 21:08)  Source: .Surgical Swab Left foot wound  Preliminary Report (12-22-21 @ 19:55):    Few Alpha hemolytic strep "Susceptibilities not performed"    Rare Staphylococcus aureus    Rare Citrobacter koseri    Few Veillonella parvula "Susceptibilities not performed"  Organism: Staphylococcus aureus (12-22-21 @ 17:52)  Organism: Staphylococcus aureus (12-22-21 @ 17:52)      -  Ampicillin/Sulbactam: S <=8/4      -  Cefazolin: S <=4      -  Clindamycin: R <=0.25 This isolate is presumed to be clindamycin resistant based on detection of inducible resistance. Clindamycin may still be effective in some patients.      -  Erythromycin: R >4      -  Gentamicin: S 2 Should not be used as monotherapy      -  Oxacillin: S <=0.25      -  Rifampin: S <=1 Should not be used as monotherapy      -  Tetra/Doxy: S <=1      -  Trimethoprim/Sulfamethoxazole: S <=0.5/9.5      -  Vancomycin: S 2      Method Type: CHATO    Culture - Blood (collected 12-20-21 @ 05:34)  Source: .Blood Blood-Peripheral  Gram Stain (12-21-21 @ 10:36):    Growth in aerobic bottle: Gram Positive Cocci in Clusters    Growth in anaerobic bottle: Gram positive cocci in pairs  Final Report (12-23-21 @ 06:23):    Growth in aerobic bottle: Staphylococcus aureus    Growth in anaerobic bottle: Streptococcus mitis/oralis group    Alpha hemolytic strep    (not Strep. pneumoniae or Enterococcus)    Single set isolate, possible contaminant. Contact    Microbiology if susceptibility testing clinically    indicated.    ***Blood Panel PCR results on this specimen are available    approximately 3 hours after the Gram stain result.***    Gram stain, PCR, and/or culture results may not always    correspond due to difference in methodologies.    ************************************************************    This PCR assay was performed by multiplex PCR. This    Assay tests for 66 bacterial and resistance gene targets.    Please refer to the [facility] test directory    at [email or web address] for details.  Organism: Blood Culture PCR  Blood Culture PCR  Staphylococcus aureus (12-23-21 @ 06:23)  Organism: Staphylococcus aureus (12-23-21 @ 06:23)      -  Ampicillin/Sulbactam: S <=8/4      -  Cefazolin: S <=4      -  Clindamycin: R <=0.25 This isolate is presumed to be clindamycin resistant based on detection of inducible resistance. Clindamycin may still be effective in some patients.      -  Erythromycin: R >4      -  Gentamicin: S <=1 Should not be used as monotherapy      -  Oxacillin: S <=0.25      -  Rifampin: S <=1 Should not be used as monotherapy      -  Tetra/Doxy: S <=1      -  Trimethoprim/Sulfamethoxazole: S <=0.5/9.5      -  Vancomycin: S 1      Method Type: CHATO  Organism: Blood Culture PCR (12-23-21 @ 06:23)      -  Streptococcus sp. (Not Grp A, B or S pneumoniae): Detec      Method Type: PCR  Organism: Blood Culture PCR (12-23-21 @ 06:23)      -  Staphylococcus aureus: Detec Any isolate of Staphylococcus aureus from a blood culture is NOT considered a contaminant.      Method Type: PCR    Culture - Blood (collected 12-20-21 @ 05:34)  Source: .Blood Blood-Peripheral  Gram Stain (12-21-21 @ 22:53):    Growth in aerobic bottle: Gram positive cocci in pairs  Final Report (12-23-21 @ 06:23):    Growth in aerobic bottle: Staphylococcus aureus    See previous culture [id number]    Culture - Urine (collected 12-20-21 @ 04:57)  Source: Clean Catch Clean Catch (Midstream)  Final Report (12-21-21 @ 08:24):    <10,000 CFU/mL Normal Urogenital Melissa    RADIOLOGY  < from: Transthoracic Echocardiogram (12.22.21 @ 07:28) >  PROCEDURE: Transthoracic echocardiogram with 2-D, M-Mode  and complete spectral and color flow Doppler.  INDICATION: Acute and subacute infective endocarditis  (I33.0)  HISTORY:  ------------------------------------------------------------------------  DIMENSIONS:  Dimensions:     Normal Values:  LA:     4.6 cm    2.0 - 4.0 cm  Ao:     4.0 cm    2.0 - 3.8 cm  SEPTUM: 1.0 cm   0.6 - 1.2 cm  PWT:    0.9 cm    0.6 - 1.1 cm  LVIDd:  5.4 cm    3.0 - 5.6 cm  LVIDs:  3.5 cm    1.8 - 4.0 cm      Derived Variables:  LVMI: 93 g/m2  RWT: 0.33  Ejection Fraction Visual Estimate: >55 %    ------------------------------------------------------------------------  OBSERVATIONS:  Mitral Valve: Mitral annular calcification. Moderate mitral  regurgitation.  Aortic Root: Normal aortic root.  Aortic Valve: Trileaflet aortic valve.  The aortic valve  leaflets appear thickened, cannot exclude a vegetation.  Mild aortic regurgitation.  Left Atrium: Moderate left atrial enlargement.  Left Ventricle: Endocardium not well visualized; grossly  normal left ventricular systolic function. Normal left  ventricular internal dimensions and wallthicknesses.  Right Heart: Normal right atrium. Normal right ventricular  size and function. There is mild-moderate tricuspid  regurgitation. There is trace pulmonic regurgitation.  Pericardium/PleuraSmall pericardial effusion.   No  echocardiographic evidence of tamponade physiology.  Hemodynamic: RA Pressure is 10 mm Hg. RV systolic pressure  is 44 mm Hg.   Mild pulmonary hypertension.  ------------------------------------------------------------------------  CONCLUSIONS:  1. Mitral annular calcification. Moderate mitral  regurgitation.  2. Trileaflet aortic valve.  The aortic valve leaflets  appear thickened, cannot exclude a vegetation. Mild aortic  regurgitation.  3. Moderate left atrial enlargement.  4. Normal left ventricular internal dimensions and wall  thicknesses.  5. Endocardium not well visualized; grossly normal left  ventricular systolic function.  6. Normal right ventricular size and function.  7. RV systolic pressure is 44 mm Hg.   Mild pulmonary  hypertension.  8. Small pericardial effusion.   No echocardiographic  evidence of tamponade physiology.  9. Cannot exclude the presence of a vegetation on this  study.  Consider ORLY if clinically appropriate.     Subjective/objective: No elevated temp noted since 12/21. Pt underwent OR I&D of left foot yesterday. Pt c/o chronic back pain.       MEDS  acetaminophen     Tablet .. 650 milliGRAM(s) Oral every 6 hours PRN  ampicillin/sulbactam  IVPB      ampicillin/sulbactam  IVPB 3 Gram(s) IV Intermittent every 6 hours (D4)  ascorbic acid 500 milliGRAM(s) Oral daily  atorvastatin 40 milliGRAM(s) Oral at bedtime  buPROPion XL (24-Hour) . 150 milliGRAM(s) Oral daily  chlorhexidine 2% Cloths 1 Application(s) Topical <User Schedule>  dextrose 5% + sodium chloride 0.45%. 1000 milliLiter(s) IV Continuous <Continuous>  ferrous    sulfate 325 milliGRAM(s) Oral two times a day  gabapentin 800 milliGRAM(s) Oral three times a day  insulin lispro (ADMELOG) corrective regimen sliding scale   SubCutaneous three times a day before meals  insulin lispro (ADMELOG) corrective regimen sliding scale   SubCutaneous at bedtime  lidocaine 1% Injectable 10 milliLiter(s) Local Injection once  lisinopril 2.5 milliGRAM(s) Oral daily  oxycodone    5 mG/acetaminophen 325 mG 1 Tablet(s) Oral every 6 hours PRN  polyethylene glycol 3350 17 Gram(s) Oral daily  senna 2 Tablet(s) Oral at bedtime      PHYSICAL EXAM:    Vital Signs Last 24 Hrs  T(C): 36.6 (23 Dec 2021 05:21), Max: 36.9 (22 Dec 2021 22:44)  T(F): 97.9 (23 Dec 2021 05:21), Max: 98.4 (22 Dec 2021 22:44)  HR: 76 (23 Dec 2021 05:21) (65 - 80)  BP: 125/77 (23 Dec 2021 05:21) (125/77 - 162/82)  BP(mean): 88 (22 Dec 2021 23:49) (82 - 95)  RR: 16 (23 Dec 2021 05:21) (13 - 18)  SpO2: 99% (23 Dec 2021 05:21) (95% - 100%)      GEN: WD WN W male in NAD    HEENT: NC/AT, blisters noted on upper lip     CHEST/Respiratory: clear to auscultation    Cardiovascular: S1S2 irregular beats with no murmurs, gallops, rubs    Gastrointestinal: soft, nontender with BS present; scar noted on lower abd (tummy tuck scar)    Genitourinary: no diaz     Extremities: hyperpigmented changes noted on bilateral lower extremities on tibia; onychomycosis on bilateral toes   LLE: covered with dressing; no tenderness to palpation  RLE: no erythema or swelling    Neurological: A+O x3    Skin: no rashes      LABS/DIAGNOSTIC TESTS                            7.3    3.42  )-----------( 118      ( 22 Dec 2021 23:39 )             22.7       WBC Count: 3.42 K/uL (12-22 @ 23:39)  WBC Count: 4.31 K/uL (12-22 @ 07:55)  WBC Count: 4.89 K/uL (12-21 @ 08:16)      12-22    142  |  109<H>  |  22<H>  ----------------------------<  145<H>  4.3   |  24  |  1.22    Ca    9.2      22 Dec 2021 07:55  Phos  2.7     12-22  Mg     2.1     12-22      CULTURES      Culture - Tissue with Gram Stain (collected 12-23-21 @ 04:15)  Source: Bone Left foot bone biopsy culture  Gram Stain (12-23-21 @ 07:02):    Rare polymorphonuclear leukocytes seen per low power field    No organisms seen per oil power field    Culture - Surgical Swab (collected 12-20-21 @ 21:08)  Source: .Surgical Swab Left foot wound  Preliminary Report (12-22-21 @ 19:55):    Few Alpha hemolytic strep "Susceptibilities not performed"    Rare Staphylococcus aureus    Rare Citrobacter koseri    Few Veillonella parvula "Susceptibilities not performed"  Organism: Staphylococcus aureus (12-22-21 @ 17:52)  Organism: Staphylococcus aureus (12-22-21 @ 17:52)      -  Ampicillin/Sulbactam: S <=8/4      -  Cefazolin: S <=4      -  Clindamycin: R <=0.25 This isolate is presumed to be clindamycin resistant based on detection of inducible resistance. Clindamycin may still be effective in some patients.      -  Erythromycin: R >4      -  Gentamicin: S 2 Should not be used as monotherapy      -  Oxacillin: S <=0.25      -  Rifampin: S <=1 Should not be used as monotherapy      -  Tetra/Doxy: S <=1      -  Trimethoprim/Sulfamethoxazole: S <=0.5/9.5      -  Vancomycin: S 2      Method Type: CHATO    Culture - Blood (collected 12-20-21 @ 05:34)  Source: .Blood Blood-Peripheral  Gram Stain (12-21-21 @ 10:36):    Growth in aerobic bottle: Gram Positive Cocci in Clusters    Growth in anaerobic bottle: Gram positive cocci in pairs  Final Report (12-23-21 @ 06:23):    Growth in aerobic bottle: Staphylococcus aureus    Growth in anaerobic bottle: Streptococcus mitis/oralis group    Alpha hemolytic strep    (not Strep. pneumoniae or Enterococcus)    Single set isolate, possible contaminant. Contact    Microbiology if susceptibility testing clinically    indicated.    ***Blood Panel PCR results on this specimen are available    approximately 3 hours after the Gram stain result.***    Gram stain, PCR, and/or culture results may not always    correspond due to difference in methodologies.    ************************************************************    This PCR assay was performed by multiplex PCR. This    Assay tests for 66 bacterial and resistance gene targets.    Please refer to the [facility] test directory    at [email or web address] for details.  Organism: Blood Culture PCR  Blood Culture PCR  Staphylococcus aureus (12-23-21 @ 06:23)  Organism: Staphylococcus aureus (12-23-21 @ 06:23)      -  Ampicillin/Sulbactam: S <=8/4      -  Cefazolin: S <=4      -  Clindamycin: R <=0.25 This isolate is presumed to be clindamycin resistant based on detection of inducible resistance. Clindamycin may still be effective in some patients.      -  Erythromycin: R >4      -  Gentamicin: S <=1 Should not be used as monotherapy      -  Oxacillin: S <=0.25      -  Rifampin: S <=1 Should not be used as monotherapy      -  Tetra/Doxy: S <=1      -  Trimethoprim/Sulfamethoxazole: S <=0.5/9.5      -  Vancomycin: S 1      Method Type: CHATO  Organism: Blood Culture PCR (12-23-21 @ 06:23)      -  Streptococcus sp. (Not Grp A, B or S pneumoniae): Detec      Method Type: PCR  Organism: Blood Culture PCR (12-23-21 @ 06:23)      -  Staphylococcus aureus: Detec Any isolate of Staphylococcus aureus from a blood culture is NOT considered a contaminant.      Method Type: PCR    Culture - Blood (collected 12-20-21 @ 05:34)  Source: .Blood Blood-Peripheral  Gram Stain (12-21-21 @ 22:53):    Growth in aerobic bottle: Gram positive cocci in pairs  Final Report (12-23-21 @ 06:23):    Growth in aerobic bottle: Staphylococcus aureus    See previous culture [id number]    Culture - Urine (collected 12-20-21 @ 04:57)  Source: Clean Catch Clean Catch (Midstream)  Final Report (12-21-21 @ 08:24):    <10,000 CFU/mL Normal Urogenital Melissa    RADIOLOGY  < from: Transthoracic Echocardiogram (12.22.21 @ 07:28) >  PROCEDURE: Transthoracic echocardiogram with 2-D, M-Mode  and complete spectral and color flow Doppler.  INDICATION: Acute and subacute infective endocarditis  (I33.0)  HISTORY:  ------------------------------------------------------------------------  DIMENSIONS:  Dimensions:     Normal Values:  LA:     4.6 cm    2.0 - 4.0 cm  Ao:     4.0 cm    2.0 - 3.8 cm  SEPTUM: 1.0 cm   0.6 - 1.2 cm  PWT:    0.9 cm    0.6 - 1.1 cm  LVIDd:  5.4 cm    3.0 - 5.6 cm  LVIDs:  3.5 cm    1.8 - 4.0 cm      Derived Variables:  LVMI: 93 g/m2  RWT: 0.33  Ejection Fraction Visual Estimate: >55 %    ------------------------------------------------------------------------  OBSERVATIONS:  Mitral Valve: Mitral annular calcification. Moderate mitral  regurgitation.  Aortic Root: Normal aortic root.  Aortic Valve: Trileaflet aortic valve.  The aortic valve  leaflets appear thickened, cannot exclude a vegetation.  Mild aortic regurgitation.  Left Atrium: Moderate left atrial enlargement.  Left Ventricle: Endocardium not well visualized; grossly  normal left ventricular systolic function. Normal left  ventricular internal dimensions and wallthicknesses.  Right Heart: Normal right atrium. Normal right ventricular  size and function. There is mild-moderate tricuspid  regurgitation. There is trace pulmonic regurgitation.  Pericardium/PleuraSmall pericardial effusion.   No  echocardiographic evidence of tamponade physiology.  Hemodynamic: RA Pressure is 10 mm Hg. RV systolic pressure  is 44 mm Hg.   Mild pulmonary hypertension.  ------------------------------------------------------------------------  CONCLUSIONS:  1. Mitral annular calcification. Moderate mitral  regurgitation.  2. Trileaflet aortic valve.  The aortic valve leaflets  appear thickened, cannot exclude a vegetation. Mild aortic  regurgitation.  3. Moderate left atrial enlargement.  4. Normal left ventricular internal dimensions and wall  thicknesses.  5. Endocardium not well visualized; grossly normal left  ventricular systolic function.  6. Normal right ventricular size and function.  7. RV systolic pressure is 44 mm Hg.   Mild pulmonary  hypertension.  8. Small pericardial effusion.   No echocardiographic  evidence of tamponade physiology.  9. Cannot exclude the presence of a vegetation on this  study.  Consider ORLY if clinically appropriate.     Subjective/objective: No elevated temp noted since 12/21. Pt underwent OR I&D of left foot yesterday. Pt c/o chronic back pain.       MEDS  acetaminophen     Tablet .. 650 milliGRAM(s) Oral every 6 hours PRN  ampicillin/sulbactam  IVPB      ampicillin/sulbactam  IVPB 3 Gram(s) IV Intermittent every 6 hours (D4)  ascorbic acid 500 milliGRAM(s) Oral daily  atorvastatin 40 milliGRAM(s) Oral at bedtime  buPROPion XL (24-Hour) . 150 milliGRAM(s) Oral daily  chlorhexidine 2% Cloths 1 Application(s) Topical <User Schedule>  dextrose 5% + sodium chloride 0.45%. 1000 milliLiter(s) IV Continuous <Continuous>  ferrous    sulfate 325 milliGRAM(s) Oral two times a day  gabapentin 800 milliGRAM(s) Oral three times a day  insulin lispro (ADMELOG) corrective regimen sliding scale   SubCutaneous three times a day before meals  insulin lispro (ADMELOG) corrective regimen sliding scale   SubCutaneous at bedtime  lidocaine 1% Injectable 10 milliLiter(s) Local Injection once  lisinopril 2.5 milliGRAM(s) Oral daily  oxycodone    5 mG/acetaminophen 325 mG 1 Tablet(s) Oral every 6 hours PRN  polyethylene glycol 3350 17 Gram(s) Oral daily  senna 2 Tablet(s) Oral at bedtime      PHYSICAL EXAM:    Vital Signs Last 24 Hrs  T(C): 36.6 (23 Dec 2021 05:21), Max: 36.9 (22 Dec 2021 22:44)  T(F): 97.9 (23 Dec 2021 05:21), Max: 98.4 (22 Dec 2021 22:44)  HR: 76 (23 Dec 2021 05:21) (65 - 80)  BP: 125/77 (23 Dec 2021 05:21) (125/77 - 162/82)  BP(mean): 88 (22 Dec 2021 23:49) (82 - 95)  RR: 16 (23 Dec 2021 05:21) (13 - 18)  SpO2: 99% (23 Dec 2021 05:21) (95% - 100%)      GEN: WD WN W male in NAD    HEENT: NC/AT, blisters noted on upper lip     CHEST/Respiratory: clear to auscultation    Cardiovascular: S1S2 irregular beats with no murmurs, gallops, rubs    Gastrointestinal: soft, nontender with BS present; scar noted on lower abd (tummy tuck scar)    Genitourinary: no diaz     Extremities: hyperpigmented changes noted on bilateral lower extremities on tibia; onychomycosis on bilateral toes   LLE: approx. 10 cm longitudinal incision site medial aspect of foot with packing. Bleeding noted.   RLE: no erythema or swelling    Neurological: A+O x3    Skin: no rashes      LABS/DIAGNOSTIC TESTS                        7.3    3.42  )-----------( 118      ( 22 Dec 2021 23:39 )             22.7       WBC Count: 3.42 K/uL (12-22 @ 23:39)  WBC Count: 4.31 K/uL (12-22 @ 07:55)  WBC Count: 4.89 K/uL (12-21 @ 08:16)      12-22    142  |  109<H>  |  22<H>  ----------------------------<  145<H>  4.3   |  24  |  1.22    Ca    9.2      22 Dec 2021 07:55  Phos  2.7     12-22  Mg     2.1     12-22      CULTURES      Culture - Tissue with Gram Stain (collected 12-23-21 @ 04:15)  Source: Bone Left foot bone biopsy culture  Gram Stain (12-23-21 @ 07:02):    Rare polymorphonuclear leukocytes seen per low power field    No organisms seen per oil power field    Culture - Surgical Swab (collected 12-20-21 @ 21:08)  Source: .Surgical Swab Left foot wound  Preliminary Report (12-22-21 @ 19:55):    Few Alpha hemolytic strep "Susceptibilities not performed"    Rare Staphylococcus aureus    Rare Citrobacter koseri    Few Veillonella parvula "Susceptibilities not performed"  Organism: Staphylococcus aureus (12-22-21 @ 17:52)  Organism: Staphylococcus aureus (12-22-21 @ 17:52)      -  Ampicillin/Sulbactam: S <=8/4      -  Cefazolin: S <=4      -  Clindamycin: R <=0.25 This isolate is presumed to be clindamycin resistant based on detection of inducible resistance. Clindamycin may still be effective in some patients.      -  Erythromycin: R >4      -  Gentamicin: S 2 Should not be used as monotherapy      -  Oxacillin: S <=0.25      -  Rifampin: S <=1 Should not be used as monotherapy      -  Tetra/Doxy: S <=1      -  Trimethoprim/Sulfamethoxazole: S <=0.5/9.5      -  Vancomycin: S 2      Method Type: CHATO    Culture - Blood (collected 12-20-21 @ 05:34)  Source: .Blood Blood-Peripheral  Gram Stain (12-21-21 @ 10:36):    Growth in aerobic bottle: Gram Positive Cocci in Clusters    Growth in anaerobic bottle: Gram positive cocci in pairs  Final Report (12-23-21 @ 06:23):    Growth in aerobic bottle: Staphylococcus aureus    Growth in anaerobic bottle: Streptococcus mitis/oralis group    Alpha hemolytic strep    (not Strep. pneumoniae or Enterococcus)    Single set isolate, possible contaminant. Contact    Microbiology if susceptibility testing clinically    indicated.    ***Blood Panel PCR results on this specimen are available    approximately 3 hours after the Gram stain result.***    Gram stain, PCR, and/or culture results may not always    correspond due to difference in methodologies.    ************************************************************    This PCR assay was performed by multiplex PCR. This    Assay tests for 66 bacterial and resistance gene targets.    Please refer to the [facility] test directory    at [email or web address] for details.  Organism: Blood Culture PCR  Blood Culture PCR  Staphylococcus aureus (12-23-21 @ 06:23)  Organism: Staphylococcus aureus (12-23-21 @ 06:23)      -  Ampicillin/Sulbactam: S <=8/4      -  Cefazolin: S <=4      -  Clindamycin: R <=0.25 This isolate is presumed to be clindamycin resistant based on detection of inducible resistance. Clindamycin may still be effective in some patients.      -  Erythromycin: R >4      -  Gentamicin: S <=1 Should not be used as monotherapy      -  Oxacillin: S <=0.25      -  Rifampin: S <=1 Should not be used as monotherapy      -  Tetra/Doxy: S <=1      -  Trimethoprim/Sulfamethoxazole: S <=0.5/9.5      -  Vancomycin: S 1      Method Type: CHATO  Organism: Blood Culture PCR (12-23-21 @ 06:23)      -  Streptococcus sp. (Not Grp A, B or S pneumoniae): Detec      Method Type: PCR  Organism: Blood Culture PCR (12-23-21 @ 06:23)      -  Staphylococcus aureus: Detec Any isolate of Staphylococcus aureus from a blood culture is NOT considered a contaminant.      Method Type: PCR    Culture - Blood (collected 12-20-21 @ 05:34)  Source: .Blood Blood-Peripheral  Gram Stain (12-21-21 @ 22:53):    Growth in aerobic bottle: Gram positive cocci in pairs  Final Report (12-23-21 @ 06:23):    Growth in aerobic bottle: Staphylococcus aureus    See previous culture [id number]    Culture - Urine (collected 12-20-21 @ 04:57)  Source: Clean Catch Clean Catch (Midstream)  Final Report (12-21-21 @ 08:24):    <10,000 CFU/mL Normal Urogenital Melissa    RADIOLOGY  < from: Transthoracic Echocardiogram (12.22.21 @ 07:28) >  PROCEDURE: Transthoracic echocardiogram with 2-D, M-Mode  and complete spectral and color flow Doppler.  INDICATION: Acute and subacute infective endocarditis  (I33.0)  HISTORY:  ------------------------------------------------------------------------  DIMENSIONS:  Dimensions:     Normal Values:  LA:     4.6 cm    2.0 - 4.0 cm  Ao:     4.0 cm    2.0 - 3.8 cm  SEPTUM: 1.0 cm   0.6 - 1.2 cm  PWT:    0.9 cm    0.6 - 1.1 cm  LVIDd:  5.4 cm    3.0 - 5.6 cm  LVIDs:  3.5 cm    1.8 - 4.0 cm      Derived Variables:  LVMI: 93 g/m2  RWT: 0.33  Ejection Fraction Visual Estimate: >55 %    ------------------------------------------------------------------------  OBSERVATIONS:  Mitral Valve: Mitral annular calcification. Moderate mitral  regurgitation.  Aortic Root: Normal aortic root.  Aortic Valve: Trileaflet aortic valve.  The aortic valve  leaflets appear thickened, cannot exclude a vegetation.  Mild aortic regurgitation.  Left Atrium: Moderate left atrial enlargement.  Left Ventricle: Endocardium not well visualized; grossly  normal left ventricular systolic function. Normal left  ventricular internal dimensions and wallthicknesses.  Right Heart: Normal right atrium. Normal right ventricular  size and function. There is mild-moderate tricuspid  regurgitation. There is trace pulmonic regurgitation.  Pericardium/PleuraSmall pericardial effusion.   No  echocardiographic evidence of tamponade physiology.  Hemodynamic: RA Pressure is 10 mm Hg. RV systolic pressure  is 44 mm Hg.   Mild pulmonary hypertension.  ------------------------------------------------------------------------  CONCLUSIONS:  1. Mitral annular calcification. Moderate mitral  regurgitation.  2. Trileaflet aortic valve.  The aortic valve leaflets  appear thickened, cannot exclude a vegetation. Mild aortic  regurgitation.  3. Moderate left atrial enlargement.  4. Normal left ventricular internal dimensions and wall  thicknesses.  5. Endocardium not well visualized; grossly normal left  ventricular systolic function.  6. Normal right ventricular size and function.  7. RV systolic pressure is 44 mm Hg.   Mild pulmonary  hypertension.  8. Small pericardial effusion.   No echocardiographic  evidence of tamponade physiology.  9. Cannot exclude the presence of a vegetation on this  study.  Consider ORLY if clinically appropriate.

## 2021-12-23 NOTE — PROGRESS NOTE ADULT - ATTENDING COMMENTS
Patient was seen and examined at bedside  Anxious about his medical conditions     Vitals stable     P/E;  As above    Labs noted     A/P:  Will cont Unasyn, f/u repeat blood cx, surgical pathology result   ORLY tomorrow   Hemostasis achieved by podiatry team at bedside   Repeat CBC Hb 6.7, will transfuse one unit PRBC, CBC q8hrs  Hold AC  Rest of the management as above

## 2021-12-23 NOTE — PROGRESS NOTE ADULT - PROBLEM SELECTOR PLAN 4
- Patient noted to have low hgb 8-9  - Iron panel compatible with KISHOR  - C/w Ferrous sulfate and vitamin C  - Bowel regimen   - Monitor for any signs of bleeding - Patient noted to have low hgb 8-9 on admission, now 7  - S/p I & D on 12/22 by Podiatry  - Iron panel compatible with KISHOR however anemia from blood loss as well  - C/w Ferrous sulfate and vitamin C  - Type and screen, transfuse if hgb <7  - Consent in the chart   - Bowel regimen   - CBC q 8 hrs for now and post transfusion   - Monitor for any signs of bleeding - Acute blood loss anemia  - Patient noted to have low hgb 8-9 on admission, now 7>6.7  - S/p I & D on 12/22 by Podiatry  - Iron panel compatible with KISHOR however anemia from blood loss as well  - C/w Ferrous sulfate and vitamin C  - Type and screen, transfuse if hgb <7  - Consent in the chart   - Bowel regimen   - CBC q 8 hrs for now and post transfusion   - Monitor for any signs of bleeding - Acute blood loss anemia  - Patient noted to have low hgb 8-9 on admission, now 7>6.7  - S/p I & D on 12/23 by Podiatry  - Iron panel compatible with KISHOR however anemia from blood loss as well  - C/w Ferrous sulfate and vitamin C  - Type and screen, transfuse if hgb <7  - Consent in the chart   - Bowel regimen   - CBC q 8 hrs for now and post transfusion   - Monitor for any signs of bleeding

## 2021-12-23 NOTE — PROGRESS NOTE ADULT - PROBLEM SELECTOR PLAN 6
- On admission, patient with Cr 1.8, downtrended 1.2  - PATTY most likely pre renal in the setting of acute infection, poor oral intake,   - Monitor I/O's daily  - Avoid nephrotoxins, NSAIDS, ACEI and ARBS  - Monitor BMP daily

## 2021-12-23 NOTE — PROGRESS NOTE ADULT - ASSESSMENT
A:  Cellulitis, Left Foot and Ankle.  Abscess, Left Plantar Foot  Left Foot Plantar Wound  Diabetic Neuropathy, b/l  DM  S/P bedside I&D (12/20/21)  S/P OR I&D (12/23/21)    Plan:  -Patient examined and chart reviewed  -Explained all clinical findings to the patient to the patient's satisfaction.  -Educated the patient about the importance of glycemic control in wound healing  -Xrays reviewed  -MRI reviewed  -Cultures - hemolytic strep   -Dressed Left Foot with surgicel, iodoform packing, 4x4 gauze, ABD, kerlix and ace.   -Patient to keep left foot dressings dry, clean, and intact.   -Patient to be NWB to the left foot  -Medicine team is aware that the patient lost 50cc's of blood during the surgery last night.   -Recommend Left Lower Extremity elevation.  -Recommend antibiotics per ID team for Left Foot Cellulitis and Left Foot Wound  -Podiatry will follow in house.  -Discussed with attending Dr. Hart

## 2021-12-23 NOTE — PROGRESS NOTE ADULT - PROBLEM SELECTOR PLAN 7
- Patient has hx of atrial fibrillation not on any meds at home    - EKG A fib rate controlled  - Continue to monitor - Patient has hx of atrial fibrillation not on any meds at home    - EKG A fib rate controlled  - Started on full dose Lovenox for now, no plans for OR procedures by Podiatry for now   - Continue to monitor - Patient has hx of atrial fibrillation not on any meds at home    - EKG A fib rate controlled  - CHADSVASC2 4 points   - Will hold off on AC for now as patient is s/p I & D with blood loss and drop in hgb  - Continue to monitor  - Cardio Dr Liz consulted

## 2021-12-24 LAB
ANION GAP SERPL CALC-SCNC: 7 MMOL/L — SIGNIFICANT CHANGE UP (ref 5–17)
ANISOCYTOSIS BLD QL: SIGNIFICANT CHANGE UP
BUN SERPL-MCNC: 12 MG/DL — SIGNIFICANT CHANGE UP (ref 7–18)
CALCIUM SERPL-MCNC: 8.1 MG/DL — LOW (ref 8.4–10.5)
CHLORIDE SERPL-SCNC: 110 MMOL/L — HIGH (ref 96–108)
CO2 SERPL-SCNC: 24 MMOL/L — SIGNIFICANT CHANGE UP (ref 22–31)
CREAT SERPL-MCNC: 1.11 MG/DL — SIGNIFICANT CHANGE UP (ref 0.5–1.3)
GLUCOSE BLDC GLUCOMTR-MCNC: 152 MG/DL — HIGH (ref 70–99)
GLUCOSE BLDC GLUCOMTR-MCNC: 177 MG/DL — HIGH (ref 70–99)
GLUCOSE BLDC GLUCOMTR-MCNC: 191 MG/DL — HIGH (ref 70–99)
GLUCOSE BLDC GLUCOMTR-MCNC: 202 MG/DL — HIGH (ref 70–99)
GLUCOSE SERPL-MCNC: 187 MG/DL — HIGH (ref 70–99)
HCT VFR BLD CALC: 21.1 % — LOW (ref 39–50)
HCT VFR BLD CALC: 24.8 % — LOW (ref 39–50)
HGB BLD-MCNC: 6.9 G/DL — CRITICAL LOW (ref 13–17)
HGB BLD-MCNC: 8 G/DL — LOW (ref 13–17)
HYPOCHROMIA BLD QL: SLIGHT — SIGNIFICANT CHANGE UP
MAGNESIUM SERPL-MCNC: 1.6 MG/DL — SIGNIFICANT CHANGE UP (ref 1.6–2.6)
MANUAL SMEAR VERIFICATION: SIGNIFICANT CHANGE UP
MCHC RBC-ENTMCNC: 28 PG — SIGNIFICANT CHANGE UP (ref 27–34)
MCHC RBC-ENTMCNC: 28.4 PG — SIGNIFICANT CHANGE UP (ref 27–34)
MCHC RBC-ENTMCNC: 32.3 GM/DL — SIGNIFICANT CHANGE UP (ref 32–36)
MCHC RBC-ENTMCNC: 32.7 GM/DL — SIGNIFICANT CHANGE UP (ref 32–36)
MCV RBC AUTO: 85.8 FL — SIGNIFICANT CHANGE UP (ref 80–100)
MCV RBC AUTO: 87.9 FL — SIGNIFICANT CHANGE UP (ref 80–100)
MICROCYTES BLD QL: SIGNIFICANT CHANGE UP
NRBC # BLD: 0 /100 WBCS — SIGNIFICANT CHANGE UP (ref 0–0)
NRBC # BLD: 0 /100 WBCS — SIGNIFICANT CHANGE UP (ref 0–0)
PHOSPHATE SERPL-MCNC: 3.4 MG/DL — SIGNIFICANT CHANGE UP (ref 2.5–4.5)
PLAT MORPH BLD: NORMAL — SIGNIFICANT CHANGE UP
PLATELET # BLD AUTO: 123 K/UL — LOW (ref 150–400)
PLATELET # BLD AUTO: 128 K/UL — LOW (ref 150–400)
POIKILOCYTOSIS BLD QL AUTO: SLIGHT — SIGNIFICANT CHANGE UP
POTASSIUM SERPL-MCNC: 4.2 MMOL/L — SIGNIFICANT CHANGE UP (ref 3.5–5.3)
POTASSIUM SERPL-SCNC: 4.2 MMOL/L — SIGNIFICANT CHANGE UP (ref 3.5–5.3)
RBC # BLD: 2.46 M/UL — LOW (ref 4.2–5.8)
RBC # BLD: 2.82 M/UL — LOW (ref 4.2–5.8)
RBC # FLD: 13.1 % — SIGNIFICANT CHANGE UP (ref 10.3–14.5)
RBC # FLD: 13.5 % — SIGNIFICANT CHANGE UP (ref 10.3–14.5)
RBC BLD AUTO: ABNORMAL
SODIUM SERPL-SCNC: 141 MMOL/L — SIGNIFICANT CHANGE UP (ref 135–145)
SPHEROCYTES BLD QL SMEAR: SLIGHT — SIGNIFICANT CHANGE UP
WBC # BLD: 3.96 K/UL — SIGNIFICANT CHANGE UP (ref 3.8–10.5)
WBC # BLD: 4.48 K/UL — SIGNIFICANT CHANGE UP (ref 3.8–10.5)
WBC # FLD AUTO: 3.96 K/UL — SIGNIFICANT CHANGE UP (ref 3.8–10.5)
WBC # FLD AUTO: 4.48 K/UL — SIGNIFICANT CHANGE UP (ref 3.8–10.5)

## 2021-12-24 PROCEDURE — 99233 SBSQ HOSP IP/OBS HIGH 50: CPT | Mod: GC

## 2021-12-24 PROCEDURE — 99233 SBSQ HOSP IP/OBS HIGH 50: CPT

## 2021-12-24 RX ORDER — OXYCODONE AND ACETAMINOPHEN 5; 325 MG/1; MG/1
1 TABLET ORAL ONCE
Refills: 0 | Status: DISCONTINUED | OUTPATIENT
Start: 2021-12-24 | End: 2021-12-24

## 2021-12-24 RX ORDER — CEFAZOLIN SODIUM 1 G
2000 VIAL (EA) INJECTION EVERY 8 HOURS
Refills: 0 | Status: DISCONTINUED | OUTPATIENT
Start: 2021-12-24 | End: 2022-01-07

## 2021-12-24 RX ADMIN — GABAPENTIN 800 MILLIGRAM(S): 400 CAPSULE ORAL at 22:52

## 2021-12-24 RX ADMIN — AMPICILLIN SODIUM AND SULBACTAM SODIUM 200 GRAM(S): 250; 125 INJECTION, POWDER, FOR SUSPENSION INTRAMUSCULAR; INTRAVENOUS at 08:39

## 2021-12-24 RX ADMIN — SENNA PLUS 2 TABLET(S): 8.6 TABLET ORAL at 22:52

## 2021-12-24 RX ADMIN — Medication 100 MILLIGRAM(S): at 22:52

## 2021-12-24 RX ADMIN — Medication 1: at 17:08

## 2021-12-24 RX ADMIN — GABAPENTIN 800 MILLIGRAM(S): 400 CAPSULE ORAL at 06:07

## 2021-12-24 RX ADMIN — OXYCODONE AND ACETAMINOPHEN 1 TABLET(S): 5; 325 TABLET ORAL at 23:22

## 2021-12-24 RX ADMIN — Medication 325 MILLIGRAM(S): at 06:07

## 2021-12-24 RX ADMIN — ATORVASTATIN CALCIUM 40 MILLIGRAM(S): 80 TABLET, FILM COATED ORAL at 22:52

## 2021-12-24 RX ADMIN — CHLORHEXIDINE GLUCONATE 1 APPLICATION(S): 213 SOLUTION TOPICAL at 06:07

## 2021-12-24 RX ADMIN — OXYCODONE AND ACETAMINOPHEN 1 TABLET(S): 5; 325 TABLET ORAL at 07:58

## 2021-12-24 RX ADMIN — OXYCODONE AND ACETAMINOPHEN 1 TABLET(S): 5; 325 TABLET ORAL at 06:33

## 2021-12-24 RX ADMIN — Medication 2: at 08:38

## 2021-12-24 RX ADMIN — Medication 100 MILLIGRAM(S): at 13:11

## 2021-12-24 RX ADMIN — LISINOPRIL 2.5 MILLIGRAM(S): 2.5 TABLET ORAL at 06:07

## 2021-12-24 RX ADMIN — Medication 325 MILLIGRAM(S): at 17:08

## 2021-12-24 RX ADMIN — OXYCODONE AND ACETAMINOPHEN 1 TABLET(S): 5; 325 TABLET ORAL at 03:05

## 2021-12-24 RX ADMIN — OXYCODONE AND ACETAMINOPHEN 1 TABLET(S): 5; 325 TABLET ORAL at 22:52

## 2021-12-24 RX ADMIN — Medication 500 MILLIGRAM(S): at 13:11

## 2021-12-24 RX ADMIN — OXYCODONE AND ACETAMINOPHEN 1 TABLET(S): 5; 325 TABLET ORAL at 01:47

## 2021-12-24 RX ADMIN — BUPROPION HYDROCHLORIDE 150 MILLIGRAM(S): 150 TABLET, EXTENDED RELEASE ORAL at 13:11

## 2021-12-24 RX ADMIN — AMPICILLIN SODIUM AND SULBACTAM SODIUM 200 GRAM(S): 250; 125 INJECTION, POWDER, FOR SUSPENSION INTRAMUSCULAR; INTRAVENOUS at 03:03

## 2021-12-24 RX ADMIN — Medication 1: at 11:38

## 2021-12-24 RX ADMIN — GABAPENTIN 800 MILLIGRAM(S): 400 CAPSULE ORAL at 13:16

## 2021-12-24 NOTE — PROGRESS NOTE ADULT - ASSESSMENT
A:  Cellulitis, Left Foot and Ankle.  Abscess, Left Plantar Foot  Left Foot Plantar Wound  Diabetic Neuropathy, b/l  DM  S/P bedside I&D (12/20/21)  S/P OR I&D (12/23/21)    Plan:  -Patient examined and chart reviewed  -Explained all clinical findings to the patient to the patient's satisfaction.  -Educated the patient about the importance of glycemic control in wound healing  -Xrays reviewed  -MRI reviewed  -Cultures - hemolytic strep   -Flushed surgical site with saline flush. Dressed Left Foot with iodoform packing, 4x4 gauze, ABD, kerlix and ace.   -Patient to keep left foot dressings dry, clean, and intact.   -Patient to be NWB to the left foot  -Recommend Left Lower Extremity elevation.  -Recommend antibiotics per ID team for Left Foot Cellulitis and Left Foot Wound  -Podiatry will follow in house.  -Discussed with attending Dr. Hart, evaluated bedside with attending Dr. Castro

## 2021-12-24 NOTE — PROGRESS NOTE ADULT - ATTENDING COMMENTS
Patient was seen and examined at bedside  Denies any complains     Vitals noted    P/E;  As above    Labs noted     A/P:  Antibiotics changed as per ID recommendations, repeat blood cx NGTD, f/u surgical pathology result   ORLY neg for vegitation  Repeat CBC, will transfuse one unit PRBC, CBC q8hrs until stable   Hold AC for now   Rest of the management as above

## 2021-12-24 NOTE — PROGRESS NOTE ADULT - PROBLEM SELECTOR PLAN 1
- Patient with MSSA and Streptococcus bacteremia most likely secondary to left foot cellulitis/abscess  - MR left foot showed Plantar soft tissue wound with tract extending to the undersurface of the first tarsometatarsal articulation. Edema within the base of the first tarsometatarsal articulation may represent sequela of early osteomyelitis. First second and third metatarsal head subchondral fractures.  - Bcx MSSA, Streptococcus, ESR and CRP elevated    - Repeat Bcx pending from 12/22   - Off Vancomycin, discontinued Unasyn today 11/24  - Started on Cefazolin 2 g q8hers and Levaquin 750 mg daily as per ID recommendations   - MRI lumbosacral no acute findings   - Echo showed aortic valve leaflets appear thickened, cannot exclude a vegetation. Mild aortic  regurgitation. Normal ventricular function, mild pulmonary hypertension  - ORLY no endocarditis   - Cardio Dr Liz on board, holding ASA due to anemia 2/2 blood loss requiring transfusions   - ID Dr Maxwell on board   - Podiatry on board, s/p I&D (12/22)  - Monitor for any signs of hemodynamic instability - Patient with MSSA and Streptococcus bacteremia most likely secondary to left foot cellulitis/abscess  - MR left foot showed Plantar soft tissue wound with tract extending to the undersurface of the first tarsometatarsal articulation. Edema within the base of the first tarsometatarsal articulation may represent sequela of early osteomyelitis. First second and third metatarsal head subchondral fractures.  - Bcx from 12/20 methicillin susceptible S. aureus, Streptococcus mitis/oralis group, and Alpha hemolytic strep, ESR and CRP elevated    - Repeat Bcx from 12/22 NGTD  - Off Vancomycin since 12/21, discontinued Unasyn today 11/24  - Started on Cefazolin 2 g q8hers and Levaquin 750 mg daily (12/24) as per ID recommendations   - MRI lumbosacral no acute findings   - Echo showed aortic valve leaflets appear thickened, cannot exclude a vegetation. Mild aortic  regurgitation. Normal ventricular function, mild pulmonary hypertension  - ORLY no vegetations - Negative for endocarditis   - Cardio Dr Liz on board, holding ASA due to anemia 2/2 blood loss requiring transfusions   - ID Dr Maxwell on board - will dc on Cefazolin 2 g q 8hrs for 4 weeks starting from 12/22  - Podiatry on board, s/p I&D (12/22)  - Will consult IR for PICC line placement   - Monitor for any signs of hemodynamic instability - Patient with MSSA and Streptococcus bacteremia most likely secondary to left foot cellulitis/abscess  - MR left foot showed Plantar soft tissue wound with tract extending to the undersurface of the first tarsometatarsal articulation. Edema within the base of the first tarsometatarsal articulation may represent sequela of early osteomyelitis. First second and third metatarsal head subchondral fractures.  - Bcx from 12/20 methicillin susceptible S. aureus, Streptococcus mitis/oralis group, and Alpha hemolytic strep, ESR and CRP elevated    - Repeat Bcx from 12/22 NGTD  - Off Vancomycin since 12/21, discontinued Unasyn today 11/24  - Started on Cefazolin 2 g q8hers and Levaquin 750 mg daily (12/24) as per ID recommendations   - MRI lumbosacral no acute findings   - Echo showed aortic valve leaflets appear thickened, cannot exclude a vegetation. Mild aortic  regurgitation. Normal ventricular function, mild pulmonary hypertension  - ORLY no vegetations - Negative for endocarditis   - Cardio Dr Liz on board, holding ASA due to anemia 2/2 blood loss requiring transfusions   - ID Dr Maxwell on board - will dc on Cefazolin 2 g q 8hrs for 4 weeks starting from 12/22  - Podiatry on board, s/p I&D (12/23)  - Will consult IR for PICC line placement   - Monitor for any signs of hemodynamic instability - RESOLVED  - Patient with MSSA and Streptococcus bacteremia most likely secondary to left foot cellulitis/abscess  - MR left foot showed Plantar soft tissue wound with tract extending to the undersurface of the first tarsometatarsal articulation. Edema within the base of the first tarsometatarsal articulation may represent sequela of early osteomyelitis. First second and third metatarsal head subchondral fractures.  - Bcx from 12/20 methicillin susceptible S. aureus, Streptococcus mitis/oralis group, and Alpha hemolytic strep, ESR and CRP elevated    - Repeat Bcx from 12/22 NGTD  - Off Vancomycin since 12/21, discontinued Unasyn today 11/24  - Started on Cefazolin 2 g q8hers and Levaquin 750 mg daily (12/24) as per ID recommendations   - MRI lumbosacral no acute findings   - Echo showed aortic valve leaflets appear thickened, cannot exclude a vegetation. Mild aortic  regurgitation. Normal ventricular function, mild pulmonary hypertension  - ORLY no vegetations - Negative for endocarditis   - Cardio Dr Liz on board, holding ASA due to anemia 2/2 blood loss requiring transfusions   - ID Dr Maxwell on board - will dc on Cefazolin 2 g q 8hrs for 4 weeks starting from 12/22  - Podiatry on board, s/p I&D (12/23)  - Will consult IR for PICC line placement   - Monitor for any signs of hemodynamic instability

## 2021-12-24 NOTE — CHART NOTE - NSCHARTNOTEFT_GEN_A_CORE
MELIDA DAVION  [id number]    After risks, benefits and alternatives of the procedure were explained, consent was signed and placed in the medical record.  Procedural timeout was taken.  Sedation was administered by anesthesia.  ORLY probe inserted without complication and ORLY performed.   Patient tolerated the procedure well without complication.  See full report for findings.

## 2021-12-24 NOTE — PROGRESS NOTE ADULT - PROBLEM SELECTOR PLAN 2
- Patient with cellulitis of left foot and Charcot foot in diabetes   - Off Vancomycin, discontinued Unasyn today 11/24  - Started on Cefazolin 2 g q8hers and Levaquin 750 mg daily as per ID recommendations - Patient with cellulitis of left foot and Charcot foot in diabetes   - Surgical swab 12/20 of left foot wound with Alpha hemolytic strep, S. aureus, Citrobacter koseri, Veillonella parvula.  - Off Vancomycin since 12/21, discontinued Unasyn today 11/24  - Started on Cefazolin 2 g q 8 hrs and Levaquin 750 mg daily (12/24) as per ID recommendations  - ID Dr Maxwell on board - will dc on Cefazolin 2 g q 8hrs for 4 weeks starting from 12/22  - Podiatry on board, s/p I&D (12/22) - Patient with cellulitis of left foot and Charcot foot in diabetes   - Surgical swab 12/20 of left foot wound with Alpha hemolytic strep, S. aureus, Citrobacter koseri, Veillonella parvula.  - Off Vancomycin since 12/21, discontinued Unasyn today 11/24  - Started on Cefazolin 2 g q 8 hrs and Levaquin 750 mg daily (12/24) as per ID recommendations  - ID Dr Maxwell on board - will dc on Cefazolin 2 g q 8hrs for 4 weeks starting from 12/22  - Podiatry on board, s/p I&D (12/23)

## 2021-12-24 NOTE — PROGRESS NOTE ADULT - SUBJECTIVE AND OBJECTIVE BOX
CHIEF COMPLAINT:Patient is a 76y old  Male who presents with a chief complaint of AMS.Pt appears comfortable.    	  REVIEW OF SYSTEMS:  CONSTITUTIONAL: No fever, weight loss, or fatigue  EYES: No eye pain, visual disturbances, or discharge  ENT:  No difficulty hearing, tinnitus, vertigo; No sinus or throat pain  NECK: No pain or stiffness  RESPIRATORY: No cough, wheezing, chills or hemoptysis; No Shortness of Breath  CARDIOVASCULAR: No chest pain, palpitations, passing out, dizziness, or leg swelling  GASTROINTESTINAL: No abdominal or epigastric pain. No nausea, vomiting, or hematemesis; No diarrhea or constipation. No melena or hematochezia.  GENITOURINARY: No dysuria, frequency, hematuria, or incontinence  NEUROLOGICAL: No headaches, memory loss, loss of strength, numbness, or tremors  SKIN: No itching, burning, rashes, or lesions   LYMPH Nodes: No enlarged glands  ENDOCRINE: No heat or cold intolerance; No hair loss  MUSCULOSKELETAL: No joint pain or swelling; No muscle, back, or extremity pain  PSYCHIATRIC: No depression, anxiety, mood swings, or difficulty sleeping  HEME/LYMPH: No easy bruising, or bleeding gums  ALLERGY AND IMMUNOLOGIC: No hives or eczema	        PHYSICAL EXAM:  T(C): 36.6 (12-24-21 @ 05:18), Max: 36.9 (12-23-21 @ 18:08)  HR: 58 (12-24-21 @ 05:18) (58 - 78)  BP: 115/62 (12-24-21 @ 05:18) (115/62 - 144/70)  RR: 18 (12-24-21 @ 05:18) (17 - 18)  SpO2: 98% (12-24-21 @ 05:18) (96% - 99%)  Wt(kg): --  I&O's Summary      Appearance: Normal	  HEENT:   Normal oral mucosa, PERRL, EOMI	  Lymphatic: No lymphadenopathy  Cardiovascular: Normal S1 S2, No JVD, No murmurs, No edema  Respiratory: Lungs clear to auscultation	  Psychiatry: A & O x 3, Mood & affect appropriate  Gastrointestinal:  Soft, Non-tender, + BS	  Skin: No rashes, No ecchymoses, No cyanosis	  Neurologic: Non-focal  Extremities: Normal range of motion, No clubbing, cyanosis or edema  Vascular: Peripheral pulses palpable 2+ bilaterally    MEDICATIONS  (STANDING):  ampicillin/sulbactam  IVPB      ampicillin/sulbactam  IVPB 3 Gram(s) IV Intermittent every 6 hours  ascorbic acid 500 milliGRAM(s) Oral daily  atorvastatin 40 milliGRAM(s) Oral at bedtime  buPROPion XL (24-Hour) . 150 milliGRAM(s) Oral daily  chlorhexidine 2% Cloths 1 Application(s) Topical <User Schedule>  dextrose 5% + sodium chloride 0.45%. 1000 milliLiter(s) (75 mL/Hr) IV Continuous <Continuous>  ferrous    sulfate 325 milliGRAM(s) Oral two times a day  gabapentin 800 milliGRAM(s) Oral three times a day  insulin lispro (ADMELOG) corrective regimen sliding scale   SubCutaneous three times a day before meals  insulin lispro (ADMELOG) corrective regimen sliding scale   SubCutaneous at bedtime  lidocaine 1% Injectable 10 milliLiter(s) Local Injection once  lisinopril 2.5 milliGRAM(s) Oral daily  polyethylene glycol 3350 17 Gram(s) Oral daily  senna 2 Tablet(s) Oral at bedtime        	  LABS:	 	                        7.5    4.78  )-----------( 124      ( 23 Dec 2021 23:36 )             23.0         nikhil< from: NIKHIL w/Doppler (12.24.21 @ 14:08) >  OBSERVATIONS:  Mitral Valve: Mitral annular calcification. Moderate mitral  regurgitation.  Aortic Root: Aortic Root: 3.6 cm.Normal aortic root, aortic  arch and descending thoracic aorta.    Aortic Valve: Calcified trileaflet aortic valve with normal  opening. Mild to moderate aortic regurgitation.  Left Atrium: Moderate left atrial enlargement.Normal left  atrium.  No left atrium or left atrial appendage thrombus.  Spontaneus echo contrast seen.  Left Ventricle: Normal Left Ventricular Systolic Function,  (EF = 55 to 60%) Normal left ventricular internal  dimensions and wall thicknesses.  Right Heart:Normal right atrium. Normal right ventricular  size and function. There is trace tricuspid regurgitation.  Normal pulmonic valve.  Pericardium/PleuraNormal pericardium with no pericardial  effusion.  Hemodynamic: Contrast injection demonstrates no evidence of  a patent foramen ovale.  ------------------------------------------------------------------------  CONCLUSIONS:  1. Mitral annular calcification. Moderate mitral  regurgitation.  2. Calcified trileaflet aortic valve with normal opening.  Mild to moderate aortic regurgitation.  3. Aortic Root: 3.6 cm.Normal aortic root, aortic arch and  descending thoracic aorta.    4. Moderate left atrial enlargement.Normal left atrium.  No  left atrium or left atrial appendage thrombus. Spontaneus  echo contrast seen.  5. Normal left ventricular internal dimensions and wall  thicknesses.  6. Normal Left Ventricular Systolic Function,  (EF = 55 to  60%)  7. Normal right atrium.  8. Normal right ventricular size and function.  9. There is trace tricuspid regurgitation.  10. Normal pulmonic valve.  11. Contrast injection demonstrates no evidence of a patent  foramen ovale.  12. Normal pericardium with no pericardial effusion.  13.No vegetations seen on this study.  ------------------------------------------------------------------------  Confirmed on  12/24/2021 - 10:25:36 by Shania Liz MD  ------------------------------------------------------------------------

## 2021-12-24 NOTE — PROGRESS NOTE ADULT - SUBJECTIVE AND OBJECTIVE BOX
Patient is a 76y old  Male who presents with a chief complaint of AMS (20 Dec 2021 14:19)    Podiatry Interval HPI: Patient seen bedside in no acute distress. Pt is s/p Left Foot I&D and bone biopsy. Pt denies any pain to the left foot. Patient states that he is aware that it is important to not put weight to the left foot and states that he only puts weight on the left foot when he is urinating. Denies any constitutional symptoms of N/V/C/F/SOB. Denies any other pedal complaints. Advised pt strict non WB to LLE. Pt was bleeding yesterday, bleeding stopped after applying compression, cautery, sutures, and compression.     Podiatry HPI: 75 y/o male with a PMHx of DM, HTN, A Fib, Sciatica, Cataract, OA of Right Hip presented to the ED for altered mental status. Podiatry was consulted for Left Foot Cellulitis/Wound. Patient states that his podiatrist was debriding a callus on the Left Foot and the podiatrist went too deep which caused the initial wound about 3 weeks ago and since then it has been getting worse. Denies any pain to the Left Foot. States that he cannot feel any painful stimuli to his left foot due to diabetic neuropathy. States that he would like to get back on his feet and exercise because that is how he is able to maintain his sugar levels and patient states that he has always had a left foot flat arch. Denies any trauma to the area. Patient states that yesterday he was having chills but today he denies any constitutional symptoms of N/V/C/F/SOB. Denies any other pedal complaints at this moment.     HPI:  Patient is a 76 year-old male from home with past medical history of atrial fibrillation (was on coumadin until 1month ago), hypertension, hyperlipidemia, diabetes mellitus presents to ED for AMS. Pt states that today he started feeling freezing cold and was shivering. Talked to wife for more information and she stated that patient was feeling very cold though house was warm. He stopped shivering for a while and the started again. During his second episode of shivering from feeling extremely cold he got very confused and could not talk, understand what she was saying and was not making sense. After a while the shivering stopped and he regained his normal mental status. Denies fever, chest pain. abdominal pain, diarrhea, constipation. Pt states he has incontinence and an ulcer on his left foot. He states his podiatrist scraped a callus which caused the ulcer. He has diabetic neuropathy so does not feel pain around the ulcer. He did notice the left foot got more erythematous today.   He stopped taking warfarin 1 month ago as he states it was too much of an hassle to get INR checked and avoid eating certain foods.   (19 Dec 2021 22:54)      Medications acetaminophen     Tablet .. 650 milliGRAM(s) Oral every 6 hours PRN  ampicillin/sulbactam  IVPB 3 Gram(s) IV Intermittent every 6 hours  ampicillin/sulbactam  IVPB      ascorbic acid 500 milliGRAM(s) Oral daily  atorvastatin 40 milliGRAM(s) Oral at bedtime  buPROPion XL (24-Hour) . 150 milliGRAM(s) Oral daily  chlorhexidine 2% Cloths 1 Application(s) Topical <User Schedule>  dextrose 5% + sodium chloride 0.45%. 1000 milliLiter(s) IV Continuous <Continuous>  ferrous    sulfate 325 milliGRAM(s) Oral two times a day  gabapentin 800 milliGRAM(s) Oral three times a day  insulin lispro (ADMELOG) corrective regimen sliding scale   SubCutaneous three times a day before meals  insulin lispro (ADMELOG) corrective regimen sliding scale   SubCutaneous at bedtime  lidocaine 1% Injectable 10 milliLiter(s) Local Injection once  lisinopril 2.5 milliGRAM(s) Oral daily  oxycodone    5 mG/acetaminophen 325 mG 1 Tablet(s) Oral every 6 hours PRN  polyethylene glycol 3350 17 Gram(s) Oral daily  senna 2 Tablet(s) Oral at bedtime    FHFamily history of coronary artery disease (Sibling)    Family history of breast cancer in sister (Sibling)    ,   PMHDiabetes    Hypertension    Osteoarthritis of right hip    Cataract    Atrial fibrillation    Vertigo    Sciatica       PSHS/P total hip arthroplasty        Labs                          7.5    4.78  )-----------( 124      ( 23 Dec 2021 23:36 )             23.0             Vital Signs Last 24 Hrs  T(C): 36.6 (24 Dec 2021 05:18), Max: 36.9 (23 Dec 2021 18:08)  T(F): 97.8 (24 Dec 2021 05:18), Max: 98.4 (23 Dec 2021 18:08)  HR: 58 (24 Dec 2021 05:18) (58 - 78)  BP: 115/62 (24 Dec 2021 05:18) (115/62 - 144/70)  BP(mean): 79 (23 Dec 2021 12:55) (79 - 79)  RR: 18 (24 Dec 2021 05:18) (17 - 18)  SpO2: 98% (24 Dec 2021 05:18) (96% - 99%)  Sedimentation Rate, Erythrocyte: 86 mm/Hr (12-20-21 @ 06:15)  Sedimentation Rate, Erythrocyte: 95 mm/Hr (12-19-21 @ 19:15)         C-Reactive Protein, Serum: 107 mg/L (12-20-21 @ 09:36)  C-Reactive Protein, Serum: 94 mg/L (12-20-21 @ 04:01)   WBC Count: 4.78 K/uL (12-23-21 @ 23:36)  WBC Count: 4.73 K/uL (12-23-21 @ 16:32)      PHYSICAL EXAM  LE Focused:    Vasc:  DP/PT pulses were palpable, bilaterally. Generalized edema noted to the Left Foot  Derm: Left Plantar medial arch incision site shows decreased redness and erythema to the area as well as the left foot in general. No malodor, no purulent drainage, +PTB was noted. Edema was noted to be slightly less to the left foot.   12/23/21: Bleeding seized, no erythema, no edema noted   12/22/21: Left Plantar medial arch wound noted measuring 3cm x 3cm x 3cm with 5cm tunnelling noted to the surrounding area with +PTB. Redness and Warmth noted to the left foot with no malodor, no purulent drainage noted upon expression today. Streaking up to the left ankle and plantar distal/lateral arch of the left foot. Edema was noted to have decreased from yesterday to the left foot.  12/20/21: Left Plantar medial arch wound noted measuring 3cm x 3cm x 3cm with 5cm tunnelling noted to the surrounding area with +ptb and mild fluctuance noted to the surrounding wound. Left Foot Wound has Redness, increased warmth noted, mild malodor, purulent drainage, streaking up to the left ankle and plantar distal/lateral arch.   Post Bedside Incision and Drainage (12/20/21)  Neuro:  Protective sensation absent, bilaterally.   MSK: No pain on palpation to the Left Foot Wound. Denies any calf pain, bilaterally.     Imaging:     EXAM:  MR FOOT LT                          PROCEDURE DATE:  12/20/2021      INTERPRETATION:  LEFT FOOT MRI    CLINICAL INFORMATION: Left foot wound. Eval for osteomyelitis.  TECHNIQUE: Multiplanar, multisequence MRI was obtained of the left foot.    COMPARISON: Left foot MRI 12/23/2015.    FINDINGS:    Plantar soft tissue wound is present at the level of the first   tarsometatarsal articulation with tract extending to the bone. There is   edema along the plantar aspect of the distal medial cuneiform and base of   the first metatarsal with preservation of the T1 marrow signal. There is   moderate tarsometatarsal arthrosis and arthrosis of the articulation of   the navicular and lateral cuneiform. There are subchondral fractures of   the first second and third metatarsal heads. There is diffuse increased   muscle signal, most consistent with denervation. There is diffuse   subcutaneous edema.    IMPRESSION:    Plantar soft tissue wound with tract extending to the undersurface of the   first tarsometatarsal articulation.  Edema within the base of the first tarsometatarsal articulation adjacent   to the tract with preservation of the T1 marrow signal, which may   represent sequela of early osteomyelitis.  First second and third metatarsal head subchondral fractures.    Cultures:   Culture pending

## 2021-12-24 NOTE — PROGRESS NOTE ADULT - PROBLEM SELECTOR PLAN 11
IMPROVE VTE Individual Risk Assessment  RISK                                                                Points  [  ] Previous VTE                                                  3  [  ] Thrombophilia                                               2  [  ] Lower limb paralysis                                      2        (unable to hold up >15 seconds)    [  ] Current Cancer                                              2         (within 6 months)  [  ] Immobilization > 24 hrs                                1  [  ] ICU/CCU stay > 24 hours                              1  [  ] Age > 60                                                      1  IMPROVE VTE Score _________    PPI for GI prophylaxis  Heparin for DVT PPX IMPROVE VTE Individual Risk Assessment  RISK                                                                Points  [  ] Previous VTE                                                  3  [  ] Thrombophilia                                               2  [  ] Lower limb paralysis                                      2        (unable to hold up >15 seconds)    [  ] Current Cancer                                              2         (within 6 months)  [  ] Immobilization > 24 hrs                                1  [  ] ICU/CCU stay > 24 hours                              1  [  ] Age > 60                                                      1  IMPROVE VTE Score _________    PPI for GI prophylaxis

## 2021-12-24 NOTE — PROGRESS NOTE ADULT - PROBLEM SELECTOR PLAN 7
- Patient has hx of atrial fibrillation not on any meds at home    - EKG A fib rate controlled  - CHADSVASC2 4 points   - Will hold off on AC for now as patient is s/p I & D with blood loss and drop in hgb  - Continue to monitor  - Cardio Dr Liz consulted - Patient has hx of atrial fibrillation not on any meds at home    - EKG A fib rate controlled  - CHADSVASC2 4 points   - Will hold off on AC for now as patient is s/p I & D with blood loss and drop in hgb, ASA to be started once hemoglobin stable and no active bleeding **  - Continue to monitor  - Cardio Dr Liz on board

## 2021-12-24 NOTE — PROGRESS NOTE ADULT - ASSESSMENT
76 year-old male from home with past medical history of atrial fibrillation (was on coumadin until 1month ago), hypertension, hyperlipidemia, diabetes mellitus presents to ED for AMS,bacteremia, anemia.  1.ORLY-r/o endocarditis.  2.Bacteremia-ABX.Repaet blood cx-p.  3.Afib-off ac,add asa for now.  4.Pt not want coumadin due to dietary limitation,consider NOAC  5.DM-Insulin.  6.HTN-ace.  7.PPI.  8.Lipid d/o-statin.  9.DVT prophylaxis.

## 2021-12-24 NOTE — PROGRESS NOTE ADULT - SUBJECTIVE AND OBJECTIVE BOX
PGY-1 Progress Note discussed with attending    PAGER #: [[phone]] TILL 5:00 PM  PLEASE CONTACT ON CALL TEAM:  - On Call Team (Please refer to Brie) FROM 5:00 PM - 8:30PM  - Nightfloat Team FROM 8:30 -7:30 AM    CHIEF COMPLAINT & BRIEF HOSPITAL COURSE:      INTERVAL HPI/OVERNIGHT EVENTS: patient's hemoglobin dropped yesterday, s/p 1 unit of PRBC, today noted to have hemoglobin 6.9, will transfuse one more unit of PRBC. Podiatry, ID and Cardio on board. Patient refers left foot pain overnight requiring pain meds, now denies pain.       MEDICATIONS  (STANDING):  ampicillin/sulbactam  IVPB      ampicillin/sulbactam  IVPB 3 Gram(s) IV Intermittent every 6 hours  ascorbic acid 500 milliGRAM(s) Oral daily  atorvastatin 40 milliGRAM(s) Oral at bedtime  buPROPion XL (24-Hour) . 150 milliGRAM(s) Oral daily  chlorhexidine 2% Cloths 1 Application(s) Topical <User Schedule>  dextrose 5% + sodium chloride 0.45%. 1000 milliLiter(s) (75 mL/Hr) IV Continuous <Continuous>  ferrous    sulfate 325 milliGRAM(s) Oral two times a day  gabapentin 800 milliGRAM(s) Oral three times a day  insulin lispro (ADMELOG) corrective regimen sliding scale   SubCutaneous three times a day before meals  insulin lispro (ADMELOG) corrective regimen sliding scale   SubCutaneous at bedtime  lidocaine 1% Injectable 10 milliLiter(s) Local Injection once  lisinopril 2.5 milliGRAM(s) Oral daily  polyethylene glycol 3350 17 Gram(s) Oral daily  senna 2 Tablet(s) Oral at bedtime    MEDICATIONS  (PRN):  acetaminophen     Tablet .. 650 milliGRAM(s) Oral every 6 hours PRN Temp greater or equal to 38C (100.4F)  oxycodone    5 mG/acetaminophen 325 mG 1 Tablet(s) Oral every 6 hours PRN Moderate Pain      Vital Signs Last 24 Hrs  T(C): 36.6 (24 Dec 2021 05:18), Max: 36.9 (23 Dec 2021 18:08)  T(F): 97.8 (24 Dec 2021 05:18), Max: 98.4 (23 Dec 2021 18:08)  HR: 58 (24 Dec 2021 05:18) (58 - 78)  BP: 115/62 (24 Dec 2021 05:18) (115/62 - 144/70)  BP(mean): 79 (23 Dec 2021 12:55) (79 - 79)  RR: 18 (24 Dec 2021 05:18) (17 - 18)  SpO2: 98% (24 Dec 2021 05:18) (96% - 99%)    PHYSICAL EXAMINATION:  GENERAL: NAD, well built, sat well on RA, pale  HEAD:  Atraumatic, Normocephalic  EYES:  conjunctiva and sclera clear  NECK: Supple, No JVD, Normal thyroid  CHEST/LUNG: Clear to auscultation. Clear to percussion bilaterally; No rales, rhonchi, wheezing, or rubs  HEART: Regular rate and rhythm; No murmurs, rubs, or gallops  ABDOMEN: Soft, Nontender, Nondistended; Bowel sounds present  NERVOUS SYSTEM:  Alert & Oriented X3, no neuro deficits     EXTREMITIES:  2+ Peripheral Pulses, No clubbing, cyanosis, or edema  SKIN: Left foot wound, DSD blood stained                         6.9    3.96  )-----------( 123      ( 24 Dec 2021 10:06 )             21.1     Culture - Tissue with Gram Stain (collected 23 Dec 2021 04:15)  Source: Bone Left foot bone biopsy culture  Gram Stain (23 Dec 2021 07:02):    Rare polymorphonuclear leukocytes seen per low power field    No organisms seen per oil power field  Preliminary Report (24 Dec 2021 08:48):    No growth    Culture - Surgical Swab (collected 23 Dec 2021 04:13)  Source: .Surgical Swab Left foot deep wound culture  Preliminary Report (24 Dec 2021 08:53):    No growth    Culture - Blood (collected 22 Dec 2021 18:48)  Source: .Blood Blood  Preliminary Report (23 Dec 2021 19:01):    No growth to date.    Culture - Blood (collected 22 Dec 2021 18:46)  Source: .Blood Blood  Preliminary Report (23 Dec 2021 19:01):    No growth to date.                       PGY-1 Progress Note discussed with attending    PAGER #: [[phone]] TILL 5:00 PM  PLEASE CONTACT ON CALL TEAM:  - On Call Team (Please refer to Brie) FROM 5:00 PM - 8:30PM  - Nightfloat Team FROM 8:30 -7:30 AM    CHIEF COMPLAINT & BRIEF HOSPITAL COURSE:      INTERVAL HPI/OVERNIGHT EVENTS: patient's hemoglobin dropped yesterday, s/p 1 unit of PRBC, hemoglobin post transfusion 7.5. Patient noted to have hemoglobin 6.9, will transfuse one more unit of PRBC. Podiatry, ID and Cardio on board. Patient refers left foot pain overnight requiring pain meds, now denies pain.       MEDICATIONS  (STANDING):  ampicillin/sulbactam  IVPB      ampicillin/sulbactam  IVPB 3 Gram(s) IV Intermittent every 6 hours  ascorbic acid 500 milliGRAM(s) Oral daily  atorvastatin 40 milliGRAM(s) Oral at bedtime  buPROPion XL (24-Hour) . 150 milliGRAM(s) Oral daily  chlorhexidine 2% Cloths 1 Application(s) Topical <User Schedule>  dextrose 5% + sodium chloride 0.45%. 1000 milliLiter(s) (75 mL/Hr) IV Continuous <Continuous>  ferrous    sulfate 325 milliGRAM(s) Oral two times a day  gabapentin 800 milliGRAM(s) Oral three times a day  insulin lispro (ADMELOG) corrective regimen sliding scale   SubCutaneous three times a day before meals  insulin lispro (ADMELOG) corrective regimen sliding scale   SubCutaneous at bedtime  lidocaine 1% Injectable 10 milliLiter(s) Local Injection once  lisinopril 2.5 milliGRAM(s) Oral daily  polyethylene glycol 3350 17 Gram(s) Oral daily  senna 2 Tablet(s) Oral at bedtime    MEDICATIONS  (PRN):  acetaminophen     Tablet .. 650 milliGRAM(s) Oral every 6 hours PRN Temp greater or equal to 38C (100.4F)  oxycodone    5 mG/acetaminophen 325 mG 1 Tablet(s) Oral every 6 hours PRN Moderate Pain      Vital Signs Last 24 Hrs  T(C): 36.6 (24 Dec 2021 05:18), Max: 36.9 (23 Dec 2021 18:08)  T(F): 97.8 (24 Dec 2021 05:18), Max: 98.4 (23 Dec 2021 18:08)  HR: 58 (24 Dec 2021 05:18) (58 - 78)  BP: 115/62 (24 Dec 2021 05:18) (115/62 - 144/70)  BP(mean): 79 (23 Dec 2021 12:55) (79 - 79)  RR: 18 (24 Dec 2021 05:18) (17 - 18)  SpO2: 98% (24 Dec 2021 05:18) (96% - 99%)    PHYSICAL EXAMINATION:  GENERAL: NAD, well built, sat well on RA, pale  HEAD:  Atraumatic, Normocephalic  EYES:  conjunctiva and sclera clear  NECK: Supple, No JVD, Normal thyroid  CHEST/LUNG: Clear to auscultation. Clear to percussion bilaterally; No rales, rhonchi, wheezing, or rubs  HEART: Regular rate and rhythm; No murmurs, rubs, or gallops  ABDOMEN: Soft, Nontender, Nondistended; Bowel sounds present  NERVOUS SYSTEM:  Alert & Oriented X3, no neuro deficits     EXTREMITIES:  2+ Peripheral Pulses, No clubbing, cyanosis, or edema  SKIN: Left foot wound, DSD blood stained                         6.9    3.96  )-----------( 123      ( 24 Dec 2021 10:06 )             21.1     Culture - Tissue with Gram Stain (collected 23 Dec 2021 04:15)  Source: Bone Left foot bone biopsy culture  Gram Stain (23 Dec 2021 07:02):    Rare polymorphonuclear leukocytes seen per low power field    No organisms seen per oil power field  Preliminary Report (24 Dec 2021 08:48):    No growth    Culture - Surgical Swab (collected 23 Dec 2021 04:13)  Source: .Surgical Swab Left foot deep wound culture  Preliminary Report (24 Dec 2021 08:53):    No growth    Culture - Blood (collected 22 Dec 2021 18:48)  Source: .Blood Blood  Preliminary Report (23 Dec 2021 19:01):    No growth to date.    Culture - Blood (collected 22 Dec 2021 18:46)  Source: .Blood Blood  Preliminary Report (23 Dec 2021 19:01):    No growth to date.                       PGY-1 Progress Note discussed with attending    PAGER #: [[phone]] TILL 5:00 PM  PLEASE CONTACT ON CALL TEAM:  - On Call Team (Please refer to Brie) FROM 5:00 PM - 8:30PM  - Nightfloat Team FROM 8:30 -7:30 AM    CHIEF COMPLAINT & BRIEF HOSPITAL COURSE:      INTERVAL HPI/OVERNIGHT EVENTS: patient's hemoglobin dropped yesterday, s/p 1 unit of PRBC, hemoglobin post transfusion 7.5. Patient noted to have hemoglobin 6.9, will transfuse one more unit of PRBC. Podiatry, ID and Cardio on board. Patient refers left foot pain overnight requiring pain meds, now denies pain.       MEDICATIONS  (STANDING):  ampicillin/sulbactam  IVPB      ampicillin/sulbactam  IVPB 3 Gram(s) IV Intermittent every 6 hours  ascorbic acid 500 milliGRAM(s) Oral daily  atorvastatin 40 milliGRAM(s) Oral at bedtime  buPROPion XL (24-Hour) . 150 milliGRAM(s) Oral daily  chlorhexidine 2% Cloths 1 Application(s) Topical <User Schedule>  dextrose 5% + sodium chloride 0.45%. 1000 milliLiter(s) (75 mL/Hr) IV Continuous <Continuous>  ferrous    sulfate 325 milliGRAM(s) Oral two times a day  gabapentin 800 milliGRAM(s) Oral three times a day  insulin lispro (ADMELOG) corrective regimen sliding scale   SubCutaneous three times a day before meals  insulin lispro (ADMELOG) corrective regimen sliding scale   SubCutaneous at bedtime  lidocaine 1% Injectable 10 milliLiter(s) Local Injection once  lisinopril 2.5 milliGRAM(s) Oral daily  polyethylene glycol 3350 17 Gram(s) Oral daily  senna 2 Tablet(s) Oral at bedtime    MEDICATIONS  (PRN):  acetaminophen     Tablet .. 650 milliGRAM(s) Oral every 6 hours PRN Temp greater or equal to 38C (100.4F)  oxycodone    5 mG/acetaminophen 325 mG 1 Tablet(s) Oral every 6 hours PRN Moderate Pain      Vital Signs Last 24 Hrs  T(C): 36.6 (24 Dec 2021 05:18), Max: 36.9 (23 Dec 2021 18:08)  T(F): 97.8 (24 Dec 2021 05:18), Max: 98.4 (23 Dec 2021 18:08)  HR: 58 (24 Dec 2021 05:18) (58 - 78)  BP: 115/62 (24 Dec 2021 05:18) (115/62 - 144/70)  BP(mean): 79 (23 Dec 2021 12:55) (79 - 79)  RR: 18 (24 Dec 2021 05:18) (17 - 18)  SpO2: 98% (24 Dec 2021 05:18) (96% - 99%)    PHYSICAL EXAMINATION:  GENERAL: NAD, well built, sat well on RA, pale  HEAD:  Atraumatic, Normocephalic  EYES:  conjunctiva and sclera clear  NECK: Supple, No JVD, Normal thyroid  CHEST/LUNG: Clear to auscultation. Clear to percussion bilaterally; No rales, rhonchi, wheezing, or rubs  HEART: Regular rate and rhythm; No murmurs, rubs, or gallops  ABDOMEN: Soft, Nontender, Nondistended; Bowel sounds present  NERVOUS SYSTEM:  Alert & Oriented X3, no neuro deficits     EXTREMITIES:  2+ Peripheral Pulses, No clubbing, cyanosis, or edema  SKIN: Left foot wound, DSD intact                        6.9    3.96  )-----------( 123      ( 24 Dec 2021 10:06 )             21.1     Culture - Tissue with Gram Stain (collected 23 Dec 2021 04:15)  Source: Bone Left foot bone biopsy culture  Gram Stain (23 Dec 2021 07:02):    Rare polymorphonuclear leukocytes seen per low power field    No organisms seen per oil power field  Preliminary Report (24 Dec 2021 08:48):    No growth    Culture - Surgical Swab (collected 23 Dec 2021 04:13)  Source: .Surgical Swab Left foot deep wound culture  Preliminary Report (24 Dec 2021 08:53):    No growth    Culture - Blood (collected 22 Dec 2021 18:48)  Source: .Blood Blood  Preliminary Report (23 Dec 2021 19:01):    No growth to date.    Culture - Blood (collected 22 Dec 2021 18:46)  Source: .Blood Blood  Preliminary Report (23 Dec 2021 19:01):    No growth to date.

## 2021-12-24 NOTE — PROGRESS NOTE ADULT - ASSESSMENT
Pt is a 76 year old male from home with PMHx of atrial fibrillation, hypertension, hyperlipidemia, DM admitted for sepsis with acute encephalopathy secondary to diabetic foot ulcer. Pt received one dose of levaquine in ED and switched to vancomycin (d/c'ed 12/21) and amp/sulbactam (d/c'ed today). Pt started on cefazolin IV and levofloxacin due to resistance to ampicillin and clindamycin. Xray of left foot with multiple metatarsal Fx but without radiographic evidence of osteomyelitis. However, MRI of left foot with early osteomyelitis of the 1st tarsometatarsal site, multiple metatarsal Fx and diffuse SQ edema. BCxs from 12/20 growing methicillin susceptible S. aureus, Streptococcus mitis/oralis group, and Alpha hemolytic strep. Pt underwent bedside debridement of L foot on 12/20 by Podiatry. Surgical swab of left foot wound with Alpha hemolytic strep, S. aureus, Citrobacter koseri, Veillonella parvula. Pt underwent OR debridement of left foot 12/22. Bone Cx (sent from OR) with no growth to date. Repeat BC 12/22 negative. Pt will need at least 4wks of IV Ab from the time of 12/22 debridement to Rx osteo and possible endocarditis (particularly n view of S. aureus in initial BCs).     #1 left foot cellulitis secondary to diabetic foot ulcer with osteomyelitis.   -- continue cefazolin  -- continue Levofloxacin   -- f/u final result of OR specimens Cx/path    #2 acute encephalopathy - resolved     #3 chronic back pain -- MRI of back showing degenerative changes and no osteo.     #4 anemia - bleeding from OR site stopped by Podiatry overnight   -- f/u H/H  -- blood transfusion initiated    Pt is a 76 year old male from home with PMHx of atrial fibrillation, hypertension, hyperlipidemia, DM admitted for sepsis with acute encephalopathy secondary to diabetic foot ulcer. Pt received one dose of levaquine in ED and switched to vancomycin (d/c'ed 12/21) and amp/sulbactam (d/c'ed today). Pt started on cefazolin IV and levofloxacin p.o. today b.o. resistance to ampicillin and clindamycin. Xray of left foot with multiple metatarsal Fx but without radiographic evidence of osteomyelitis. However, MRI of left foot with early osteomyelitis of the 1st tarsometatarsal site, multiple metatarsal Fx and diffuse SQ edema. BCxs from 12/20 growing methicillin susceptible S. aureus, Streptococcus mitis/oralis group, and Alpha hemolytic strep. Pt underwent bedside debridement of L foot on 12/20 by Podiatry. Surgical swab 12/20 of left foot wound with Alpha hemolytic strep, S. aureus, Citrobacter koseri, Veillonella parvula. Pt underwent OR debridement of left foot 12/22. Bone Cx (sent from OR) with no growth to date. Repeat surgical swab 12/3 no growth to date. Repeat BC 12/22 negative. Pt will need at least 4wks of IV Ab from the time of 12/22 debridement to Rx osteo and possible endocarditis (particularly n view of S. aureus in initial BCs).     #1 left foot cellulitis secondary to diabetic foot ulcer with osteomyelitis.   -- continue cefazolin  -- continue Levofloxacin   -- f/u final result of OR specimens Cx/path    #2 acute encephalopathy - resolved     #3 chronic back pain -- MRI of back showing degenerative changes and no osteo.     #4 anemia - bleeding from OR site stopped by Podiatry overnight   -- f/u H/H  -- blood transfusion initiated    Pt is a 76 year old male from home with PMHx of atrial fibrillation, hypertension, hyperlipidemia, DM admitted for sepsis with acute encephalopathy secondary to diabetic foot ulcer. Pt received one dose of levaquine in ED and switched to vancomycin (d/c'ed 12/21) and amp/sulbactam (d/c'ed today). Xray of left foot with multiple metatarsal Fx but without radiographic evidence of osteomyelitis. However, MRI of left foot with early osteomyelitis of the 1st tarsometatarsal site, multiple metatarsal Fx and diffuse SQ edema. BCxs from 12/20 growing methicillin susceptible S. aureus, Streptococcus mitis/oralis group, and Alpha hemolytic strep. Pt underwent bedside debridement of L foot on 12/20 by Podiatry. Surgical swab 12/20 of left foot wound with Alpha hemolytic strep, S. aureus, Citrobacter koseri, Veillonella parvula. Pt underwent OR debridement of left foot 12/22. Bone Cx from OR with no growth to date. Repeat surgical swab no growth to date. Repeat BC 12/22 negative.  Pt will need at least 4wks of Ab from the time of 12/22 debridement to Rx bacteremia and osteomyelitis. Recommend cefazolin to Rx MSSA and strep from initial BC. B/O concern for development of R of Citrobacter during Rx with ampi/sulbactam, levofloxacin p.o. was recommended instead.     #1 left foot cellulitis secondary to diabetic foot ulcer with osteomyelitis. ORLY without vegetations.    -- continue cefazolin to complete 4wks of IV antibiotic (rom 12/22)  -- continue Levofloxacin p.o. x 4wks to Rx gram negative organisms that grew from initial surgical swab    #2 acute encephalopathy - resolved     #3 chronic back pain -- MRI of back showing degenerative changes and no osteo.     #4 anemia - bleeding from OR site stopped    -- f/u H/H  -- blood transfusion initiated     Discussed above with the medical team, including Dr. Shetty. Please call again if needed.

## 2021-12-24 NOTE — PROGRESS NOTE ADULT - PROBLEM SELECTOR PLAN 4
- Acute blood loss anemia  - Patient noted to have low hgb 8-9 on admission, now 7>6.7>7.5>6.9  - S/p I & D on 12/22 by Podiatry  - Iron panel compatible with KISHOR however anemia from blood loss as well  - C/w Ferrous sulfate and vitamin C  - Type and screen, transfuse if hgb <7  - Consent in the chart   - Bowel regimen   - S/p 1 unit PRBC (12/23), will order one more unit now   - CBC q 8 hrs for now and post transfusion   - Monitor for any signs of bleeding - Acute blood loss anemia  - Patient noted to have low hgb 8-9 on admission, now 7>6.7>7.5>6.9  - S/p I & D on 12/22 by Podiatry  - Iron panel compatible with KISHOR however anemia from blood loss as well  - C/w Ferrous sulfate and vitamin C  - Type and screen, transfuse if hgb <7  - Consent in the chart   - Bowel regimen   - S/p 1 unit PRBC (12/23), will order one more unit now   - CBC q 8 hrs for now and post transfusion   - Monitor for any signs of bleeding or hemodynamic instability

## 2021-12-24 NOTE — PROGRESS NOTE ADULT - SUBJECTIVE AND OBJECTIVE BOX
Subjective/Objective: Pt c/o left ankle pain and chronic back pain. Podiatry stopped left foot bleed overnight.         MEDS  acetaminophen     Tablet .. 650 milliGRAM(s) Oral every 6 hours PRN  ascorbic acid 500 milliGRAM(s) Oral daily  atorvastatin 40 milliGRAM(s) Oral at bedtime  buPROPion XL (24-Hour) . 150 milliGRAM(s) Oral daily  ceFAZolin   IVPB 2000 milliGRAM(s) IV Intermittent every 8 hours (D1)  chlorhexidine 2% Cloths 1 Application(s) Topical <User Schedule>  dextrose 5% + sodium chloride 0.45%. 1000 milliLiter(s) IV Continuous <Continuous>  ferrous    sulfate 325 milliGRAM(s) Oral two times a day  gabapentin 800 milliGRAM(s) Oral three times a day  insulin lispro (ADMELOG) corrective regimen sliding scale   SubCutaneous three times a day before meals  insulin lispro (ADMELOG) corrective regimen sliding scale   SubCutaneous at bedtime  levoFLOXacin  Tablet 750 milliGRAM(s) Oral every 24 hours (one dose of IV given on 12/19, restarted today)   lidocaine 1% Injectable 10 milliLiter(s) Local Injection once  lisinopril 2.5 milliGRAM(s) Oral daily  oxycodone    5 mG/acetaminophen 325 mG 1 Tablet(s) Oral every 6 hours PRN  polyethylene glycol 3350 17 Gram(s) Oral daily  senna 2 Tablet(s) Oral at bedtime      PHYSICAL EXAM:    Vital Signs Last 24 Hrs  T(C): 36.6 (24 Dec 2021 05:18), Max: 36.9 (23 Dec 2021 18:08)  T(F): 97.8 (24 Dec 2021 05:18), Max: 98.4 (23 Dec 2021 18:08)  HR: 58 (24 Dec 2021 05:18) (58 - 78)  BP: 115/62 (24 Dec 2021 05:18) (115/62 - 144/70)  BP(mean): 79 (23 Dec 2021 12:55) (79 - 79)  RR: 18 (24 Dec 2021 05:18) (17 - 18)  SpO2: 98% (24 Dec 2021 05:18) (96% - 99%)    GEN: WD WN W male in NAD    HEENT: NC/AT, blisters noted on upper lip     CHEST/Respiratory: clear to auscultation    Cardiovascular: S1 S2 irregular beats with no murmurs, gallops, rubs    Gastrointestinal: soft, nontender with BS present; scar noted on lower abd (tummy tuck scar)    Genitourinary: no diaz     Extremities: hyperpigmented changes noted on bilateral lower extremities on tibia; onychomycosis on bilateral toes   LLE: covered with dressing   RLE: no erythema or swelling    Neurological: A+O x3    Skin: no rashes      LABS/DIAGNOSTIC TESTS                            6.9    3.96  )-----------( 123      ( 24 Dec 2021 10:06 )             21.1       WBC Count: 3.96 K/uL (12-24 @ 10:06)  WBC Count: 4.78 K/uL (12-23 @ 23:36)  WBC Count: 4.73 K/uL (12-23 @ 16:32)  WBC Count: 3.42 K/uL (12-22 @ 23:39)  WBC Count: 4.31 K/uL (12-22 @ 07:55)      12-24    141  |  110<H>  |  12  ----------------------------<  187<H>  4.2   |  24  |  1.11    Ca    8.1<L>      24 Dec 2021 10:06  Phos  3.4     12-24  Mg     1.6     12-24    CULTURES    Culture - Tissue with Gram Stain (collected 12-23-21 @ 04:15)  Source: Bone Left foot bone biopsy culture  Gram Stain (12-23-21 @ 07:02):    Rare polymorphonuclear leukocytes seen per low power field    No organisms seen per oil power field  Preliminary Report (12-24-21 @ 08:48):    No growth    Culture - Surgical Swab (collected 12-23-21 @ 04:13)  Source: .Surgical Swab Left foot deep wound culture  Preliminary Report (12-24-21 @ 08:53):    No growth    Culture - Blood (collected 12-22-21 @ 18:48)  Source: .Blood Blood  Preliminary Report (12-23-21 @ 19:01):    No growth to date.    Culture - Blood (collected 12-22-21 @ 18:46)  Source: .Blood Blood  Preliminary Report (12-23-21 @ 19:01):    No growth to date.    Culture - Surgical Swab (collected 12-20-21 @ 21:08)  Source: .Surgical Swab Left foot wound  Final Report (12-23-21 @ 16:51):    Few Alpha hemolytic strep "Susceptibilities not performed"    Rare Staphylococcus aureus    Rare Citrobacter koseri    Few Veillonella parvula "Susceptibilities not performed"  Organism: Staphylococcus aureus  Citrobacter koseri (12-23-21 @ 16:51)  Organism: Citrobacter koseri (12-23-21 @ 16:51)      -  Amikacin: S <=16      -  Amoxicillin/Clavulanic Acid: S <=8/4      -  Ampicillin: R >16 These ampicillin results predict results for amoxicillin      -  Ampicillin/Sulbactam: S <=4/2 Enterobacter, Klebsiella aerogenes, Citrobacter, and Serratia may develop resistance during prolonged therapy (3-4 days)      -  Aztreonam: S <=4      -  Cefazolin: S <=2 Enterobacter, Klebsiella aerogenes, Citrobacter, and Serratia may develop resistance during prolonged therapy (3-4 days)      -  Cefepime: S <=2      -  Cefoxitin: S <=8      -  Ceftriaxone: S <=1 Enterobacter, Klebsiella aerogenes, Citrobacter, and Serratia may develop resistance during prolonged therapy      -  Ciprofloxacin: S <=0.25      -  Ertapenem: S <=0.5      -  Gentamicin: S <=2      -  Imipenem: S <=1      -  Levofloxacin: S <=0.5      -  Meropenem: S <=1      -  Piperacillin/Tazobactam: S <=8      -  Tobramycin: S <=2      -  Trimethoprim/Sulfamethoxazole: S <=0.5/9.5      Method Type: CHATO  Organism: Staphylococcus aureus (12-23-21 @ 16:51)      -  Ampicillin/Sulbactam: S <=8/4      -  Cefazolin: S <=4      -  Clindamycin: R <=0.25 This isolate is presumed to be clindamycin resistant based on detection of inducible resistance. Clindamycin may still be effective in some patients.      -  Erythromycin: R >4      -  Gentamicin: S 2 Should not be used as monotherapy      -  Oxacillin: S <=0.25      -  Rifampin: S <=1 Should not be used as monotherapy      -  Tetra/Doxy: S <=1      -  Trimethoprim/Sulfamethoxazole: S <=0.5/9.5      -  Vancomycin: S 2      Method Type: CHATO    Culture - Blood (collected 12-20-21 @ 05:34)  Source: .Blood Blood-Peripheral  Gram Stain (12-21-21 @ 10:36):    Growth in aerobic bottle: Gram Positive Cocci in Clusters    Growth in anaerobic bottle: Gram positive cocci in pairs  Final Report (12-23-21 @ 06:23):    Growth in aerobic bottle: Staphylococcus aureus    Growth in anaerobic bottle: Streptococcus mitis/oralis group    Alpha hemolytic strep    (not Strep. pneumoniae or Enterococcus)    Single set isolate, possible contaminant. Contact    Microbiology if susceptibility testing clinically    indicated.    ***Blood Panel PCR results on this specimen are available    approximately 3 hours after the Gram stain result.***    Gram stain, PCR, and/or culture results may not always    correspond due to difference in methodologies.    ************************************************************    This PCR assay was performed by multiplex PCR. This    Assay tests for 66 bacterial and resistance gene targets.    Please refer to the [facility] test directory    at [email or web address] for details.  Organism: Blood Culture PCR  Blood Culture PCR  Staphylococcus aureus (12-23-21 @ 06:23)  Organism: Staphylococcus aureus (12-23-21 @ 06:23)      -  Ampicillin/Sulbactam: S <=8/4      -  Cefazolin: S <=4      -  Clindamycin: R <=0.25 This isolate is presumed to be clindamycin resistant based on detection of inducible resistance. Clindamycin may still be effective in some patients.      -  Erythromycin: R >4      -  Gentamicin: S <=1 Should not be used as monotherapy      -  Oxacillin: S <=0.25      -  Rifampin: S <=1 Should not be used as monotherapy      -  Tetra/Doxy: S <=1      -  Trimethoprim/Sulfamethoxazole: S <=0.5/9.5      -  Vancomycin: S 1      Method Type: CHATO  Organism: Blood Culture PCR (12-23-21 @ 06:23)      -  Streptococcus sp. (Not Grp A, B or S pneumoniae): Detec      Method Type: PCR  Organism: Blood Culture PCR (12-23-21 @ 06:23)      -  Staphylococcus aureus: Detec Any isolate of Staphylococcus aureus from a blood culture is NOT considered a contaminant.      Method Type: PCR    Culture - Blood (collected 12-20-21 @ 05:34)  Source: .Blood Blood-Peripheral  Gram Stain (12-21-21 @ 22:53):    Growth in aerobic bottle: Gram positive cocci in pairs  Final Report (12-23-21 @ 06:23):    Growth in aerobic bottle: Staphylococcus aureus    See previous culture [id number]    Culture - Urine (collected 12-20-21 @ 04:57)  Source: Clean Catch Clean Catch (Midstream)  Final Report (12-21-21 @ 08:24):    <10,000 CFU/mL Normal Urogenital Melissa      RADIOLOGY  < from: Xray Foot AP + Lateral, Left (12.23.21 @ 13:39) >    ACC: [id number] EXAM:  XR FOOT 2 VIEWS LT                          PROCEDURE DATE:  12/23/2021      INTERPRETATION:  LEFT foot    CLINICAL INFORMATION: Postoperative radiographs.  Comparison: LEFT foot radiographs 12/19/2021  TECHNIQUE: AP,lateral and oblique views.    FINDINGS:  Status post incision and drainage of the LEFT foot abscess on plantar   surface. 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  No acute fracture.  No gross radiographic evidence of osteomyelitis.    IMPRESSION:    Status post incision and drainage of LEFT foot abscess.  Please see MRI LEFT foot 12/20/2021.  ----------------------------------------------------------------------------    < from: ORLY w/Doppler (12.24.21 @ 14:08) >  PROCEDURE: Transesophageal and transthoracic echocardiogram  was performed in the echocardiography laboratory.  The  patient was sedated with Propofol  100 mg IV and 5mg of  etomidate by anestheiologist.  The procedure was monitored  with automatic blood pressure monitoring,  ECG tracings and  pulse oximetry. The transesophageal probe was placed in the  esophagus posterior to the heart without any complications.   The patient tolerated the procedure well.  Belen __10  cc of agitated normal saline  INDICATION: Acute and subacute infective endocarditis  (I33.0)  HISTORY:  ------------------------------------------------------------------------  DIMENSIONS:  Dimensions:     Normal Values:  LA:       ---     2.0 - 4.0 cm  Ao:     3.6 cm    2.0 - 3.8 cm  SEPTUM:   ---     0.6 - 1.2 cm  PWT:      ---     0.6 - 1.1 cm  LVIDd:    ---     3.0 - 5.6 cm  LVIDs:    ---    1.8 - 4.0 cm      Ejection Fraction Visual Estimate: 55-60 %    ------------------------------------------------------------------------  OBSERVATIONS:  Mitral Valve: Mitral annular calcification. Moderate mitral  regurgitation.  Aortic Root: Aortic Root: 3.6 cm.Normal aortic root, aortic  arch and descending thoracic aorta.    Aortic Valve: Calcified trileaflet aortic valve with normal  opening. Mild to moderate aortic regurgitation.  Left Atrium: Moderate left atrial enlargement.Normal left  atrium.  No left atrium or left atrial appendage thrombus.  Spontaneus echo contrast seen.  Left Ventricle: Normal Left Ventricular Systolic Function,  (EF = 55 to 60%) Normal left ventricular internal  dimensions and wall thicknesses.  Right Heart:Normal right atrium. Normal right ventricular  size and function. There is trace tricuspid regurgitation.  Normal pulmonic valve.  Pericardium/PleuraNormal pericardium with no pericardial  effusion.  Hemodynamic: Contrast injection demonstrates no evidence of  a patent foramen ovale.  ------------------------------------------------------------------------  CONCLUSIONS:  1. Mitral annular calcification. Moderate mitral  regurgitation.  2. Calcified trileaflet aortic valve with normal opening.  Mild to moderate aortic regurgitation.  3. Aortic Root: 3.6 cm. Normal aortic root, aortic arch and  descending thoracic aorta.  4. Moderate left atrial enlargement. Normal left atrium.  No  left atrium or left atrial appendage thrombus. Spontaneus  echo contrast seen.  5. Normal left ventricular internal dimensions and wall  thicknesses.  6. Normal Left Ventricular Systolic Function,  (EF = 55 to  60%)  7. Normal right atrium.  8. Normal right ventricular size and function.  9. There is trace tricuspid regurgitation.  10. Normal pulmonic valve.  11. Contrast injection demonstrates no evidence of a patent  foramen ovale.  12. Normal pericardium with no pericardial effusion.  13. No vegetations seen on this study.                     Subjective/Objective: Pt c/o left ankle pain and chronic back pain. No further L foot bleeding.          MEDS  acetaminophen     Tablet .. 650 milliGRAM(s) Oral every 6 hours PRN  ascorbic acid 500 milliGRAM(s) Oral daily  atorvastatin 40 milliGRAM(s) Oral at bedtime  buPROPion XL (24-Hour) . 150 milliGRAM(s) Oral daily  ceFAZolin   IVPB 2000 milliGRAM(s) IV Intermittent every 8 hours (D1)  chlorhexidine 2% Cloths 1 Application(s) Topical <User Schedule>  dextrose 5% + sodium chloride 0.45%. 1000 milliLiter(s) IV Continuous <Continuous>  ferrous    sulfate 325 milliGRAM(s) Oral two times a day  gabapentin 800 milliGRAM(s) Oral three times a day  insulin lispro (ADMELOG) corrective regimen sliding scale   SubCutaneous three times a day before meals  insulin lispro (ADMELOG) corrective regimen sliding scale   SubCutaneous at bedtime  levoFLOXacin  Tablet 750 milliGRAM(s) Oral every 24 hours (one dose of IV given on 12/19, restarted today)   lidocaine 1% Injectable 10 milliLiter(s) Local Injection once  lisinopril 2.5 milliGRAM(s) Oral daily  oxycodone    5 mG/acetaminophen 325 mG 1 Tablet(s) Oral every 6 hours PRN  polyethylene glycol 3350 17 Gram(s) Oral daily  senna 2 Tablet(s) Oral at bedtime      PHYSICAL EXAM:    Vital Signs Last 24 Hrs  T(C): 36.6 (24 Dec 2021 05:18), Max: 36.9 (23 Dec 2021 18:08)  T(F): 97.8 (24 Dec 2021 05:18), Max: 98.4 (23 Dec 2021 18:08)  HR: 58 (24 Dec 2021 05:18) (58 - 78)  BP: 115/62 (24 Dec 2021 05:18) (115/62 - 144/70)  BP(mean): 79 (23 Dec 2021 12:55) (79 - 79)  RR: 18 (24 Dec 2021 05:18) (17 - 18)  SpO2: 98% (24 Dec 2021 05:18) (96% - 99%)    GEN: WD WN W male in NAD    HEENT: NC/AT, blisters noted on upper lip     CHEST/Respiratory: clear to auscultation    Cardiovascular: S1 S2 irregular beats with no murmurs, gallops, rubs    Gastrointestinal: soft, nontender with BS present; scar noted on lower abd (tummy tuck scar)    Genitourinary: no diza     Extremities: hyperpigmented changes noted on bilateral lower extremities on tibia; onychomycosis on bilateral toes   LLE: covered with dressing   RLE: no erythema or swelling    Neurological: A+O x3    Skin: no rashes      LABS/DIAGNOSTIC TESTS                        6.9    3.96  )-----------( 123      ( 24 Dec 2021 10:06 )             21.1       WBC Count: 3.96 K/uL (12-24 @ 10:06)  WBC Count: 4.78 K/uL (12-23 @ 23:36)  WBC Count: 4.73 K/uL (12-23 @ 16:32)  WBC Count: 3.42 K/uL (12-22 @ 23:39)  WBC Count: 4.31 K/uL (12-22 @ 07:55)      12-24    141  |  110<H>  |  12  ----------------------------<  187<H>  4.2   |  24  |  1.11    Ca    8.1<L>      24 Dec 2021 10:06  Phos  3.4     12-24  Mg     1.6     12-24    CULTURES    Culture - Tissue with Gram Stain (collected 12-23-21 @ 04:15)  Source: Bone Left foot bone biopsy culture  Gram Stain (12-23-21 @ 07:02):    Rare polymorphonuclear leukocytes seen per low power field    No organisms seen per oil power field  Preliminary Report (12-24-21 @ 08:48):    No growth    Culture - Surgical Swab (collected 12-23-21 @ 04:13)  Source: .Surgical Swab Left foot deep wound culture  Preliminary Report (12-24-21 @ 08:53):    No growth    Culture - Blood (collected 12-22-21 @ 18:48)  Source: .Blood Blood  Preliminary Report (12-23-21 @ 19:01):    No growth to date.    Culture - Blood (collected 12-22-21 @ 18:46)  Source: .Blood Blood  Preliminary Report (12-23-21 @ 19:01):    No growth to date.    Culture - Surgical Swab (collected 12-20-21 @ 21:08)  Source: .Surgical Swab Left foot wound  Final Report (12-23-21 @ 16:51):    Few Alpha hemolytic strep "Susceptibilities not performed"    Rare Staphylococcus aureus    Rare Citrobacter koseri    Few Veillonella parvula "Susceptibilities not performed"  Organism: Staphylococcus aureus  Citrobacter koseri (12-23-21 @ 16:51)  Organism: Citrobacter koseri (12-23-21 @ 16:51)      -  Amikacin: S <=16      -  Amoxicillin/Clavulanic Acid: S <=8/4      -  Ampicillin: R >16 These ampicillin results predict results for amoxicillin      -  Ampicillin/Sulbactam: S <=4/2 Enterobacter, Klebsiella aerogenes, Citrobacter, and Serratia may develop resistance during prolonged therapy (3-4 days)      -  Aztreonam: S <=4      -  Cefazolin: S <=2 Enterobacter, Klebsiella aerogenes, Citrobacter, and Serratia may develop resistance during prolonged therapy (3-4 days)      -  Cefepime: S <=2      -  Cefoxitin: S <=8      -  Ceftriaxone: S <=1 Enterobacter, Klebsiella aerogenes, Citrobacter, and Serratia may develop resistance during prolonged therapy      -  Ciprofloxacin: S <=0.25      -  Ertapenem: S <=0.5      -  Gentamicin: S <=2      -  Imipenem: S <=1      -  Levofloxacin: S <=0.5      -  Meropenem: S <=1      -  Piperacillin/Tazobactam: S <=8      -  Tobramycin: S <=2      -  Trimethoprim/Sulfamethoxazole: S <=0.5/9.5      Method Type: CHATO  Organism: Staphylococcus aureus (12-23-21 @ 16:51)      -  Ampicillin/Sulbactam: S <=8/4      -  Cefazolin: S <=4      -  Clindamycin: R <=0.25 This isolate is presumed to be clindamycin resistant based on detection of inducible resistance. Clindamycin may still be effective in some patients.      -  Erythromycin: R >4      -  Gentamicin: S 2 Should not be used as monotherapy      -  Oxacillin: S <=0.25      -  Rifampin: S <=1 Should not be used as monotherapy      -  Tetra/Doxy: S <=1      -  Trimethoprim/Sulfamethoxazole: S <=0.5/9.5      -  Vancomycin: S 2      Method Type: CHATO    Culture - Blood (collected 12-20-21 @ 05:34)  Source: .Blood Blood-Peripheral  Gram Stain (12-21-21 @ 10:36):    Growth in aerobic bottle: Gram Positive Cocci in Clusters    Growth in anaerobic bottle: Gram positive cocci in pairs  Final Report (12-23-21 @ 06:23):    Growth in aerobic bottle: Staphylococcus aureus    Growth in anaerobic bottle: Streptococcus mitis/oralis group    Alpha hemolytic strep    (not Strep. pneumoniae or Enterococcus)    Single set isolate, possible contaminant. Contact    Microbiology if susceptibility testing clinically    indicated.    ***Blood Panel PCR results on this specimen are available    approximately 3 hours after the Gram stain result.***    Gram stain, PCR, and/or culture results may not always    correspond due to difference in methodologies.    ************************************************************    This PCR assay was performed by multiplex PCR. This    Assay tests for 66 bacterial and resistance gene targets.    Please refer to the [facility] test directory    at [email or web address] for details.  Organism: Blood Culture PCR  Blood Culture PCR  Staphylococcus aureus (12-23-21 @ 06:23)  Organism: Staphylococcus aureus (12-23-21 @ 06:23)      -  Ampicillin/Sulbactam: S <=8/4      -  Cefazolin: S <=4      -  Clindamycin: R <=0.25 This isolate is presumed to be clindamycin resistant based on detection of inducible resistance. Clindamycin may still be effective in some patients.      -  Erythromycin: R >4      -  Gentamicin: S <=1 Should not be used as monotherapy      -  Oxacillin: S <=0.25      -  Rifampin: S <=1 Should not be used as monotherapy      -  Tetra/Doxy: S <=1      -  Trimethoprim/Sulfamethoxazole: S <=0.5/9.5      -  Vancomycin: S 1      Method Type: CHATO  Organism: Blood Culture PCR (12-23-21 @ 06:23)      -  Streptococcus sp. (Not Grp A, B or S pneumoniae): Detec      Method Type: PCR  Organism: Blood Culture PCR (12-23-21 @ 06:23)      -  Staphylococcus aureus: Detec Any isolate of Staphylococcus aureus from a blood culture is NOT considered a contaminant.      Method Type: PCR    Culture - Blood (collected 12-20-21 @ 05:34)  Source: .Blood Blood-Peripheral  Gram Stain (12-21-21 @ 22:53):    Growth in aerobic bottle: Gram positive cocci in pairs  Final Report (12-23-21 @ 06:23):    Growth in aerobic bottle: Staphylococcus aureus    See previous culture [id number]    Culture - Urine (collected 12-20-21 @ 04:57)  Source: Clean Catch Clean Catch (Midstream)  Final Report (12-21-21 @ 08:24):    <10,000 CFU/mL Normal Urogenital Melissa      RADIOLOGY  < from: Xray Foot AP + Lateral, Left (12.23.21 @ 13:39) >    ACC: [id number] EXAM:  XR FOOT 2 VIEWS LT                          PROCEDURE DATE:  12/23/2021      INTERPRETATION:  LEFT foot    CLINICAL INFORMATION: Postoperative radiographs.  Comparison: LEFT foot radiographs 12/19/2021  TECHNIQUE: AP,lateral and oblique views.    FINDINGS:  Status post incision and drainage of the LEFT foot abscess on plantar   surface. 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  No acute fracture.  No gross radiographic evidence of osteomyelitis.    IMPRESSION:    Status post incision and drainage of LEFT foot abscess.  Please see MRI LEFT foot 12/20/2021.  ----------------------------------------------------------------------------    < from: ORLY w/Doppler (12.24.21 @ 14:08) >  PROCEDURE: Transesophageal and transthoracic echocardiogram  was performed in the echocardiography laboratory.  The  patient was sedated with Propofol  100 mg IV and 5mg of  etomidate by anestheiologist.  The procedure was monitored  with automatic blood pressure monitoring,  ECG tracings and  pulse oximetry. The transesophageal probe was placed in the  esophagus posterior to the heart without any complications.   The patient tolerated the procedure well.  Erika __10  cc of agitated normal saline  INDICATION: Acute and subacute infective endocarditis  (I33.0)  HISTORY:  ------------------------------------------------------------------------  DIMENSIONS:  Dimensions:     Normal Values:  LA:       ---     2.0 - 4.0 cm  Ao:     3.6 cm    2.0 - 3.8 cm  SEPTUM:   ---     0.6 - 1.2 cm  PWT:      ---     0.6 - 1.1 cm  LVIDd:    ---     3.0 - 5.6 cm  LVIDs:    ---    1.8 - 4.0 cm      Ejection Fraction Visual Estimate: 55-60 %    ------------------------------------------------------------------------  OBSERVATIONS:  Mitral Valve: Mitral annular calcification. Moderate mitral  regurgitation.  Aortic Root: Aortic Root: 3.6 cm.Normal aortic root, aortic  arch and descending thoracic aorta.    Aortic Valve: Calcified trileaflet aortic valve with normal  opening. Mild to moderate aortic regurgitation.  Left Atrium: Moderate left atrial enlargement.Normal left  atrium.  No left atrium or left atrial appendage thrombus.  Spontaneus echo contrast seen.  Left Ventricle: Normal Left Ventricular Systolic Function,  (EF = 55 to 60%) Normal left ventricular internal  dimensions and wall thicknesses.  Right Heart:Normal right atrium. Normal right ventricular  size and function. There is trace tricuspid regurgitation.  Normal pulmonic valve.  Pericardium/PleuraNormal pericardium with no pericardial  effusion.  Hemodynamic: Contrast injection demonstrates no evidence of  a patent foramen ovale.  ------------------------------------------------------------------------  CONCLUSIONS:  1. Mitral annular calcification. Moderate mitral  regurgitation.  2. Calcified trileaflet aortic valve with normal opening.  Mild to moderate aortic regurgitation.  3. Aortic Root: 3.6 cm. Normal aortic root, aortic arch and  descending thoracic aorta.  4. Moderate left atrial enlargement. Normal left atrium.  No  left atrium or left atrial appendage thrombus. Spontaneus  echo contrast seen.  5. Normal left ventricular internal dimensions and wall  thicknesses.  6. Normal Left Ventricular Systolic Function,  (EF = 55 to  60%)  7. Normal right atrium.  8. Normal right ventricular size and function.  9. There is trace tricuspid regurgitation.  10. Normal pulmonic valve.  11. Contrast injection demonstrates no evidence of a patent  foramen ovale.  12. Normal pericardium with no pericardial effusion.  13. No vegetations seen on this study.

## 2021-12-25 LAB
ANION GAP SERPL CALC-SCNC: 7 MMOL/L — SIGNIFICANT CHANGE UP (ref 5–17)
BUN SERPL-MCNC: 9 MG/DL — SIGNIFICANT CHANGE UP (ref 7–18)
CALCIUM SERPL-MCNC: 8.7 MG/DL — SIGNIFICANT CHANGE UP (ref 8.4–10.5)
CHLORIDE SERPL-SCNC: 110 MMOL/L — HIGH (ref 96–108)
CO2 SERPL-SCNC: 24 MMOL/L — SIGNIFICANT CHANGE UP (ref 22–31)
CREAT SERPL-MCNC: 1.17 MG/DL — SIGNIFICANT CHANGE UP (ref 0.5–1.3)
GLUCOSE BLDC GLUCOMTR-MCNC: 183 MG/DL — HIGH (ref 70–99)
GLUCOSE BLDC GLUCOMTR-MCNC: 188 MG/DL — HIGH (ref 70–99)
GLUCOSE BLDC GLUCOMTR-MCNC: 230 MG/DL — HIGH (ref 70–99)
GLUCOSE BLDC GLUCOMTR-MCNC: 232 MG/DL — HIGH (ref 70–99)
GLUCOSE SERPL-MCNC: 221 MG/DL — HIGH (ref 70–99)
HCT VFR BLD CALC: 25.9 % — LOW (ref 39–50)
HGB BLD-MCNC: 8.5 G/DL — LOW (ref 13–17)
MAGNESIUM SERPL-MCNC: 1.8 MG/DL — SIGNIFICANT CHANGE UP (ref 1.6–2.6)
MCHC RBC-ENTMCNC: 28.3 PG — SIGNIFICANT CHANGE UP (ref 27–34)
MCHC RBC-ENTMCNC: 32.8 GM/DL — SIGNIFICANT CHANGE UP (ref 32–36)
MCV RBC AUTO: 86.3 FL — SIGNIFICANT CHANGE UP (ref 80–100)
NRBC # BLD: 0 /100 WBCS — SIGNIFICANT CHANGE UP (ref 0–0)
PHOSPHATE SERPL-MCNC: 3 MG/DL — SIGNIFICANT CHANGE UP (ref 2.5–4.5)
PLATELET # BLD AUTO: 145 K/UL — LOW (ref 150–400)
POTASSIUM SERPL-MCNC: 3.8 MMOL/L — SIGNIFICANT CHANGE UP (ref 3.5–5.3)
POTASSIUM SERPL-SCNC: 3.8 MMOL/L — SIGNIFICANT CHANGE UP (ref 3.5–5.3)
RBC # BLD: 3 M/UL — LOW (ref 4.2–5.8)
RBC # FLD: 13.5 % — SIGNIFICANT CHANGE UP (ref 10.3–14.5)
SODIUM SERPL-SCNC: 141 MMOL/L — SIGNIFICANT CHANGE UP (ref 135–145)
WBC # BLD: 5.16 K/UL — SIGNIFICANT CHANGE UP (ref 3.8–10.5)
WBC # FLD AUTO: 5.16 K/UL — SIGNIFICANT CHANGE UP (ref 3.8–10.5)

## 2021-12-25 PROCEDURE — 99233 SBSQ HOSP IP/OBS HIGH 50: CPT | Mod: GC

## 2021-12-25 RX ORDER — ENOXAPARIN SODIUM 100 MG/ML
80 INJECTION SUBCUTANEOUS EVERY 12 HOURS
Refills: 0 | Status: DISCONTINUED | OUTPATIENT
Start: 2021-12-25 | End: 2021-12-29

## 2021-12-25 RX ORDER — LANOLIN ALCOHOL/MO/W.PET/CERES
5 CREAM (GRAM) TOPICAL AT BEDTIME
Refills: 0 | Status: DISCONTINUED | OUTPATIENT
Start: 2021-12-25 | End: 2022-01-07

## 2021-12-25 RX ADMIN — OXYCODONE AND ACETAMINOPHEN 1 TABLET(S): 5; 325 TABLET ORAL at 23:57

## 2021-12-25 RX ADMIN — SENNA PLUS 2 TABLET(S): 8.6 TABLET ORAL at 23:23

## 2021-12-25 RX ADMIN — ATORVASTATIN CALCIUM 40 MILLIGRAM(S): 80 TABLET, FILM COATED ORAL at 23:23

## 2021-12-25 RX ADMIN — Medication 100 MILLIGRAM(S): at 23:23

## 2021-12-25 RX ADMIN — Medication 2: at 17:26

## 2021-12-25 RX ADMIN — Medication 325 MILLIGRAM(S): at 06:51

## 2021-12-25 RX ADMIN — Medication 5 MILLIGRAM(S): at 23:23

## 2021-12-25 RX ADMIN — Medication 100 MILLIGRAM(S): at 14:06

## 2021-12-25 RX ADMIN — Medication 2: at 11:58

## 2021-12-25 RX ADMIN — LISINOPRIL 2.5 MILLIGRAM(S): 2.5 TABLET ORAL at 06:51

## 2021-12-25 RX ADMIN — Medication 325 MILLIGRAM(S): at 17:27

## 2021-12-25 RX ADMIN — CHLORHEXIDINE GLUCONATE 1 APPLICATION(S): 213 SOLUTION TOPICAL at 06:51

## 2021-12-25 RX ADMIN — BUPROPION HYDROCHLORIDE 150 MILLIGRAM(S): 150 TABLET, EXTENDED RELEASE ORAL at 11:59

## 2021-12-25 RX ADMIN — Medication 1: at 07:58

## 2021-12-25 RX ADMIN — GABAPENTIN 800 MILLIGRAM(S): 400 CAPSULE ORAL at 06:51

## 2021-12-25 RX ADMIN — GABAPENTIN 800 MILLIGRAM(S): 400 CAPSULE ORAL at 23:23

## 2021-12-25 RX ADMIN — Medication 500 MILLIGRAM(S): at 11:58

## 2021-12-25 RX ADMIN — OXYCODONE AND ACETAMINOPHEN 1 TABLET(S): 5; 325 TABLET ORAL at 23:27

## 2021-12-25 RX ADMIN — Medication 100 MILLIGRAM(S): at 06:51

## 2021-12-25 RX ADMIN — GABAPENTIN 800 MILLIGRAM(S): 400 CAPSULE ORAL at 14:05

## 2021-12-25 NOTE — PROGRESS NOTE ADULT - PROBLEM SELECTOR PLAN 7
- EKG A fib rate controlled  - CHADSVASC2 4 points   - Started on AC (Lovenox) for now; cleared from Podiatry (as patient is s/p I & D with blood loss and drop in hgb)  - Continue to monitor Hb  - Cardio Dr Liz on board  - Hold AC on Wednesday as patient has tentative OR on Thursday for wound closure

## 2021-12-25 NOTE — PROGRESS NOTE ADULT - PROBLEM SELECTOR PLAN 2
- Patient with cellulitis of left foot and Charcot foot in diabetes   - Surgical swab 12/20 of left foot wound with Alpha hemolytic strep, S. aureus, Citrobacter koseri, Veillonella parvula.  - Off Vancomycin since 12/21, discontinued Unasyn 11/24  - Started on Cefazolin 2 g q 8 hrs and Levaquin 750 mg daily (12/24) as per ID recommendations  - ID Dr Maxwell on board - will dc on Cefazolin 2 g q 8hrs for 4 weeks starting from 12/22  - Podiatry on board, s/p I&D (12/22)

## 2021-12-25 NOTE — PROGRESS NOTE ADULT - PROBLEM SELECTOR PLAN 11
IMPROVE VTE Individual Risk Assessment  RISK                                                                Points  [  ] Previous VTE                                                  3  [  ] Thrombophilia                                               2  [  ] Lower limb paralysis                                      2        (unable to hold up >15 seconds)    [  ] Current Cancer                                              2         (within 6 months)  [  ] Immobilization > 24 hrs                                1  [  ] ICU/CCU stay > 24 hours                              1  [  ] Age > 60                                                      1  IMPROVE VTE Score _________   Full dose Lovenox   Discussed above plan with patient IMPROVE VTE Individual Risk Assessment  RISK                                                                Points  [  ] Previous VTE                                                  3  [  ] Thrombophilia                                               2  [  ] Lower limb paralysis                                      2        (unable to hold up >15 seconds)    [  ] Current Cancer                                              2         (within 6 months)  [  ] Immobilization > 24 hrs                                1  [  ] ICU/CCU stay > 24 hours                              1  [  ] Age > 60                                                      1  IMPROVE VTE Score _________   Full dose Lovenox   Discussed above plan with patient  PT eval PRINCE

## 2021-12-25 NOTE — PROGRESS NOTE ADULT - PROBLEM SELECTOR PLAN 4
- Acute blood loss anemia  - Patient noted to have low hgb 8-9 on admission, now 7>6.7>7.5>6.9>8.5 stable now  - S/p I & D on 12/22 by Podiatry  - S/p 2 unit PRBC   - Iron panel compatible with KISHOR however anemia from blood loss as well  - C/w Ferrous sulfate and vitamin C  - Type and screen, transfuse if hgb <7  - Consent in the chart   - Bowel regimen   - CBC daily  - Monitor for any signs of bleeding or hemodynamic instability

## 2021-12-25 NOTE — PROGRESS NOTE ADULT - PROBLEM SELECTOR PLAN 1
- Patient with MSSA and Streptococcus bacteremia most likely secondary to left foot cellulitis/abscess  - MR left foot showed Plantar soft tissue wound with tract extending to the undersurface of the first tarsometatarsal articulation. Edema within the base of the first tarsometatarsal articulation may represent sequela of early osteomyelitis. First second and third metatarsal head subchondral fractures.  - Bcx from 12/20 methicillin susceptible S. aureus, Streptococcus mitis/oralis group, and Alpha hemolytic strep, ESR and CRP elevated    - Repeat Bcx from 12/22 NGTD  - Was on Vancomycin then Unasyn  - Started on Cefazolin 2 g q8hrs and Levaquin 750 mg daily (12/24) as per ID recommendations   - MRI lumbosacral no acute findings   - Echo showed aortic valve leaflets appear thickened, cannot exclude a vegetation. Mild aortic  regurgitation. Normal ventricular function, mild pulmonary hypertension  - ORLY no vegetations - Negative for endocarditis   - Cardio Dr Liz on board  - ID Dr Maxwell on board - will dc on Cefazolin 2 g q 8hrs for 4 weeks starting from 12/22  - Podiatry on board, s/p I&D (12/22)  - Will consult IR for PICC line placement   - Monitor for any signs of hemodynamic instability

## 2021-12-25 NOTE — PROGRESS NOTE ADULT - ASSESSMENT
A:  Cellulitis, Left Foot and Ankle.  Abscess, Left Plantar Foot  Left Foot Plantar Wound  Diabetic Neuropathy, b/l  DM  S/P bedside I&D (12/20/21)  S/P OR I&D (12/23/21)    Plan:  -Patient examined and chart reviewed  -Explained all clinical findings to the patient to the patient's satisfaction.  -Educated the patient about the importance of glycemic control in wound healing  -Xrays reviewed  -MRI reviewed  -Cultures - hemolytic strep   -Flushed surgical site with saline flush. Dressed Left Foot with iodoform packing, 4x4 gauze, ABD, kerlix and ace.   -Patient to keep left foot dressings dry, clean, and intact.   -Patient to be NWB to the left foot  -Recommend Left Lower Extremity elevation.  -Recommend antibiotics per ID team for Left Foot Cellulitis and Left Foot Wound  -Lovenox can be started again per Medicine Team.   -Tentatively plan for OR on Thursday (12/30) for a partial delayed primary closure.   -Podiatry will follow in house.  -Discussed with attending Dr. Hart

## 2021-12-25 NOTE — PROGRESS NOTE ADULT - SUBJECTIVE AND OBJECTIVE BOX
CHIEF COMPLAINT:Patient is a 76y old  Male who presents with a chief complaint of AMS .Pt appears comfortable.    	  REVIEW OF SYSTEMS:  CONSTITUTIONAL: No fever, weight loss, or fatigue  EYES: No eye pain, visual disturbances, or discharge  ENT:  No difficulty hearing, tinnitus, vertigo; No sinus or throat pain  NECK: No pain or stiffness  RESPIRATORY: No cough, wheezing, chills or hemoptysis; No Shortness of Breath  CARDIOVASCULAR: No chest pain, palpitations, passing out, dizziness, or leg swelling  GASTROINTESTINAL: No abdominal or epigastric pain. No nausea, vomiting, or hematemesis; No diarrhea or constipation. No melena or hematochezia.  GENITOURINARY: No dysuria, frequency, hematuria, or incontinence  NEUROLOGICAL: No headaches, memory loss, loss of strength, numbness, or tremors  SKIN: No itching, burning, rashes, or lesions   LYMPH Nodes: No enlarged glands  ENDOCRINE: No heat or cold intolerance; No hair loss  MUSCULOSKELETAL: No joint pain or swelling; No muscle, back, or extremity pain  PSYCHIATRIC: No depression, anxiety, mood swings, or difficulty sleeping  HEME/LYMPH: No easy bruising, or bleeding gums  ALLERGY AND IMMUNOLOGIC: No hives or eczema	      PHYSICAL EXAM:  T(C): 37.1 (12-25-21 @ 12:04), Max: 37.1 (12-24-21 @ 14:10)  HR: 69 (12-25-21 @ 12:04) (69 - 76)  BP: 144/76 (12-25-21 @ 12:04) (120/72 - 155/82)  RR: 18 (12-25-21 @ 12:04) (18 - 18)  SpO2: 97% (12-25-21 @ 12:04) (97% - 100%)  Wt(kg): --  I&O's Summary    24 Dec 2021 07:01  -  25 Dec 2021 07:00  --------------------------------------------------------  IN: 0 mL / OUT: 500 mL / NET: -500 mL    25 Dec 2021 07:01  -  25 Dec 2021 12:48  --------------------------------------------------------  IN: 0 mL / OUT: 475 mL / NET: -475 mL        Appearance: Normal	  HEENT:   Normal oral mucosa, PERRL, EOMI	  Lymphatic: No lymphadenopathy  Cardiovascular: Normal S1 S2, No JVD, No murmurs, No edema  Respiratory: Lungs clear to auscultation	  Psychiatry: A & O x 3, Mood & affect appropriate  Gastrointestinal:  Soft, Non-tender, + BS	  Skin: No rashes, No ecchymoses, No cyanosis	  Neurologic: Non-focal  Extremities: Normal range of motion, No clubbing, cyanosis or edema  Vascular: Peripheral pulses palpable 2+ bilaterally    MEDICATIONS  (STANDING):  ascorbic acid 500 milliGRAM(s) Oral daily  atorvastatin 40 milliGRAM(s) Oral at bedtime  buPROPion XL (24-Hour) . 150 milliGRAM(s) Oral daily  ceFAZolin   IVPB 2000 milliGRAM(s) IV Intermittent every 8 hours  chlorhexidine 2% Cloths 1 Application(s) Topical <User Schedule>  dextrose 5% + sodium chloride 0.45%. 1000 milliLiter(s) (75 mL/Hr) IV Continuous <Continuous>  ferrous    sulfate 325 milliGRAM(s) Oral two times a day  gabapentin 800 milliGRAM(s) Oral three times a day  insulin lispro (ADMELOG) corrective regimen sliding scale   SubCutaneous three times a day before meals  insulin lispro (ADMELOG) corrective regimen sliding scale   SubCutaneous at bedtime  levoFLOXacin  Tablet 750 milliGRAM(s) Oral every 24 hours  lidocaine 1% Injectable 10 milliLiter(s) Local Injection once  lisinopril 2.5 milliGRAM(s) Oral daily  polyethylene glycol 3350 17 Gram(s) Oral daily  senna 2 Tablet(s) Oral at bedtime      LABS:	 	                        8.5    5.16  )-----------( 145      ( 25 Dec 2021 09:19 )             25.9     12-25    141  |  110<H>  |  9   ----------------------------<  221<H>  3.8   |  24  |  1.17    Ca    8.7      25 Dec 2021 09:19  Phos  3.0     12-25  Mg     1.8     12-25

## 2021-12-25 NOTE — PROGRESS NOTE ADULT - SUBJECTIVE AND OBJECTIVE BOX
Patient is a 76y old  Male who presents with a chief complaint of AMS (20 Dec 2021 14:19)    Podiatry Interval HPI: Patient seen bedside in no acute distress. Pt is s/p Left Foot I&D and bone biopsy. Pt denies any pain to the left foot. Patient states that he is aware that it is important to not put weight to the left foot and states that he only puts weight on the left foot when he is urinating. Denies any constitutional symptoms of N/V/C/F/SOB. Denies any other pedal complaints. Advised pt strict non WB to LLE. Patient was not bleeding today during dressing change.    Podiatry HPI: 77 y/o male with a PMHx of DM, HTN, A Fib, Sciatica, Cataract, OA of Right Hip presented to the ED for altered mental status. Podiatry was consulted for Left Foot Cellulitis/Wound. Patient states that his podiatrist was debriding a callus on the Left Foot and the podiatrist went too deep which caused the initial wound about 3 weeks ago and since then it has been getting worse. Denies any pain to the Left Foot. States that he cannot feel any painful stimuli to his left foot due to diabetic neuropathy. States that he would like to get back on his feet and exercise because that is how he is able to maintain his sugar levels and patient states that he has always had a left foot flat arch. Denies any trauma to the area. Patient states that yesterday he was having chills but today he denies any constitutional symptoms of N/V/C/F/SOB. Denies any other pedal complaints at this moment.     HPI:  Patient is a 76 year-old male from home with past medical history of atrial fibrillation (was on coumadin until 1month ago), hypertension, hyperlipidemia, diabetes mellitus presents to ED for AMS. Pt states that today he started feeling freezing cold and was shivering. Talked to wife for more information and she stated that patient was feeling very cold though house was warm. He stopped shivering for a while and the started again. During his second episode of shivering from feeling extremely cold he got very confused and could not talk, understand what she was saying and was not making sense. After a while the shivering stopped and he regained his normal mental status. Denies fever, chest pain. abdominal pain, diarrhea, constipation. Pt states he has incontinence and an ulcer on his left foot. He states his podiatrist scraped a callus which caused the ulcer. He has diabetic neuropathy so does not feel pain around the ulcer. He did notice the left foot got more erythematous today.   He stopped taking warfarin 1 month ago as he states it was too much of an hassle to get INR checked and avoid eating certain foods.   (19 Dec 2021 22:54)      Medications acetaminophen     Tablet .. 650 milliGRAM(s) Oral every 6 hours PRN  ascorbic acid 500 milliGRAM(s) Oral daily  atorvastatin 40 milliGRAM(s) Oral at bedtime  buPROPion XL (24-Hour) . 150 milliGRAM(s) Oral daily  ceFAZolin   IVPB 2000 milliGRAM(s) IV Intermittent every 8 hours  chlorhexidine 2% Cloths 1 Application(s) Topical <User Schedule>  dextrose 5% + sodium chloride 0.45%. 1000 milliLiter(s) IV Continuous <Continuous>  ferrous    sulfate 325 milliGRAM(s) Oral two times a day  gabapentin 800 milliGRAM(s) Oral three times a day  insulin lispro (ADMELOG) corrective regimen sliding scale   SubCutaneous three times a day before meals  insulin lispro (ADMELOG) corrective regimen sliding scale   SubCutaneous at bedtime  levoFLOXacin  Tablet 750 milliGRAM(s) Oral every 24 hours  lidocaine 1% Injectable 10 milliLiter(s) Local Injection once  lisinopril 2.5 milliGRAM(s) Oral daily  oxycodone    5 mG/acetaminophen 325 mG 1 Tablet(s) Oral every 6 hours PRN  polyethylene glycol 3350 17 Gram(s) Oral daily  senna 2 Tablet(s) Oral at bedtime    FHFamily history of coronary artery disease (Sibling)    Family history of breast cancer in sister (Sibling)    ,   PMHDiabetes    Hypertension    Osteoarthritis of right hip    Cataract    Atrial fibrillation    Vertigo    Sciatica       PSHS/P total hip arthroplasty        Labs                          8.5    5.16  )-----------( 145      ( 25 Dec 2021 09:19 )             25.9      12-25    141  |  110<H>  |  9   ----------------------------<  221<H>  3.8   |  24  |  1.17    Ca    8.7      25 Dec 2021 09:19  Phos  3.0     12-25  Mg     1.8     12-25       Vital Signs Last 24 Hrs  T(C): 37.1 (25 Dec 2021 05:22), Max: 37.1 (24 Dec 2021 14:10)  T(F): 98.7 (25 Dec 2021 05:22), Max: 98.8 (24 Dec 2021 18:41)  HR: 74 (25 Dec 2021 10:04) (71 - 76)  BP: 155/82 (25 Dec 2021 10:04) (120/72 - 155/82)  BP(mean): --  RR: 18 (25 Dec 2021 05:22) (18 - 18)  SpO2: 98% (25 Dec 2021 05:22) (97% - 100%)  Sedimentation Rate, Erythrocyte: 86 mm/Hr (12-20-21 @ 06:15)  Sedimentation Rate, Erythrocyte: 95 mm/Hr (12-19-21 @ 19:15)         C-Reactive Protein, Serum: 107 mg/L (12-20-21 @ 09:36)  C-Reactive Protein, Serum: 94 mg/L (12-20-21 @ 04:01)  WBC Count: 5.16 K/uL (12-25-21 @ 09:19)  WBC Count: 4.48 K/uL (12-24-21 @ 18:32)  WBC Count: 4.78 K/uL (12-23-21 @ 23:36)  WBC Count: 4.73 K/uL (12-23-21 @ 16:32)      PHYSICAL EXAM  LE Focused:    Vasc:  DP/PT pulses were palpable, bilaterally. Generalized edema noted to the Left Foot  Derm: Left Plantar medial arch incision site shows decreased redness to the left foot. No warmth noted to th left foot. No malodor, no purulent drainage, +PTB was noted. Edema was noted to be decreased to the left foot.   12/23/21: Bleeding seized, no erythema, no edema noted   12/22/21: Left Plantar medial arch wound noted measuring 3cm x 3cm x 3cm with 5cm tunnelling noted to the surrounding area with +PTB. Redness and Warmth noted to the left foot with no malodor, no purulent drainage noted upon expression today. Streaking up to the left ankle and plantar distal/lateral arch of the left foot. Edema was noted to have decreased from yesterday to the left foot.  12/20/21: Left Plantar medial arch wound noted measuring 3cm x 3cm x 3cm with 5cm tunnelling noted to the surrounding area with +ptb and mild fluctuance noted to the surrounding wound. Left Foot Wound has Redness, increased warmth noted, mild malodor, purulent drainage, streaking up to the left ankle and plantar distal/lateral arch.   Post Bedside Incision and Drainage (12/20/21)  Neuro:  Protective sensation absent, bilaterally.   MSK: No pain on palpation to the Left Foot Wound. Denies any calf pain, bilaterally.     Imaging:     EXAM:  MR FOOT LT                          PROCEDURE DATE:  12/20/2021      INTERPRETATION:  LEFT FOOT MRI    CLINICAL INFORMATION: Left foot wound. Eval for osteomyelitis.  TECHNIQUE: Multiplanar, multisequence MRI was obtained of the left foot.    COMPARISON: Left foot MRI 12/23/2015.    FINDINGS:    Plantar soft tissue wound is present at the level of the first   tarsometatarsal articulation with tract extending to the bone. There is   edema along the plantar aspect of the distal medial cuneiform and base of   the first metatarsal with preservation of the T1 marrow signal. There is   moderate tarsometatarsal arthrosis and arthrosis of the articulation of   the navicular and lateral cuneiform. There are subchondral fractures of   the first second and third metatarsal heads. There is diffuse increased   muscle signal, most consistent with denervation. There is diffuse   subcutaneous edema.    IMPRESSION:    Plantar soft tissue wound with tract extending to the undersurface of the   first tarsometatarsal articulation.  Edema within the base of the first tarsometatarsal articulation adjacent   to the tract with preservation of the T1 marrow signal, which may   represent sequela of early osteomyelitis.  First second and third metatarsal head subchondral fractures.    Cultures:   Culture Results:   No growth (12.23.21 @ 04:15)

## 2021-12-25 NOTE — PROGRESS NOTE ADULT - ASSESSMENT
76 year-old male from home with past medical history of atrial fibrillation (was on coumadin until 1month ago), hypertension, hyperlipidemia, diabetes mellitus presents to ED for AMS,bacteremia, anemia.  1.ORLY-no endocarditis.  2.Bacteremia-ABX.Repaet blood cx-.  3.Afib-off ac, asa for now.  4.Pt not want coumadin due to dietary limitation,consider NOAC if covered by insurance.  5.DM-Insulin.  6.HTN-ace.  7.PPI.  8.Lipid d/o-statin.  9.DVT prophylaxis.

## 2021-12-25 NOTE — PROGRESS NOTE ADULT - SUBJECTIVE AND OBJECTIVE BOX
Patient is a 76y old  Male who presents with a chief complaint of AMS (25 Dec 2021 12:48)    Patient was seen and examined at bedside   Denies any complains today    INTERVAL HPI/OVERNIGHT EVENTS:  T(C): 37.1 (12-25-21 @ 12:04), Max: 37.1 (12-25-21 @ 05:22)  HR: 69 (12-25-21 @ 12:04) (69 - 74)  BP: 144/76 (12-25-21 @ 12:04) (120/72 - 155/82)  RR: 18 (12-25-21 @ 12:04) (18 - 18)  SpO2: 97% (12-25-21 @ 12:04) (97% - 98%)  Wt(kg): --  I&O's Summary    24 Dec 2021 07:01  -  25 Dec 2021 07:00  --------------------------------------------------------  IN: 0 mL / OUT: 500 mL / NET: -500 mL    25 Dec 2021 07:01  -  25 Dec 2021 18:46  --------------------------------------------------------  IN: 0 mL / OUT: 1025 mL / NET: -1025 mL        REVIEW OF SYSTEMS: denies fever, chills, SOB, palpitations, chest pain, abdominal pain, nausea, vomiting, diarrhea, constipation, dizziness    MEDICATIONS  (STANDING):  ascorbic acid 500 milliGRAM(s) Oral daily  atorvastatin 40 milliGRAM(s) Oral at bedtime  buPROPion XL (24-Hour) . 150 milliGRAM(s) Oral daily  ceFAZolin   IVPB 2000 milliGRAM(s) IV Intermittent every 8 hours  chlorhexidine 2% Cloths 1 Application(s) Topical <User Schedule>  dextrose 5% + sodium chloride 0.45%. 1000 milliLiter(s) (75 mL/Hr) IV Continuous <Continuous>  ferrous    sulfate 325 milliGRAM(s) Oral two times a day  gabapentin 800 milliGRAM(s) Oral three times a day  insulin lispro (ADMELOG) corrective regimen sliding scale   SubCutaneous three times a day before meals  insulin lispro (ADMELOG) corrective regimen sliding scale   SubCutaneous at bedtime  levoFLOXacin  Tablet 750 milliGRAM(s) Oral every 24 hours  lidocaine 1% Injectable 10 milliLiter(s) Local Injection once  lisinopril 2.5 milliGRAM(s) Oral daily  polyethylene glycol 3350 17 Gram(s) Oral daily  senna 2 Tablet(s) Oral at bedtime    MEDICATIONS  (PRN):  acetaminophen     Tablet .. 650 milliGRAM(s) Oral every 6 hours PRN Temp greater or equal to 38C (100.4F)  oxycodone    5 mG/acetaminophen 325 mG 1 Tablet(s) Oral every 6 hours PRN Moderate Pain      PHYSICAL EXAM:  GENERAL: NAD, well-groomed, well-developed  HEAD:  Atraumatic, Normocephalic  NERVOUS SYSTEM:  Alert & Oriented X3, no new focal deficit   CHEST/LUNG: Clear to auscultation bilaterally; No rales, rhonchi, wheezing, or rubs  HEART: Regular rate and rhythm; No murmurs, rubs, or gallops  ABDOMEN: Soft, Nontender, Nondistended; Bowel sounds present  EXTREMITIES:  Right foot wound was seen during dressing with podiatry resident, no active bleeding, healing well  SKIN: No rashes or lesions    LABS:                        8.5    5.16  )-----------( 145      ( 25 Dec 2021 09:19 )             25.9     12-25    141  |  110<H>  |  9   ----------------------------<  221<H>  3.8   |  24  |  1.17    Ca    8.7      25 Dec 2021 09:19  Phos  3.0     12-25  Mg     1.8     12-25          CAPILLARY BLOOD GLUCOSE      POCT Blood Glucose.: 230 mg/dL (25 Dec 2021 17:19)  POCT Blood Glucose.: 232 mg/dL (25 Dec 2021 11:30)  POCT Blood Glucose.: 183 mg/dL (25 Dec 2021 07:36)  POCT Blood Glucose.: 177 mg/dL (24 Dec 2021 22:45)

## 2021-12-25 NOTE — PHYSICAL THERAPY INITIAL EVALUATION ADULT - GENERAL OBSERVATIONS, REHAB EVAL
patient referral, s/p left foot i&d, pt narrow base of support NWB to LLE limiting functional mobility

## 2021-12-26 LAB
ANION GAP SERPL CALC-SCNC: 6 MMOL/L — SIGNIFICANT CHANGE UP (ref 5–17)
BUN SERPL-MCNC: 8 MG/DL — SIGNIFICANT CHANGE UP (ref 7–18)
CALCIUM SERPL-MCNC: 8.8 MG/DL — SIGNIFICANT CHANGE UP (ref 8.4–10.5)
CHLORIDE SERPL-SCNC: 110 MMOL/L — HIGH (ref 96–108)
CO2 SERPL-SCNC: 25 MMOL/L — SIGNIFICANT CHANGE UP (ref 22–31)
CREAT SERPL-MCNC: 1.13 MG/DL — SIGNIFICANT CHANGE UP (ref 0.5–1.3)
GLUCOSE BLDC GLUCOMTR-MCNC: 164 MG/DL — HIGH (ref 70–99)
GLUCOSE BLDC GLUCOMTR-MCNC: 210 MG/DL — HIGH (ref 70–99)
GLUCOSE BLDC GLUCOMTR-MCNC: 219 MG/DL — HIGH (ref 70–99)
GLUCOSE BLDC GLUCOMTR-MCNC: 263 MG/DL — HIGH (ref 70–99)
GLUCOSE SERPL-MCNC: 178 MG/DL — HIGH (ref 70–99)
HCT VFR BLD CALC: 24.5 % — LOW (ref 39–50)
HCT VFR BLD CALC: 25 % — LOW (ref 39–50)
HGB BLD-MCNC: 8 G/DL — LOW (ref 13–17)
HGB BLD-MCNC: 8.1 G/DL — LOW (ref 13–17)
MAGNESIUM SERPL-MCNC: 1.8 MG/DL — SIGNIFICANT CHANGE UP (ref 1.6–2.6)
MCHC RBC-ENTMCNC: 28.1 PG — SIGNIFICANT CHANGE UP (ref 27–34)
MCHC RBC-ENTMCNC: 28.1 PG — SIGNIFICANT CHANGE UP (ref 27–34)
MCHC RBC-ENTMCNC: 32.4 GM/DL — SIGNIFICANT CHANGE UP (ref 32–36)
MCHC RBC-ENTMCNC: 32.7 GM/DL — SIGNIFICANT CHANGE UP (ref 32–36)
MCV RBC AUTO: 86 FL — SIGNIFICANT CHANGE UP (ref 80–100)
MCV RBC AUTO: 86.8 FL — SIGNIFICANT CHANGE UP (ref 80–100)
NRBC # BLD: 0 /100 WBCS — SIGNIFICANT CHANGE UP (ref 0–0)
NRBC # BLD: 0 /100 WBCS — SIGNIFICANT CHANGE UP (ref 0–0)
PHOSPHATE SERPL-MCNC: 3 MG/DL — SIGNIFICANT CHANGE UP (ref 2.5–4.5)
PLATELET # BLD AUTO: 161 K/UL — SIGNIFICANT CHANGE UP (ref 150–400)
PLATELET # BLD AUTO: 167 K/UL — SIGNIFICANT CHANGE UP (ref 150–400)
POTASSIUM SERPL-MCNC: 3.8 MMOL/L — SIGNIFICANT CHANGE UP (ref 3.5–5.3)
POTASSIUM SERPL-SCNC: 3.8 MMOL/L — SIGNIFICANT CHANGE UP (ref 3.5–5.3)
RBC # BLD: 2.85 M/UL — LOW (ref 4.2–5.8)
RBC # BLD: 2.88 M/UL — LOW (ref 4.2–5.8)
RBC # FLD: 13 % — SIGNIFICANT CHANGE UP (ref 10.3–14.5)
RBC # FLD: 13.2 % — SIGNIFICANT CHANGE UP (ref 10.3–14.5)
SARS-COV-2 RNA SPEC QL NAA+PROBE: SIGNIFICANT CHANGE UP
SODIUM SERPL-SCNC: 141 MMOL/L — SIGNIFICANT CHANGE UP (ref 135–145)
WBC # BLD: 5.65 K/UL — SIGNIFICANT CHANGE UP (ref 3.8–10.5)
WBC # BLD: 6.13 K/UL — SIGNIFICANT CHANGE UP (ref 3.8–10.5)
WBC # FLD AUTO: 5.65 K/UL — SIGNIFICANT CHANGE UP (ref 3.8–10.5)
WBC # FLD AUTO: 6.13 K/UL — SIGNIFICANT CHANGE UP (ref 3.8–10.5)

## 2021-12-26 PROCEDURE — 99233 SBSQ HOSP IP/OBS HIGH 50: CPT | Mod: GC

## 2021-12-26 RX ADMIN — Medication 325 MILLIGRAM(S): at 17:04

## 2021-12-26 RX ADMIN — Medication 3: at 12:04

## 2021-12-26 RX ADMIN — Medication 500 MILLIGRAM(S): at 12:05

## 2021-12-26 RX ADMIN — Medication 100 MILLIGRAM(S): at 05:24

## 2021-12-26 RX ADMIN — Medication 2: at 08:20

## 2021-12-26 RX ADMIN — CHLORHEXIDINE GLUCONATE 1 APPLICATION(S): 213 SOLUTION TOPICAL at 05:24

## 2021-12-26 RX ADMIN — OXYCODONE AND ACETAMINOPHEN 1 TABLET(S): 5; 325 TABLET ORAL at 23:13

## 2021-12-26 RX ADMIN — Medication 100 MILLIGRAM(S): at 14:12

## 2021-12-26 RX ADMIN — GABAPENTIN 800 MILLIGRAM(S): 400 CAPSULE ORAL at 23:13

## 2021-12-26 RX ADMIN — ENOXAPARIN SODIUM 80 MILLIGRAM(S): 100 INJECTION SUBCUTANEOUS at 05:25

## 2021-12-26 RX ADMIN — ENOXAPARIN SODIUM 80 MILLIGRAM(S): 100 INJECTION SUBCUTANEOUS at 17:04

## 2021-12-26 RX ADMIN — Medication 325 MILLIGRAM(S): at 05:24

## 2021-12-26 RX ADMIN — SENNA PLUS 2 TABLET(S): 8.6 TABLET ORAL at 23:13

## 2021-12-26 RX ADMIN — Medication 100 MILLIGRAM(S): at 23:13

## 2021-12-26 RX ADMIN — GABAPENTIN 800 MILLIGRAM(S): 400 CAPSULE ORAL at 14:12

## 2021-12-26 RX ADMIN — Medication 1: at 17:04

## 2021-12-26 RX ADMIN — LISINOPRIL 2.5 MILLIGRAM(S): 2.5 TABLET ORAL at 05:24

## 2021-12-26 RX ADMIN — Medication 5 MILLIGRAM(S): at 23:14

## 2021-12-26 RX ADMIN — OXYCODONE AND ACETAMINOPHEN 1 TABLET(S): 5; 325 TABLET ORAL at 23:43

## 2021-12-26 RX ADMIN — GABAPENTIN 800 MILLIGRAM(S): 400 CAPSULE ORAL at 05:24

## 2021-12-26 RX ADMIN — ATORVASTATIN CALCIUM 40 MILLIGRAM(S): 80 TABLET, FILM COATED ORAL at 23:13

## 2021-12-26 RX ADMIN — BUPROPION HYDROCHLORIDE 150 MILLIGRAM(S): 150 TABLET, EXTENDED RELEASE ORAL at 12:05

## 2021-12-26 NOTE — PROGRESS NOTE ADULT - PROBLEM SELECTOR PLAN 7
- EKG A fib rate controlled  - CHADSVASC2 4 points   - Started on AC (Lovenox) for now; cleared from Podiatry (as patient is s/p I & D with blood loss and drop in hgb)  - Continue to monitor Hb  - Cardio Dr Liz on board  - Hold AC on Wednesday as patient has tentative OR on Thursday for wound closure  - risks and benefits of AC discussed with patient

## 2021-12-26 NOTE — DIETITIAN INITIAL EVALUATION ADULT. - PROBLEM SELECTOR PLAN 5
likely prerenal  s/p 2.5L IVF in ED  Monitor BMP daily  Avoid nephrotoxins  Continue gentle hydration

## 2021-12-26 NOTE — DIETITIAN INITIAL EVALUATION ADULT. - PROBLEM SELECTOR PLAN 7
On oral hypoglycemics at home  Hold OHA  Will start on sliding scale  Would calculate long acting insulin in AM  FS ACHS

## 2021-12-26 NOTE — DIETITIAN INITIAL EVALUATION ADULT. - OTHER INFO
Patient from home. Visited pt. alert & awake, reports usual po intake good, consumes 3 meals daily, denies n/v or diarrhea, complaint with his "diabetic meds" & meal plan, "know which foods to avoid", also received nutrition education at previous admissions, denies weight loss/gain? dosing wt. Patient from home. Visited pt. alert & awake, reports usual po intake good, consumes 3 meals daily, denies n/v or diarrhea, complaint with his "diabetic meds" & meal plan, "know which foods to avoid", also received nutrition education at previous admissions, denies weight loss/gain? dosing wt. 198.8 Lbs on 12/24/21. Presently pt. consuming 40-50% meals, states "dislikes certain foods served", obtained food preferences, updated kitchen, willing to have nutrition supplement once daily, d/w kitchen/Diet Tech, encourage po intake w/meal set up.     Followed by Podiatry team, s/p OR for left foot I & D with bone biopsy on 12/23 with ongoing wound care. Also plans for OR on 12/3/21 for a partial delayed primary closure of wound/left foot & pending PICC line insertion? no edema.

## 2021-12-26 NOTE — PROGRESS NOTE ADULT - PROBLEM SELECTOR PLAN 1
- Patient with MSSA and Streptococcus bacteremia most likely secondary to left foot cellulitis/abscess  - MR left foot showed Plantar soft tissue wound with tract extending to the undersurface of the first tarsometatarsal articulation. Edema within the base of the first tarsometatarsal articulation may represent sequela of early osteomyelitis. First second and third metatarsal head subchondral fractures.  - Bcx from 12/20 methicillin susceptible S. aureus, Streptococcus mitis/oralis group, and Alpha hemolytic strep, ESR and CRP elevated    - Repeat Bcx from 12/22 NGTD  - Was on Vancomycin then Unasyn  - on Cefazolin 2 g q8hrs and Levaquin 750 mg daily (12/24) as per ID recommendations   - MRI lumbosacral no acute findings   - Echo showed aortic valve leaflets appear thickened, cannot exclude a vegetation. Mild aortic  regurgitation. Normal ventricular function, mild pulmonary hypertension  - ORLY no vegetations - Negative for endocarditis   - Cardio Dr Liz on board  - ID Dr Maxwell on board - will dc on Cefazolin 2 g q 8hrs for 4 weeks starting from 12/22  - Podiatry on board, s/p I&D (12/22)  - Will consult IR for PICC line placement   - Monitor for any signs of hemodynamic instability

## 2021-12-26 NOTE — PROGRESS NOTE ADULT - SUBJECTIVE AND OBJECTIVE BOX
Patient is a 76y old  Male who presents with a chief complaint of AMS (26 Dec 2021 15:42)    patient was seen and examined at bedside   denies any complains   Again discussed about risks and benefits of AC    INTERVAL HPI/OVERNIGHT EVENTS:  T(C): 36.8 (12-26-21 @ 14:35), Max: 36.8 (12-25-21 @ 21:00)  HR: 69 (12-26-21 @ 14:35) (51 - 69)  BP: 130/64 (12-26-21 @ 14:35) (130/64 - 145/69)  RR: 16 (12-26-21 @ 14:35) (16 - 18)  SpO2: 98% (12-26-21 @ 14:35) (97% - 99%)  Wt(kg): --  I&O's Summary    25 Dec 2021 07:01  -  26 Dec 2021 07:00  --------------------------------------------------------  IN: 0 mL / OUT: 1025 mL / NET: -1025 mL        REVIEW OF SYSTEMS: denies fever, chills, SOB, palpitations, chest pain, abdominal pain, nausea, vomiting, diarrhea, constipation, dizziness    MEDICATIONS  (STANDING):  ascorbic acid 500 milliGRAM(s) Oral daily  atorvastatin 40 milliGRAM(s) Oral at bedtime  buPROPion XL (24-Hour) . 150 milliGRAM(s) Oral daily  ceFAZolin   IVPB 2000 milliGRAM(s) IV Intermittent every 8 hours  chlorhexidine 2% Cloths 1 Application(s) Topical <User Schedule>  dextrose 5% + sodium chloride 0.45%. 1000 milliLiter(s) (75 mL/Hr) IV Continuous <Continuous>  enoxaparin Injectable 80 milliGRAM(s) SubCutaneous every 12 hours  ferrous    sulfate 325 milliGRAM(s) Oral two times a day  gabapentin 800 milliGRAM(s) Oral three times a day  insulin lispro (ADMELOG) corrective regimen sliding scale   SubCutaneous three times a day before meals  insulin lispro (ADMELOG) corrective regimen sliding scale   SubCutaneous at bedtime  levoFLOXacin  Tablet 750 milliGRAM(s) Oral every 24 hours  lidocaine 1% Injectable 10 milliLiter(s) Local Injection once  lisinopril 2.5 milliGRAM(s) Oral daily  melatonin 5 milliGRAM(s) Oral at bedtime  polyethylene glycol 3350 17 Gram(s) Oral daily  senna 2 Tablet(s) Oral at bedtime    MEDICATIONS  (PRN):  acetaminophen     Tablet .. 650 milliGRAM(s) Oral every 6 hours PRN Temp greater or equal to 38C (100.4F)  oxycodone    5 mG/acetaminophen 325 mG 1 Tablet(s) Oral every 6 hours PRN Moderate Pain      PHYSICAL EXAM:  GENERAL: NAD, well-groomed, well-developed  HEAD:  Atraumatic, Normocephalic  NERVOUS SYSTEM:  Alert & Oriented X3, Good concentration; Motor Strength 5/5 B/L upper and lower extremities  CHEST/LUNG: Clear to ausculation  bilaterally; No rales, rhonchi, wheezing, or rubs  HEART: Regular rate and rhythm; No murmurs, rubs, or gallops  ABDOMEN: Soft, Nontender, Nondistended; Bowel sounds present  EXTREMITIES:  left food bandage clean, dry and intact  LYMPH: No lymphadenopathy noted  SKIN: No rashes or lesions    LABS:                        8.1    6.13  )-----------( 167      ( 26 Dec 2021 15:49 )             25.0     12-26    141  |  110<H>  |  8   ----------------------------<  178<H>  3.8   |  25  |  1.13    Ca    8.8      26 Dec 2021 05:16  Phos  3.0     12-26  Mg     1.8     12-26          CAPILLARY BLOOD GLUCOSE      POCT Blood Glucose.: 164 mg/dL (26 Dec 2021 16:21)  POCT Blood Glucose.: 263 mg/dL (26 Dec 2021 11:54)  POCT Blood Glucose.: 210 mg/dL (26 Dec 2021 07:46)  POCT Blood Glucose.: 188 mg/dL (25 Dec 2021 21:58)  POCT Blood Glucose.: 230 mg/dL (25 Dec 2021 17:19)

## 2021-12-26 NOTE — DIETITIAN INITIAL EVALUATION ADULT. - PROBLEM SELECTOR PLAN 3
Left foot ulcer infection   Will start on vanc and unasyn  Will need MRI to r/o osteoarthritis   Follow blood cultures   Podiatry consulted

## 2021-12-26 NOTE — DIETITIAN INITIAL EVALUATION ADULT. - PERTINENT MEDS FT
MEDICATIONS  (STANDING):  ascorbic acid 500 milliGRAM(s) Oral daily  atorvastatin 40 milliGRAM(s) Oral at bedtime  buPROPion XL (24-Hour) . 150 milliGRAM(s) Oral daily  ceFAZolin   IVPB 2000 milliGRAM(s) IV Intermittent every 8 hours  chlorhexidine 2% Cloths 1 Application(s) Topical <User Schedule>  dextrose 5% + sodium chloride 0.45%. 1000 milliLiter(s) (75 mL/Hr) IV Continuous <Continuous>  enoxaparin Injectable 80 milliGRAM(s) SubCutaneous every 12 hours  ferrous    sulfate 325 milliGRAM(s) Oral two times a day  gabapentin 800 milliGRAM(s) Oral three times a day  insulin lispro (ADMELOG) corrective regimen sliding scale   SubCutaneous three times a day before meals  insulin lispro (ADMELOG) corrective regimen sliding scale   SubCutaneous at bedtime  levoFLOXacin  Tablet 750 milliGRAM(s) Oral every 24 hours  lidocaine 1% Injectable 10 milliLiter(s) Local Injection once  lisinopril 2.5 milliGRAM(s) Oral daily  melatonin 5 milliGRAM(s) Oral at bedtime  polyethylene glycol 3350 17 Gram(s) Oral daily  senna 2 Tablet(s) Oral at bedtime    MEDICATIONS  (PRN):  acetaminophen     Tablet .. 650 milliGRAM(s) Oral every 6 hours PRN Temp greater or equal to 38C (100.4F)  oxycodone    5 mG/acetaminophen 325 mG 1 Tablet(s) Oral every 6 hours PRN Moderate Pain

## 2021-12-26 NOTE — DIETITIAN INITIAL EVALUATION ADULT. - PERTINENT LABORATORY DATA
12-26 Na141 mmol/L Glu 178 mg/dL<H> K+ 3.8 mmol/L Cr  1.13 mg/dL BUN 8 mg/dL   12-26 Phos 3.0 mg/dL   12-21 Alb 2.7 g/dL<L>        12-22-21 @ 09:52 HgbA1C 6.3  12-21-21 @ 14:56 HgbA1C 6.2

## 2021-12-26 NOTE — PROGRESS NOTE ADULT - SUBJECTIVE AND OBJECTIVE BOX
Patient is a 76y old  Male who presents with a chief complaint of AMS (20 Dec 2021 14:19)    Podiatry Interval HPI: Patient seen bedside in no acute distress. Pt is s/p Left Foot I&D and bone biopsy. Pt denies any pain to the left foot. Patient states that he is aware that it is important to not put weight to the left foot and states that he only puts weight on the left foot when he is urinating. Denies any constitutional symptoms of N/V/C/F/SOB. Denies any other pedal complaints. Advised pt strict non WB to LLE. Patient was not bleeding today during dressing change.    Podiatry HPI: 77 y/o male with a PMHx of DM, HTN, A Fib, Sciatica, Cataract, OA of Right Hip presented to the ED for altered mental status. Podiatry was consulted for Left Foot Cellulitis/Wound. Patient states that his podiatrist was debriding a callus on the Left Foot and the podiatrist went too deep which caused the initial wound about 3 weeks ago and since then it has been getting worse. Denies any pain to the Left Foot. States that he cannot feel any painful stimuli to his left foot due to diabetic neuropathy. States that he would like to get back on his feet and exercise because that is how he is able to maintain his sugar levels and patient states that he has always had a left foot flat arch. Denies any trauma to the area. Patient states that yesterday he was having chills but today he denies any constitutional symptoms of N/V/C/F/SOB. Denies any other pedal complaints at this moment.     HPI:  Patient is a 76 year-old male from home with past medical history of atrial fibrillation (was on coumadin until 1month ago), hypertension, hyperlipidemia, diabetes mellitus presents to ED for AMS. Pt states that today he started feeling freezing cold and was shivering. Talked to wife for more information and she stated that patient was feeling very cold though house was warm. He stopped shivering for a while and the started again. During his second episode of shivering from feeling extremely cold he got very confused and could not talk, understand what she was saying and was not making sense. After a while the shivering stopped and he regained his normal mental status. Denies fever, chest pain. abdominal pain, diarrhea, constipation. Pt states he has incontinence and an ulcer on his left foot. He states his podiatrist scraped a callus which caused the ulcer. He has diabetic neuropathy so does not feel pain around the ulcer. He did notice the left foot got more erythematous today.   He stopped taking warfarin 1 month ago as he states it was too much of an hassle to get INR checked and avoid eating certain foods.   (19 Dec 2021 22:54)      Medications acetaminophen     Tablet .. 650 milliGRAM(s) Oral every 6 hours PRN  ascorbic acid 500 milliGRAM(s) Oral daily  atorvastatin 40 milliGRAM(s) Oral at bedtime  buPROPion XL (24-Hour) . 150 milliGRAM(s) Oral daily  ceFAZolin   IVPB 2000 milliGRAM(s) IV Intermittent every 8 hours  chlorhexidine 2% Cloths 1 Application(s) Topical <User Schedule>  dextrose 5% + sodium chloride 0.45%. 1000 milliLiter(s) IV Continuous <Continuous>  enoxaparin Injectable 80 milliGRAM(s) SubCutaneous every 12 hours  ferrous    sulfate 325 milliGRAM(s) Oral two times a day  gabapentin 800 milliGRAM(s) Oral three times a day  insulin lispro (ADMELOG) corrective regimen sliding scale   SubCutaneous three times a day before meals  insulin lispro (ADMELOG) corrective regimen sliding scale   SubCutaneous at bedtime  levoFLOXacin  Tablet 750 milliGRAM(s) Oral every 24 hours  lidocaine 1% Injectable 10 milliLiter(s) Local Injection once  lisinopril 2.5 milliGRAM(s) Oral daily  melatonin 5 milliGRAM(s) Oral at bedtime  oxycodone    5 mG/acetaminophen 325 mG 1 Tablet(s) Oral every 6 hours PRN  polyethylene glycol 3350 17 Gram(s) Oral daily  senna 2 Tablet(s) Oral at bedtime    FHFamily history of coronary artery disease (Sibling)    Family history of breast cancer in sister (Sibling)    ,   PMHDiabetes    Hypertension    Osteoarthritis of right hip    Cataract    Atrial fibrillation    Vertigo    Sciatica       PSHS/P total hip arthroplasty        Labs                          8.0    5.65  )-----------( 161      ( 26 Dec 2021 05:16 )             24.5      12-26    141  |  110<H>  |  8   ----------------------------<  178<H>  3.8   |  25  |  1.13    Ca    8.8      26 Dec 2021 05:16  Phos  3.0     12-26  Mg     1.8     12-26       Vital Signs Last 24 Hrs  T(C): 36.8 (26 Dec 2021 14:35), Max: 36.8 (25 Dec 2021 21:00)  T(F): 98.2 (26 Dec 2021 14:35), Max: 98.3 (25 Dec 2021 21:00)  HR: 69 (26 Dec 2021 14:35) (51 - 69)  BP: 130/64 (26 Dec 2021 14:35) (130/64 - 145/69)  BP(mean): 81 (26 Dec 2021 14:35) (81 - 81)  RR: 16 (26 Dec 2021 14:35) (16 - 18)  SpO2: 98% (26 Dec 2021 14:35) (97% - 99%)  Sedimentation Rate, Erythrocyte: 86 mm/Hr (12-20-21 @ 06:15)  Sedimentation Rate, Erythrocyte: 95 mm/Hr (12-19-21 @ 19:15)         C-Reactive Protein, Serum: 107 mg/L (12-20-21 @ 09:36)  C-Reactive Protein, Serum: 94 mg/L (12-20-21 @ 04:01)  WBC Count: 5.65 K/uL (12-26-21 @ 05:16)  WBC Count: 5.16 K/uL (12-25-21 @ 09:19)  WBC Count: 4.48 K/uL (12-24-21 @ 18:32)  WBC Count: 4.78 K/uL (12-23-21 @ 23:36)  WBC Count: 4.73 K/uL (12-23-21 @ 16:32)      PHYSICAL EXAM  LE Focused:    Vasc:  DP/PT pulses were palpable, bilaterally. Generalized edema noted to the Left Foot  Derm: Left Plantar medial arch incision site shows decreased redness to the left foot. No warmth noted to th left foot. No malodor, no purulent drainage, +PTB was noted. No edema noted to the left foot.    12/23/21: Bleeding seized, no erythema, no edema noted   12/22/21: Left Plantar medial arch wound noted measuring 3cm x 3cm x 3cm with 5cm tunnelling noted to the surrounding area with +PTB. Redness and Warmth noted to the left foot with no malodor, no purulent drainage noted upon expression today. Streaking up to the left ankle and plantar distal/lateral arch of the left foot. Edema was noted to have decreased from yesterday to the left foot.  12/20/21: Left Plantar medial arch wound noted measuring 3cm x 3cm x 3cm with 5cm tunnelling noted to the surrounding area with +ptb and mild fluctuance noted to the surrounding wound. Left Foot Wound has Redness, increased warmth noted, mild malodor, purulent drainage, streaking up to the left ankle and plantar distal/lateral arch.   Post Bedside Incision and Drainage (12/20/21)  Neuro:  Protective sensation absent, bilaterally.   MSK: No pain on palpation to the Left Foot Wound. Denies any calf pain, bilaterally.     Imaging:     EXAM:  MR FOOT LT                          PROCEDURE DATE:  12/20/2021      INTERPRETATION:  LEFT FOOT MRI    CLINICAL INFORMATION: Left foot wound. Eval for osteomyelitis.  TECHNIQUE: Multiplanar, multisequence MRI was obtained of the left foot.    COMPARISON: Left foot MRI 12/23/2015.    FINDINGS:    Plantar soft tissue wound is present at the level of the first   tarsometatarsal articulation with tract extending to the bone. There is   edema along the plantar aspect of the distal medial cuneiform and base of   the first metatarsal with preservation of the T1 marrow signal. There is   moderate tarsometatarsal arthrosis and arthrosis of the articulation of   the navicular and lateral cuneiform. There are subchondral fractures of   the first second and third metatarsal heads. There is diffuse increased   muscle signal, most consistent with denervation. There is diffuse   subcutaneous edema.    IMPRESSION:    Plantar soft tissue wound with tract extending to the undersurface of the   first tarsometatarsal articulation.  Edema within the base of the first tarsometatarsal articulation adjacent   to the tract with preservation of the T1 marrow signal, which may   represent sequela of early osteomyelitis.  First second and third metatarsal head subchondral fractures.    Cultures:   Culture Results:   No growth (12.23.21 @ 04:15)

## 2021-12-26 NOTE — DIETITIAN INITIAL EVALUATION ADULT. - PROBLEM SELECTOR PLAN 2
Acute transient confusional state  Likely 2/2 infection  Follow Blood cultures and urine cultures  Will start on Unasyn and Vancomycin for foot ulcer   Monitor for mental status changes   Keep electrolytes wnl   Aspiration and fall precautions

## 2021-12-26 NOTE — DIETITIAN INITIAL EVALUATION ADULT. - ORAL NUTRITION SUPPLEMENTS
Add Glucerna Shake, 8oz once daily as medically feasible & to honor pt. food preferences as requested

## 2021-12-26 NOTE — PROGRESS NOTE ADULT - SUBJECTIVE AND OBJECTIVE BOX
CHIEF COMPLAINT:Patient is a 76y old  Male who presents with a chief complaint of AMS.Pt appears comfortable.    	  REVIEW OF SYSTEMS:  CONSTITUTIONAL: No fever, weight loss, or fatigue  EYES: No eye pain, visual disturbances, or discharge  ENT:  No difficulty hearing, tinnitus, vertigo; No sinus or throat pain  NECK: No pain or stiffness  RESPIRATORY: No cough, wheezing, chills or hemoptysis; No Shortness of Breath  CARDIOVASCULAR: No chest pain, palpitations, passing out, dizziness, or leg swelling  GASTROINTESTINAL: No abdominal or epigastric pain. No nausea, vomiting, or hematemesis; No diarrhea or constipation. No melena or hematochezia.  GENITOURINARY: No dysuria, frequency, hematuria, or incontinence  NEUROLOGICAL: No headaches, memory loss, loss of strength, numbness, or tremors  SKIN: No itching, burning, rashes, or lesions   LYMPH Nodes: No enlarged glands  ENDOCRINE: No heat or cold intolerance; No hair loss  MUSCULOSKELETAL: No joint pain or swelling; No muscle, back, or extremity pain  PSYCHIATRIC: No depression, anxiety, mood swings, or difficulty sleeping  HEME/LYMPH: No easy bruising, or bleeding gums  ALLERGY AND IMMUNOLOGIC: No hives or eczema	        PHYSICAL EXAM:  T(C): 36.3 (12-26-21 @ 05:18), Max: 37.1 (12-25-21 @ 12:04)  HR: 51 (12-26-21 @ 05:18) (51 - 69)  BP: 142/72 (12-26-21 @ 05:18) (142/72 - 145/69)  RR: 16 (12-26-21 @ 05:18) (16 - 18)  SpO2: 99% (12-26-21 @ 05:18) (97% - 99%)  Wt(kg): --  I&O's Summary    25 Dec 2021 07:01  -  26 Dec 2021 07:00  --------------------------------------------------------  IN: 0 mL / OUT: 1025 mL / NET: -1025 mL        Appearance: Normal	  HEENT:   Normal oral mucosa, PERRL, EOMI	  Lymphatic: No lymphadenopathy  Cardiovascular: Normal S1 S2, No JVD, No murmurs, No edema  Respiratory: Lungs clear to auscultation	  Psychiatry: A & O x 3, Mood & affect appropriate  Gastrointestinal:  Soft, Non-tender, + BS	  Skin: No rashes, No ecchymoses, No cyanosis	  Neurologic: Non-focal  Extremities: Normal range of motion, No clubbing, cyanosis or edema      MEDICATIONS  (STANDING):  ascorbic acid 500 milliGRAM(s) Oral daily  atorvastatin 40 milliGRAM(s) Oral at bedtime  buPROPion XL (24-Hour) . 150 milliGRAM(s) Oral daily  ceFAZolin   IVPB 2000 milliGRAM(s) IV Intermittent every 8 hours  chlorhexidine 2% Cloths 1 Application(s) Topical <User Schedule>  dextrose 5% + sodium chloride 0.45%. 1000 milliLiter(s) (75 mL/Hr) IV Continuous <Continuous>  enoxaparin Injectable 80 milliGRAM(s) SubCutaneous every 12 hours  ferrous    sulfate 325 milliGRAM(s) Oral two times a day  gabapentin 800 milliGRAM(s) Oral three times a day  insulin lispro (ADMELOG) corrective regimen sliding scale   SubCutaneous three times a day before meals  insulin lispro (ADMELOG) corrective regimen sliding scale   SubCutaneous at bedtime  levoFLOXacin  Tablet 750 milliGRAM(s) Oral every 24 hours  lidocaine 1% Injectable 10 milliLiter(s) Local Injection once  lisinopril 2.5 milliGRAM(s) Oral daily  melatonin 5 milliGRAM(s) Oral at bedtime  polyethylene glycol 3350 17 Gram(s) Oral daily  senna 2 Tablet(s) Oral at bedtime    	  	  LABS:	 	                        8.0    5.65  )-----------( 161      ( 26 Dec 2021 05:16 )             24.5     12-26    141  |  110<H>  |  8   ----------------------------<  178<H>  3.8   |  25  |  1.13    Ca    8.8      26 Dec 2021 05:16  Phos  3.0     12-26  Mg     1.8     12-26

## 2021-12-26 NOTE — PROGRESS NOTE ADULT - PROBLEM SELECTOR PLAN 11
IMPROVE VTE Individual Risk Assessment  RISK                                                                Points  [  ] Previous VTE                                                  3  [  ] Thrombophilia                                               2  [  ] Lower limb paralysis                                      2        (unable to hold up >15 seconds)    [  ] Current Cancer                                              2         (within 6 months)  [  ] Immobilization > 24 hrs                                1  [  ] ICU/CCU stay > 24 hours                              1  [  ] Age > 60                                                      1  IMPROVE VTE Score _________   Full dose Lovenox   Discussed above plan with patient  PT eval PRINCE

## 2021-12-26 NOTE — PROGRESS NOTE ADULT - ASSESSMENT
A:  Cellulitis, Left Foot and Ankle.  Abscess, Left Plantar Foot  Left Foot Plantar Wound  Diabetic Neuropathy, b/l  DM  S/P bedside I&D (12/20/21)  S/P OR I&D (12/23/21)    Plan:  -Patient examined and chart reviewed  -Explained all clinical findings to the patient to the patient's satisfaction.  -Educated the patient about the importance of glycemic control in wound healing  -Xrays reviewed  -MRI reviewed  -Cultures - hemolytic strep   -Flushed surgical site with saline flush. Dressed Left Foot with iodoform packing, 4x4 gauze, ABD, kerlix and ace.   -Patient to keep left foot dressings dry, clean, and intact.   -Patient to be NWB to the left foot  -Recommend Left Lower Extremity elevation.  -Recommend antibiotics per ID team for Left Foot Cellulitis and Left Foot Wound  -Tentatively plan for OR on Thursday (12/30) for a partial delayed primary closure.   -Podiatry will follow in house.  -Discussed with attending Dr. Hart

## 2021-12-26 NOTE — DIETITIAN INITIAL EVALUATION ADULT. - FACTORS AFF FOOD INTAKE
weakness, AMS, sepsis, foot abscess, bacteremia SA, osteoarthritis of rt. hip, sciatica, diabetes, Afib, HTN, hyperlipidemia, PATTY w/CKD, s/p total hip arthroplasty/pain/[employer]/ethnic/cultural/personal food preferences/other (specify)

## 2021-12-27 LAB
ANION GAP SERPL CALC-SCNC: 7 MMOL/L — SIGNIFICANT CHANGE UP (ref 5–17)
BUN SERPL-MCNC: 8 MG/DL — SIGNIFICANT CHANGE UP (ref 7–18)
CALCIUM SERPL-MCNC: 8.5 MG/DL — SIGNIFICANT CHANGE UP (ref 8.4–10.5)
CHLORIDE SERPL-SCNC: 108 MMOL/L — SIGNIFICANT CHANGE UP (ref 96–108)
CO2 SERPL-SCNC: 27 MMOL/L — SIGNIFICANT CHANGE UP (ref 22–31)
CREAT SERPL-MCNC: 1.29 MG/DL — SIGNIFICANT CHANGE UP (ref 0.5–1.3)
CULTURE RESULTS: SIGNIFICANT CHANGE UP
CULTURE RESULTS: SIGNIFICANT CHANGE UP
GLUCOSE BLDC GLUCOMTR-MCNC: 168 MG/DL — HIGH (ref 70–99)
GLUCOSE BLDC GLUCOMTR-MCNC: 188 MG/DL — HIGH (ref 70–99)
GLUCOSE BLDC GLUCOMTR-MCNC: 198 MG/DL — HIGH (ref 70–99)
GLUCOSE BLDC GLUCOMTR-MCNC: 200 MG/DL — HIGH (ref 70–99)
GLUCOSE SERPL-MCNC: 197 MG/DL — HIGH (ref 70–99)
HCT VFR BLD CALC: 25.4 % — LOW (ref 39–50)
HGB BLD-MCNC: 8.2 G/DL — LOW (ref 13–17)
MAGNESIUM SERPL-MCNC: 1.9 MG/DL — SIGNIFICANT CHANGE UP (ref 1.6–2.6)
MCHC RBC-ENTMCNC: 28.6 PG — SIGNIFICANT CHANGE UP (ref 27–34)
MCHC RBC-ENTMCNC: 32.3 GM/DL — SIGNIFICANT CHANGE UP (ref 32–36)
MCV RBC AUTO: 88.5 FL — SIGNIFICANT CHANGE UP (ref 80–100)
NRBC # BLD: 0 /100 WBCS — SIGNIFICANT CHANGE UP (ref 0–0)
PHOSPHATE SERPL-MCNC: 2.9 MG/DL — SIGNIFICANT CHANGE UP (ref 2.5–4.5)
PLATELET # BLD AUTO: 162 K/UL — SIGNIFICANT CHANGE UP (ref 150–400)
POTASSIUM SERPL-MCNC: 3.7 MMOL/L — SIGNIFICANT CHANGE UP (ref 3.5–5.3)
POTASSIUM SERPL-SCNC: 3.7 MMOL/L — SIGNIFICANT CHANGE UP (ref 3.5–5.3)
RBC # BLD: 2.87 M/UL — LOW (ref 4.2–5.8)
RBC # FLD: 13.3 % — SIGNIFICANT CHANGE UP (ref 10.3–14.5)
SODIUM SERPL-SCNC: 142 MMOL/L — SIGNIFICANT CHANGE UP (ref 135–145)
SPECIMEN SOURCE: SIGNIFICANT CHANGE UP
SPECIMEN SOURCE: SIGNIFICANT CHANGE UP
WBC # BLD: 5.91 K/UL — SIGNIFICANT CHANGE UP (ref 3.8–10.5)
WBC # FLD AUTO: 5.91 K/UL — SIGNIFICANT CHANGE UP (ref 3.8–10.5)

## 2021-12-27 PROCEDURE — 99233 SBSQ HOSP IP/OBS HIGH 50: CPT | Mod: GC

## 2021-12-27 RX ORDER — APIXABAN 2.5 MG/1
1 TABLET, FILM COATED ORAL
Qty: 60 | Refills: 0
Start: 2021-12-27 | End: 2022-01-25

## 2021-12-27 RX ORDER — RIVAROXABAN 15 MG-20MG
1 KIT ORAL
Qty: 30 | Refills: 0
Start: 2021-12-27 | End: 2022-01-25

## 2021-12-27 RX ADMIN — ENOXAPARIN SODIUM 80 MILLIGRAM(S): 100 INJECTION SUBCUTANEOUS at 17:06

## 2021-12-27 RX ADMIN — Medication 325 MILLIGRAM(S): at 06:55

## 2021-12-27 RX ADMIN — ENOXAPARIN SODIUM 80 MILLIGRAM(S): 100 INJECTION SUBCUTANEOUS at 06:55

## 2021-12-27 RX ADMIN — Medication 500 MILLIGRAM(S): at 12:12

## 2021-12-27 RX ADMIN — OXYCODONE AND ACETAMINOPHEN 1 TABLET(S): 5; 325 TABLET ORAL at 22:06

## 2021-12-27 RX ADMIN — OXYCODONE AND ACETAMINOPHEN 1 TABLET(S): 5; 325 TABLET ORAL at 22:36

## 2021-12-27 RX ADMIN — Medication 100 MILLIGRAM(S): at 21:59

## 2021-12-27 RX ADMIN — Medication 1: at 08:09

## 2021-12-27 RX ADMIN — CHLORHEXIDINE GLUCONATE 1 APPLICATION(S): 213 SOLUTION TOPICAL at 06:55

## 2021-12-27 RX ADMIN — BUPROPION HYDROCHLORIDE 150 MILLIGRAM(S): 150 TABLET, EXTENDED RELEASE ORAL at 12:12

## 2021-12-27 RX ADMIN — Medication 1: at 17:06

## 2021-12-27 RX ADMIN — GABAPENTIN 800 MILLIGRAM(S): 400 CAPSULE ORAL at 22:06

## 2021-12-27 RX ADMIN — Medication 325 MILLIGRAM(S): at 17:06

## 2021-12-27 RX ADMIN — Medication 1: at 12:11

## 2021-12-27 RX ADMIN — Medication 5 MILLIGRAM(S): at 22:06

## 2021-12-27 RX ADMIN — LISINOPRIL 2.5 MILLIGRAM(S): 2.5 TABLET ORAL at 06:55

## 2021-12-27 RX ADMIN — Medication 100 MILLIGRAM(S): at 14:46

## 2021-12-27 RX ADMIN — Medication 100 MILLIGRAM(S): at 06:55

## 2021-12-27 RX ADMIN — GABAPENTIN 800 MILLIGRAM(S): 400 CAPSULE ORAL at 06:55

## 2021-12-27 RX ADMIN — SENNA PLUS 2 TABLET(S): 8.6 TABLET ORAL at 22:06

## 2021-12-27 RX ADMIN — GABAPENTIN 800 MILLIGRAM(S): 400 CAPSULE ORAL at 14:46

## 2021-12-27 RX ADMIN — POLYETHYLENE GLYCOL 3350 17 GRAM(S): 17 POWDER, FOR SOLUTION ORAL at 12:12

## 2021-12-27 RX ADMIN — SODIUM CHLORIDE 75 MILLILITER(S): 9 INJECTION, SOLUTION INTRAVENOUS at 22:19

## 2021-12-27 RX ADMIN — ATORVASTATIN CALCIUM 40 MILLIGRAM(S): 80 TABLET, FILM COATED ORAL at 22:06

## 2021-12-27 NOTE — PROGRESS NOTE ADULT - ASSESSMENT
76 year-old male from home with past medical history of atrial fibrillation (was on coumadin until 1month ago), hypertension, hyperlipidemia, diabetes mellitus presents to ED for AMS,bacteremia, anemia.  1.ORLY-no endocarditis.  2.Bacteremia-ABX.Repaet blood cx-.  3.Afib-lovenox for now.  4.Pt not want coumadin due to dietary limitation,consider NOAC if covered by insurance.  5.DM-Insulin.  6.HTN-ace.  7.PPI.  8.Lipid d/o-statin.  9.DVT prophylaxis.

## 2021-12-27 NOTE — PROGRESS NOTE ADULT - ASSESSMENT
A:  Cellulitis, Left Foot and Ankle.  Abscess, Left Plantar Foot  Left Foot Plantar Wound  Diabetic Neuropathy, b/l  DM  S/P bedside I&D (12/20/21)  S/P OR I&D (12/23/21)    Plan:  -Patient examined and chart reviewed  -Explained all clinical findings to the patient to the patient's satisfaction.  -Educated the patient about the importance of glycemic control in wound healing  -Xrays reviewed  -MRI reviewed  -Cultures - hemolytic strep   -Applied wound vac to left foot with gauze, ace, kerlix, and ace.   -Patient to keep left foot dressings dry, clean, and intact.   -Patient to be NWB to the left foot  -Recommend Left Lower Extremity elevation.  -Recommend antibiotics per ID team for Left Foot Cellulitis and Left Foot Wound  -Plan for OR on Thursday (12/30) @2pm for a partial delayed primary closure.   -Podiatry will follow in house.  -Discussed with attending Dr. Hart

## 2021-12-27 NOTE — PROGRESS NOTE ADULT - PROBLEM SELECTOR PLAN 7
- Patient has hx of atrial fibrillation not on any meds at home    - EKG A fib rate controlled  - CHADSVASC2 4 points   - Will hold off on AC for now as patient is s/p I & D with blood loss and drop in hgb, ASA to be started once hemoglobin stable and no active bleeding **  - Continue to monitor  - Cardio Dr Liz on board - Patient has hx of atrial fibrillation not on any meds at home    - EKG A fib rate controlled  - CHADSVASC2 4 points   - on full dose lovenox  - Continue to monitor  - Cardio Dr Liz on board - Patient has hx of atrial fibrillation not on any meds at home    - EKG A fib rate controlled  - CHADSVASC2 4 points   - On full dose Lovenox, Eliquis prescription sent to pharmacy, covered by insurance, co payment 45 dollars monthly  - Will hold Lovenox on Wednesday for OR procedure by Podiatry on Thursday   - Continue to monitor  - Cardio Dr Liz on board

## 2021-12-27 NOTE — PROGRESS NOTE ADULT - PROBLEM SELECTOR PLAN 1
- Patient with MSSA and Streptococcus bacteremia most likely secondary to left foot cellulitis/abscess  - MR left foot showed Plantar soft tissue wound with tract extending to the undersurface of the first tarsometatarsal articulation. Edema within the base of the first tarsometatarsal articulation may represent sequela of early osteomyelitis. First second and third metatarsal head subchondral fractures.  - Bcx from 12/20 methicillin susceptible S. aureus, Streptococcus mitis/oralis group, and Alpha hemolytic strep, ESR and CRP elevated    - Repeat Bcx from 12/22 NGTD  - Off Vancomycin since 12/21, discontinued Unasyn today 11/24  - Started on Cefazolin 2 g q8hers and Levaquin 750 mg daily (12/24) as per ID recommendations   - MRI lumbosacral no acute findings   - Echo showed aortic valve leaflets appear thickened, cannot exclude a vegetation. Mild aortic  regurgitation. Normal ventricular function, mild pulmonary hypertension  - ORLY no vegetations - Negative for endocarditis   - Cardio Dr Liz on board, holding ASA due to anemia 2/2 blood loss requiring transfusions   - ID Dr Maxwell on board - will dc on Cefazolin 2 g q 8hrs for 4 weeks starting from 12/22  - Podiatry on board, s/p I&D (12/22)  - Will consult IR for PICC line placement   - Monitor for any signs of hemodynamic instability - Patient with MSSA and Streptococcus bacteremia most likely secondary to left foot cellulitis/abscess  - MR left foot showed Plantar soft tissue wound with tract extending to the undersurface of the first tarsometatarsal articulation. Edema within the base of the first tarsometatarsal articulation may represent sequela of early osteomyelitis. First second and third metatarsal head subchondral fractures.  - Bcx from 12/20 methicillin susceptible S. aureus, Streptococcus mitis/oralis group, and Alpha hemolytic strep, ESR and CRP elevated    - Repeat Bcx from 12/22 NGTD  - Off Vancomycin since 12/21, discontinued Unasyn today 11/24  - Started on Cefazolin 2 g q8hers and Levaquin 750 mg daily (12/24) as per ID recommendations   - MRI lumbosacral no acute findings   - Echo showed aortic valve leaflets appear thickened, cannot exclude a vegetation. Mild aortic  regurgitation. Normal ventricular function, mild pulmonary hypertension  - ORLY no vegetations - Negative for endocarditis   - Cardio Dr Liz on board, holding ASA due to anemia 2/2 blood loss requiring transfusions   - ID Dr Maxwell on board - will dc on Cefazolin 2 g q 8hrs for 4 weeks starting from 12/22  - Podiatry on board, s/p I&D (12/22), planned procedure for Thursday   - Planned placement of Dwell by IR on Thursday before discharge for Cefazolin 12/22/21-1/19/22  - Monitor for any signs of hemodynamic instability - Patient with MSSA and Streptococcus bacteremia most likely secondary to left foot cellulitis/abscess  - MR left foot showed Plantar soft tissue wound with tract extending to the undersurface of the first tarsometatarsal articulation. Edema within the base of the first tarsometatarsal articulation may represent sequela of early osteomyelitis. First second and third metatarsal head subchondral fractures.  - Bcx from 12/20 methicillin susceptible S. aureus, Streptococcus mitis/oralis group, and Alpha hemolytic strep, ESR and CRP elevated    - Repeat Bcx from 12/22 NGTD  - Off Vancomycin since 12/21, discontinued Unasyn today 11/24  - Started on Cefazolin 2 g q8hrs and Levaquin 750 mg daily (12/24) as per ID recommendations   - MRI lumbosacral no acute findings   - Echo showed aortic valve leaflets appear thickened, cannot exclude a vegetation. Mild aortic  regurgitation. Normal ventricular function, mild pulmonary hypertension  - ORLY no vegetations - Negative for endocarditis   - Cardio Dr Liz on board, holding ASA due to anemia 2/2 blood loss requiring transfusions   - ID Dr Maxwell on board - will dc on Cefazolin 2 g q 8hrs for 4 weeks starting from 12/22  - Podiatry on board, s/p I&D (12/23), planned procedure for Thursday   - Planned placement of Dwell by IR on Thursday before discharge for Cefazolin 12/22/21-1/19/22  - Monitor for any signs of hemodynamic instability - Patient with MSSA and Streptococcus bacteremia most likely secondary to left foot cellulitis/abscess  - MR left foot showed Plantar soft tissue wound with tract extending to the undersurface of the first tarsometatarsal articulation. Edema within the base of the first tarsometatarsal articulation may represent sequela of early osteomyelitis. First second and third metatarsal head subchondral fractures.  - Bcx from 12/20 methicillin susceptible S. aureus, Streptococcus mitis/oralis group, and Alpha hemolytic strep, ESR and CRP elevated    - Repeat Bcx from 12/22 NGTD  - Off Vancomycin since 12/21, discontinued Unasyn today 11/24  - Started on Cefazolin 2 g q8hrs and Levaquin 750 mg daily (12/24) as per ID recommendations   - MRI lumbosacral no acute findings   - Echo showed aortic valve leaflets appear thickened, cannot exclude a vegetation. Mild aortic  regurgitation. Normal ventricular function, mild pulmonary hypertension  - ORLY no vegetations - Negative for endocarditis   - Cardio Dr Liz on board  - ID Dr Maxwell on board - will dc on Cefazolin 2 g q 8hrs for 4 weeks starting from 12/22  - Podiatry on board, s/p I&D (12/23), planned procedure for Thursday   - Planned placement of Dwell by IR on Thursday before discharge for Cefazolin 12/22/21-1/19/22  - Monitor for any signs of hemodynamic instability - RESOLVED  - Patient with MSSA and Streptococcus bacteremia most likely secondary to left foot cellulitis/abscess  - MR left foot showed Plantar soft tissue wound with tract extending to the undersurface of the first tarsometatarsal articulation. Edema within the base of the first tarsometatarsal articulation may represent sequela of early osteomyelitis. First second and third metatarsal head subchondral fractures.  - Bcx from 12/20 methicillin susceptible S. aureus, Streptococcus mitis/oralis group, and Alpha hemolytic strep, ESR and CRP elevated    - Repeat Bcx from 12/22 NGTD  - Off Vancomycin since 12/21, discontinued Unasyn today 11/24  - Started on Cefazolin 2 g q8hrs and Levaquin 750 mg daily (12/24) as per ID recommendations   - MRI lumbosacral no acute findings   - Echo showed aortic valve leaflets appear thickened, cannot exclude a vegetation. Mild aortic  regurgitation. Normal ventricular function, mild pulmonary hypertension  - ORLY no vegetations - Negative for endocarditis   - Cardio Dr Liz on board  - ID Dr Maxwell on board - will dc on Cefazolin 2 g q 8hrs for 4 weeks starting from 12/22  - Podiatry on board, s/p I&D (12/23), planned procedure for Thursday   - Planned placement of Dwell by IR on Thursday before discharge for Cefazolin 12/22/21-1/19/22  - Monitor for any signs of hemodynamic instability

## 2021-12-27 NOTE — PROGRESS NOTE ADULT - SUBJECTIVE AND OBJECTIVE BOX
PGY-1 Progress Note discussed with attending    PAGER #: [[phone]] TILL 5:00 PM  PLEASE CONTACT ON CALL TEAM:  - On Call Team (Please refer to Brie) FROM 5:00 PM - 8:30PM  - Nightfloat Team FROM 8:30 -7:30 AM    CHIEF COMPLAINT & BRIEF HOSPITAL COURSE:      INTERVAL HPI/OVERNIGHT EVENTS:       MEDICATIONS  (STANDING):  ascorbic acid 500 milliGRAM(s) Oral daily  atorvastatin 40 milliGRAM(s) Oral at bedtime  buPROPion XL (24-Hour) . 150 milliGRAM(s) Oral daily  ceFAZolin   IVPB 2000 milliGRAM(s) IV Intermittent every 8 hours  chlorhexidine 2% Cloths 1 Application(s) Topical <User Schedule>  dextrose 5% + sodium chloride 0.45%. 1000 milliLiter(s) (75 mL/Hr) IV Continuous <Continuous>  enoxaparin Injectable 80 milliGRAM(s) SubCutaneous every 12 hours  ferrous    sulfate 325 milliGRAM(s) Oral two times a day  gabapentin 800 milliGRAM(s) Oral three times a day  insulin lispro (ADMELOG) corrective regimen sliding scale   SubCutaneous three times a day before meals  insulin lispro (ADMELOG) corrective regimen sliding scale   SubCutaneous at bedtime  levoFLOXacin  Tablet 750 milliGRAM(s) Oral every 24 hours  lidocaine 1% Injectable 10 milliLiter(s) Local Injection once  lisinopril 2.5 milliGRAM(s) Oral daily  melatonin 5 milliGRAM(s) Oral at bedtime  polyethylene glycol 3350 17 Gram(s) Oral daily  senna 2 Tablet(s) Oral at bedtime    MEDICATIONS  (PRN):  acetaminophen     Tablet .. 650 milliGRAM(s) Oral every 6 hours PRN Temp greater or equal to 38C (100.4F)  oxycodone    5 mG/acetaminophen 325 mG 1 Tablet(s) Oral every 6 hours PRN Moderate Pain      Vital Signs Last 24 Hrs  T(C): 36.6 (26 Dec 2021 20:46), Max: 36.8 (26 Dec 2021 14:35)  T(F): 97.8 (26 Dec 2021 20:46), Max: 98.2 (26 Dec 2021 14:35)  HR: 73 (26 Dec 2021 20:46) (69 - 73)  BP: 151/74 (26 Dec 2021 20:46) (130/64 - 151/74)  BP(mean): 81 (26 Dec 2021 14:35) (81 - 81)  RR: 18 (26 Dec 2021 20:46) (16 - 18)  SpO2: 100% (26 Dec 2021 20:46) (98% - 100%)    PHYSICAL EXAMINATION:  GENERAL: NAD, well built  HEAD:  Atraumatic, Normocephalic  EYES:  conjunctiva and sclera clear  NECK: Supple, No JVD, Normal thyroid  CHEST/LUNG: Clear to auscultation. Clear to percussion bilaterally; No rales, rhonchi, wheezing, or rubs  HEART: Regular rate and rhythm; No murmurs, rubs, or gallops  ABDOMEN: Soft, Nontender, Nondistended; Bowel sounds present, no pain or masses on palpation  NERVOUS SYSTEM:  Alert & Oriented X3  : voiding well  EXTREMITIES:  2+ Peripheral Pulses, No clubbing, cyanosis, or edema  SKIN: warm dry                          8.2    5.91  )-----------( 162      ( 27 Dec 2021 09:08 )             25.4     12-27    142  |  108  |  8   ----------------------------<  197<H>  3.7   |  27  |  1.29    Ca    8.5      27 Dec 2021 09:08  Phos  2.9     12-27  Mg     1.9     12-27                             PGY-1 Progress Note discussed with attending    PAGER #: [[phone]] TILL 5:00 PM  PLEASE CONTACT ON CALL TEAM:  - On Call Team (Please refer to Brie) FROM 5:00 PM - 8:30PM  - Nightfloat Team FROM 8:30 -7:30 AM    CHIEF COMPLAINT & BRIEF HOSPITAL COURSE:      INTERVAL HPI/OVERNIGHT EVENTS: patient refers feeling fine, denies any overnight events, continued on Lovenox full dose for A fib as recommended by Cardiologist, pending OR procedure by Podiatry on Thursday. Planned Dwell placement by IR on Thursday as well before discharge to [facility] to continue treatment with Cefazolin 12/22/21-1/19/22.   Called pharmacy regarding Eliquis coverage which is covered by insurance, co-payment 45 dollar monthly.       MEDICATIONS  (STANDING):  ascorbic acid 500 milliGRAM(s) Oral daily  atorvastatin 40 milliGRAM(s) Oral at bedtime  buPROPion XL (24-Hour) . 150 milliGRAM(s) Oral daily  ceFAZolin   IVPB 2000 milliGRAM(s) IV Intermittent every 8 hours  chlorhexidine 2% Cloths 1 Application(s) Topical <User Schedule>  dextrose 5% + sodium chloride 0.45%. 1000 milliLiter(s) (75 mL/Hr) IV Continuous <Continuous>  enoxaparin Injectable 80 milliGRAM(s) SubCutaneous every 12 hours  ferrous    sulfate 325 milliGRAM(s) Oral two times a day  gabapentin 800 milliGRAM(s) Oral three times a day  insulin lispro (ADMELOG) corrective regimen sliding scale   SubCutaneous three times a day before meals  insulin lispro (ADMELOG) corrective regimen sliding scale   SubCutaneous at bedtime  levoFLOXacin  Tablet 750 milliGRAM(s) Oral every 24 hours  lidocaine 1% Injectable 10 milliLiter(s) Local Injection once  lisinopril 2.5 milliGRAM(s) Oral daily  melatonin 5 milliGRAM(s) Oral at bedtime  polyethylene glycol 3350 17 Gram(s) Oral daily  senna 2 Tablet(s) Oral at bedtime    MEDICATIONS  (PRN):  acetaminophen     Tablet .. 650 milliGRAM(s) Oral every 6 hours PRN Temp greater or equal to 38C (100.4F)  oxycodone    5 mG/acetaminophen 325 mG 1 Tablet(s) Oral every 6 hours PRN Moderate Pain      Vital Signs Last 24 Hrs  T(C): 36.6 (26 Dec 2021 20:46), Max: 36.8 (26 Dec 2021 14:35)  T(F): 97.8 (26 Dec 2021 20:46), Max: 98.2 (26 Dec 2021 14:35)  HR: 73 (26 Dec 2021 20:46) (69 - 73)  BP: 151/74 (26 Dec 2021 20:46) (130/64 - 151/74)  BP(mean): 81 (26 Dec 2021 14:35) (81 - 81)  RR: 18 (26 Dec 2021 20:46) (16 - 18)  SpO2: 100% (26 Dec 2021 20:46) (98% - 100%)    PHYSICAL EXAMINATION:  GENERAL: NAD, well built, sat well on RA, pale  HEAD:  Atraumatic, Normocephalic  EYES:  conjunctiva and sclera clear  NECK: Supple, No JVD, Normal thyroid  CHEST/LUNG: Clear to auscultation. Clear to percussion bilaterally; No rales, rhonchi, wheezing, or rubs  HEART: Regular rate and rhythm; No murmurs, rubs, or gallops  ABDOMEN: Soft, Nontender, Nondistended; Bowel sounds present  NERVOUS SYSTEM:  Alert & Oriented X3, no neuro deficits     EXTREMITIES:  2+ Peripheral Pulses, No clubbing, cyanosis, or edema  SKIN: Left foot wound, DSD intact                          8.2    5.91  )-----------( 162      ( 27 Dec 2021 09:08 )             25.4     12-27    142  |  108  |  8   ----------------------------<  197<H>  3.7   |  27  |  1.29    Ca    8.5      27 Dec 2021 09:08  Phos  2.9     12-27  Mg     1.9     12-27

## 2021-12-27 NOTE — PROGRESS NOTE ADULT - PROBLEM SELECTOR PLAN 4
- Acute blood loss anemia  - Patient noted to have low hgb 8-9 on admission, now 7>6.7>7.5>6.9  - S/p I & D on 12/22 by Podiatry  - Iron panel compatible with KISHOR however anemia from blood loss as well  - C/w Ferrous sulfate and vitamin C  - Type and screen, transfuse if hgb <7  - Consent in the chart   - Bowel regimen   - S/p 1 unit PRBC (12/23), will order one more unit now   - CBC q 8 hrs for now and post transfusion   - Monitor for any signs of bleeding or hemodynamic instability - Acute blood loss anemia  - Patient noted to have low hgb 8-9 on admission, now 7>6.7>7.5>6.9>8.2  - S/p I & D on 12/22 by Podiatry  - Iron panel compatible with KISHOR however anemia from blood loss as well  - C/w Ferrous sulfate and vitamin C  - Type and screen, transfuse if hgb <7  - Consent in the chart   - Bowel regimen   - S/p 2 units PRBC (12/23 and 12/24)  - Monitor CBC daily   - Monitor for any signs of bleeding or hemodynamic instability

## 2021-12-27 NOTE — PROGRESS NOTE ADULT - SUBJECTIVE AND OBJECTIVE BOX
CHIEF COMPLAINT:Patient is a 76y old  Male who presents with a chief complaint of AMS.Pt appears comfortable.    	  REVIEW OF SYSTEMS:  CONSTITUTIONAL: No fever, weight loss, or fatigue  EYES: No eye pain, visual disturbances, or discharge  ENT:  No difficulty hearing, tinnitus, vertigo; No sinus or throat pain  NECK: No pain or stiffness  RESPIRATORY: No cough, wheezing, chills or hemoptysis; No Shortness of Breath  CARDIOVASCULAR: No chest pain, palpitations, passing out, dizziness, or leg swelling  GASTROINTESTINAL: No abdominal or epigastric pain. No nausea, vomiting, or hematemesis; No diarrhea or constipation. No melena or hematochezia.  GENITOURINARY: No dysuria, frequency, hematuria, or incontinence  NEUROLOGICAL: No headaches, memory loss, loss of strength, numbness, or tremors  SKIN: No itching, burning, rashes, or lesions   LYMPH Nodes: No enlarged glands  ENDOCRINE: No heat or cold intolerance; No hair loss  MUSCULOSKELETAL: No joint pain or swelling; No muscle, back, or extremity pain  PSYCHIATRIC: No depression, anxiety, mood swings, or difficulty sleeping  HEME/LYMPH: No easy bruising, or bleeding gums  ALLERGY AND IMMUNOLOGIC: No hives or eczema	        PHYSICAL EXAM:  T(C): 36.6 (12-26-21 @ 20:46), Max: 36.8 (12-26-21 @ 14:35)  HR: 73 (12-26-21 @ 20:46) (69 - 73)  BP: 151/74 (12-26-21 @ 20:46) (130/64 - 151/74)  RR: 18 (12-26-21 @ 20:46) (16 - 18)  SpO2: 100% (12-26-21 @ 20:46) (98% - 100%)  Wt(kg): --  I&O's Summary      Appearance: Normal	  HEENT:   Normal oral mucosa, PERRL, EOMI	  Lymphatic: No lymphadenopathy  Cardiovascular: Normal S1 S2, No JVD, No murmurs, No edema  Respiratory: Lungs clear to auscultation	  Psychiatry: A & O x 3, Mood & affect appropriate  Gastrointestinal:  Soft, Non-tender, + BS	  Skin: No rashes, No ecchymoses, No cyanosis	  Neurologic: Non-focal  Extremities: Normal range of motion, No clubbing, cyanosis or edema  Vascular: Peripheral pulses palpable 2+ bilaterally    MEDICATIONS  (STANDING):  ascorbic acid 500 milliGRAM(s) Oral daily  atorvastatin 40 milliGRAM(s) Oral at bedtime  buPROPion XL (24-Hour) . 150 milliGRAM(s) Oral daily  ceFAZolin   IVPB 2000 milliGRAM(s) IV Intermittent every 8 hours  chlorhexidine 2% Cloths 1 Application(s) Topical <User Schedule>  dextrose 5% + sodium chloride 0.45%. 1000 milliLiter(s) (75 mL/Hr) IV Continuous <Continuous>  enoxaparin Injectable 80 milliGRAM(s) SubCutaneous every 12 hours  ferrous    sulfate 325 milliGRAM(s) Oral two times a day  gabapentin 800 milliGRAM(s) Oral three times a day  insulin lispro (ADMELOG) corrective regimen sliding scale   SubCutaneous three times a day before meals  insulin lispro (ADMELOG) corrective regimen sliding scale   SubCutaneous at bedtime  levoFLOXacin  Tablet 750 milliGRAM(s) Oral every 24 hours  lidocaine 1% Injectable 10 milliLiter(s) Local Injection once  lisinopril 2.5 milliGRAM(s) Oral daily  melatonin 5 milliGRAM(s) Oral at bedtime  polyethylene glycol 3350 17 Gram(s) Oral daily  senna 2 Tablet(s) Oral at bedtime      	  	  LABS:	 	                           8.2    5.91  )-----------( 162      ( 27 Dec 2021 09:08 )             25.4     12-27    142  |  108  |  8   ----------------------------<  197<H>  3.7   |  27  |  1.29    Ca    8.5      27 Dec 2021 09:08  Phos  2.9     12-27  Mg     1.9     12-27      proBNP:   Lipid Profile:   HgA1c:   TSH:

## 2021-12-27 NOTE — PROGRESS NOTE ADULT - SUBJECTIVE AND OBJECTIVE BOX
Patient is a 76y old  Male who presents with a chief complaint of AMS (20 Dec 2021 14:19)    Podiatry Interval HPI: Patient seen bedside in no acute distress. Pt is s/p Left Foot I&D and bone biopsy. Pt denies any pain to the left foot. Patient states that he is aware that it is important to not put weight to the left foot and states that he only puts weight on the left foot when he is urinating. Denies any constitutional symptoms of N/V/C/F/SOB. Denies any other pedal complaints. Advised pt strict non WB to LLE.     Podiatry HPI: 77 y/o male with a PMHx of DM, HTN, A Fib, Sciatica, Cataract, OA of Right Hip presented to the ED for altered mental status. Podiatry was consulted for Left Foot Cellulitis/Wound. Patient states that his podiatrist was debriding a callus on the Left Foot and the podiatrist went too deep which caused the initial wound about 3 weeks ago and since then it has been getting worse. Denies any pain to the Left Foot. States that he cannot feel any painful stimuli to his left foot due to diabetic neuropathy. States that he would like to get back on his feet and exercise because that is how he is able to maintain his sugar levels and patient states that he has always had a left foot flat arch. Denies any trauma to the area. Patient states that yesterday he was having chills but today he denies any constitutional symptoms of N/V/C/F/SOB. Denies any other pedal complaints at this moment.     HPI:  Patient is a 76 year-old male from home with past medical history of atrial fibrillation (was on coumadin until 1month ago), hypertension, hyperlipidemia, diabetes mellitus presents to ED for AMS. Pt states that today he started feeling freezing cold and was shivering. Talked to wife for more information and she stated that patient was feeling very cold though house was warm. He stopped shivering for a while and the started again. During his second episode of shivering from feeling extremely cold he got very confused and could not talk, understand what she was saying and was not making sense. After a while the shivering stopped and he regained his normal mental status. Denies fever, chest pain. abdominal pain, diarrhea, constipation. Pt states he has incontinence and an ulcer on his left foot. He states his podiatrist scraped a callus which caused the ulcer. He has diabetic neuropathy so does not feel pain around the ulcer. He did notice the left foot got more erythematous today.   He stopped taking warfarin 1 month ago as he states it was too much of an hassle to get INR checked and avoid eating certain foods.   (19 Dec 2021 22:54)      Medications acetaminophen     Tablet .. 650 milliGRAM(s) Oral every 6 hours PRN  ascorbic acid 500 milliGRAM(s) Oral daily  atorvastatin 40 milliGRAM(s) Oral at bedtime  buPROPion XL (24-Hour) . 150 milliGRAM(s) Oral daily  ceFAZolin   IVPB 2000 milliGRAM(s) IV Intermittent every 8 hours  chlorhexidine 2% Cloths 1 Application(s) Topical <User Schedule>  dextrose 5% + sodium chloride 0.45%. 1000 milliLiter(s) IV Continuous <Continuous>  enoxaparin Injectable 80 milliGRAM(s) SubCutaneous every 12 hours  ferrous    sulfate 325 milliGRAM(s) Oral two times a day  gabapentin 800 milliGRAM(s) Oral three times a day  insulin lispro (ADMELOG) corrective regimen sliding scale   SubCutaneous three times a day before meals  insulin lispro (ADMELOG) corrective regimen sliding scale   SubCutaneous at bedtime  levoFLOXacin  Tablet 750 milliGRAM(s) Oral every 24 hours  lidocaine 1% Injectable 10 milliLiter(s) Local Injection once  lisinopril 2.5 milliGRAM(s) Oral daily  melatonin 5 milliGRAM(s) Oral at bedtime  oxycodone    5 mG/acetaminophen 325 mG 1 Tablet(s) Oral every 6 hours PRN  polyethylene glycol 3350 17 Gram(s) Oral daily  senna 2 Tablet(s) Oral at bedtime    FHFamily history of coronary artery disease (Sibling)    Family history of breast cancer in sister (Sibling)    ,   PMHDiabetes    Hypertension    Osteoarthritis of right hip    Cataract    Atrial fibrillation    Vertigo    Sciatica       PSHS/P total hip arthroplasty        Labs                          8.2    5.91  )-----------( 162      ( 27 Dec 2021 09:08 )             25.4      12-27    142  |  108  |  8   ----------------------------<  197<H>  3.7   |  27  |  1.29    Ca    8.5      27 Dec 2021 09:08  Phos  2.9     12-27  Mg     1.9     12-27       Vital Signs Last 24 Hrs  T(C): 37.1 (27 Dec 2021 12:46), Max: 37.1 (27 Dec 2021 12:46)  T(F): 98.7 (27 Dec 2021 12:46), Max: 98.7 (27 Dec 2021 12:46)  HR: 58 (27 Dec 2021 12:46) (58 - 73)  BP: 120/73 (27 Dec 2021 12:46) (120/73 - 151/74)  BP(mean): --  RR: 18 (27 Dec 2021 12:46) (18 - 18)  SpO2: 99% (27 Dec 2021 12:46) (99% - 100%)  Sedimentation Rate, Erythrocyte: 86 mm/Hr (12-20-21 @ 06:15)  Sedimentation Rate, Erythrocyte: 95 mm/Hr (12-19-21 @ 19:15)         C-Reactive Protein, Serum: 107 mg/L (12-20-21 @ 09:36)  C-Reactive Protein, Serum: 94 mg/L (12-20-21 @ 04:01)  WBC Count: 5.91 K/uL (12-27-21 @ 09:08)  WBC Count: 6.13 K/uL (12-26-21 @ 15:49)  WBC Count: 5.65 K/uL (12-26-21 @ 05:16)  WBC Count: 5.16 K/uL (12-25-21 @ 09:19)  WBC Count: 4.48 K/uL (12-24-21 @ 18:32)  WBC Count: 4.78 K/uL (12-23-21 @ 23:36)  WBC Count: 4.73 K/uL (12-23-21 @ 16:32)      PHYSICAL EXAM  LE Focused:    Vasc:  DP/PT pulses were palpable, bilaterally. Generalized edema noted to the Left Foot  Derm: Left Plantar medial arch incision site shows decreased redness to the left foot. No warmth noted to th left foot. No malodor, no purulent drainage, +PTB was noted. No edema noted to the left foot.    12/23/21: Bleeding seized, no erythema, no edema noted   12/22/21: Left Plantar medial arch wound noted measuring 3cm x 3cm x 3cm with 5cm tunnelling noted to the surrounding area with +PTB. Redness and Warmth noted to the left foot with no malodor, no purulent drainage noted upon expression today. Streaking up to the left ankle and plantar distal/lateral arch of the left foot. Edema was noted to have decreased from yesterday to the left foot.  12/20/21: Left Plantar medial arch wound noted measuring 3cm x 3cm x 3cm with 5cm tunnelling noted to the surrounding area with +ptb and mild fluctuance noted to the surrounding wound. Left Foot Wound has Redness, increased warmth noted, mild malodor, purulent drainage, streaking up to the left ankle and plantar distal/lateral arch.   Post Bedside Incision and Drainage (12/20/21)  Neuro:  Protective sensation absent, bilaterally.   MSK: No pain on palpation to the Left Foot Wound. Denies any calf pain, bilaterally.     Imaging:     EXAM:  MR FOOT LT                          PROCEDURE DATE:  12/20/2021      INTERPRETATION:  LEFT FOOT MRI    CLINICAL INFORMATION: Left foot wound. Eval for osteomyelitis.  TECHNIQUE: Multiplanar, multisequence MRI was obtained of the left foot.    COMPARISON: Left foot MRI 12/23/2015.    FINDINGS:    Plantar soft tissue wound is present at the level of the first   tarsometatarsal articulation with tract extending to the bone. There is   edema along the plantar aspect of the distal medial cuneiform and base of   the first metatarsal with preservation of the T1 marrow signal. There is   moderate tarsometatarsal arthrosis and arthrosis of the articulation of   the navicular and lateral cuneiform. There are subchondral fractures of   the first second and third metatarsal heads. There is diffuse increased   muscle signal, most consistent with denervation. There is diffuse   subcutaneous edema.    IMPRESSION:    Plantar soft tissue wound with tract extending to the undersurface of the   first tarsometatarsal articulation.  Edema within the base of the first tarsometatarsal articulation adjacent   to the tract with preservation of the T1 marrow signal, which may   represent sequela of early osteomyelitis.  First second and third metatarsal head subchondral fractures.    Cultures:   Culture Results:   No growth (12.23.21 @ 04:15)

## 2021-12-27 NOTE — PROGRESS NOTE ADULT - ATTENDING COMMENTS
patient was seen and examined at bedside   Denies any complains     Vitals noted     P/E:  GENERAL: NAD, well-groomed, well-developed  HEAD:  Atraumatic, Normocephalic  NERVOUS SYSTEM:  Alert & Oriented X3, Good concentration; Motor Strength 5/5 B/L upper and lower extremities  CHEST/LUNG: Clear to ausculation  bilaterally; No rales, rhonchi, wheezing, or rubs  HEART: Regular rate and rhythm; No murmurs, rubs, or gallops  ABDOMEN: Soft, Nontender, Nondistended; Bowel sounds present  EXTREMITIES:  left food bandage clean, dry and intact  LYMPH: No lymphadenopathy noted  SKIN: No rashes or lesions    labs noted     A/P:  MSSA and Strep bacteremia   Left foot OM  Diabetic foot ulcer   Anemia due to acute blood loss and anemia of chronic disease   Afib   DM  HTN  HLD    Plan:   Cont Cefazolin and Levofloxacin   Hb stable, cont Lovenox full dose, discussed with Dr. Liz  OR procedure with Podiatry on Thursday for wound closure, follow up with Podiatry   Rest of the management as above   PATRICE MORRISON

## 2021-12-27 NOTE — PROGRESS NOTE ADULT - PROBLEM SELECTOR PLAN 2
- Patient with cellulitis of left foot and Charcot foot in diabetes   - Surgical swab 12/20 of left foot wound with Alpha hemolytic strep, S. aureus, Citrobacter koseri, Veillonella parvula.  - Off Vancomycin since 12/21, discontinued Unasyn today 11/24  - Started on Cefazolin 2 g q 8 hrs and Levaquin 750 mg daily (12/24) as per ID recommendations  - ID Dr Maxwell on board - will dc on Cefazolin 2 g q 8hrs for 4 weeks starting from 12/22  - Podiatry on board, s/p I&D (12/22) - Patient with cellulitis of left foot and Charcot foot in diabetes   - Surgical swab 12/20 of left foot wound with Alpha hemolytic strep, S. aureus, Citrobacter koseri, Veillonella parvula.  - Off Vancomycin since 12/21, discontinued Unasyn today 11/24  - Started on Cefazolin 2 g q 8 hrs and Levaquin 750 mg daily (12/24) as per ID recommendations  - ID Dr Maxwell on board - will dc on Cefazolin 2 g q 8hrs for 4 weeks starting from 12/22 - 1/19/22  - Podiatry on board, s/p I&D (12/22)  - PT recommended [facility]** - Patient with cellulitis of left foot and Charcot foot in diabetes   - Surgical swab 12/20 of left foot wound with Alpha hemolytic strep, S. aureus, Citrobacter koseri, Veillonella parvula.  - Off Vancomycin since 12/21, discontinued Unasyn today 11/24  - Started on Cefazolin 2 g q 8 hrs and Levaquin 750 mg daily (12/24) as per ID recommendations  - ID Dr Maxwell on board - will dc on Cefazolin 2 g q 8hrs for 4 weeks starting from 12/22 - 1/19/22  - Podiatry on board, s/p I&D (12/23)  - PT recommended [facility]**

## 2021-12-28 LAB
ANION GAP SERPL CALC-SCNC: 5 MMOL/L — SIGNIFICANT CHANGE UP (ref 5–17)
BUN SERPL-MCNC: 11 MG/DL — SIGNIFICANT CHANGE UP (ref 7–18)
CALCIUM SERPL-MCNC: 8.8 MG/DL — SIGNIFICANT CHANGE UP (ref 8.4–10.5)
CHLORIDE SERPL-SCNC: 108 MMOL/L — SIGNIFICANT CHANGE UP (ref 96–108)
CO2 SERPL-SCNC: 27 MMOL/L — SIGNIFICANT CHANGE UP (ref 22–31)
CREAT SERPL-MCNC: 1.33 MG/DL — HIGH (ref 0.5–1.3)
CULTURE RESULTS: NO GROWTH — SIGNIFICANT CHANGE UP
CULTURE RESULTS: NO GROWTH — SIGNIFICANT CHANGE UP
GLUCOSE BLDC GLUCOMTR-MCNC: 170 MG/DL — HIGH (ref 70–99)
GLUCOSE BLDC GLUCOMTR-MCNC: 184 MG/DL — HIGH (ref 70–99)
GLUCOSE BLDC GLUCOMTR-MCNC: 189 MG/DL — HIGH (ref 70–99)
GLUCOSE BLDC GLUCOMTR-MCNC: 209 MG/DL — HIGH (ref 70–99)
GLUCOSE SERPL-MCNC: 179 MG/DL — HIGH (ref 70–99)
HCT VFR BLD CALC: 26.7 % — LOW (ref 39–50)
HGB BLD-MCNC: 8.6 G/DL — LOW (ref 13–17)
HOMOCYSTEINE LEVEL: 16.6 UMOL/L — HIGH
MAGNESIUM SERPL-MCNC: 1.9 MG/DL — SIGNIFICANT CHANGE UP (ref 1.6–2.6)
MCHC RBC-ENTMCNC: 28.5 PG — SIGNIFICANT CHANGE UP (ref 27–34)
MCHC RBC-ENTMCNC: 32.2 GM/DL — SIGNIFICANT CHANGE UP (ref 32–36)
MCV RBC AUTO: 88.4 FL — SIGNIFICANT CHANGE UP (ref 80–100)
METHYLMALONIC ACID LEVEL: 252 NMOL/L — SIGNIFICANT CHANGE UP (ref 87–318)
NRBC # BLD: 0 /100 WBCS — SIGNIFICANT CHANGE UP (ref 0–0)
PHOSPHATE SERPL-MCNC: 3.2 MG/DL — SIGNIFICANT CHANGE UP (ref 2.5–4.5)
PLATELET # BLD AUTO: 201 K/UL — SIGNIFICANT CHANGE UP (ref 150–400)
POTASSIUM SERPL-MCNC: 4.8 MMOL/L — SIGNIFICANT CHANGE UP (ref 3.5–5.3)
POTASSIUM SERPL-SCNC: 4.8 MMOL/L — SIGNIFICANT CHANGE UP (ref 3.5–5.3)
RBC # BLD: 3.02 M/UL — LOW (ref 4.2–5.8)
RBC # FLD: 13.8 % — SIGNIFICANT CHANGE UP (ref 10.3–14.5)
SODIUM SERPL-SCNC: 140 MMOL/L — SIGNIFICANT CHANGE UP (ref 135–145)
SPECIMEN SOURCE: SIGNIFICANT CHANGE UP
SPECIMEN SOURCE: SIGNIFICANT CHANGE UP
SURGICAL PATHOLOGY STUDY: SIGNIFICANT CHANGE UP
WBC # BLD: 7.3 K/UL — SIGNIFICANT CHANGE UP (ref 3.8–10.5)
WBC # FLD AUTO: 7.3 K/UL — SIGNIFICANT CHANGE UP (ref 3.8–10.5)

## 2021-12-28 PROCEDURE — 99233 SBSQ HOSP IP/OBS HIGH 50: CPT | Mod: GC

## 2021-12-28 RX ORDER — OXYCODONE AND ACETAMINOPHEN 5; 325 MG/1; MG/1
1 TABLET ORAL EVERY 6 HOURS
Refills: 0 | Status: DISCONTINUED | OUTPATIENT
Start: 2021-12-28 | End: 2022-01-04

## 2021-12-28 RX ORDER — LANOLIN ALCOHOL/MO/W.PET/CERES
3 CREAM (GRAM) TOPICAL AT BEDTIME
Refills: 0 | Status: DISCONTINUED | OUTPATIENT
Start: 2021-12-28 | End: 2022-01-07

## 2021-12-28 RX ADMIN — ENOXAPARIN SODIUM 80 MILLIGRAM(S): 100 INJECTION SUBCUTANEOUS at 06:11

## 2021-12-28 RX ADMIN — GABAPENTIN 800 MILLIGRAM(S): 400 CAPSULE ORAL at 14:33

## 2021-12-28 RX ADMIN — Medication 325 MILLIGRAM(S): at 06:11

## 2021-12-28 RX ADMIN — Medication 325 MILLIGRAM(S): at 17:06

## 2021-12-28 RX ADMIN — Medication 100 MILLIGRAM(S): at 06:13

## 2021-12-28 RX ADMIN — GABAPENTIN 800 MILLIGRAM(S): 400 CAPSULE ORAL at 22:32

## 2021-12-28 RX ADMIN — Medication 1 DROP(S): at 17:05

## 2021-12-28 RX ADMIN — ENOXAPARIN SODIUM 80 MILLIGRAM(S): 100 INJECTION SUBCUTANEOUS at 17:07

## 2021-12-28 RX ADMIN — ATORVASTATIN CALCIUM 40 MILLIGRAM(S): 80 TABLET, FILM COATED ORAL at 22:32

## 2021-12-28 RX ADMIN — SENNA PLUS 2 TABLET(S): 8.6 TABLET ORAL at 22:32

## 2021-12-28 RX ADMIN — Medication 5 MILLIGRAM(S): at 22:32

## 2021-12-28 RX ADMIN — GABAPENTIN 800 MILLIGRAM(S): 400 CAPSULE ORAL at 06:10

## 2021-12-28 RX ADMIN — Medication 100 MILLIGRAM(S): at 14:34

## 2021-12-28 RX ADMIN — LISINOPRIL 2.5 MILLIGRAM(S): 2.5 TABLET ORAL at 06:23

## 2021-12-28 RX ADMIN — Medication 1: at 17:05

## 2021-12-28 RX ADMIN — OXYCODONE AND ACETAMINOPHEN 1 TABLET(S): 5; 325 TABLET ORAL at 23:02

## 2021-12-28 RX ADMIN — OXYCODONE AND ACETAMINOPHEN 1 TABLET(S): 5; 325 TABLET ORAL at 22:32

## 2021-12-28 RX ADMIN — Medication 2: at 11:58

## 2021-12-28 RX ADMIN — POLYETHYLENE GLYCOL 3350 17 GRAM(S): 17 POWDER, FOR SOLUTION ORAL at 12:00

## 2021-12-28 RX ADMIN — CHLORHEXIDINE GLUCONATE 1 APPLICATION(S): 213 SOLUTION TOPICAL at 06:10

## 2021-12-28 RX ADMIN — BUPROPION HYDROCHLORIDE 150 MILLIGRAM(S): 150 TABLET, EXTENDED RELEASE ORAL at 11:59

## 2021-12-28 RX ADMIN — Medication 100 MILLIGRAM(S): at 22:31

## 2021-12-28 RX ADMIN — Medication 500 MILLIGRAM(S): at 11:59

## 2021-12-28 RX ADMIN — Medication 1: at 07:56

## 2021-12-28 RX ADMIN — Medication 3 MILLIGRAM(S): at 23:43

## 2021-12-28 NOTE — PROGRESS NOTE ADULT - PROBLEM SELECTOR PLAN 7
- Patient has hx of atrial fibrillation not on any meds at home    - EKG A fib rate controlled  - CHADSVASC2 4 points   - On full dose Lovenox, Eliquis prescription sent to pharmacy, covered by insurance, co payment 45 dollars monthly  - Will hold Lovenox on Wednesday for OR procedure by Podiatry on Thursday   - Continue to monitor  - Cardio Dr Liz on board

## 2021-12-28 NOTE — PROGRESS NOTE ADULT - ASSESSMENT
76 year-old male from home with past medical history of atrial fibrillation (was on coumadin until 1month ago), hypertension, hyperlipidemia, diabetes mellitus presents to ED for AMS,bacteremia, anemia.  1.ORLY-no endocarditis.  2.Bacteremia-ABX.Repaet blood cx-.  3.Afib-lovenox for now until after OR..  4.Pt not want coumadin due to dietary limitation,consider NOAC if covered by insurance.  5.DM-Insulin.  6.HTN-ace.  7.PPI.  8.Lipid d/o-statin.  9.DVT prophylaxis.

## 2021-12-28 NOTE — PROGRESS NOTE ADULT - PROBLEM SELECTOR PLAN 1
- Patient with MSSA and Streptococcus bacteremia most likely secondary to left foot cellulitis/abscess  - MR left foot showed Plantar soft tissue wound with tract extending to the undersurface of the first tarsometatarsal articulation. Edema within the base of the first tarsometatarsal articulation may represent sequela of early osteomyelitis. First second and third metatarsal head subchondral fractures.  - Bcx from 12/20 methicillin susceptible S. aureus, Streptococcus mitis/oralis group, and Alpha hemolytic strep, ESR and CRP elevated    - Repeat Bcx from 12/22 NGTD  - Off Vancomycin since 12/21, discontinued Unasyn today 11/24  - Started on Cefazolin 2 g q8hers and Levaquin 750 mg daily (12/24) as per ID recommendations   - MRI lumbosacral no acute findings   - Echo showed aortic valve leaflets appear thickened, cannot exclude a vegetation. Mild aortic  regurgitation. Normal ventricular function, mild pulmonary hypertension  - ORLY no vegetations - Negative for endocarditis   - Cardio Dr Liz on board, holding ASA due to anemia 2/2 blood loss requiring transfusions   - ID Dr Maxwell on board - will dc on Cefazolin 2 g q 8hrs for 4 weeks starting from 12/22  - Podiatry on board, s/p I&D (12/22), planned procedure for Thursday   - Planned placement of Dwell by IR on Thursday before discharge for Cefazolin 12/22/21-1/19/22  - Monitor for any signs of hemodynamic instability - Patient with MSSA and Streptococcus bacteremia most likely secondary to left foot cellulitis/abscess  - MR left foot showed Plantar soft tissue wound with tract extending to the undersurface of the first tarsometatarsal articulation. Edema within the base of the first tarsometatarsal articulation may represent sequela of early osteomyelitis. First second and third metatarsal head subchondral fractures.  - Surgical pathology showed Tendon, left foot, excision: Fibro-tendinous tissue with ischemic and degenerative changes. Bone, left foot, biopsy: Bone fragment with resorptive changes and focal myelofibrosis. Negative for acute osteomyelitis  - Bcx from 12/20 methicillin susceptible S. aureus, Streptococcus mitis/oralis group, and Alpha hemolytic strep, ESR and CRP elevated    - Repeat Bcx from 12/22 NGTD  - Off Vancomycin since 12/21, discontinued Unasyn today 11/24  - Started on Cefazolin 2 g q8hers and Levaquin 750 mg daily (12/24) as per ID recommendations   - MRI lumbosacral no acute findings   - Echo showed aortic valve leaflets appear thickened, cannot exclude a vegetation. Mild aortic  regurgitation. Normal ventricular function, mild pulmonary hypertension  - ORLY no vegetations - Negative for endocarditis   - Cardio Dr Liz on board, holding ASA due to anemia 2/2 blood loss requiring transfusions   - ID Dr Maxwell on board - will dc on Cefazolin 2 g q 8hrs for 4 weeks starting from 12/22  - Podiatry on board, s/p I&D (12/23), planned procedure for Thursday   - Planned placement of Dwell by IR on Thursday before discharge for Cefazolin 12/22/21-1/19/22  - Monitor for any signs of hemodynamic instability - Patient with MSSA and Streptococcus bacteremia most likely secondary to left foot cellulitis/abscess  - MR left foot showed Plantar soft tissue wound with tract extending to the undersurface of the first tarsometatarsal articulation. Edema within the base of the first tarsometatarsal articulation may represent sequela of early osteomyelitis. First second and third metatarsal head subchondral fractures.  - Surgical pathology showed Tendon, left foot, excision: Fibro-tendinous tissue with ischemic and degenerative changes. Bone, left foot, biopsy: Bone fragment with resorptive changes and focal myelofibrosis. Negative for acute osteomyelitis  - Bcx from 12/20 methicillin susceptible S. aureus, Streptococcus mitis/oralis group, and Alpha hemolytic strep, ESR and CRP elevated    - Repeat Bcx from 12/22 NGTD  - Off Vancomycin since 12/21, discontinued Unasyn today 11/24  - Started on Cefazolin 2 g q8hers and Levaquin 750 mg daily (12/24) as per ID recommendations   - MRI lumbosacral no acute findings   - Echo showed aortic valve leaflets appear thickened, cannot exclude a vegetation. Mild aortic  regurgitation. Normal ventricular function, mild pulmonary hypertension  - ORLY no vegetations - Negative for endocarditis   - Cardio Dr Liz on board   - ID Dr Maxwell on board - will dc on Cefazolin 2 g q 8hrs for 4 weeks starting from 12/22  - Podiatry on board, s/p I&D (12/23), planned procedure for Thursday   - Planned placement of Dwell by IR on Thursday before discharge for Cefazolin 12/22/21-1/19/22  - Monitor for any signs of hemodynamic instability - RESOLVED  - Patient with MSSA and Streptococcus bacteremia most likely secondary to left foot cellulitis/abscess  - MR left foot showed Plantar soft tissue wound with tract extending to the undersurface of the first tarsometatarsal articulation. Edema within the base of the first tarsometatarsal articulation may represent sequela of early osteomyelitis. First second and third metatarsal head subchondral fractures.  - Surgical pathology showed Tendon, left foot, excision: Fibro-tendinous tissue with ischemic and degenerative changes. Bone, left foot, biopsy: Bone fragment with resorptive changes and focal myelofibrosis. Negative for acute osteomyelitis  - Bcx from 12/20 methicillin susceptible S. aureus, Streptococcus mitis/oralis group, and Alpha hemolytic strep, ESR and CRP elevated    - Repeat Bcx from 12/22 NGTD  - Off Vancomycin since 12/21, discontinued Unasyn today 11/24  - Started on Cefazolin 2 g q8hers and Levaquin 750 mg daily (12/24) as per ID recommendations   - MRI lumbosacral no acute findings   - Echo showed aortic valve leaflets appear thickened, cannot exclude a vegetation. Mild aortic  regurgitation. Normal ventricular function, mild pulmonary hypertension  - ORLY no vegetations - Negative for endocarditis   - Cardio Dr Liz on board   - ID Dr Maxwell on board - will dc on Cefazolin 2 g q 8hrs for 4 weeks starting from 12/22  - Podiatry on board, s/p I&D (12/23), planned procedure for Thursday   - Planned placement of Dwell by IR on Thursday before discharge for Cefazolin 12/22/21-1/19/22  - Monitor for any signs of hemodynamic instability

## 2021-12-28 NOTE — PROGRESS NOTE ADULT - ATTENDING COMMENTS
Patient seen and examined this m    Vital Signs Last 24 Hrs  T(C): 36.6 (28 Dec 2021 14:20), Max: 37.1 (27 Dec 2021 21:42)  T(F): 97.8 (28 Dec 2021 14:20), Max: 98.7 (27 Dec 2021 21:42)  HR: 67 (28 Dec 2021 14:20) (53 - 67)  BP: 100/60 (28 Dec 2021 14:20) (100/60 - 127/72)  BP(mean): 68 (28 Dec 2021 14:20) (68 - 78)  RR: 17 (28 Dec 2021 14:20) (16 - 18)  SpO2: 99% (28 Dec 2021 14:20) (97% - 99%)    P/E:  GENERAL: NAD, well-groomed, well-developed  HEAD:  Atraumatic, Normocephalic  NERVOUS SYSTEM:  Alert & Oriented X3, Good concentration; Motor Strength 5/5 B/L upper and lower extremities  CHEST/LUNG: Clear to ausculation  bilaterally; No rales, rhonchi, wheezing, or rubs  HEART: Regular rate and rhythm; No murmurs, rubs, or gallops  ABDOMEN: Soft, Nontender, Nondistended; Bowel sounds present  EXTREMITIES:  left food bandage clean, dry and intact  LYMPH: No lymphadenopathy noted  SKIN: No rashes or lesions    Labs:                        8.6    7.30  )-----------( 201      ( 28 Dec 2021 07:44 )             26.7   12-28    140  |  108  |  11  ----------------------------<  179<H>  4.8   |  27  |  1.33<H>    Ca    8.8      28 Dec 2021 07:44  Phos  3.2     12-28  Mg     1.9     12-28        A/P:  MSSA and Strep bacteremia   Left foot OM  Diabetic foot ulcer   Anemia due to acute blood loss and anemia of chronic disease   Afib   DM  HTN  HLD    Plan:   Cont Cefazolin and Levofloxacin   Hb stable, cont Lovenox full dose, discussed with Dr. Liz  OR procedure with Podiatry on Thursday for wound closure, follow up with Podiatry   Rest of the management as above   PT hema PRINCE Patient seen and examined this morning around 11 AM    76 year-old male with Hx of atrial fibrillation (on coumadin recently), hypertension, hyperlipidemia, diabetes mellitus was admitted to medicine for altered mentation and left diabetic foot ulcer noted to be Bacteremic with MSSA s/p debridement in OR scheduled for OR Thursday for wound closure.     Patient doing better overall. NAD; N O new complaints  denies fever, chills, SOB, palpitations, chest pain, nausea, vomiting, diarrhea, constipation, dizziness    Vital Signs Last 24 Hrs  T(C): 36.6 (28 Dec 2021 14:20), Max: 37.1 (27 Dec 2021 21:42)  T(F): 97.8 (28 Dec 2021 14:20), Max: 98.7 (27 Dec 2021 21:42)  HR: 67 (28 Dec 2021 14:20) (53 - 67)  BP: 100/60 (28 Dec 2021 14:20) (100/60 - 127/72)  BP(mean): 68 (28 Dec 2021 14:20) (68 - 78)  RR: 17 (28 Dec 2021 14:20) (16 - 18)  SpO2: 99% (28 Dec 2021 14:20) (97% - 99%)    P/E:  GENERAL: NAD, well-groomed, well-developed  HEAD:  Atraumatic, Normocephalic  NERVOUS SYSTEM:  Alert & Oriented X3, Good concentration; Motor Strength 5/5 B/L upper and lower extremities  CHEST/LUNG: Clear to ausculation  bilaterally; No rales, rhonchi, wheezing, or rubs  HEART: Regular rate and rhythm; No murmurs, rubs, or gallops  ABDOMEN: Soft, Nontender, Nondistended; Bowel sounds present  EXTREMITIES:  left foot wrapped in bandage clean, dry and intact with wound vac in place  LYMPH: No lymphadenopathy noted  SKIN: No rashes or lesions    Labs:                        8.6    7.30  )-----------( 201      ( 28 Dec 2021 07:44 )             26.7   12-28    140  |  108  |  11  ----------------------------<  179<H>  4.8   |  27  |  1.33<H>    Ca    8.8      28 Dec 2021 07:44  Phos  3.2     12-28  Mg     1.9     12-28    A/P:  MSSA and Strep bacteremia likely related to Left foot Diabetic ulcer with  Left foot OM  Anemia due to acute blood loss and anemia of chronic disease   Afib rate controlled  Type 2 DM controlled with likely Diabetic Neuropathy  HTN  HLD    Plan:   Cont Cefazolin and Levofloxacin   Bone biopsy from OR does not show OM but Patient with MRI evidence of OM; d/w Dr. Maxwell; agreed biopsy may not have been adequate and patient with bacteremia will need to complete 4 weeks of ABX from negative Cx  Spoke with IR ANN-MARIE Escobar; Extended dwell catheter/ PICC requested; will be either placed Wednesday afternoon (preferable) or Thursday morning as patient is schedulred for OR Thursday afternoon.   Hb stable, cont Lovenox full dose, discussed with Dr. Liz; PLEASE CHECK WITH HIS PHARMACY IF COVERD FOR DOACs as patient not willing to go back on Warfarin as per cardio  OR procedure with Podiatry on Thursday for wound closure, follow up with Podiatry   DVT ppx on Lovenox; Hold after AM dose tomorrow  PT hema MORRISON  Discussed with  Yane; PLEASE ARRANGE REHAB vs HOME INFUSION; Patient should be expected to be medically stable for discharge Friday into Saturday once cleared by Podiatry after wound closure.   Rest of the management as above     Discussed with PGY1 Dr. BELEM Myers/ PGY2 DR. Pham and RN Margy. Patient seen and examined this morning around 11 AM    76 year-old male with Hx of atrial fibrillation (on coumadin recently), hypertension, hyperlipidemia, diabetes mellitus was admitted to medicine for altered mentation and left diabetic foot ulcer noted to be Bacteremic with MSSA s/p debridement in OR scheduled for OR Thursday for wound closure.     Patient doing better overall. NAD; N O new complaints  denies fever, chills, SOB, palpitations, chest pain, nausea, vomiting, diarrhea, constipation, dizziness    Vital Signs Last 24 Hrs  T(C): 36.6 (28 Dec 2021 14:20), Max: 37.1 (27 Dec 2021 21:42)  T(F): 97.8 (28 Dec 2021 14:20), Max: 98.7 (27 Dec 2021 21:42)  HR: 67 (28 Dec 2021 14:20) (53 - 67)  BP: 100/60 (28 Dec 2021 14:20) (100/60 - 127/72)  BP(mean): 68 (28 Dec 2021 14:20) (68 - 78)  RR: 17 (28 Dec 2021 14:20) (16 - 18)  SpO2: 99% (28 Dec 2021 14:20) (97% - 99%)    P/E:  GENERAL: NAD, well-groomed, well-developed  HEAD:  Atraumatic, Normocephalic  NERVOUS SYSTEM:  Alert & Oriented X3, Good concentration; Motor Strength 5/5 B/L upper and lower extremities  CHEST/LUNG: Clear to ausculation  bilaterally; No rales, rhonchi, wheezing, or rubs  HEART: Regular rate and rhythm; No murmurs, rubs, or gallops  ABDOMEN: Soft, Nontender, Nondistended; Bowel sounds present  EXTREMITIES:  left foot wrapped in bandage clean, dry and intact with wound vac in place  LYMPH: No lymphadenopathy noted  SKIN: No rashes or lesions    Labs:                        8.6    7.30  )-----------( 201      ( 28 Dec 2021 07:44 )             26.7   12-28    140  |  108  |  11  ----------------------------<  179<H>  4.8   |  27  |  1.33<H>    Ca    8.8      28 Dec 2021 07:44  Phos  3.2     12-28  Mg     1.9     12-28    A/P:  MSSA and Strep bacteremia likely related to Left foot Diabetic ulcer with  Left foot OM  Anemia due to acute blood loss and anemia of chronic disease   Afib rate controlled  Type 2 DM controlled with likely Diabetic Neuropathy  Acute encephalopathy likely infectious on admission resolved  HTN  HLD    Plan:   Cont Cefazolin and Levofloxacin   Bone biopsy from OR does not show OM but Patient with MRI evidence of OM; d/w Dr. Maxwell; agreed biopsy may not have been adequate and patient with bacteremia will need to complete 4 weeks of ABX from negative Cx  Spoke with IR ANN-MARIE Escobar; Extended dwell catheter/ PICC requested; will be either placed Wednesday afternoon (preferable) or Thursday morning as patient is schedulred for OR Thursday afternoon.   Hb stable, cont Lovenox full dose, discussed with Dr. Liz; PLEASE CHECK WITH HIS PHARMACY IF COVERD FOR DOACs as patient not willing to go back on Warfarin as per cardio  OR procedure with Podiatry on Thursday for wound closure, follow up with Podiatry   DVT ppx on Lovenox; Hold after AM dose tomorrow  PT eval PRINCE  Discussed with  Yane; PLEASE ARRANGE REHAB vs HOME INFUSION; Patient should be expected to be medically stable for discharge Friday into Saturday once cleared by Podiatry after wound closure.   Rest of the management as above     Discussed with PGY1 Dr. BELEM Myers/ PGY2 DR. Pham and RN Margy.

## 2021-12-28 NOTE — PROGRESS NOTE ADULT - PROBLEM SELECTOR PLAN 4
- Acute blood loss anemia  - Patient noted to have low hgb 8-9 on admission, now 7>6.7>7.5>6.9>8.2  - S/p I & D on 12/22 by Podiatry  - Iron panel compatible with KISHOR however anemia from blood loss as well  - C/w Ferrous sulfate and vitamin C  - Type and screen, transfuse if hgb <7  - Consent in the chart   - Bowel regimen   - S/p 2 units PRBC (12/23 and 12/24)  - Monitor CBC daily   - Monitor for any signs of bleeding or hemodynamic instability

## 2021-12-28 NOTE — PROGRESS NOTE ADULT - SUBJECTIVE AND OBJECTIVE BOX
PGY-1 Progress Note discussed with attending    PAGER #: [[phone]] TILL 5:00 PM  PLEASE CONTACT ON CALL TEAM:  - On Call Team (Please refer to Brie) FROM 5:00 PM - 8:30PM  - Nightfloat Team FROM 8:30 -7:30 AM    CHIEF COMPLAINT & BRIEF HOSPITAL COURSE:      INTERVAL HPI/OVERNIGHT EVENTS: Patient refers feeling better, denies any pain or complaints overnight. patient has wound vac on left foot placed by podiatry yesterday afternoon.      MEDICATIONS  (STANDING):  ascorbic acid 500 milliGRAM(s) Oral daily  atorvastatin 40 milliGRAM(s) Oral at bedtime  buPROPion XL (24-Hour) . 150 milliGRAM(s) Oral daily  ceFAZolin   IVPB 2000 milliGRAM(s) IV Intermittent every 8 hours  chlorhexidine 2% Cloths 1 Application(s) Topical <User Schedule>  dextrose 5% + sodium chloride 0.45%. 1000 milliLiter(s) (75 mL/Hr) IV Continuous <Continuous>  enoxaparin Injectable 80 milliGRAM(s) SubCutaneous every 12 hours  ferrous    sulfate 325 milliGRAM(s) Oral two times a day  gabapentin 800 milliGRAM(s) Oral three times a day  insulin lispro (ADMELOG) corrective regimen sliding scale   SubCutaneous three times a day before meals  insulin lispro (ADMELOG) corrective regimen sliding scale   SubCutaneous at bedtime  levoFLOXacin  Tablet 750 milliGRAM(s) Oral every 24 hours  lidocaine 1% Injectable 10 milliLiter(s) Local Injection once  lisinopril 2.5 milliGRAM(s) Oral daily  melatonin 5 milliGRAM(s) Oral at bedtime  polyethylene glycol 3350 17 Gram(s) Oral daily  senna 2 Tablet(s) Oral at bedtime    MEDICATIONS  (PRN):  acetaminophen     Tablet .. 650 milliGRAM(s) Oral every 6 hours PRN Temp greater or equal to 38C (100.4F)      Vital Signs Last 24 Hrs  T(C): 36.8 (28 Dec 2021 05:12), Max: 37.1 (27 Dec 2021 12:46)  T(F): 98.2 (28 Dec 2021 05:12), Max: 98.7 (27 Dec 2021 12:46)  HR: 53 (28 Dec 2021 05:12) (53 - 66)  BP: 115/69 (28 Dec 2021 05:12) (115/69 - 121/68)  BP(mean): 78 (27 Dec 2021 21:42) (78 - 78)  RR: 18 (28 Dec 2021 05:12) (16 - 18)  SpO2: 97% (28 Dec 2021 05:12) (97% - 99%)      PHYSICAL EXAMINATION:  GENERAL: NAD, well built, sat well on RA, pale  HEAD:  Atraumatic, Normocephalic  EYES:  conjunctiva and sclera clear  NECK: Supple, No JVD, Normal thyroid  CHEST/LUNG: Clear to auscultation. Clear to percussion bilaterally; No rales, rhonchi, wheezing, or rubs  HEART: Regular rate and rhythm; No murmurs, rubs, or gallops  ABDOMEN: Soft, Nontender, Nondistended; Bowel sounds present  NERVOUS SYSTEM:  Alert & Oriented X3, no neuro deficits     EXTREMITIES:  2+ Peripheral Pulses, No clubbing, cyanosis, or edema  SKIN: Left foot wound, DSD intact                              8.6    7.30  )-----------( 201      ( 28 Dec 2021 07:44 )             26.7     12-28    140  |  108  |  11  ----------------------------<  179<H>  4.8   |  27  |  1.33<H>    Ca    8.8      28 Dec 2021 07:44  Phos  3.2     12-28  Mg     1.9     12-28                I&O's Summary    27 Dec 2021 07:01  -  28 Dec 2021 07:00  --------------------------------------------------------  IN: 0 mL / OUT: 2000 mL / NET: -2000 mL

## 2021-12-28 NOTE — PROGRESS NOTE ADULT - SUBJECTIVE AND OBJECTIVE BOX
CHIEF COMPLAINT:Patient is a 76y old  Male who presents with a chief complaint of AMS.Pt appears comfortable.    	  REVIEW OF SYSTEMS:  CONSTITUTIONAL: No fever, weight loss, or fatigue  EYES: No eye pain, visual disturbances, or discharge  ENT:  No difficulty hearing, tinnitus, vertigo; No sinus or throat pain  NECK: No pain or stiffness  RESPIRATORY: No cough, wheezing, chills or hemoptysis; No Shortness of Breath  CARDIOVASCULAR: No chest pain, palpitations, passing out, dizziness, or leg swelling  GASTROINTESTINAL: No abdominal or epigastric pain. No nausea, vomiting, or hematemesis; No diarrhea or constipation. No melena or hematochezia.  GENITOURINARY: No dysuria, frequency, hematuria, or incontinence  NEUROLOGICAL: No headaches, memory loss, loss of strength, numbness, or tremors  SKIN: No itching, burning, rashes, or lesions   LYMPH Nodes: No enlarged glands  ENDOCRINE: No heat or cold intolerance; No hair loss  MUSCULOSKELETAL: No joint pain or swelling; No muscle, back, or extremity pain  PSYCHIATRIC: No depression, anxiety, mood swings, or difficulty sleeping  HEME/LYMPH: No easy bruising, or bleeding gums  ALLERGY AND IMMUNOLOGIC: No hives or eczema	        PHYSICAL EXAM:  T(C): 36.8 (12-28-21 @ 05:12), Max: 37.1 (12-27-21 @ 12:46)  HR: 53 (12-28-21 @ 05:12) (53 - 66)  BP: 115/69 (12-28-21 @ 05:12) (115/69 - 121/68)  RR: 18 (12-28-21 @ 05:12) (16 - 18)  SpO2: 97% (12-28-21 @ 05:12) (97% - 99%)  Wt(kg): --  I&O's Summary    27 Dec 2021 07:01  -  28 Dec 2021 07:00  --------------------------------------------------------  IN: 0 mL / OUT: 2000 mL / NET: -2000 mL        Appearance: Normal	  HEENT:   Normal oral mucosa, PERRL, EOMI	  Lymphatic: No lymphadenopathy  Cardiovascular: Normal S1 S2, No JVD, No murmurs, No edema  Respiratory: Lungs clear to auscultation	  Psychiatry: A & O x 3, Mood & affect appropriate  Gastrointestinal:  Soft, Non-tender, + BS	  Skin: No rashes, No ecchymoses, No cyanosis	  Neurologic: Non-focal  Extremities: Normal range of motion, No clubbing, cyanosis or edema  Vascular: Peripheral pulses palpable 2+ bilaterally    MEDICATIONS  (STANDING):  artificial tears (preservative free) Ophthalmic Solution 1 Drop(s) Both EYES two times a day  ascorbic acid 500 milliGRAM(s) Oral daily  atorvastatin 40 milliGRAM(s) Oral at bedtime  buPROPion XL (24-Hour) . 150 milliGRAM(s) Oral daily  ceFAZolin   IVPB 2000 milliGRAM(s) IV Intermittent every 8 hours  chlorhexidine 2% Cloths 1 Application(s) Topical <User Schedule>  dextrose 5% + sodium chloride 0.45%. 1000 milliLiter(s) (75 mL/Hr) IV Continuous <Continuous>  enoxaparin Injectable 80 milliGRAM(s) SubCutaneous every 12 hours  ferrous    sulfate 325 milliGRAM(s) Oral two times a day  gabapentin 800 milliGRAM(s) Oral three times a day  insulin lispro (ADMELOG) corrective regimen sliding scale   SubCutaneous three times a day before meals  insulin lispro (ADMELOG) corrective regimen sliding scale   SubCutaneous at bedtime  levoFLOXacin  Tablet 750 milliGRAM(s) Oral every 24 hours  lidocaine 1% Injectable 10 milliLiter(s) Local Injection once  lisinopril 2.5 milliGRAM(s) Oral daily  melatonin 5 milliGRAM(s) Oral at bedtime  polyethylene glycol 3350 17 Gram(s) Oral daily  senna 2 Tablet(s) Oral at bedtime      	  LABS:	 	                       8.6    7.30  )-----------( 201      ( 28 Dec 2021 07:44 )             26.7     12-28    140  |  108  |  11  ----------------------------<  179<H>  4.8   |  27  |  1.33<H>    Ca    8.8      28 Dec 2021 07:44  Phos  3.2     12-28  Mg     1.9     12-28

## 2021-12-28 NOTE — PROGRESS NOTE ADULT - PROBLEM SELECTOR PLAN 2
- Patient with cellulitis of left foot and Charcot foot in diabetes   - Surgical swab 12/20 of left foot wound with Alpha hemolytic strep, S. aureus, Citrobacter koseri, Veillonella parvula.  - Off Vancomycin since 12/21, discontinued Unasyn today 11/24  - Started on Cefazolin 2 g q 8 hrs and Levaquin 750 mg daily (12/24) as per ID recommendations  - ID Dr Maxwell on board - will dc on Cefazolin 2 g q 8hrs for 4 weeks starting from 12/22 - 1/19/22  - Podiatry on board, s/p I&D (12/22)  - PT recommended PRINCE** - Patient with cellulitis of left foot and Charcot foot in diabetes   - Surgical swab 12/20 of left foot wound with Alpha hemolytic strep, S. aureus, Citrobacter koseri, Veillonella parvula.  - Off Vancomycin since 12/21, discontinued Unasyn today 11/24  - Started on Cefazolin 2 g q 8 hrs and Levaquin 750 mg daily (12/24) as per ID recommendations  - ID Dr Maxwell on board - will dc on Cefazolin 2 g q 8hrs for 4 weeks starting from 12/22 - 1/19/22  - Podiatry on board, s/p I&D (12/23)  - PT recommended PRINCE**

## 2021-12-29 LAB
ANION GAP SERPL CALC-SCNC: 5 MMOL/L — SIGNIFICANT CHANGE UP (ref 5–17)
APTT BLD: 44.9 SEC — HIGH (ref 27.5–35.5)
BUN SERPL-MCNC: 15 MG/DL — SIGNIFICANT CHANGE UP (ref 7–18)
CALCIUM SERPL-MCNC: 8.9 MG/DL — SIGNIFICANT CHANGE UP (ref 8.4–10.5)
CHLORIDE SERPL-SCNC: 108 MMOL/L — SIGNIFICANT CHANGE UP (ref 96–108)
CO2 SERPL-SCNC: 27 MMOL/L — SIGNIFICANT CHANGE UP (ref 22–31)
CREAT SERPL-MCNC: 1.29 MG/DL — SIGNIFICANT CHANGE UP (ref 0.5–1.3)
GLUCOSE BLDC GLUCOMTR-MCNC: 176 MG/DL — HIGH (ref 70–99)
GLUCOSE BLDC GLUCOMTR-MCNC: 203 MG/DL — HIGH (ref 70–99)
GLUCOSE BLDC GLUCOMTR-MCNC: 219 MG/DL — HIGH (ref 70–99)
GLUCOSE BLDC GLUCOMTR-MCNC: 230 MG/DL — HIGH (ref 70–99)
GLUCOSE SERPL-MCNC: 162 MG/DL — HIGH (ref 70–99)
HCT VFR BLD CALC: 29.1 % — LOW (ref 39–50)
HGB BLD-MCNC: 9.2 G/DL — LOW (ref 13–17)
INR BLD: 1.23 RATIO — HIGH (ref 0.88–1.16)
MAGNESIUM SERPL-MCNC: 2.1 MG/DL — SIGNIFICANT CHANGE UP (ref 1.6–2.6)
MCHC RBC-ENTMCNC: 28.2 PG — SIGNIFICANT CHANGE UP (ref 27–34)
MCHC RBC-ENTMCNC: 31.6 GM/DL — LOW (ref 32–36)
MCV RBC AUTO: 89.3 FL — SIGNIFICANT CHANGE UP (ref 80–100)
NRBC # BLD: 0 /100 WBCS — SIGNIFICANT CHANGE UP (ref 0–0)
PHOSPHATE SERPL-MCNC: 3.1 MG/DL — SIGNIFICANT CHANGE UP (ref 2.5–4.5)
PLATELET # BLD AUTO: 226 K/UL — SIGNIFICANT CHANGE UP (ref 150–400)
POTASSIUM SERPL-MCNC: 4.2 MMOL/L — SIGNIFICANT CHANGE UP (ref 3.5–5.3)
POTASSIUM SERPL-SCNC: 4.2 MMOL/L — SIGNIFICANT CHANGE UP (ref 3.5–5.3)
PROTHROM AB SERPL-ACNC: 14.5 SEC — HIGH (ref 10.6–13.6)
RBC # BLD: 3.26 M/UL — LOW (ref 4.2–5.8)
RBC # FLD: 13.6 % — SIGNIFICANT CHANGE UP (ref 10.3–14.5)
SODIUM SERPL-SCNC: 140 MMOL/L — SIGNIFICANT CHANGE UP (ref 135–145)
WBC # BLD: 7.56 K/UL — SIGNIFICANT CHANGE UP (ref 3.8–10.5)
WBC # FLD AUTO: 7.56 K/UL — SIGNIFICANT CHANGE UP (ref 3.8–10.5)

## 2021-12-29 PROCEDURE — 99233 SBSQ HOSP IP/OBS HIGH 50: CPT | Mod: GC

## 2021-12-29 RX ORDER — FAMOTIDINE 10 MG/ML
20 INJECTION INTRAVENOUS DAILY
Refills: 0 | Status: DISCONTINUED | OUTPATIENT
Start: 2021-12-29 | End: 2022-01-07

## 2021-12-29 RX ORDER — APIXABAN 2.5 MG/1
5 TABLET, FILM COATED ORAL EVERY 12 HOURS
Refills: 0 | Status: DISCONTINUED | OUTPATIENT
Start: 2021-12-30 | End: 2022-01-07

## 2021-12-29 RX ADMIN — CHLORHEXIDINE GLUCONATE 1 APPLICATION(S): 213 SOLUTION TOPICAL at 05:34

## 2021-12-29 RX ADMIN — Medication 325 MILLIGRAM(S): at 17:25

## 2021-12-29 RX ADMIN — GABAPENTIN 800 MILLIGRAM(S): 400 CAPSULE ORAL at 05:35

## 2021-12-29 RX ADMIN — Medication 100 MILLIGRAM(S): at 22:07

## 2021-12-29 RX ADMIN — Medication 2: at 17:24

## 2021-12-29 RX ADMIN — Medication 1 DROP(S): at 05:34

## 2021-12-29 RX ADMIN — GABAPENTIN 800 MILLIGRAM(S): 400 CAPSULE ORAL at 22:06

## 2021-12-29 RX ADMIN — ATORVASTATIN CALCIUM 40 MILLIGRAM(S): 80 TABLET, FILM COATED ORAL at 22:10

## 2021-12-29 RX ADMIN — OXYCODONE AND ACETAMINOPHEN 1 TABLET(S): 5; 325 TABLET ORAL at 22:36

## 2021-12-29 RX ADMIN — Medication 2: at 11:49

## 2021-12-29 RX ADMIN — Medication 3 MILLIGRAM(S): at 22:06

## 2021-12-29 RX ADMIN — Medication 1 DROP(S): at 17:25

## 2021-12-29 RX ADMIN — Medication 5 MILLIGRAM(S): at 22:06

## 2021-12-29 RX ADMIN — ENOXAPARIN SODIUM 80 MILLIGRAM(S): 100 INJECTION SUBCUTANEOUS at 05:33

## 2021-12-29 RX ADMIN — Medication 325 MILLIGRAM(S): at 05:33

## 2021-12-29 RX ADMIN — BUPROPION HYDROCHLORIDE 150 MILLIGRAM(S): 150 TABLET, EXTENDED RELEASE ORAL at 11:49

## 2021-12-29 RX ADMIN — POLYETHYLENE GLYCOL 3350 17 GRAM(S): 17 POWDER, FOR SOLUTION ORAL at 11:49

## 2021-12-29 RX ADMIN — Medication 100 MILLIGRAM(S): at 14:03

## 2021-12-29 RX ADMIN — GABAPENTIN 800 MILLIGRAM(S): 400 CAPSULE ORAL at 14:03

## 2021-12-29 RX ADMIN — Medication 500 MILLIGRAM(S): at 11:49

## 2021-12-29 RX ADMIN — SENNA PLUS 2 TABLET(S): 8.6 TABLET ORAL at 22:09

## 2021-12-29 RX ADMIN — Medication 1: at 07:58

## 2021-12-29 RX ADMIN — OXYCODONE AND ACETAMINOPHEN 1 TABLET(S): 5; 325 TABLET ORAL at 22:06

## 2021-12-29 RX ADMIN — Medication 100 MILLIGRAM(S): at 05:34

## 2021-12-29 NOTE — PROGRESS NOTE ADULT - SUBJECTIVE AND OBJECTIVE BOX
PGY-1 Progress Note discussed with attending    PAGER #: [[phone]] TILL 5:00 PM  PLEASE CONTACT ON CALL TEAM:  - On Call Team (Please refer to Brie) FROM 5:00 PM - 8:30PM  - Nightfloat Team FROM 8:30 -7:30 AM    CHIEF COMPLAINT & BRIEF HOSPITAL COURSE:      INTERVAL HPI/OVERNIGHT EVENTS:       MEDICATIONS  (STANDING):  artificial  tears Solution 1 Drop(s) Both EYES two times a day  ascorbic acid 500 milliGRAM(s) Oral daily  atorvastatin 40 milliGRAM(s) Oral at bedtime  buPROPion XL (24-Hour) . 150 milliGRAM(s) Oral daily  ceFAZolin   IVPB 2000 milliGRAM(s) IV Intermittent every 8 hours  chlorhexidine 2% Cloths 1 Application(s) Topical <User Schedule>  ferrous    sulfate 325 milliGRAM(s) Oral two times a day  gabapentin 800 milliGRAM(s) Oral three times a day  insulin lispro (ADMELOG) corrective regimen sliding scale   SubCutaneous three times a day before meals  insulin lispro (ADMELOG) corrective regimen sliding scale   SubCutaneous at bedtime  levoFLOXacin  Tablet 750 milliGRAM(s) Oral every 24 hours  lidocaine 1% Injectable 10 milliLiter(s) Local Injection once  lisinopril 2.5 milliGRAM(s) Oral daily  melatonin 5 milliGRAM(s) Oral at bedtime  polyethylene glycol 3350 17 Gram(s) Oral daily  senna 2 Tablet(s) Oral at bedtime    MEDICATIONS  (PRN):  acetaminophen     Tablet .. 650 milliGRAM(s) Oral every 6 hours PRN Temp greater or equal to 38C (100.4F)  melatonin 3 milliGRAM(s) Oral at bedtime PRN Insomnia  oxycodone    5 mG/acetaminophen 325 mG 1 Tablet(s) Oral every 6 hours PRN Moderate Pain      Vital Signs Last 24 Hrs  T(C): 36.7 (29 Dec 2021 04:53), Max: 36.8 (28 Dec 2021 20:51)  T(F): 98 (29 Dec 2021 04:53), Max: 98.2 (28 Dec 2021 20:51)  HR: 50 (29 Dec 2021 04:53) (50 - 67)  BP: 103/53 (29 Dec 2021 04:53) (100/60 - 133/70)  BP(mean): 68 (28 Dec 2021 14:20) (68 - 68)  RR: 18 (29 Dec 2021 04:53) (17 - 18)  SpO2: 98% (29 Dec 2021 04:53) (98% - 99%)      PHYSICAL EXAMINATION:  GENERAL: NAD, well built, sat well on RA, pale  HEAD:  Atraumatic, Normocephalic  EYES:  conjunctiva and sclera clear  NECK: Supple, No JVD, Normal thyroid  CHEST/LUNG: Clear to auscultation. Clear to percussion bilaterally; No rales, rhonchi, wheezing, or rubs  HEART: Regular rate and rhythm; No murmurs, rubs, or gallops  ABDOMEN: Soft, Nontender, Nondistended; Bowel sounds present  NERVOUS SYSTEM:  Alert & Oriented X3, no neuro deficits     EXTREMITIES:  2+ Peripheral Pulses, No clubbing, cyanosis, or edema  SKIN: Left foot wound, DSD intact                            9.2    7.56  )-----------( 226      ( 29 Dec 2021 07:00 )             29.1     12-29    140  |  108  |  15  ----------------------------<  162<H>  4.2   |  27  |  1.29    Ca    8.9      29 Dec 2021 07:00  Phos  3.1     12-29  Mg     2.1     12-29            PT/INR - ( 29 Dec 2021 07:00 )   PT: 14.5 sec;   INR: 1.23 ratio         PTT - ( 29 Dec 2021 07:00 )  PTT:44.9 sec    I&O's Summary    28 Dec 2021 07:01  -  29 Dec 2021 07:00  --------------------------------------------------------  IN: 0 mL / OUT: 425 mL / NET: -425 mL                       PGY-1 Progress Note discussed with attending    PAGER #: [[phone]] TILL 5:00 PM  PLEASE CONTACT ON CALL TEAM:  - On Call Team (Please refer to Brie) FROM 5:00 PM - 8:30PM  - Nightfloat Team FROM 8:30 -7:30 AM    CHIEF COMPLAINT & BRIEF HOSPITAL COURSE:      INTERVAL HPI/OVERNIGHT EVENTS: patient refers feeling better, denies any pain. Patient was waiting for Podiatry intervention however it was cancelled. Podiatry recommended discharge with Wound Vac and wound closure as outpatient       MEDICATIONS  (STANDING):  artificial  tears Solution 1 Drop(s) Both EYES two times a day  ascorbic acid 500 milliGRAM(s) Oral daily  atorvastatin 40 milliGRAM(s) Oral at bedtime  buPROPion XL (24-Hour) . 150 milliGRAM(s) Oral daily  ceFAZolin   IVPB 2000 milliGRAM(s) IV Intermittent every 8 hours  chlorhexidine 2% Cloths 1 Application(s) Topical <User Schedule>  ferrous    sulfate 325 milliGRAM(s) Oral two times a day  gabapentin 800 milliGRAM(s) Oral three times a day  insulin lispro (ADMELOG) corrective regimen sliding scale   SubCutaneous three times a day before meals  insulin lispro (ADMELOG) corrective regimen sliding scale   SubCutaneous at bedtime  levoFLOXacin  Tablet 750 milliGRAM(s) Oral every 24 hours  lidocaine 1% Injectable 10 milliLiter(s) Local Injection once  lisinopril 2.5 milliGRAM(s) Oral daily  melatonin 5 milliGRAM(s) Oral at bedtime  polyethylene glycol 3350 17 Gram(s) Oral daily  senna 2 Tablet(s) Oral at bedtime    MEDICATIONS  (PRN):  acetaminophen     Tablet .. 650 milliGRAM(s) Oral every 6 hours PRN Temp greater or equal to 38C (100.4F)  melatonin 3 milliGRAM(s) Oral at bedtime PRN Insomnia  oxycodone    5 mG/acetaminophen 325 mG 1 Tablet(s) Oral every 6 hours PRN Moderate Pain      Vital Signs Last 24 Hrs  T(C): 36.7 (29 Dec 2021 04:53), Max: 36.8 (28 Dec 2021 20:51)  T(F): 98 (29 Dec 2021 04:53), Max: 98.2 (28 Dec 2021 20:51)  HR: 50 (29 Dec 2021 04:53) (50 - 67)  BP: 103/53 (29 Dec 2021 04:53) (100/60 - 133/70)  BP(mean): 68 (28 Dec 2021 14:20) (68 - 68)  RR: 18 (29 Dec 2021 04:53) (17 - 18)  SpO2: 98% (29 Dec 2021 04:53) (98% - 99%)      PHYSICAL EXAMINATION:  GENERAL: NAD, well built, sat well on RA, pale  HEAD:  Atraumatic, Normocephalic  EYES:  conjunctiva and sclera clear  NECK: Supple, No JVD, Normal thyroid  CHEST/LUNG: Clear to auscultation. Clear to percussion bilaterally; No rales, rhonchi, wheezing, or rubs  HEART: Regular rate and rhythm; No murmurs, rubs, or gallops  ABDOMEN: Soft, Nontender, Nondistended; Bowel sounds present  NERVOUS SYSTEM:  Alert & Oriented X3, no neuro deficits     EXTREMITIES:  2+ Peripheral Pulses, No clubbing, cyanosis, or edema  SKIN: Left foot wound, DSD intact                            9.2    7.56  )-----------( 226      ( 29 Dec 2021 07:00 )             29.1     12-29    140  |  108  |  15  ----------------------------<  162<H>  4.2   |  27  |  1.29    Ca    8.9      29 Dec 2021 07:00  Phos  3.1     12-29  Mg     2.1     12-29            PT/INR - ( 29 Dec 2021 07:00 )   PT: 14.5 sec;   INR: 1.23 ratio         PTT - ( 29 Dec 2021 07:00 )  PTT:44.9 sec    I&O's Summary    28 Dec 2021 07:01  -  29 Dec 2021 07:00  --------------------------------------------------------  IN: 0 mL / OUT: 425 mL / NET: -425 mL

## 2021-12-29 NOTE — PROGRESS NOTE ADULT - ASSESSMENT
A:  Cellulitis, Left Foot and Ankle.  Abscess, Left Plantar Foot  Left Foot Plantar Wound  Diabetic Neuropathy, b/l  DM  S/P bedside I&D (12/20/21)  S/P OR I&D (12/23/21)    Plan:  -Patient examined and chart reviewed  -Explained all clinical findings to the patient to the patient's satisfaction.  -Educated the patient about the importance of glycemic control in wound healing  -Xrays reviewed  -MRI reviewed  -Cultures - hemolytic strep   -Applied iodform packing with DSD and ACE L foot.   -Will apply wound vac tomorrow 12/30  -Patient to keep left foot dressings dry, clean, and intact.   -Patient to be NWB to the left foot  -Recommend Left Lower Extremity elevation.  -Recommend antibiotics per ID team for Left Foot Cellulitis and Left Foot Wound  - Pt stable from podiatry  -Pt to be discharged to [facility] with wound vac  WCO: Pt to have wound vac changed every 2 days including black foam with tegaderm, 4x4s, abd, dianna and ACE. Pt to be nonwb to Left foot. Pt to keep dressing clean, dry and intact.   Pt to follow up at [address] [facility] [address]. Please call [phone] to make an appointment.  -Possible delayed primary closure as outpatient L foot  -Podiatry will follow in house.  -Discussed with attending Dr. Hart and seen bedside with Dr. Singh

## 2021-12-29 NOTE — PROGRESS NOTE ADULT - ATTENDING COMMENTS
Patient seen and examined this morning with PGY2    76 year-old male with Hx of atrial fibrillation (on coumadin recently), hypertension, hyperlipidemia, diabetes mellitus was admitted to medicine for altered mentation and left diabetic foot ulcer noted to be Bacteremic with MSSA s/p debridement in OR scheduled for OR Thursday for wound closure. Encephalopathy resolved with fluids and antibiotics.     Patient doing better overall. Patient was on wound vac was off today; as per patient Podiatry is planning on skin graft and partial wound closure.  Patient has poor sensation to left foot.   denies fever, chills, SOB, palpitations, chest pain, nausea, vomiting, diarrhea, constipation, dizziness    Vital Signs Last 24 Hrs  T(C): 36.6 (28 Dec 2021 14:20), Max: 37.1 (27 Dec 2021 21:42)  T(F): 97.8 (28 Dec 2021 14:20), Max: 98.7 (27 Dec 2021 21:42)  HR: 67 (28 Dec 2021 14:20) (53 - 67)  BP: 100/60 (28 Dec 2021 14:20) (100/60 - 127/72)  BP(mean): 68 (28 Dec 2021 14:20) (68 - 78)  RR: 17 (28 Dec 2021 14:20) (16 - 18)  SpO2: 99% (28 Dec 2021 14:20) (97% - 99%)    P/E:  GENERAL: NAD, well-groomed, well-developed  HEAD:  Atraumatic, Normocephalic  NERVOUS SYSTEM:  Alert & Oriented X3, Good concentration; Motor Strength 5/5 B/L upper and lower extremities  CHEST/LUNG: Clear to ausculation  bilaterally; No rales, rhonchi, wheezing, or rubs  HEART: Regular rate and rhythm; No murmurs, rubs, or gallops  ABDOMEN: Soft, Nontender, Nondistended; Bowel sounds present  EXTREMITIES:  left foot wrapped in bandage clean, dry and intact with wound vac in place  LYMPH: No lymphadenopathy noted  SKIN: No rashes or lesions    Labs:                        8.6    7.30  )-----------( 201      ( 28 Dec 2021 07:44 )             26.7   12-28    140  |  108  |  11  ----------------------------<  179<H>  4.8   |  27  |  1.33<H>    Ca    8.8      28 Dec 2021 07:44  Phos  3.2     12-28  Mg     1.9     12-28    A/P:  MSSA and Strep bacteremia likely related to Left foot Diabetic ulcer with  Left foot OM  Anemia due to acute blood loss and anemia of chronic disease   Afib rate controlled  Type 2 DM controlled with likely Diabetic Neuropathy  HTN  HLD    Plan:   Cont Cefazolin and Levofloxacin   Bone biopsy from OR does not show OM but Patient with MRI evidence of OM; d/w Dr. Maxwell; agreed biopsy may not have been adequate and patient with bacteremia will need to complete 4 weeks of ABX from negative Cx  Spoke with IR ANN-MARIE Escobar; Extended dwell catheter/ PICC requested; will be either placed Wednesday afternoon (preferable) or Thursday morning as patient is schedulred for OR Thursday afternoon.   Hb stable, cont Lovenox full dose, discussed with Dr. Liz; PLEASE CHECK WITH HIS PHARMACY IF COVERD FOR DOACs as patient not willing to go back on Warfarin as per cardio  OR procedure with Podiatry on Thursday for wound closure, follow up with Podiatry   DVT ppx on Lovenox; Hold after AM dose tomorrow  PT hema MORRISON  Discussed with  Yane; PLEASE ARRANGE REHAB vs HOME INFUSION; Patient should be expected to be medically stable for discharge Friday into Saturday once cleared by Podiatry after wound closure.   Rest of the management as above     Discussed with PGY1 Dr. BELEM Myers/ PGY2 DR. Pham and RN Margy. Patient seen and examined this morning with PGY2    76 year-old male with Hx of atrial fibrillation (on coumadin recently), hypertension, hyperlipidemia, diabetes mellitus was admitted to medicine for altered mentation and left diabetic foot ulcer noted to be Bacteremic with MSSA s/p debridement in OR scheduled for OR Thursday for wound closure. Encephalopathy resolved with fluids and antibiotics.     Patient doing better overall. Patient was on wound vac was off today; as per patient Podiatry is planning on skin graft and partial wound closure.  Patient has poor sensation to left foot.   denies fever, chills, SOB, palpitations, chest pain, nausea, vomiting, diarrhea, constipation, dizziness    Vital Signs Last 24 Hrs  T(C): 36.6 (28 Dec 2021 14:20), Max: 37.1 (27 Dec 2021 21:42)  T(F): 97.8 (28 Dec 2021 14:20), Max: 98.7 (27 Dec 2021 21:42)  HR: 67 (28 Dec 2021 14:20) (53 - 67)  BP: 100/60 (28 Dec 2021 14:20) (100/60 - 127/72)  BP(mean): 68 (28 Dec 2021 14:20) (68 - 78)  RR: 17 (28 Dec 2021 14:20) (16 - 18)  SpO2: 99% (28 Dec 2021 14:20) (97% - 99%)    P/E:  GENERAL: NAD, well-groomed, well-developed  HEAD:  Atraumatic, Normocephalic  NERVOUS SYSTEM:  Alert & Oriented X3, Good concentration; Motor Strength 5/5 B/L upper and lower extremities  CHEST/LUNG: Clear to ausculation  bilaterally; No rales, rhonchi, wheezing, or rubs  HEART: Regular rate and rhythm; No murmurs, rubs, or gallops  ABDOMEN: Soft, Nontender, Nondistended; Bowel sounds present  EXTREMITIES:  left foot wrapped in bandage clean, dry and intact with wound vac in place  LYMPH: No lymphadenopathy noted  SKIN: No rashes or lesions    Labs:                        8.6    7.30  )-----------( 201      ( 28 Dec 2021 07:44 )             26.7   12-28    140  |  108  |  11  ----------------------------<  179<H>  4.8   |  27  |  1.33<H>    Ca    8.8      28 Dec 2021 07:44  Phos  3.2     12-28  Mg     1.9     12-28    A/P:  MSSA and Strep bacteremia likely related to Left foot Diabetic ulcer with  Left foot OM  Anemia due to acute blood loss and anemia of chronic disease   Afib rate controlled  Type 2 DM controlled with likely Diabetic Neuropathy  HTN  HLD    Plan:   Cont Cefazolin IV q 8 hrs and Levofloxacin orally   Bone biopsy from OR does not show OM but Patient with MRI evidence of OM; d/w Dr. Maxwell; agreed biopsy may not have been adequate and patient with bacteremia will need to complete 4 weeks of ABX from negative Cx  Podiatry follow up noted; Plan for OR cancelled; d/w Podiatry resident; For Wound vac placement again tomorrow and patient stable for discharge   WCO: Pt to have wound vac changed every 2 days including black foam with tegaderm, 4x4s, abd, dianna and ACE. Pt to be nonwb to Left foot. Pt to keep dressing clean, dry and intact.   Follow up with IR for Extended dwell catheter placement early tomorrow morning; Dr. Maxwell ID approves  Hb stable, Patient covered for DOACs; Start Apixaban 5 mg BID  PT eval [facility]INCE for PT and IV Antibiotics  Discussed with  Yane; PLEASE ARRANGE REHAB; patient choose [facility]  Patient should be expected to be medically stable for discharge tomorrow as no further plans for OR   Continue Gabapentin  Rest of the management as above     Discussed with PGY1 Dr. BELEM Myers/ PGY2 DR. Pham and RN Margy.

## 2021-12-29 NOTE — PROGRESS NOTE ADULT - PROBLEM SELECTOR PLAN 1
- Patient with MSSA and Streptococcus bacteremia most likely secondary to left foot cellulitis/abscess  - MR left foot showed Plantar soft tissue wound with tract extending to the undersurface of the first tarsometatarsal articulation. Edema within the base of the first tarsometatarsal articulation may represent sequela of early osteomyelitis. First second and third metatarsal head subchondral fractures.  - Surgical pathology showed Tendon, left foot, excision: Fibro-tendinous tissue with ischemic and degenerative changes. Bone, left foot, biopsy: Bone fragment with resorptive changes and focal myelofibrosis. Negative for acute osteomyelitis  - Bcx from 12/20 methicillin susceptible S. aureus, Streptococcus mitis/oralis group, and Alpha hemolytic strep, ESR and CRP elevated    - Repeat Bcx from 12/22 NGTD  - Off Vancomycin since 12/21, discontinued Unasyn today 11/24  - Started on Cefazolin 2 g q8hers and Levaquin 750 mg daily (12/24) as per ID recommendations   - MRI lumbosacral no acute findings   - Echo showed aortic valve leaflets appear thickened, cannot exclude a vegetation. Mild aortic  regurgitation. Normal ventricular function, mild pulmonary hypertension  - ORLY no vegetations - Negative for endocarditis   - Cardio Dr Liz on board, holding ASA due to anemia 2/2 blood loss requiring transfusions   - ID Dr Maxwell on board - will dc on Cefazolin 2 g q 8hrs for 4 weeks starting from 12/22  - Podiatry on board, s/p I&D (12/23), planned procedure for Thursday   - Planned placement of Dwell by IR on Thursday before discharge for Cefazolin 12/22/21-1/19/22  - Monitor for any signs of hemodynamic instability - Patient with MSSA and Streptococcus bacteremia most likely secondary to left foot cellulitis/abscess  - MR left foot showed Plantar soft tissue wound with tract extending to the undersurface of the first tarsometatarsal articulation. Edema within the base of the first tarsometatarsal articulation may represent sequela of early osteomyelitis. First second and third metatarsal head subchondral fractures.  - Surgical pathology showed Tendon, left foot, excision: Fibro-tendinous tissue with ischemic and degenerative changes. Bone, left foot, biopsy: Bone fragment with resorptive changes and focal myelofibrosis. Negative for acute osteomyelitis  - Bcx from 12/20 methicillin susceptible S. aureus, Streptococcus mitis/oralis group, and Alpha hemolytic strep, ESR and CRP elevated    - Repeat Bcx from 12/22 NGTD  - Off Vancomycin since 12/21, discontinued Unasyn today 11/24  - Started on Cefazolin 2 g q8hers and Levaquin 750 mg daily (12/24) as per ID recommendations   - MRI lumbosacral no acute findings   - Echo showed aortic valve leaflets appear thickened, cannot exclude a vegetation. Mild aortic  regurgitation. Normal ventricular function, mild pulmonary hypertension  - ORLY no vegetations - Negative for endocarditis   - Cardio Dr Liz on board  - ID Dr Maxwell on board - will dc on Cefazolin 2 g q 8hrs for 4 weeks starting from 12/22  - Podiatry on board, s/p I&D (12/23), Podiatry procedure cancelled, to be discharged with wound Vac   - Planned placement of Dwell by IR on Thursday before discharge for Cefazolin 12/22/21-1/19/22  - Monitor for any signs of hemodynamic instability - RESOLVED  - Patient with MSSA and Streptococcus bacteremia most likely secondary to left foot cellulitis/abscess  - MR left foot showed Plantar soft tissue wound with tract extending to the undersurface of the first tarsometatarsal articulation. Edema within the base of the first tarsometatarsal articulation may represent sequela of early osteomyelitis. First second and third metatarsal head subchondral fractures.  - Surgical pathology showed Tendon, left foot, excision: Fibro-tendinous tissue with ischemic and degenerative changes. Bone, left foot, biopsy: Bone fragment with resorptive changes and focal myelofibrosis. Negative for acute osteomyelitis  - Bcx from 12/20 methicillin susceptible S. aureus, Streptococcus mitis/oralis group, and Alpha hemolytic strep, ESR and CRP elevated    - Repeat Bcx from 12/22 NGTD  - Off Vancomycin since 12/21, discontinued Unasyn today 11/24  - Started on Cefazolin 2 g q8hers and Levaquin 750 mg daily (12/24) as per ID recommendations   - MRI lumbosacral no acute findings   - Echo showed aortic valve leaflets appear thickened, cannot exclude a vegetation. Mild aortic  regurgitation. Normal ventricular function, mild pulmonary hypertension  - ORLY no vegetations - Negative for endocarditis   - Cardio Dr Liz on board  - ID Dr Maxwell on board - will dc on Cefazolin 2 g q 8hrs for 4 weeks starting from 12/22  - Podiatry on board, s/p I&D (12/23), Podiatry procedure cancelled, to be discharged with wound Vac   - Planned placement of Dwell by IR on Thursday before discharge for Cefazolin 12/22/21-1/19/22  - Monitor for any signs of hemodynamic instability

## 2021-12-29 NOTE — PROGRESS NOTE ADULT - PROBLEM SELECTOR PLAN 7
- Patient has hx of atrial fibrillation not on any meds at home    - EKG A fib rate controlled  - CHADSVASC2 4 points   - On full dose Lovenox, Eliquis prescription sent to pharmacy, covered by insurance, co payment 45 dollars monthly  - Will hold Lovenox on Wednesday for OR procedure by Podiatry on Thursday   - Continue to monitor  - Cardio Dr Liz on board - Patient has hx of atrial fibrillation not on any meds at home    - EKG A fib rate controlled  - CHADSVASC2 4 points   - On full dose Lovenox, Eliquis prescription sent to pharmacy, covered by insurance, co payment 45 dollars monthly  - Resumed AC with Eliquis   - Continue to monitor  - Cardio Dr Liz on board - Patient has hx of atrial fibrillation not on any meds at home    - EKG A fib rate controlled  - CHADSVASC2 4 points   - Eliquis prescription sent to pharmacy, covered by insurance, co payment 45 dollars monthly  - Resumed AC with Eliquis   - Continue to monitor  - Cardio Dr Liz on board

## 2021-12-29 NOTE — PROGRESS NOTE ADULT - PROBLEM SELECTOR PLAN 2
- Patient with cellulitis of left foot and Charcot foot in diabetes   - Surgical swab 12/20 of left foot wound with Alpha hemolytic strep, S. aureus, Citrobacter koseri, Veillonella parvula.  - Off Vancomycin since 12/21, discontinued Unasyn today 11/24  - Started on Cefazolin 2 g q 8 hrs and Levaquin 750 mg daily (12/24) as per ID recommendations  - ID Dr Maxwell on board - will dc on Cefazolin 2 g q 8hrs for 4 weeks starting from 12/22 - 1/19/22  - Podiatry on board, s/p I&D (12/23)  - PT recommended PRINCE**

## 2021-12-29 NOTE — PROGRESS NOTE ADULT - SUBJECTIVE AND OBJECTIVE BOX
Patient is a 76y old  Male who presents with a chief complaint of AMS (20 Dec 2021 14:19)    Podiatry Interval HPI: Patient seen bedside in no acute distress. Pt is s/p Left Foot I&D and bone biopsy. Pt denies any pain to the left foot. Patient states that he is aware that it is important to not put weight to the left foot and states that he only puts weight on the left foot when he is urinating. Denies any constitutional symptoms of N/V/C/F/SOB. Denies any other pedal complaints. Advised pt strict non WB to LLE.     Podiatry HPI: 75 y/o male with a PMHx of DM, HTN, A Fib, Sciatica, Cataract, OA of Right Hip presented to the ED for altered mental status. Podiatry was consulted for Left Foot Cellulitis/Wound. Patient states that his podiatrist was debriding a callus on the Left Foot and the podiatrist went too deep which caused the initial wound about 3 weeks ago and since then it has been getting worse. Denies any pain to the Left Foot. States that he cannot feel any painful stimuli to his left foot due to diabetic neuropathy. States that he would like to get back on his feet and exercise because that is how he is able to maintain his sugar levels and patient states that he has always had a left foot flat arch. Denies any trauma to the area. Patient states that yesterday he was having chills but today he denies any constitutional symptoms of N/V/C/F/SOB. Denies any other pedal complaints at this moment.     HPI:  Patient is a 76 year-old male from home with past medical history of atrial fibrillation (was on coumadin until 1month ago), hypertension, hyperlipidemia, diabetes mellitus presents to ED for AMS. Pt states that today he started feeling freezing cold and was shivering. Talked to wife for more information and she stated that patient was feeling very cold though house was warm. He stopped shivering for a while and the started again. During his second episode of shivering from feeling extremely cold he got very confused and could not talk, understand what she was saying and was not making sense. After a while the shivering stopped and he regained his normal mental status. Denies fever, chest pain. abdominal pain, diarrhea, constipation. Pt states he has incontinence and an ulcer on his left foot. He states his podiatrist scraped a callus which caused the ulcer. He has diabetic neuropathy so does not feel pain around the ulcer. He did notice the left foot got more erythematous today.   He stopped taking warfarin 1 month ago as he states it was too much of an hassle to get INR checked and avoid eating certain foods.   (19 Dec 2021 22:54)    Medications acetaminophen     Tablet .. 650 milliGRAM(s) Oral every 6 hours PRN  artificial  tears Solution 1 Drop(s) Both EYES two times a day  ascorbic acid 500 milliGRAM(s) Oral daily  atorvastatin 40 milliGRAM(s) Oral at bedtime  buPROPion XL (24-Hour) . 150 milliGRAM(s) Oral daily  ceFAZolin   IVPB 2000 milliGRAM(s) IV Intermittent every 8 hours  chlorhexidine 2% Cloths 1 Application(s) Topical <User Schedule>  ferrous    sulfate 325 milliGRAM(s) Oral two times a day  gabapentin 800 milliGRAM(s) Oral three times a day  insulin lispro (ADMELOG) corrective regimen sliding scale   SubCutaneous three times a day before meals  insulin lispro (ADMELOG) corrective regimen sliding scale   SubCutaneous at bedtime  levoFLOXacin  Tablet 750 milliGRAM(s) Oral every 24 hours  lidocaine 1% Injectable 10 milliLiter(s) Local Injection once  lisinopril 2.5 milliGRAM(s) Oral daily  melatonin 3 milliGRAM(s) Oral at bedtime PRN  melatonin 5 milliGRAM(s) Oral at bedtime  oxycodone    5 mG/acetaminophen 325 mG 1 Tablet(s) Oral every 6 hours PRN  polyethylene glycol 3350 17 Gram(s) Oral daily  senna 2 Tablet(s) Oral at bedtime    FHFamily history of coronary artery disease (Sibling)    Family history of breast cancer in sister (Sibling)    ,   PMHDiabetes    Hypertension    Osteoarthritis of right hip    Cataract    Atrial fibrillation    Vertigo    Sciatica       PSHS/P total hip arthroplasty        Labs                          9.2    7.56  )-----------( 226      ( 29 Dec 2021 07:00 )             29.1      12-29    140  |  108  |  15  ----------------------------<  162<H>  4.2   |  27  |  1.29    Ca    8.9      29 Dec 2021 07:00  Phos  3.1     12-29  Mg     2.1     12-29       Vital Signs Last 24 Hrs  T(C): 37.2 (29 Dec 2021 13:20), Max: 37.2 (29 Dec 2021 13:20)  T(F): 98.9 (29 Dec 2021 13:20), Max: 98.9 (29 Dec 2021 13:20)  HR: 70 (29 Dec 2021 13:20) (50 - 70)  BP: 106/57 (29 Dec 2021 13:20) (100/60 - 133/70)  BP(mean): 67 (29 Dec 2021 13:20) (67 - 68)  RR: 17 (29 Dec 2021 13:20) (17 - 18)  SpO2: 100% (29 Dec 2021 13:20) (98% - 100%)  Sedimentation Rate, Erythrocyte: 86 mm/Hr (12-20-21 @ 06:15)  Sedimentation Rate, Erythrocyte: 95 mm/Hr (12-19-21 @ 19:15)         C-Reactive Protein, Serum: 107 mg/L (12-20-21 @ 09:36)  C-Reactive Protein, Serum: 94 mg/L (12-20-21 @ 04:01)   WBC Count: 7.56 K/uL (12-29-21 @ 07:00)      PHYSICAL EXAM  LE Focused:    Vasc:  DP/PT pulses were palpable, bilaterally. Generalized edema noted to the Left Foot  Derm: Left Plantar medial arch incision site shows decreased redness to the left foot. No warmth noted to th left foot. No malodor, no purulent drainage, +PTB was noted. No edema noted to the left foot.    12/23/21: Bleeding seized, no erythema, no edema noted, macerated wound borders Left plantar wound  12/22/21: Left Plantar medial arch wound noted measuring 3cm x 3cm x 3cm with 5cm tunnelling noted to the surrounding area with +PTB. Redness and Warmth noted to the left foot with no malodor, no purulent drainage noted upon expression today. Streaking up to the left ankle and plantar distal/lateral arch of the left foot. Edema was noted to have decreased from yesterday to the left foot.  12/20/21: Left Plantar medial arch wound noted measuring 3cm x 3cm x 3cm with 5cm tunnelling noted to the surrounding area with +ptb and mild fluctuance noted to the surrounding wound. Left Foot Wound has Redness, increased warmth noted, mild malodor, purulent drainage, streaking up to the left ankle and plantar distal/lateral arch.   Post Bedside Incision and Drainage (12/20/21)  Neuro:  Protective sensation absent, bilaterally.   MSK: No pain on palpation to the Left Foot Wound. Denies any calf pain, bilaterally.     Imaging:     EXAM:  MR FOOT LT                          PROCEDURE DATE:  12/20/2021      INTERPRETATION:  LEFT FOOT MRI    CLINICAL INFORMATION: Left foot wound. Eval for osteomyelitis.  TECHNIQUE: Multiplanar, multisequence MRI was obtained of the left foot.    COMPARISON: Left foot MRI 12/23/2015.    FINDINGS:    Plantar soft tissue wound is present at the level of the first   tarsometatarsal articulation with tract extending to the bone. There is   edema along the plantar aspect of the distal medial cuneiform and base of   the first metatarsal with preservation of the T1 marrow signal. There is   moderate tarsometatarsal arthrosis and arthrosis of the articulation of   the navicular and lateral cuneiform. There are subchondral fractures of   the first second and third metatarsal heads. There is diffuse increased   muscle signal, most consistent with denervation. There is diffuse   subcutaneous edema.    IMPRESSION:    Plantar soft tissue wound with tract extending to the undersurface of the   first tarsometatarsal articulation.  Edema within the base of the first tarsometatarsal articulation adjacent   to the tract with preservation of the T1 marrow signal, which may   represent sequela of early osteomyelitis.  First second and third metatarsal head subchondral fractures.    Cultures:   Culture Results:   No growth (12.23.21 @ 04:15)

## 2021-12-29 NOTE — PROGRESS NOTE ADULT - SUBJECTIVE AND OBJECTIVE BOX
CHIEF COMPLAINT:Patient is a 76y old  Male who presents with a chief complaint of AMS.Pt appears comfortable.    	  REVIEW OF SYSTEMS:  CONSTITUTIONAL: No fever, weight loss, or fatigue  EYES: No eye pain, visual disturbances, or discharge  ENT:  No difficulty hearing, tinnitus, vertigo; No sinus or throat pain  NECK: No pain or stiffness  RESPIRATORY: No cough, wheezing, chills or hemoptysis; No Shortness of Breath  CARDIOVASCULAR: No chest pain, palpitations, passing out, dizziness, or leg swelling  GASTROINTESTINAL: No abdominal or epigastric pain. No nausea, vomiting, or hematemesis; No diarrhea or constipation. No melena or hematochezia.  GENITOURINARY: No dysuria, frequency, hematuria, or incontinence  NEUROLOGICAL: No headaches, memory loss, loss of strength, numbness, or tremors  SKIN: No itching, burning, rashes, or lesions   LYMPH Nodes: No enlarged glands  ENDOCRINE: No heat or cold intolerance; No hair loss  MUSCULOSKELETAL: No joint pain or swelling; No muscle, back, or extremity pain  PSYCHIATRIC: No depression, anxiety, mood swings, or difficulty sleeping  HEME/LYMPH: No easy bruising, or bleeding gums  ALLERGY AND IMMUNOLOGIC: No hives or eczema	        PHYSICAL EXAM:  T(C): 36.7 (12-29-21 @ 04:53), Max: 36.8 (12-28-21 @ 20:51)  HR: 50 (12-29-21 @ 04:53) (50 - 67)  BP: 103/53 (12-29-21 @ 04:53) (100/60 - 133/70)  RR: 18 (12-29-21 @ 04:53) (17 - 18)  SpO2: 98% (12-29-21 @ 04:53) (98% - 99%)  Wt(kg): --  I&O's Summary    28 Dec 2021 07:01  -  29 Dec 2021 07:00  --------------------------------------------------------  IN: 0 mL / OUT: 425 mL / NET: -425 mL        Appearance: Normal	  HEENT:   Normal oral mucosa, PERRL, EOMI	  Lymphatic: No lymphadenopathy  Cardiovascular: Normal S1 S2, No JVD, No murmurs, No edema  Respiratory: Lungs clear to auscultation	  Psychiatry: A & O x 3, Mood & affect appropriate  Gastrointestinal:  Soft, Non-tender, + BS	  Skin: No rashes, No ecchymoses, No cyanosis	  Neurologic: Non-focal  Extremities: Normal range of motion, No clubbing, cyanosis or edema  Vascular: Peripheral pulses palpable 2+ bilaterally    MEDICATIONS  (STANDING):  artificial  tears Solution 1 Drop(s) Both EYES two times a day  ascorbic acid 500 milliGRAM(s) Oral daily  atorvastatin 40 milliGRAM(s) Oral at bedtime  buPROPion XL (24-Hour) . 150 milliGRAM(s) Oral daily  ceFAZolin   IVPB 2000 milliGRAM(s) IV Intermittent every 8 hours  chlorhexidine 2% Cloths 1 Application(s) Topical <User Schedule>  ferrous    sulfate 325 milliGRAM(s) Oral two times a day  gabapentin 800 milliGRAM(s) Oral three times a day  insulin lispro (ADMELOG) corrective regimen sliding scale   SubCutaneous three times a day before meals  insulin lispro (ADMELOG) corrective regimen sliding scale   SubCutaneous at bedtime  levoFLOXacin  Tablet 750 milliGRAM(s) Oral every 24 hours  lidocaine 1% Injectable 10 milliLiter(s) Local Injection once  lisinopril 2.5 milliGRAM(s) Oral daily  melatonin 5 milliGRAM(s) Oral at bedtime  polyethylene glycol 3350 17 Gram(s) Oral daily  senna 2 Tablet(s) Oral at bedtime        LABS:	 	                        9.2    7.56  )-----------( 226      ( 29 Dec 2021 07:00 )             29.1     12-29    140  |  108  |  15  ----------------------------<  162<H>  4.2   |  27  |  1.29    Ca    8.9      29 Dec 2021 07:00  Phos  3.1     12-29  Mg     2.1     12-29      proBNP:   Lipid Profile:   HgA1c:   TSH:

## 2021-12-29 NOTE — PROGRESS NOTE ADULT - ATTENDING COMMENTS
Seen at bedside.  Wound remains too wide to close. No further plan for return to OR.  Will continue with vac and d/c plan with home vac.

## 2021-12-30 LAB
ANION GAP SERPL CALC-SCNC: 6 MMOL/L — SIGNIFICANT CHANGE UP (ref 5–17)
BUN SERPL-MCNC: 17 MG/DL — SIGNIFICANT CHANGE UP (ref 7–18)
CALCIUM SERPL-MCNC: 8.9 MG/DL — SIGNIFICANT CHANGE UP (ref 8.4–10.5)
CHLORIDE SERPL-SCNC: 107 MMOL/L — SIGNIFICANT CHANGE UP (ref 96–108)
CO2 SERPL-SCNC: 28 MMOL/L — SIGNIFICANT CHANGE UP (ref 22–31)
CREAT SERPL-MCNC: 1.37 MG/DL — HIGH (ref 0.5–1.3)
GLUCOSE BLDC GLUCOMTR-MCNC: 162 MG/DL — HIGH (ref 70–99)
GLUCOSE BLDC GLUCOMTR-MCNC: 194 MG/DL — HIGH (ref 70–99)
GLUCOSE BLDC GLUCOMTR-MCNC: 194 MG/DL — HIGH (ref 70–99)
GLUCOSE BLDC GLUCOMTR-MCNC: 223 MG/DL — HIGH (ref 70–99)
GLUCOSE SERPL-MCNC: 159 MG/DL — HIGH (ref 70–99)
HCT VFR BLD CALC: 27.2 % — LOW (ref 39–50)
HGB BLD-MCNC: 8.8 G/DL — LOW (ref 13–17)
MAGNESIUM SERPL-MCNC: 2.1 MG/DL — SIGNIFICANT CHANGE UP (ref 1.6–2.6)
MCHC RBC-ENTMCNC: 28.5 PG — SIGNIFICANT CHANGE UP (ref 27–34)
MCHC RBC-ENTMCNC: 32.4 GM/DL — SIGNIFICANT CHANGE UP (ref 32–36)
MCV RBC AUTO: 88 FL — SIGNIFICANT CHANGE UP (ref 80–100)
NRBC # BLD: 0 /100 WBCS — SIGNIFICANT CHANGE UP (ref 0–0)
PHOSPHATE SERPL-MCNC: 3.3 MG/DL — SIGNIFICANT CHANGE UP (ref 2.5–4.5)
PLATELET # BLD AUTO: 205 K/UL — SIGNIFICANT CHANGE UP (ref 150–400)
POTASSIUM SERPL-MCNC: 4.4 MMOL/L — SIGNIFICANT CHANGE UP (ref 3.5–5.3)
POTASSIUM SERPL-SCNC: 4.4 MMOL/L — SIGNIFICANT CHANGE UP (ref 3.5–5.3)
RBC # BLD: 3.09 M/UL — LOW (ref 4.2–5.8)
RBC # FLD: 14 % — SIGNIFICANT CHANGE UP (ref 10.3–14.5)
SARS-COV-2 RNA SPEC QL NAA+PROBE: SIGNIFICANT CHANGE UP
SODIUM SERPL-SCNC: 141 MMOL/L — SIGNIFICANT CHANGE UP (ref 135–145)
WBC # BLD: 5.54 K/UL — SIGNIFICANT CHANGE UP (ref 3.8–10.5)
WBC # FLD AUTO: 5.54 K/UL — SIGNIFICANT CHANGE UP (ref 3.8–10.5)

## 2021-12-30 PROCEDURE — 99232 SBSQ HOSP IP/OBS MODERATE 35: CPT

## 2021-12-30 RX ORDER — POLYETHYLENE GLYCOL 3350 17 G/17G
17 POWDER, FOR SOLUTION ORAL
Qty: 0 | Refills: 0 | DISCHARGE
Start: 2021-12-30

## 2021-12-30 RX ORDER — CIPROFLOXACIN LACTATE 400MG/40ML
1 VIAL (ML) INTRAVENOUS
Qty: 0 | Refills: 0 | DISCHARGE
Start: 2021-12-30

## 2021-12-30 RX ORDER — GABAPENTIN 400 MG/1
0 CAPSULE ORAL
Qty: 0 | Refills: 1 | DISCHARGE

## 2021-12-30 RX ORDER — APIXABAN 2.5 MG/1
1 TABLET, FILM COATED ORAL
Qty: 0 | Refills: 0 | DISCHARGE
Start: 2021-12-30

## 2021-12-30 RX ORDER — ASCORBIC ACID 60 MG
1 TABLET,CHEWABLE ORAL
Qty: 0 | Refills: 0 | DISCHARGE
Start: 2021-12-30

## 2021-12-30 RX ORDER — BUPROPION HYDROCHLORIDE 150 MG/1
1 TABLET, EXTENDED RELEASE ORAL
Qty: 0 | Refills: 0 | DISCHARGE
Start: 2021-12-30

## 2021-12-30 RX ORDER — CEFAZOLIN SODIUM 1 G
2 VIAL (EA) INJECTION
Qty: 0 | Refills: 0 | DISCHARGE
Start: 2021-12-30

## 2021-12-30 RX ORDER — FERROUS SULFATE 325(65) MG
1 TABLET ORAL
Qty: 0 | Refills: 0 | DISCHARGE
Start: 2021-12-30

## 2021-12-30 RX ORDER — FUROSEMIDE 40 MG
0 TABLET ORAL
Qty: 0 | Refills: 0 | DISCHARGE

## 2021-12-30 RX ORDER — SENNA PLUS 8.6 MG/1
2 TABLET ORAL
Qty: 0 | Refills: 0 | DISCHARGE
Start: 2021-12-30

## 2021-12-30 RX ORDER — GABAPENTIN 400 MG/1
2 CAPSULE ORAL
Qty: 0 | Refills: 0 | DISCHARGE
Start: 2021-12-30

## 2021-12-30 RX ORDER — BUPROPION HYDROCHLORIDE 150 MG/1
0 TABLET, EXTENDED RELEASE ORAL
Qty: 0 | Refills: 2 | DISCHARGE

## 2021-12-30 RX ORDER — LANOLIN ALCOHOL/MO/W.PET/CERES
1 CREAM (GRAM) TOPICAL
Qty: 0 | Refills: 0 | DISCHARGE
Start: 2021-12-30

## 2021-12-30 RX ORDER — FAMOTIDINE 10 MG/ML
1 INJECTION INTRAVENOUS
Qty: 0 | Refills: 0 | DISCHARGE
Start: 2021-12-30

## 2021-12-30 RX ADMIN — Medication 500 MILLIGRAM(S): at 12:04

## 2021-12-30 RX ADMIN — Medication 1: at 12:01

## 2021-12-30 RX ADMIN — Medication 100 MILLIGRAM(S): at 06:44

## 2021-12-30 RX ADMIN — OXYCODONE AND ACETAMINOPHEN 1 TABLET(S): 5; 325 TABLET ORAL at 23:21

## 2021-12-30 RX ADMIN — Medication 100 MILLIGRAM(S): at 21:48

## 2021-12-30 RX ADMIN — SENNA PLUS 2 TABLET(S): 8.6 TABLET ORAL at 21:50

## 2021-12-30 RX ADMIN — CHLORHEXIDINE GLUCONATE 1 APPLICATION(S): 213 SOLUTION TOPICAL at 06:44

## 2021-12-30 RX ADMIN — GABAPENTIN 800 MILLIGRAM(S): 400 CAPSULE ORAL at 06:44

## 2021-12-30 RX ADMIN — LISINOPRIL 2.5 MILLIGRAM(S): 2.5 TABLET ORAL at 06:45

## 2021-12-30 RX ADMIN — Medication 1 DROP(S): at 17:22

## 2021-12-30 RX ADMIN — Medication 5 MILLIGRAM(S): at 21:51

## 2021-12-30 RX ADMIN — Medication 325 MILLIGRAM(S): at 06:45

## 2021-12-30 RX ADMIN — Medication 1: at 07:37

## 2021-12-30 RX ADMIN — FAMOTIDINE 20 MILLIGRAM(S): 10 INJECTION INTRAVENOUS at 12:03

## 2021-12-30 RX ADMIN — OXYCODONE AND ACETAMINOPHEN 1 TABLET(S): 5; 325 TABLET ORAL at 22:51

## 2021-12-30 RX ADMIN — Medication 100 MILLIGRAM(S): at 14:59

## 2021-12-30 RX ADMIN — ATORVASTATIN CALCIUM 40 MILLIGRAM(S): 80 TABLET, FILM COATED ORAL at 21:50

## 2021-12-30 RX ADMIN — APIXABAN 5 MILLIGRAM(S): 2.5 TABLET, FILM COATED ORAL at 17:21

## 2021-12-30 RX ADMIN — GABAPENTIN 800 MILLIGRAM(S): 400 CAPSULE ORAL at 14:59

## 2021-12-30 RX ADMIN — Medication 325 MILLIGRAM(S): at 17:22

## 2021-12-30 RX ADMIN — Medication 1: at 17:21

## 2021-12-30 RX ADMIN — GABAPENTIN 800 MILLIGRAM(S): 400 CAPSULE ORAL at 21:50

## 2021-12-30 RX ADMIN — BUPROPION HYDROCHLORIDE 150 MILLIGRAM(S): 150 TABLET, EXTENDED RELEASE ORAL at 12:04

## 2021-12-30 NOTE — PROGRESS NOTE ADULT - SUBJECTIVE AND OBJECTIVE BOX
CHIEF COMPLAINT:Patient is a 76y old  Male who presents with a chief complaint of AMS .Pt appears comfortable.    	  REVIEW OF SYSTEMS:  CONSTITUTIONAL: No fever, weight loss, or fatigue  EYES: No eye pain, visual disturbances, or discharge  ENT:  No difficulty hearing, tinnitus, vertigo; No sinus or throat pain  NECK: No pain or stiffness  RESPIRATORY: No cough, wheezing, chills or hemoptysis; No Shortness of Breath  CARDIOVASCULAR: No chest pain, palpitations, passing out, dizziness, or leg swelling  GASTROINTESTINAL: No abdominal or epigastric pain. No nausea, vomiting, or hematemesis; No diarrhea or constipation. No melena or hematochezia.  GENITOURINARY: No dysuria, frequency, hematuria, or incontinence  NEUROLOGICAL: No headaches, memory loss, loss of strength, numbness, or tremors  SKIN: No itching, burning, rashes, or lesions   LYMPH Nodes: No enlarged glands  ENDOCRINE: No heat or cold intolerance; No hair loss  MUSCULOSKELETAL: No joint pain or swelling; No muscle, back, or extremity pain  PSYCHIATRIC: No depression, anxiety, mood swings, or difficulty sleeping  HEME/LYMPH: No easy bruising, or bleeding gums  ALLERGY AND IMMUNOLOGIC: No hives or eczema	      PHYSICAL EXAM:  T(C): 36.2 (12-30-21 @ 05:21), Max: 37.2 (12-29-21 @ 13:20)  HR: 106 (12-30-21 @ 05:21) (56 - 106)  BP: 128/67 (12-30-21 @ 05:21) (106/57 - 128/67)  RR: 17 (12-30-21 @ 05:21) (17 - 18)  SpO2: 100% (12-30-21 @ 05:21) (100% - 100%)  Wt(kg): --  I&O's Summary      Appearance: Normal	  HEENT:   Normal oral mucosa, PERRL, EOMI	  Lymphatic: No lymphadenopathy  Cardiovascular: Normal S1 S2, No JVD, No murmurs, No edema  Respiratory: Lungs clear to auscultation	  Psychiatry: A & O x 3, Mood & affect appropriate  Gastrointestinal:  Soft, Non-tender, + BS	  Skin: No rashes, No ecchymoses, No cyanosis	  Neurologic: Non-focal  Extremities: Normal range of motion, No clubbing, cyanosis or edema      MEDICATIONS  (STANDING):  apixaban 5 milliGRAM(s) Oral every 12 hours  artificial  tears Solution 1 Drop(s) Both EYES two times a day  ascorbic acid 500 milliGRAM(s) Oral daily  atorvastatin 40 milliGRAM(s) Oral at bedtime  buPROPion XL (24-Hour) . 150 milliGRAM(s) Oral daily  ceFAZolin   IVPB 2000 milliGRAM(s) IV Intermittent every 8 hours  chlorhexidine 2% Cloths 1 Application(s) Topical <User Schedule>  famotidine    Tablet 20 milliGRAM(s) Oral daily  ferrous    sulfate 325 milliGRAM(s) Oral two times a day  gabapentin 800 milliGRAM(s) Oral three times a day  insulin lispro (ADMELOG) corrective regimen sliding scale   SubCutaneous three times a day before meals  insulin lispro (ADMELOG) corrective regimen sliding scale   SubCutaneous at bedtime  levoFLOXacin  Tablet 750 milliGRAM(s) Oral every 24 hours  lidocaine 1% Injectable 10 milliLiter(s) Local Injection once  lisinopril 2.5 milliGRAM(s) Oral daily  melatonin 5 milliGRAM(s) Oral at bedtime  polyethylene glycol 3350 17 Gram(s) Oral daily  senna 2 Tablet(s) Oral at bedtime      	  	  LABS:	 	                         8.8    5.54  )-----------( 205      ( 30 Dec 2021 07:37 )             27.2     12-30    141  |  107  |  17  ----------------------------<  159<H>  4.4   |  28  |  1.37<H>    Ca    8.9      30 Dec 2021 07:37  Phos  3.3     12-30  Mg     2.1     12-30

## 2021-12-30 NOTE — PROGRESS NOTE ADULT - ASSESSMENT
A/P:  MSSA and Strep bacteremia likely related to Left foot Diabetic ulcer with  Left foot OM  Anemia due to acute blood loss and anemia of chronic disease   Afib rate controlled  Type 2 DM controlled with likely Diabetic Neuropathy  HTN  HLD    Plan:   Cont Cefazolin IV q 8 hrs and Levofloxacin orally   Bone biopsy from OR does not show OM but Patient with MRI evidence of OM; As d/w ID Dr. Maxwell; agreed biopsy may not have been adequate and patient with bacteremia will need to complete 4 weeks of ABX from negative Cx Last day of Cfeazolin 1/23/21 and Levaquin 1/24/21; Remove Extended dwell after antibiotic is completed  Podiatry follow up appreciated;  Plan for OR cancelled; d/w Podiatry resident; Wound vac placed and patient stable for discharge with follow up with Dr. Hart outpatient and delayed closure after few weeks planned; d/w Patient as well as d/w Podiatry resident to please update plan to patient again this afternoon as patient was not very clear; Also Podiatry was requested to update Girlfriend as patient has tendency to forget at times.   Continue Gabapentin    Wound care orders:  Pt to have wound vac changed every 2 days including black foam with Tegaderm 4x4s, abd, Alyse and ACE. Pt to be nonwb to Left foot. Pt to keep dressing clean, dry and intact.     Extended dwell placed by TERRI galan;vickie this morning after I spoke IR ANN-MARIE Escobar   Hb stable, Patient covered for DOACs; Started Apixaban 5 mg BID  PT hema MORRISON for PT and IV Antibiotics; Accepted to [facility]; Await Authorization;   PATIENT MEDICALLY STABLE FOR DISCHARGE FOR CONTINUED WOUND CARE AND PODIATRY FOLLOW UP OUTPATIENT WITH IV ABX AS ABOVE  Discussed with  Yane;     Discussed with PGY2 DR. Pham and CHELE Cabral  Discussed with patient this morning and updated plan and medically stable for discharge and neded follow up with Podiatry outpatient

## 2021-12-30 NOTE — PROGRESS NOTE ADULT - SUBJECTIVE AND OBJECTIVE BOX
6cm 20 Gauge Extended Dwell Catheter inserted via the Left Basilic Vein.  Good blood flow, dressing applied.  Maximum catheter dwell time is <29 days.

## 2021-12-30 NOTE — PROGRESS NOTE ADULT - ASSESSMENT
76 year-old male from home with past medical history of atrial fibrillation (was on coumadin until 1month ago), hypertension, hyperlipidemia, diabetes mellitus presents to ED for AMS,bacteremia, anemia.  1.ORLY-no endocarditis.  2.Bacteremia-ABX.Repaet blood cx-.  3.Afib-eliquis.  4.PT.  5.DM-Insulin.  6.HTN-ace.  7.PPI.  8.Lipid d/o-statin.

## 2021-12-30 NOTE — PROGRESS NOTE ADULT - SUBJECTIVE AND OBJECTIVE BOX
Patient is a 76y old  Male who presents with a chief complaint of AMS (20 Dec 2021 14:19)    Podiatry Interval HPI: Patient seen bedside in no acute distress. Pt is s/p Left Foot I&D and bone biopsy. Pt denies any pain to the left foot. Patient states that he is aware that it is important to not put weight to the left foot and states that he only puts weight on the left foot when he is urinating. Denies any constitutional symptoms of N/V/C/F/SOB. Denies any other pedal complaints. Advised pt strict non WB to LLE.     Podiatry HPI: 75 y/o male with a PMHx of DM, HTN, A Fib, Sciatica, Cataract, OA of Right Hip presented to the ED for altered mental status. Podiatry was consulted for Left Foot Cellulitis/Wound. Patient states that his podiatrist was debriding a callus on the Left Foot and the podiatrist went too deep which caused the initial wound about 3 weeks ago and since then it has been getting worse. Denies any pain to the Left Foot. States that he cannot feel any painful stimuli to his left foot due to diabetic neuropathy. States that he would like to get back on his feet and exercise because that is how he is able to maintain his sugar levels and patient states that he has always had a left foot flat arch. Denies any trauma to the area. Patient states that yesterday he was having chills but today he denies any constitutional symptoms of N/V/C/F/SOB. Denies any other pedal complaints at this moment.     HPI:  Patient is a 76 year-old male from home with past medical history of atrial fibrillation (was on coumadin until 1month ago), hypertension, hyperlipidemia, diabetes mellitus presents to ED for AMS. Pt states that today he started feeling freezing cold and was shivering. Talked to wife for more information and she stated that patient was feeling very cold though house was warm. He stopped shivering for a while and the started again. During his second episode of shivering from feeling extremely cold he got very confused and could not talk, understand what she was saying and was not making sense. After a while the shivering stopped and he regained his normal mental status. Denies fever, chest pain. abdominal pain, diarrhea, constipation. Pt states he has incontinence and an ulcer on his left foot. He states his podiatrist scraped a callus which caused the ulcer. He has diabetic neuropathy so does not feel pain around the ulcer. He did notice the left foot got more erythematous today.   He stopped taking warfarin 1 month ago as he states it was too much of an hassle to get INR checked and avoid eating certain foods.   (19 Dec 2021 22:54)      Medications acetaminophen     Tablet .. 650 milliGRAM(s) Oral every 6 hours PRN  apixaban 5 milliGRAM(s) Oral every 12 hours  artificial  tears Solution 1 Drop(s) Both EYES two times a day  ascorbic acid 500 milliGRAM(s) Oral daily  atorvastatin 40 milliGRAM(s) Oral at bedtime  buPROPion XL (24-Hour) . 150 milliGRAM(s) Oral daily  ceFAZolin   IVPB 2000 milliGRAM(s) IV Intermittent every 8 hours  chlorhexidine 2% Cloths 1 Application(s) Topical <User Schedule>  famotidine    Tablet 20 milliGRAM(s) Oral daily  ferrous    sulfate 325 milliGRAM(s) Oral two times a day  gabapentin 800 milliGRAM(s) Oral three times a day  insulin lispro (ADMELOG) corrective regimen sliding scale   SubCutaneous three times a day before meals  insulin lispro (ADMELOG) corrective regimen sliding scale   SubCutaneous at bedtime  levoFLOXacin  Tablet 750 milliGRAM(s) Oral every 24 hours  lidocaine 1% Injectable 10 milliLiter(s) Local Injection once  lisinopril 2.5 milliGRAM(s) Oral daily  melatonin 5 milliGRAM(s) Oral at bedtime  melatonin 3 milliGRAM(s) Oral at bedtime PRN  oxycodone    5 mG/acetaminophen 325 mG 1 Tablet(s) Oral every 6 hours PRN  polyethylene glycol 3350 17 Gram(s) Oral daily  senna 2 Tablet(s) Oral at bedtime    FHFamily history of coronary artery disease (Sibling)    Family history of breast cancer in sister (Sibling)    ,   PMHDiabetes    Hypertension    Osteoarthritis of right hip    Cataract    Atrial fibrillation    Vertigo    Sciatica       PSHS/P total hip arthroplasty        Labs                          9.2    7.56  )-----------( 226      ( 29 Dec 2021 07:00 )             29.1      12-29    140  |  108  |  15  ----------------------------<  162<H>  4.2   |  27  |  1.29    Ca    8.9      29 Dec 2021 07:00  Phos  3.1     12-29  Mg     2.1     12-29       Vital Signs Last 24 Hrs  T(C): 36.2 (30 Dec 2021 05:21), Max: 37.2 (29 Dec 2021 13:20)  T(F): 97.2 (30 Dec 2021 05:21), Max: 98.9 (29 Dec 2021 13:20)  HR: 106 (30 Dec 2021 05:21) (56 - 106)  BP: 128/67 (30 Dec 2021 05:21) (106/57 - 128/67)  BP(mean): 67 (29 Dec 2021 13:20) (67 - 67)  RR: 17 (30 Dec 2021 05:21) (17 - 18)  SpO2: 100% (30 Dec 2021 05:21) (100% - 100%)  Sedimentation Rate, Erythrocyte: 86 mm/Hr (12-20-21 @ 06:15)  Sedimentation Rate, Erythrocyte: 95 mm/Hr (12-19-21 @ 19:15)         C-Reactive Protein, Serum: 107 mg/L (12-20-21 @ 09:36)  C-Reactive Protein, Serum: 94 mg/L (12-20-21 @ 04:01)  WBC Count: 7.56 K/uL (12-29-21 @ 07:00)      PHYSICAL EXAM  LE Focused:    Vasc:  DP/PT pulses were palpable, bilaterally. Generalized edema noted to the Left Foot  Derm: Left Plantar medial arch incision site shows decreased redness to the left foot. No warmth noted to th left foot. No malodor, no purulent drainage, +PTB was noted. No edema noted to the left foot.    12/23/21: Bleeding seized, no erythema, no edema noted, macerated wound borders Left plantar wound  12/22/21: Left Plantar medial arch wound noted measuring 3cm x 3cm x 3cm with 5cm tunnelling noted to the surrounding area with +PTB. Redness and Warmth noted to the left foot with no malodor, no purulent drainage noted upon expression today. Streaking up to the left ankle and plantar distal/lateral arch of the left foot. Edema was noted to have decreased from yesterday to the left foot.  12/20/21: Left Plantar medial arch wound noted measuring 3cm x 3cm x 3cm with 5cm tunnelling noted to the surrounding area with +ptb and mild fluctuance noted to the surrounding wound. Left Foot Wound has Redness, increased warmth noted, mild malodor, purulent drainage, streaking up to the left ankle and plantar distal/lateral arch.   Post Bedside Incision and Drainage (12/20/21)  Neuro:  Protective sensation absent, bilaterally.   MSK: No pain on palpation to the Left Foot Wound. Denies any calf pain, bilaterally.     Imaging:     EXAM:  MR FOOT LT                          PROCEDURE DATE:  12/20/2021      INTERPRETATION:  LEFT FOOT MRI    CLINICAL INFORMATION: Left foot wound. Eval for osteomyelitis.  TECHNIQUE: Multiplanar, multisequence MRI was obtained of the left foot.    COMPARISON: Left foot MRI 12/23/2015.    FINDINGS:    Plantar soft tissue wound is present at the level of the first   tarsometatarsal articulation with tract extending to the bone. There is   edema along the plantar aspect of the distal medial cuneiform and base of   the first metatarsal with preservation of the T1 marrow signal. There is   moderate tarsometatarsal arthrosis and arthrosis of the articulation of   the navicular and lateral cuneiform. There are subchondral fractures of   the first second and third metatarsal heads. There is diffuse increased   muscle signal, most consistent with denervation. There is diffuse   subcutaneous edema.    IMPRESSION:    Plantar soft tissue wound with tract extending to the undersurface of the   first tarsometatarsal articulation.  Edema within the base of the first tarsometatarsal articulation adjacent   to the tract with preservation of the T1 marrow signal, which may   represent sequela of early osteomyelitis.  First second and third metatarsal head subchondral fractures.    Cultures:   Culture Results:   No growth (12.23.21 @ 04:15)

## 2021-12-30 NOTE — PROGRESS NOTE ADULT - ASSESSMENT
A:  Cellulitis, Left Foot and Ankle.  Abscess, Left Plantar Foot  Left Foot Plantar Wound  Diabetic Neuropathy, b/l  DM  S/P bedside I&D (12/20/21)  S/P OR I&D (12/23/21)    Plan:  -Patient examined and chart reviewed  -Explained all clinical findings to the patient to the patient's satisfaction.  -Educated the patient about the importance of glycemic control in wound healing  -Xrays reviewed  -MRI reviewed  -Cultures - hemolytic strep   -Applied wound vac today. Will change it on Saturday if patient is here.  -Patient to keep left foot dressings dry, clean, and intact.   -Patient to be NWB to the left foot  -Recommend Left Lower Extremity elevation.  -Recommend antibiotics per ID team for Left Foot Cellulitis and Left Foot Wound  - Pt stable from podiatry  - Pt to be discharged to [facility] with wound vac  - WCO: Pt to have wound vac changed every 2 days including black foam with tegaderm, 4x4s, abd, dianna and ACE. Pt to be nonwb to Left Foot. Pt to keep dressing clean, dry and intact.   - Pt to follow up at [address] [facility] [address]. Please call [phone] to make an appointment.  - Pt can also follow up at [phone] [place] [address]. Call [phone] to make an appointment.   -Possible delayed primary closure as outpatient L foot  -Podiatry will follow while in house.  -Discussed and seen with attending Dr. Hart.

## 2021-12-30 NOTE — PROGRESS NOTE ADULT - SUBJECTIVE AND OBJECTIVE BOX
Patient seen and examined this morning with PGY2    76 year-old male with Hx of atrial fibrillation (on coumadin recently), hypertension, hyperlipidemia, diabetes mellitus was admitted to medicine for altered mentation and left diabetic foot ulcer noted to be Bacteremic with MSSA s/p debridement in OR scheduled for OR Thursday for wound closure. Encephalopathy resolved with fluids and antibiotics.     Patient doing better overall. Patient was on wound vac was off today; as per patient Podiatry is planning on skin graft and partial wound closure.  Patient has poor sensation to left foot.   denies fever, chills, SOB, palpitations, chest pain, nausea, vomiting, diarrhea, constipation, dizziness    MEDICATIONS  (STANDING):  apixaban 5 milliGRAM(s) Oral every 12 hours  artificial  tears Solution 1 Drop(s) Both EYES two times a day  ascorbic acid 500 milliGRAM(s) Oral daily  atorvastatin 40 milliGRAM(s) Oral at bedtime  buPROPion XL (24-Hour) . 150 milliGRAM(s) Oral daily  ceFAZolin   IVPB 2000 milliGRAM(s) IV Intermittent every 8 hours  chlorhexidine 2% Cloths 1 Application(s) Topical <User Schedule>  famotidine    Tablet 20 milliGRAM(s) Oral daily  ferrous    sulfate 325 milliGRAM(s) Oral two times a day  gabapentin 800 milliGRAM(s) Oral three times a day  insulin lispro (ADMELOG) corrective regimen sliding scale   SubCutaneous three times a day before meals  insulin lispro (ADMELOG) corrective regimen sliding scale   SubCutaneous at bedtime  levoFLOXacin  Tablet 750 milliGRAM(s) Oral every 24 hours  lidocaine 1% Injectable 10 milliLiter(s) Local Injection once  lisinopril 2.5 milliGRAM(s) Oral daily  melatonin 5 milliGRAM(s) Oral at bedtime  polyethylene glycol 3350 17 Gram(s) Oral daily  senna 2 Tablet(s) Oral at bedtime    MEDICATIONS  (PRN):  acetaminophen     Tablet .. 650 milliGRAM(s) Oral every 6 hours PRN Temp greater or equal to 38C (100.4F)  melatonin 3 milliGRAM(s) Oral at bedtime PRN Insomnia  oxycodone    5 mG/acetaminophen 325 mG 1 Tablet(s) Oral every 6 hours PRN Moderate Pain      Vital Signs Last 24 Hrs  T(C): 36.9 (30 Dec 2021 13:10), Max: 37 (29 Dec 2021 21:04)  T(F): 98.5 (30 Dec 2021 13:10), Max: 98.6 (29 Dec 2021 21:04)  HR: 58 (30 Dec 2021 13:10) (56 - 106)  BP: 106/60 (30 Dec 2021 13:10) (106/60 - 128/67)  BP(mean): 69 (30 Dec 2021 13:10) (69 - 69)  RR: 16 (30 Dec 2021 13:10) (16 - 18)  SpO2: 100% (30 Dec 2021 13:10) (100% - 100%)    P/E:  GENERAL: NAD, well-groomed, well-developed  HEAD:  Atraumatic, Normocephalic  NERVOUS SYSTEM:  Alert & Oriented X3, Good concentration; Motor Strength 5/5 B/L upper and lower extremities  CHEST/LUNG: Clear to ausculation  bilaterally; No rales, rhonchi, wheezing, or rubs  HEART: Regular rate and rhythm; No murmurs, rubs, or gallops  ABDOMEN: Soft, Nontender, Nondistended; Bowel sounds present  EXTREMITIES:  left foot wrapped in bandage clean, dry and intact with wound vac in place  LYMPH: No lymphadenopathy noted  SKIN: No rashes or lesions    Labs:                        8.8    5.54  )-----------( 205      ( 30 Dec 2021 07:37 )             27.2   12-30    141  |  107  |  17  ----------------------------<  159<H>  4.4   |  28  |  1.37<H>    Ca    8.9      30 Dec 2021 07:37  Phos  3.3     12-30  Mg     2.1     12-30

## 2021-12-31 LAB
ANION GAP SERPL CALC-SCNC: 6 MMOL/L — SIGNIFICANT CHANGE UP (ref 5–17)
BUN SERPL-MCNC: 19 MG/DL — HIGH (ref 7–18)
CALCIUM SERPL-MCNC: 8.9 MG/DL — SIGNIFICANT CHANGE UP (ref 8.4–10.5)
CHLORIDE SERPL-SCNC: 107 MMOL/L — SIGNIFICANT CHANGE UP (ref 96–108)
CO2 SERPL-SCNC: 26 MMOL/L — SIGNIFICANT CHANGE UP (ref 22–31)
CREAT SERPL-MCNC: 1.2 MG/DL — SIGNIFICANT CHANGE UP (ref 0.5–1.3)
GLUCOSE BLDC GLUCOMTR-MCNC: 170 MG/DL — HIGH (ref 70–99)
GLUCOSE BLDC GLUCOMTR-MCNC: 181 MG/DL — HIGH (ref 70–99)
GLUCOSE BLDC GLUCOMTR-MCNC: 250 MG/DL — HIGH (ref 70–99)
GLUCOSE BLDC GLUCOMTR-MCNC: 253 MG/DL — HIGH (ref 70–99)
GLUCOSE SERPL-MCNC: 146 MG/DL — HIGH (ref 70–99)
HCT VFR BLD CALC: 29.2 % — LOW (ref 39–50)
HGB BLD-MCNC: 9.3 G/DL — LOW (ref 13–17)
MAGNESIUM SERPL-MCNC: 2.2 MG/DL — SIGNIFICANT CHANGE UP (ref 1.6–2.6)
MCHC RBC-ENTMCNC: 28.4 PG — SIGNIFICANT CHANGE UP (ref 27–34)
MCHC RBC-ENTMCNC: 31.8 GM/DL — LOW (ref 32–36)
MCV RBC AUTO: 89 FL — SIGNIFICANT CHANGE UP (ref 80–100)
NRBC # BLD: 0 /100 WBCS — SIGNIFICANT CHANGE UP (ref 0–0)
PHOSPHATE SERPL-MCNC: 3.5 MG/DL — SIGNIFICANT CHANGE UP (ref 2.5–4.5)
PLATELET # BLD AUTO: 191 K/UL — SIGNIFICANT CHANGE UP (ref 150–400)
POTASSIUM SERPL-MCNC: 4.2 MMOL/L — SIGNIFICANT CHANGE UP (ref 3.5–5.3)
POTASSIUM SERPL-SCNC: 4.2 MMOL/L — SIGNIFICANT CHANGE UP (ref 3.5–5.3)
RBC # BLD: 3.28 M/UL — LOW (ref 4.2–5.8)
RBC # FLD: 13.9 % — SIGNIFICANT CHANGE UP (ref 10.3–14.5)
SODIUM SERPL-SCNC: 139 MMOL/L — SIGNIFICANT CHANGE UP (ref 135–145)
WBC # BLD: 5.33 K/UL — SIGNIFICANT CHANGE UP (ref 3.8–10.5)
WBC # FLD AUTO: 5.33 K/UL — SIGNIFICANT CHANGE UP (ref 3.8–10.5)

## 2021-12-31 PROCEDURE — 99232 SBSQ HOSP IP/OBS MODERATE 35: CPT | Mod: GC

## 2021-12-31 RX ORDER — LACTULOSE 10 G/15ML
20 SOLUTION ORAL ONCE
Refills: 0 | Status: COMPLETED | OUTPATIENT
Start: 2021-12-31 | End: 2021-12-31

## 2021-12-31 RX ADMIN — Medication 1 DROP(S): at 05:52

## 2021-12-31 RX ADMIN — ATORVASTATIN CALCIUM 40 MILLIGRAM(S): 80 TABLET, FILM COATED ORAL at 22:17

## 2021-12-31 RX ADMIN — Medication 3: at 12:22

## 2021-12-31 RX ADMIN — Medication 100 MILLIGRAM(S): at 22:17

## 2021-12-31 RX ADMIN — Medication 1: at 08:16

## 2021-12-31 RX ADMIN — Medication 500 MILLIGRAM(S): at 12:22

## 2021-12-31 RX ADMIN — CHLORHEXIDINE GLUCONATE 1 APPLICATION(S): 213 SOLUTION TOPICAL at 05:53

## 2021-12-31 RX ADMIN — GABAPENTIN 800 MILLIGRAM(S): 400 CAPSULE ORAL at 14:41

## 2021-12-31 RX ADMIN — Medication 100 MILLIGRAM(S): at 05:53

## 2021-12-31 RX ADMIN — OXYCODONE AND ACETAMINOPHEN 1 TABLET(S): 5; 325 TABLET ORAL at 22:21

## 2021-12-31 RX ADMIN — GABAPENTIN 800 MILLIGRAM(S): 400 CAPSULE ORAL at 22:16

## 2021-12-31 RX ADMIN — APIXABAN 5 MILLIGRAM(S): 2.5 TABLET, FILM COATED ORAL at 05:52

## 2021-12-31 RX ADMIN — POLYETHYLENE GLYCOL 3350 17 GRAM(S): 17 POWDER, FOR SOLUTION ORAL at 12:23

## 2021-12-31 RX ADMIN — OXYCODONE AND ACETAMINOPHEN 1 TABLET(S): 5; 325 TABLET ORAL at 23:20

## 2021-12-31 RX ADMIN — Medication 5 MILLIGRAM(S): at 22:21

## 2021-12-31 RX ADMIN — Medication 2: at 17:30

## 2021-12-31 RX ADMIN — FAMOTIDINE 20 MILLIGRAM(S): 10 INJECTION INTRAVENOUS at 12:22

## 2021-12-31 RX ADMIN — Medication 1 DROP(S): at 17:31

## 2021-12-31 RX ADMIN — SENNA PLUS 2 TABLET(S): 8.6 TABLET ORAL at 22:16

## 2021-12-31 RX ADMIN — BUPROPION HYDROCHLORIDE 150 MILLIGRAM(S): 150 TABLET, EXTENDED RELEASE ORAL at 12:22

## 2021-12-31 RX ADMIN — Medication 325 MILLIGRAM(S): at 17:31

## 2021-12-31 RX ADMIN — Medication 325 MILLIGRAM(S): at 05:52

## 2021-12-31 RX ADMIN — APIXABAN 5 MILLIGRAM(S): 2.5 TABLET, FILM COATED ORAL at 17:31

## 2021-12-31 RX ADMIN — LACTULOSE 20 GRAM(S): 10 SOLUTION ORAL at 14:41

## 2021-12-31 RX ADMIN — GABAPENTIN 800 MILLIGRAM(S): 400 CAPSULE ORAL at 05:53

## 2021-12-31 RX ADMIN — Medication 100 MILLIGRAM(S): at 14:41

## 2021-12-31 NOTE — PROGRESS NOTE ADULT - PROBLEM SELECTOR PLAN 8
- Patient has hx of DM   - Holding home po medications  - C/w HSS  - Fingerstick before meals and at bedtime  - DASH diet with consistent carb  - [employer] -180 - Patient has hx of atrial fibrillation not on any meds at home    - EKG A fib rate controlled  - CHADSVASC2 4 points   - On full dose Lovenox, Eliquis prescription sent to pharmacy, covered by insurance, co payment 45 dollars monthly  - C/w Eliquis full AC  - Continue to monitor  - Cardio Dr Liz on board - Patient has hx of atrial fibrillation not on any meds at home    - EKG A fib rate controlled  - CHADSVASC2 4 points   - Eliquis prescription sent to pharmacy, covered by insurance, co payment 45 dollars monthly  - C/w Eliquis full AC  - Continue to monitor  - Cardio Dr Liz on board

## 2021-12-31 NOTE — PROGRESS NOTE ADULT - ATTENDING COMMENTS
Patient seen and examined this morning.    Patient doing well; OOB to chair; No BM x 3 days; otherwise no new complaints    Vitals: Reviewed as above stable    P/E: As above; clinically unchanged    Labs: stable                        9.3    5.33  )-----------( 191      ( 31 Dec 2021 07:25 )             29.2   12-31    139  |  107  |  19<H>  ----------------------------<  146<H>  4.2   |  26  |  1.20    Ca    8.9      31 Dec 2021 07:25  Phos  3.5     12-31  Mg     2.2     12-31    A/P:  MSSA and Strep bacteremia likely related to Left foot Diabetic ulcer with  Left foot OM  Anemia due to acute blood loss and anemia of chronic disease   Afib rate controlled  Type 2 DM controlled with likely Diabetic Neuropathy  HTN  HLD    Plan:   Cont Cefazolin IV q 8 hrs and Levofloxacin orally as planned  Bone biopsy from OR does not show OM but Patient with MRI evidence of OM; As d/w ID Dr. Maxwell; agreed biopsy may not have been adequate and patient with bacteremia will need to complete 4 weeks of ABX from negative Cx Last day of Cfeazolin 1/23/21 and Levaquin 1/24/21; Remove Extended dwell after antibiotic is completed  Podiatry follow up appreciated; Wound vac placed and patient stable for discharge with follow up with Dr. Hart outpatient and delayed closure after few weeks planned; d/w Patient.  Continue Gabapentin  Lactulose stat given; Add Senna HS; Miralax prn q 48 hrs on d/c    Wound care orders:  Pt to have wound vac changed every 2 days including black foam with Tegaderm 4x4s, abd, Alyse and ACE. Pt to be nonwb to Left foot. Pt to keep dressing clean, dry and intact.     Extended dwell placed by IR yesterday  Hb stable, Patient covered for DOACs; Continue Apixaban 5 mg BID  PT eval PRINCE for PT and IV Antibiotics; Accepted to [facility]; Await Authorization/ Bed   PATIENT MEDICALLY STABLE FOR DISCHARGE FOR CONTINUED WOUND CARE AND PODIATRY FOLLOW UP OUTPATIENT WITH IV ABX AS ABOVE  Discussed with  Yane;     Discussed with PGY2 DR. Pham and RN Misha  Discussed with patient this morning and updated plan and medically stable for discharge and neded follow up with Podiatry outpatient

## 2021-12-31 NOTE — PROGRESS NOTE ADULT - PROBLEM SELECTOR PLAN 3
- Plan as above - Patient with cellulitis of left foot and Charcot foot in diabetes   - Surgical swab 12/20 of left foot wound with Alpha hemolytic strep, S. aureus, Citrobacter koseri, Veillonella parvula.  - Off Vancomycin since 12/21, discontinued Unasyn today 11/24  - Started on Cefazolin 2 g q 8 hrs and Levaquin 750 mg daily (12/24) as per ID recommendations  - ID Dr Maxwell on board - will dc on Cefazolin 2 g q 8hrs for 4 weeks starting from 12/22 - 1/19/22  - Podiatry on board, s/p I&D (12/23)  - PT recommended PRINCE**

## 2021-12-31 NOTE — PROGRESS NOTE ADULT - PROBLEM SELECTOR PLAN 10
- Patient has hx of hyperlipidemia on Statin at home   - C/w Statin - Patient has history of Hypertension on lisinopril at home   - C/w home meds with parameters  - DASH diet  - Monitor BP and adjust meds as needed

## 2021-12-31 NOTE — PROGRESS NOTE ADULT - PROBLEM SELECTOR PLAN 4
- Acute blood loss anemia  - Patient noted to have low hgb 8-9 on admission, now 7>6.7>7.5>6.9>8.2  - S/p I & D on 12/22 by Podiatry  - Iron panel compatible with KISHOR however anemia from blood loss as well  - C/w Ferrous sulfate and vitamin C  - Type and screen, transfuse if hgb <7  - Consent in the chart   - Bowel regimen   - S/p 2 units PRBC (12/23 and 12/24)  - Monitor CBC daily   - Monitor for any signs of bleeding or hemodynamic instability - Plan as above

## 2021-12-31 NOTE — PROGRESS NOTE ADULT - PROBLEM SELECTOR PLAN 11
IMPROVE VTE Individual Risk Assessment  RISK                                                                Points  [  ] Previous VTE                                                  3  [  ] Thrombophilia                                               2  [  ] Lower limb paralysis                                      2        (unable to hold up >15 seconds)    [  ] Current Cancer                                              2         (within 6 months)  [  ] Immobilization > 24 hrs                                1  [  ] ICU/CCU stay > 24 hours                              1  [  ] Age > 60                                                      1  IMPROVE VTE Score _________    PPI for GI prophylaxis - Patient has hx of hyperlipidemia on Statin at home   - C/w Statin

## 2021-12-31 NOTE — PROGRESS NOTE ADULT - PROBLEM SELECTOR PLAN 1
- Patient with MSSA and Streptococcus bacteremia most likely secondary to left foot cellulitis/abscess  - MR left foot showed Plantar soft tissue wound with tract extending to the undersurface of the first tarsometatarsal articulation. Edema within the base of the first tarsometatarsal articulation may represent sequela of early osteomyelitis. First second and third metatarsal head subchondral fractures.  - Surgical pathology showed Tendon, left foot, excision: Fibro-tendinous tissue with ischemic and degenerative changes. Bone, left foot, biopsy: Bone fragment with resorptive changes and focal myelofibrosis. Negative for acute osteomyelitis  - Bcx from 12/20 methicillin susceptible S. aureus, Streptococcus mitis/oralis group, and Alpha hemolytic strep, ESR and CRP elevated    - Repeat Bcx from 12/22 NGTD  - Off Vancomycin since 12/21, discontinued Unasyn today 11/24  - Started on Cefazolin 2 g q8hers and Levaquin 750 mg daily (12/24) as per ID recommendations   - MRI lumbosacral no acute findings   - Echo showed aortic valve leaflets appear thickened, cannot exclude a vegetation. Mild aortic  regurgitation. Normal ventricular function, mild pulmonary hypertension  - ORLY no vegetations - Negative for endocarditis   - Cardio Dr Liz on board  - ID Dr Maxwell on board - will dc on Cefazolin 2 g q 8hrs for 4 weeks starting from 12/22  - Podiatry on board, s/p I&D (12/23), Podiatry procedure cancelled, to be discharged with wound Vac   - PICC line placed by IR 12/30/21 for Cefazolin 12/22/21-1/19/22  - Monitor for any signs of hemodynamic instability  - Waiting for PRINCE placement - RESOLVED  - Patient with MSSA and Streptococcus bacteremia most likely secondary to left foot cellulitis/abscess  - MR left foot showed Plantar soft tissue wound with tract extending to the undersurface of the first tarsometatarsal articulation. Edema within the base of the first tarsometatarsal articulation may represent sequela of early osteomyelitis. First second and third metatarsal head subchondral fractures.  - Surgical pathology showed Tendon, left foot, excision: Fibro-tendinous tissue with ischemic and degenerative changes. Bone, left foot, biopsy: Bone fragment with resorptive changes and focal myelofibrosis. Negative for acute osteomyelitis  - Bcx from 12/20 methicillin susceptible S. aureus, Streptococcus mitis/oralis group, and Alpha hemolytic strep, ESR and CRP elevated    - Repeat Bcx from 12/22 NGTD  - Off Vancomycin since 12/21, discontinued Unasyn today 11/24  - Started on Cefazolin 2 g q8hers and Levaquin 750 mg daily (12/24) as per ID recommendations   - MRI lumbosacral no acute findings   - Echo showed aortic valve leaflets appear thickened, cannot exclude a vegetation. Mild aortic  regurgitation. Normal ventricular function, mild pulmonary hypertension  - ORLY no vegetations - Negative for endocarditis   - Cardio Dr Liz on board  - ID Dr Maxwell on board - will dc on Cefazolin 2 g q 8hrs for 4 weeks starting from 12/22  - Podiatry on board, s/p I&D (12/23), Podiatry procedure cancelled, to be discharged with wound Vac   - PICC line placed by IR 12/30/21 for Cefazolin 12/22/21-1/19/22  - Monitor for any signs of hemodynamic instability  - Waiting for PRINCE placement

## 2021-12-31 NOTE — PROGRESS NOTE ADULT - SUBJECTIVE AND OBJECTIVE BOX
PGY-1 Progress Note discussed with attending    PAGER #: [[phone]] TILL 5:00 PM  PLEASE CONTACT ON CALL TEAM:  - On Call Team (Please refer to Brie) FROM 5:00 PM - 8:30PM  - Nightfloat Team FROM 8:30 -7:30 AM    CHIEF COMPLAINT & BRIEF HOSPITAL COURSE:      INTERVAL HPI/OVERNIGHT EVENTS: patient refers feeling better, denies any pain. Patient continues with Wound Vac and wound closure as outpatient       MEDICATIONS  (STANDING):  apixaban 5 milliGRAM(s) Oral every 12 hours  artificial  tears Solution 1 Drop(s) Both EYES two times a day  ascorbic acid 500 milliGRAM(s) Oral daily  atorvastatin 40 milliGRAM(s) Oral at bedtime  buPROPion XL (24-Hour) . 150 milliGRAM(s) Oral daily  ceFAZolin   IVPB 2000 milliGRAM(s) IV Intermittent every 8 hours  chlorhexidine 2% Cloths 1 Application(s) Topical <User Schedule>  famotidine    Tablet 20 milliGRAM(s) Oral daily  ferrous    sulfate 325 milliGRAM(s) Oral two times a day  gabapentin 800 milliGRAM(s) Oral three times a day  insulin lispro (ADMELOG) corrective regimen sliding scale   SubCutaneous three times a day before meals  insulin lispro (ADMELOG) corrective regimen sliding scale   SubCutaneous at bedtime  levoFLOXacin  Tablet 750 milliGRAM(s) Oral every 24 hours  lidocaine 1% Injectable 10 milliLiter(s) Local Injection once  lisinopril 2.5 milliGRAM(s) Oral daily  melatonin 5 milliGRAM(s) Oral at bedtime  polyethylene glycol 3350 17 Gram(s) Oral daily  senna 2 Tablet(s) Oral at bedtime    MEDICATIONS  (PRN):  acetaminophen     Tablet .. 650 milliGRAM(s) Oral every 6 hours PRN Temp greater or equal to 38C (100.4F)  melatonin 3 milliGRAM(s) Oral at bedtime PRN Insomnia  oxycodone    5 mG/acetaminophen 325 mG 1 Tablet(s) Oral every 6 hours PRN Moderate Pain      Vital Signs Last 24 Hrs  T(C): 36.3 (31 Dec 2021 05:49), Max: 37 (30 Dec 2021 20:30)  T(F): 97.3 (31 Dec 2021 05:49), Max: 98.6 (30 Dec 2021 20:30)  HR: 52 (31 Dec 2021 05:49) (52 - 67)  BP: 105/71 (31 Dec 2021 05:49) (105/71 - 110/64)  BP(mean): 73 (30 Dec 2021 20:30) (69 - 73)  RR: 18 (31 Dec 2021 05:49) (16 - 18)  SpO2: 100% (31 Dec 2021 05:49) (100% - 100%)      PHYSICAL EXAMINATION:  GENERAL: NAD, well built, sat well on RA, pale  HEAD:  Atraumatic, Normocephalic  EYES:  conjunctiva and sclera clear  NECK: Supple, No JVD, Normal thyroid  CHEST/LUNG: Clear to auscultation. Clear to percussion bilaterally; No rales, rhonchi, wheezing, or rubs  HEART: Regular rate and rhythm; No murmurs, rubs, or gallops  ABDOMEN: Soft, Nontender, Nondistended; Bowel sounds present  NERVOUS SYSTEM:  Alert & Oriented X3, no neuro deficits     EXTREMITIES:  2+ Peripheral Pulses, No clubbing, cyanosis, or edema  SKIN: Left foot wound, DSD intact, wound vac functioning                         9.3    5.33  )-----------( 191      ( 31 Dec 2021 07:25 )             29.2     12-31    139  |  107  |  19<H>  ----------------------------<  146<H>  4.2   |  26  |  1.20    Ca    8.9      31 Dec 2021 07:25  Phos  3.5     12-31  Mg     2.2     12-31        I&O's Summary    31 Dec 2021 07:01  -  31 Dec 2021 11:13  --------------------------------------------------------  IN: 0 mL / OUT: 375 mL / NET: -375 mL

## 2021-12-31 NOTE — PROGRESS NOTE ADULT - PROBLEM SELECTOR PLAN 6
- On admission, patient with Cr 1.8, downtrended 1.2  - PATTY most likely pre renal in the setting of acute infection, poor oral intake,   - Monitor I/O's daily  - Avoid nephrotoxins, NSAIDS, ACEI and ARBS  - Monitor BMP daily - On admission, patient was admitted with acute infectious encephalopathy  - Resolved, mental status back to baseline AAOx3

## 2021-12-31 NOTE — PROGRESS NOTE ADULT - PROBLEM SELECTOR PLAN 5
- On admission, patient was admitted with acute infectious encephalopathy  - Resolved, mental status back to baseline AAOx3 - Acute blood loss anemia  - Patient noted to have low hgb 8-9 on admission, now 7>6.7>7.5>6.9>8.2  - S/p I & D on 12/22 by Podiatry  - Iron panel compatible with KISHOR however anemia from blood loss as well  - C/w Ferrous sulfate and vitamin C  - Type and screen, transfuse if hgb <7  - Consent in the chart   - Bowel regimen   - S/p 2 units PRBC (12/23 and 12/24)  - Monitor CBC daily   - Monitor for any signs of bleeding or hemodynamic instability

## 2021-12-31 NOTE — PROGRESS NOTE ADULT - PROBLEM SELECTOR PLAN 9
- Patient has history of Hypertension on lisinopril at home   - C/w home meds with parameters  - DASH diet  - Monitor BP and adjust meds as needed - Patient has hx of DM   - Holding home po medications  - C/w HSS  - Fingerstick before meals and at bedtime  - DASH diet with consistent carb  - Target -180

## 2021-12-31 NOTE — PROGRESS NOTE ADULT - PROBLEM SELECTOR PLAN 7
- Patient has hx of atrial fibrillation not on any meds at home    - EKG A fib rate controlled  - CHADSVASC2 4 points   - On full dose Lovenox, Eliquis prescription sent to pharmacy, covered by insurance, co payment 45 dollars monthly  - C/w Eliquis full AC  - Continue to monitor  - Cardio Dr Liz on board - On admission, patient with Cr 1.8, downtrended 1.2  - PATTY most likely pre renal in the setting of acute infection, poor oral intake,   - Monitor I/O's daily  - Avoid nephrotoxins, NSAIDS, ACEI and ARBS  - Monitor BMP daily

## 2021-12-31 NOTE — PROGRESS NOTE ADULT - PROBLEM SELECTOR PLAN 2
- Patient with cellulitis of left foot and Charcot foot in diabetes   - Surgical swab 12/20 of left foot wound with Alpha hemolytic strep, S. aureus, Citrobacter koseri, Veillonella parvula.  - Off Vancomycin since 12/21, discontinued Unasyn today 11/24  - Started on Cefazolin 2 g q 8 hrs and Levaquin 750 mg daily (12/24) as per ID recommendations  - ID Dr Maxwell on board - will dc on Cefazolin 2 g q 8hrs for 4 weeks starting from 12/22 - 1/19/22  - Podiatry on board, s/p I&D (12/23)  - PT recommended PRINCE** - Patient has hx of constipation  - On Senna and Miralax daily with parameters to hold if >3 bowel movements/day  - Will order one dose of Lactulose for now  - Monitor bowel movements

## 2021-12-31 NOTE — PROGRESS NOTE ADULT - SUBJECTIVE AND OBJECTIVE BOX
CHIEF COMPLAINT:Patient is a 76y old  Male who presents with a chief complaint of AMS .Pt appears comfortable.    	  REVIEW OF SYSTEMS:  CONSTITUTIONAL: No fever, weight loss, or fatigue  EYES: No eye pain, visual disturbances, or discharge  ENT:  No difficulty hearing, tinnitus, vertigo; No sinus or throat pain  NECK: No pain or stiffness  RESPIRATORY: No cough, wheezing, chills or hemoptysis; No Shortness of Breath  CARDIOVASCULAR: No chest pain, palpitations, passing out, dizziness, or leg swelling  GASTROINTESTINAL: No abdominal or epigastric pain. No nausea, vomiting, or hematemesis; No diarrhea or constipation. No melena or hematochezia.  GENITOURINARY: No dysuria, frequency, hematuria, or incontinence  NEUROLOGICAL: No headaches, memory loss, loss of strength, numbness, or tremors  SKIN: No itching, burning, rashes, or lesions   LYMPH Nodes: No enlarged glands  ENDOCRINE: No heat or cold intolerance; No hair loss  MUSCULOSKELETAL: No joint pain or swelling; No muscle, back, or extremity pain  PSYCHIATRIC: No depression, anxiety, mood swings, or difficulty sleeping  HEME/LYMPH: No easy bruising, or bleeding gums  ALLERGY AND IMMUNOLOGIC: No hives or eczema	        PHYSICAL EXAM:  T(C): 36.3 (12-31-21 @ 05:49), Max: 37 (12-30-21 @ 20:30)  HR: 52 (12-31-21 @ 05:49) (52 - 67)  BP: 105/71 (12-31-21 @ 05:49) (105/71 - 110/64)  RR: 18 (12-31-21 @ 05:49) (16 - 18)  SpO2: 100% (12-31-21 @ 05:49) (100% - 100%)  Wt(kg): --  I&O's Summary    31 Dec 2021 07:01  -  31 Dec 2021 13:02  --------------------------------------------------------  IN: 0 mL / OUT: 375 mL / NET: -375 mL        Appearance: Normal	  HEENT:   Normal oral mucosa, PERRL, EOMI	  Lymphatic: No lymphadenopathy  Cardiovascular: Normal S1 S2, No JVD, No murmurs, No edema  Respiratory: Lungs clear to auscultation	  Psychiatry: A & O x 3, Mood & affect appropriate  Gastrointestinal:  Soft, Non-tender, + BS	  Skin: No rashes, No ecchymoses, No cyanosis	  Neurologic: Non-focal  Extremities: Normal range of motion, No clubbing, cyanosis or edema      MEDICATIONS  (STANDING):  apixaban 5 milliGRAM(s) Oral every 12 hours  artificial  tears Solution 1 Drop(s) Both EYES two times a day  ascorbic acid 500 milliGRAM(s) Oral daily  atorvastatin 40 milliGRAM(s) Oral at bedtime  buPROPion XL (24-Hour) . 150 milliGRAM(s) Oral daily  ceFAZolin   IVPB 2000 milliGRAM(s) IV Intermittent every 8 hours  chlorhexidine 2% Cloths 1 Application(s) Topical <User Schedule>  famotidine    Tablet 20 milliGRAM(s) Oral daily  ferrous    sulfate 325 milliGRAM(s) Oral two times a day  gabapentin 800 milliGRAM(s) Oral three times a day  insulin lispro (ADMELOG) corrective regimen sliding scale   SubCutaneous three times a day before meals  insulin lispro (ADMELOG) corrective regimen sliding scale   SubCutaneous at bedtime  levoFLOXacin  Tablet 750 milliGRAM(s) Oral every 24 hours  lidocaine 1% Injectable 10 milliLiter(s) Local Injection once  lisinopril 2.5 milliGRAM(s) Oral daily  melatonin 5 milliGRAM(s) Oral at bedtime  polyethylene glycol 3350 17 Gram(s) Oral daily  senna 2 Tablet(s) Oral at bedtime      	  	  LABS:	 	                        9.3    5.33  )-----------( 191      ( 31 Dec 2021 07:25 )             29.2     12-31    139  |  107  |  19<H>  ----------------------------<  146<H>  4.2   |  26  |  1.20    Ca    8.9      31 Dec 2021 07:25  Phos  3.5     12-31  Mg     2.2     12-31      proBNP:   Lipid Profile:   HgA1c:   TSH:

## 2022-01-01 DIAGNOSIS — R78.81 BACTEREMIA: ICD-10-CM

## 2022-01-01 DIAGNOSIS — K59.00 CONSTIPATION, UNSPECIFIED: ICD-10-CM

## 2022-01-01 LAB
ANION GAP SERPL CALC-SCNC: 5 MMOL/L — SIGNIFICANT CHANGE UP (ref 5–17)
BUN SERPL-MCNC: 19 MG/DL — HIGH (ref 7–18)
CALCIUM SERPL-MCNC: 9 MG/DL — SIGNIFICANT CHANGE UP (ref 8.4–10.5)
CHLORIDE SERPL-SCNC: 107 MMOL/L — SIGNIFICANT CHANGE UP (ref 96–108)
CO2 SERPL-SCNC: 27 MMOL/L — SIGNIFICANT CHANGE UP (ref 22–31)
CREAT SERPL-MCNC: 1.25 MG/DL — SIGNIFICANT CHANGE UP (ref 0.5–1.3)
GLUCOSE BLDC GLUCOMTR-MCNC: 187 MG/DL — HIGH (ref 70–99)
GLUCOSE BLDC GLUCOMTR-MCNC: 188 MG/DL — HIGH (ref 70–99)
GLUCOSE BLDC GLUCOMTR-MCNC: 199 MG/DL — HIGH (ref 70–99)
GLUCOSE BLDC GLUCOMTR-MCNC: 254 MG/DL — HIGH (ref 70–99)
GLUCOSE SERPL-MCNC: 162 MG/DL — HIGH (ref 70–99)
HCT VFR BLD CALC: 28 % — LOW (ref 39–50)
HGB BLD-MCNC: 8.8 G/DL — LOW (ref 13–17)
MAGNESIUM SERPL-MCNC: 2.1 MG/DL — SIGNIFICANT CHANGE UP (ref 1.6–2.6)
MCHC RBC-ENTMCNC: 28 PG — SIGNIFICANT CHANGE UP (ref 27–34)
MCHC RBC-ENTMCNC: 31.4 GM/DL — LOW (ref 32–36)
MCV RBC AUTO: 89.2 FL — SIGNIFICANT CHANGE UP (ref 80–100)
NRBC # BLD: 0 /100 WBCS — SIGNIFICANT CHANGE UP (ref 0–0)
PHOSPHATE SERPL-MCNC: 3.5 MG/DL — SIGNIFICANT CHANGE UP (ref 2.5–4.5)
PLATELET # BLD AUTO: 181 K/UL — SIGNIFICANT CHANGE UP (ref 150–400)
POTASSIUM SERPL-MCNC: 4.8 MMOL/L — SIGNIFICANT CHANGE UP (ref 3.5–5.3)
POTASSIUM SERPL-SCNC: 4.8 MMOL/L — SIGNIFICANT CHANGE UP (ref 3.5–5.3)
RBC # BLD: 3.14 M/UL — LOW (ref 4.2–5.8)
RBC # FLD: 14 % — SIGNIFICANT CHANGE UP (ref 10.3–14.5)
SODIUM SERPL-SCNC: 139 MMOL/L — SIGNIFICANT CHANGE UP (ref 135–145)
WBC # BLD: 4.75 K/UL — SIGNIFICANT CHANGE UP (ref 3.8–10.5)
WBC # FLD AUTO: 4.75 K/UL — SIGNIFICANT CHANGE UP (ref 3.8–10.5)

## 2022-01-01 PROCEDURE — 99232 SBSQ HOSP IP/OBS MODERATE 35: CPT | Mod: GC

## 2022-01-01 RX ORDER — MULTIVIT WITH MIN/MFOLATE/K2 340-15/3 G
1 POWDER (GRAM) ORAL ONCE
Refills: 0 | Status: COMPLETED | OUTPATIENT
Start: 2022-01-01 | End: 2022-01-01

## 2022-01-01 RX ADMIN — FAMOTIDINE 20 MILLIGRAM(S): 10 INJECTION INTRAVENOUS at 11:54

## 2022-01-01 RX ADMIN — POLYETHYLENE GLYCOL 3350 17 GRAM(S): 17 POWDER, FOR SOLUTION ORAL at 13:57

## 2022-01-01 RX ADMIN — Medication 1: at 08:31

## 2022-01-01 RX ADMIN — Medication 325 MILLIGRAM(S): at 06:20

## 2022-01-01 RX ADMIN — APIXABAN 5 MILLIGRAM(S): 2.5 TABLET, FILM COATED ORAL at 17:48

## 2022-01-01 RX ADMIN — SENNA PLUS 2 TABLET(S): 8.6 TABLET ORAL at 23:20

## 2022-01-01 RX ADMIN — Medication 1 DROP(S): at 17:48

## 2022-01-01 RX ADMIN — GABAPENTIN 800 MILLIGRAM(S): 400 CAPSULE ORAL at 06:23

## 2022-01-01 RX ADMIN — LISINOPRIL 2.5 MILLIGRAM(S): 2.5 TABLET ORAL at 06:20

## 2022-01-01 RX ADMIN — GABAPENTIN 800 MILLIGRAM(S): 400 CAPSULE ORAL at 13:52

## 2022-01-01 RX ADMIN — Medication 500 MILLIGRAM(S): at 11:54

## 2022-01-01 RX ADMIN — Medication 100 MILLIGRAM(S): at 13:52

## 2022-01-01 RX ADMIN — Medication 1: at 11:50

## 2022-01-01 RX ADMIN — Medication 100 MILLIGRAM(S): at 06:15

## 2022-01-01 RX ADMIN — GABAPENTIN 800 MILLIGRAM(S): 400 CAPSULE ORAL at 22:58

## 2022-01-01 RX ADMIN — Medication 1: at 22:59

## 2022-01-01 RX ADMIN — Medication 1 BOTTLE: at 10:23

## 2022-01-01 RX ADMIN — OXYCODONE AND ACETAMINOPHEN 1 TABLET(S): 5; 325 TABLET ORAL at 23:28

## 2022-01-01 RX ADMIN — BUPROPION HYDROCHLORIDE 150 MILLIGRAM(S): 150 TABLET, EXTENDED RELEASE ORAL at 11:54

## 2022-01-01 RX ADMIN — APIXABAN 5 MILLIGRAM(S): 2.5 TABLET, FILM COATED ORAL at 06:20

## 2022-01-01 RX ADMIN — CHLORHEXIDINE GLUCONATE 1 APPLICATION(S): 213 SOLUTION TOPICAL at 06:15

## 2022-01-01 RX ADMIN — Medication 100 MILLIGRAM(S): at 22:59

## 2022-01-01 RX ADMIN — OXYCODONE AND ACETAMINOPHEN 1 TABLET(S): 5; 325 TABLET ORAL at 22:58

## 2022-01-01 RX ADMIN — Medication 1 DROP(S): at 06:20

## 2022-01-01 RX ADMIN — ATORVASTATIN CALCIUM 40 MILLIGRAM(S): 80 TABLET, FILM COATED ORAL at 23:21

## 2022-01-01 RX ADMIN — Medication 1: at 17:04

## 2022-01-01 RX ADMIN — Medication 325 MILLIGRAM(S): at 17:48

## 2022-01-01 RX ADMIN — Medication 5 MILLIGRAM(S): at 22:58

## 2022-01-01 NOTE — PROGRESS NOTE ADULT - ATTENDING COMMENTS
Patient seen and examined this morning.    Patient doing well; OOB to chair; No BM x 3 days; otherwise no new complaints    Vital Signs Last 24 Hrs: stable  T(C): 36.8 (01 Jan 2022 13:53), Max: 36.9 (31 Dec 2021 20:35)  T(F): 98.3 (01 Jan 2022 13:53), Max: 98.4 (31 Dec 2021 20:35)  HR: 70 (01 Jan 2022 13:53) (59 - 99)  BP: 103/70 (01 Jan 2022 13:53) (103/70 - 124/66)  BP(mean): 82 (31 Dec 2021 20:35) (82 - 82)  RR: 18 (01 Jan 2022 13:53) (18 - 18)  SpO2: 100% (01 Jan 2022 13:53) (100% - 100%)      P/E: As above; clinically unchanged    Labs: stable                        8.8    4.75  )-----------( 181      ( 01 Jan 2022 07:25 )             28.0   01-01    139  |  107  |  19<H>  ----------------------------<  162<H>  4.8   |  27  |  1.25    Ca    9.0      01 Jan 2022 07:25  Phos  3.5     01-01  Mg     2.1     01-01        A/P:  MSSA and Strep bacteremia likely related to Left foot Diabetic ulcer with  Left foot OM  Anemia due to acute blood loss and anemia of chronic disease   Afib rate controlled  Type 2 DM controlled with likely Diabetic Neuropathy  HTN  HLD    Plan:   Cont Cefazolin IV q 8 hrs and Levofloxacin orally as planned  Bone biopsy from OR does not show OM but Patient with MRI evidence of OM; As d/w ID Dr. Maxwell; agreed biopsy may not have been adequate and patient with bacteremia will need to complete 4 weeks of ABX from negative Cx Last day of Cfeazolin 1/23/21 and Levaquin 1/24/21; Remove Extended dwell after antibiotic is completed  Podiatry follow up appreciated; Wound vac placed and patient stable for discharge with follow up with Dr. Hart outpatient and delayed closure after few weeks planned; d/w Patient.  Continue Gabapentin  Lactulose stat given; Add Senna HS; Miralax prn q 48 hrs on d/c    Wound care orders:  Pt to have wound vac changed every 2 days including black foam with Tegaderm 4x4s, abd, Alyse and ACE. Pt to be nonwb to Left foot. Pt to keep dressing clean, dry and intact.     Extended dwell placed by IR yesterday  Hb stable, Patient covered for DOACs; Continue Apixaban 5 mg BID  PT eval PRINCE for PT and IV Antibiotics; Accepted to [facility]; Await Authorization/ Bed   PATIENT MEDICALLY STABLE FOR DISCHARGE FOR CONTINUED WOUND CARE AND PODIATRY FOLLOW UP OUTPATIENT WITH IV ABX AS ABOVE  Discussed with  Yane;     Discussed with PGY2 DR. Pham and RN Misha  Discussed with patient this morning and updated plan and medically stable for discharge and neded follow up with Podiatry outpatient Patient seen and examined this morning.    Patient doing well; OOB to chair; No BM x 3 days; otherwise no new complaints; await Rehab  Auth; accepted to [facility]    Vital Signs Last 24 Hrs: stable  T(C): 36.8 (01 Jan 2022 13:53), Max: 36.9 (31 Dec 2021 20:35)  T(F): 98.3 (01 Jan 2022 13:53), Max: 98.4 (31 Dec 2021 20:35)  HR: 70 (01 Jan 2022 13:53) (59 - 99)  BP: 103/70 (01 Jan 2022 13:53) (103/70 - 124/66)  BP(mean): 82 (31 Dec 2021 20:35) (82 - 82)  RR: 18 (01 Jan 2022 13:53) (18 - 18)  SpO2: 100% (01 Jan 2022 13:53) (100% - 100%)      P/E: As above; clinically unchanged    Labs: stable                        8.8    4.75  )-----------( 181      ( 01 Jan 2022 07:25 )             28.0   01-01    139  |  107  |  19<H>  ----------------------------<  162<H>  4.8   |  27  |  1.25    Ca    9.0      01 Jan 2022 07:25  Phos  3.5     01-01  Mg     2.1     01-01    A/P:  MSSA and Strep bacteremia likely related to Left foot Diabetic ulcer with  Left foot OM  Anemia due to acute blood loss and anemia of chronic disease   Afib rate controlled  Type 2 DM controlled with likely Diabetic Neuropathy  HTN  HLD    Plan:   Cont Cefazolin IV q 8 hrs and Levofloxacin orally as planned  Bone biopsy from OR does not show OM but Patient with MRI evidence of OM; As d/w ID  agreed biopsy may not have been adequate and patient with bacteremia will need to complete 4 weeks of ABX from negative Cx Last day of Cefazolin 1/23/21 and Levaquin 1/24/21; Remove Extended dwell after antibiotic is completed  Podiatry follow up appreciated; Wound vac placed and patient stable for discharge with follow up with Dr. Hart outpatient and delayed closure after few weeks planned; d/w Patient.  Continue Gabapentin  still no BM after Lactulose last PM; given Mag. citrate today;  Senna HS; Miralax; If no BM by AM wll give Dulcolax suppository/ Enema; pt agrees    Hb stable, Patient covered for DOACs; Continue Apixaban 5 mg BID    Wound care orders:  Pt to have wound vac changed every 2 days including black foam with Tegaderm 4x4s, abd, Alyse and ACE. Pt to be nonwb to Left foot. Pt to keep dressing clean, dry and intact.     PT hema MORRISON for PT and IV Antibiotics; Accepted to [facility]; Await Authorization/ Bed   PATIENT MEDICALLY STABLE FOR DISCHARGE FOR CONTINUED WOUND CARE AND PODIATRY FOLLOW UP OUTPATIENT WITH IV ABX AS ABOVE  Discussed with  Yane;     Discussed with PGY2 DR. Pham and CHELE Soriano  Discussed with patient and updated plan and medically stable for discharge and neded follow up with Podiatry outpatient

## 2022-01-01 NOTE — PROGRESS NOTE ADULT - PROBLEM SELECTOR PLAN 8
- Patient has hx of atrial fibrillation not on any meds at home    - EKG A fib rate controlled  - CHADSVASC2 4 points   - Eliquis prescription sent to pharmacy, covered by insurance, co payment 45 dollars monthly  - C/w Eliquis full AC  - Continue to monitor  - Cardio Dr Liz on board

## 2022-01-01 NOTE — PROGRESS NOTE ADULT - PROBLEM SELECTOR PLAN 2
- Patient has hx of constipation  - On Senna and Miralax daily with parameters to hold if >3 bowel movements/day  - S/p 1 dose Lactulose yesterday without BM  - Will order one dose Mg citrate x 1 and if needed then enema   - Monitor bowel movements

## 2022-01-01 NOTE — PROGRESS NOTE ADULT - SUBJECTIVE AND OBJECTIVE BOX
Patient is a 76y old  Male who presents with a chief complaint of AMS (20 Dec 2021 14:19)    Podiatry Interval HPI: Patient seen bedside in no acute distress. Pt is s/p Left Foot I&D and bone biopsy. Pt denies any pain to the left foot. Patient states that he is aware that it is important to not put weight to the left foot and states that he only puts weight on the left foot when he is urinating. Denies any constitutional symptoms of N/V/C/F/SOB. Denies any other pedal complaints. Advised pt strict non WB to LLE.     Podiatry HPI: 75 y/o male with a PMHx of DM, HTN, A Fib, Sciatica, Cataract, OA of Right Hip presented to the ED for altered mental status. Podiatry was consulted for Left Foot Cellulitis/Wound. Patient states that his podiatrist was debriding a callus on the Left Foot and the podiatrist went too deep which caused the initial wound about 3 weeks ago and since then it has been getting worse. Denies any pain to the Left Foot. States that he cannot feel any painful stimuli to his left foot due to diabetic neuropathy. States that he would like to get back on his feet and exercise because that is how he is able to maintain his sugar levels and patient states that he has always had a left foot flat arch. Denies any trauma to the area. Patient states that yesterday he was having chills but today he denies any constitutional symptoms of N/V/C/F/SOB. Denies any other pedal complaints at this moment.     HPI:  Patient is a 76 year-old male from home with past medical history of atrial fibrillation (was on coumadin until 1month ago), hypertension, hyperlipidemia, diabetes mellitus presents to ED for AMS. Pt states that today he started feeling freezing cold and was shivering. Talked to wife for more information and she stated that patient was feeling very cold though house was warm. He stopped shivering for a while and the started again. During his second episode of shivering from feeling extremely cold he got very confused and could not talk, understand what she was saying and was not making sense. After a while the shivering stopped and he regained his normal mental status. Denies fever, chest pain. abdominal pain, diarrhea, constipation. Pt states he has incontinence and an ulcer on his left foot. He states his podiatrist scraped a callus which caused the ulcer. He has diabetic neuropathy so does not feel pain around the ulcer. He did notice the left foot got more erythematous today.   He stopped taking warfarin 1 month ago as he states it was too much of an hassle to get INR checked and avoid eating certain foods.   (19 Dec 2021 22:54)      Medications acetaminophen     Tablet .. 650 milliGRAM(s) Oral every 6 hours PRN  apixaban 5 milliGRAM(s) Oral every 12 hours  artificial  tears Solution 1 Drop(s) Both EYES two times a day  ascorbic acid 500 milliGRAM(s) Oral daily  atorvastatin 40 milliGRAM(s) Oral at bedtime  buPROPion XL (24-Hour) . 150 milliGRAM(s) Oral daily  ceFAZolin   IVPB 2000 milliGRAM(s) IV Intermittent every 8 hours  chlorhexidine 2% Cloths 1 Application(s) Topical <User Schedule>  famotidine    Tablet 20 milliGRAM(s) Oral daily  ferrous    sulfate 325 milliGRAM(s) Oral two times a day  gabapentin 800 milliGRAM(s) Oral three times a day  insulin lispro (ADMELOG) corrective regimen sliding scale   SubCutaneous three times a day before meals  insulin lispro (ADMELOG) corrective regimen sliding scale   SubCutaneous at bedtime  levoFLOXacin  Tablet 750 milliGRAM(s) Oral every 24 hours  lidocaine 1% Injectable 10 milliLiter(s) Local Injection once  lisinopril 2.5 milliGRAM(s) Oral daily  melatonin 3 milliGRAM(s) Oral at bedtime PRN  melatonin 5 milliGRAM(s) Oral at bedtime  oxycodone    5 mG/acetaminophen 325 mG 1 Tablet(s) Oral every 6 hours PRN  polyethylene glycol 3350 17 Gram(s) Oral daily  senna 2 Tablet(s) Oral at bedtime    FHFamily history of coronary artery disease (Sibling)    Family history of breast cancer in sister (Sibling)    ,   PMHDiabetes    Hypertension    Osteoarthritis of right hip    Cataract    Atrial fibrillation    Vertigo    Sciatica       PSHS/P total hip arthroplasty        Labs                          8.8    4.75  )-----------( 181      ( 01 Jan 2022 07:25 )             28.0      01-01    139  |  107  |  19<H>  ----------------------------<  162<H>  4.8   |  27  |  1.25    Ca    9.0      01 Jan 2022 07:25  Phos  3.5     01-01  Mg     2.1     01-01       Vital Signs Last 24 Hrs  T(C): 36.8 (01 Jan 2022 13:53), Max: 36.9 (31 Dec 2021 20:35)  T(F): 98.3 (01 Jan 2022 13:53), Max: 98.4 (31 Dec 2021 20:35)  HR: 70 (01 Jan 2022 13:53) (59 - 99)  BP: 103/70 (01 Jan 2022 13:53) (103/70 - 124/66)  BP(mean): 82 (31 Dec 2021 20:35) (82 - 82)  RR: 18 (01 Jan 2022 13:53) (18 - 18)  SpO2: 100% (01 Jan 2022 13:53) (100% - 100%)  Sedimentation Rate, Erythrocyte: 86 mm/Hr (12-20-21 @ 06:15)  Sedimentation Rate, Erythrocyte: 95 mm/Hr (12-19-21 @ 19:15)         C-Reactive Protein, Serum: 107 mg/L (12-20-21 @ 09:36)  C-Reactive Protein, Serum: 94 mg/L (12-20-21 @ 04:01)   WBC Count: 4.75 K/uL (01-01-22 @ 07:25)    PHYSICAL EXAM  LE Focused:    Vasc:  DP/PT pulses were palpable, bilaterally. Generalized edema noted to the Left Foot  Derm: Left Plantar medial arch incision site shows decreased redness to the left foot. No warmth noted to th left foot. No malodor, no purulent drainage, +PTB was noted. No edema noted to the left foot.    12/23/21: Bleeding seized, no erythema, no edema noted, macerated wound borders Left plantar wound  12/22/21: Left Plantar medial arch wound noted measuring 3cm x 3cm x 3cm with 5cm tunnelling noted to the surrounding area with +PTB. Redness and Warmth noted to the left foot with no malodor, no purulent drainage noted upon expression today. Streaking up to the left ankle and plantar distal/lateral arch of the left foot. Edema was noted to have decreased from yesterday to the left foot.  12/20/21: Left Plantar medial arch wound noted measuring 3cm x 3cm x 3cm with 5cm tunnelling noted to the surrounding area with +ptb and mild fluctuance noted to the surrounding wound. Left Foot Wound has Redness, increased warmth noted, mild malodor, purulent drainage, streaking up to the left ankle and plantar distal/lateral arch.   Post Bedside Incision and Drainage (12/20/21)  Neuro:  Protective sensation absent, bilaterally.   MSK: No pain on palpation to the Left Foot Wound. Denies any calf pain, bilaterally.     Imaging:     EXAM:  MR FOOT LT                          PROCEDURE DATE:  12/20/2021      INTERPRETATION:  LEFT FOOT MRI    CLINICAL INFORMATION: Left foot wound. Eval for osteomyelitis.  TECHNIQUE: Multiplanar, multisequence MRI was obtained of the left foot.    COMPARISON: Left foot MRI 12/23/2015.    FINDINGS:    Plantar soft tissue wound is present at the level of the first   tarsometatarsal articulation with tract extending to the bone. There is   edema along the plantar aspect of the distal medial cuneiform and base of   the first metatarsal with preservation of the T1 marrow signal. There is   moderate tarsometatarsal arthrosis and arthrosis of the articulation of   the navicular and lateral cuneiform. There are subchondral fractures of   the first second and third metatarsal heads. There is diffuse increased   muscle signal, most consistent with denervation. There is diffuse   subcutaneous edema.    IMPRESSION:    Plantar soft tissue wound with tract extending to the undersurface of the   first tarsometatarsal articulation.  Edema within the base of the first tarsometatarsal articulation adjacent   to the tract with preservation of the T1 marrow signal, which may   represent sequela of early osteomyelitis.  First second and third metatarsal head subchondral fractures.    Cultures:   Culture Results:   No growth (12.23.21 @ 04:15)

## 2022-01-01 NOTE — PROGRESS NOTE ADULT - ASSESSMENT
A:  Cellulitis, Left Foot and Ankle.  Abscess, Left Plantar Foot  Left Foot Plantar Wound  Diabetic Neuropathy, b/l  DM  S/P bedside I&D (12/20/21)  S/P OR I&D (12/23/21)    Plan:  -Patient examined and chart reviewed  -Explained all clinical findings to the patient to the patient's satisfaction.  -Educated the patient about the importance of glycemic control in wound healing  -Xrays reviewed  -MRI reviewed  -Cultures - hemolytic strep   -Reapplied wound vac L foot  -Patient to keep left foot dressings dry, clean, and intact.   -Patient to be NWB to the left foot  -Recommend Left Lower Extremity elevation.  -Recommend antibiotics per ID team for Left Foot Cellulitis and Left Foot Wound  - Pt stable from podiatry  - Pt to be discharged to [facility] with wound vac  - WCO: Pt to have wound vac changed every 2 days including black foam with tegaderm, 4x4s, abd, dianna and ACE. Pt to be nonwb to Left Foot. Pt to keep dressing clean, dry and intact.   - Pt to follow up at [address] [facility] [address]. Please call [phone] to make an appointment.  - Pt can also follow up at [phone] [place] [address]. Call [phone] to make an appointment.   -Possible delayed primary closure as outpatient L foot  -Podiatry will follow while in house.  -Discussed with Dr. Hart

## 2022-01-01 NOTE — PROGRESS NOTE ADULT - PROBLEM SELECTOR PLAN 1
-RESOLVED  - Cardio Dr Liz on board  - ID Dr Maxwell on board - will dc on Cefazolin 2 g q 8hrs for 4 weeks starting from 12/22, Levaquin 750 mg daily from 12/24-1/21/22  - Podiatry on board, s/p I&D (12/23), Podiatry procedure cancelled, to be discharged with wound Vac   - PICC line placed by IR 12/30/21 for Cefazolin 12/22/21-1/19/22  - Monitor for any signs of hemodynamic instability  - Waiting for PRINCE placement.

## 2022-01-01 NOTE — PROGRESS NOTE ADULT - SUBJECTIVE AND OBJECTIVE BOX
PGY-1 Progress Note discussed with attending    PAGER #: [[phone]] TILL 5:00 PM  PLEASE CONTACT ON CALL TEAM:  - On Call Team (Please refer to Brie) FROM 5:00 PM - 8:30PM  - Nightfloat Team FROM 8:30 -7:30 AM    CHIEF COMPLAINT & BRIEF HOSPITAL COURSE:      INTERVAL HPI/OVERNIGHT EVENTS:       REVIEW OF SYSTEMS:  CONSTITUTIONAL: No fever, weight loss, or fatigue  RESPIRATORY: No cough, wheezing, chills or hemoptysis; No shortness of breath  CARDIOVASCULAR: No chest pain, palpitations, dizziness, or leg swelling  GASTROINTESTINAL: No abdominal pain. No nausea, vomiting, or hematemesis; No diarrhea or constipation. No melena or hematochezia.  GENITOURINARY: No dysuria or hematuria, urinary frequency  NEUROLOGICAL: No headaches, memory loss, loss of strength, numbness, or tremors  SKIN: No itching, burning, rashes, or lesions     MEDICATIONS  (STANDING):  apixaban 5 milliGRAM(s) Oral every 12 hours  artificial  tears Solution 1 Drop(s) Both EYES two times a day  ascorbic acid 500 milliGRAM(s) Oral daily  atorvastatin 40 milliGRAM(s) Oral at bedtime  buPROPion XL (24-Hour) . 150 milliGRAM(s) Oral daily  ceFAZolin   IVPB 2000 milliGRAM(s) IV Intermittent every 8 hours  chlorhexidine 2% Cloths 1 Application(s) Topical <User Schedule>  famotidine    Tablet 20 milliGRAM(s) Oral daily  ferrous    sulfate 325 milliGRAM(s) Oral two times a day  gabapentin 800 milliGRAM(s) Oral three times a day  insulin lispro (ADMELOG) corrective regimen sliding scale   SubCutaneous three times a day before meals  insulin lispro (ADMELOG) corrective regimen sliding scale   SubCutaneous at bedtime  levoFLOXacin  Tablet 750 milliGRAM(s) Oral every 24 hours  lidocaine 1% Injectable 10 milliLiter(s) Local Injection once  lisinopril 2.5 milliGRAM(s) Oral daily  melatonin 5 milliGRAM(s) Oral at bedtime  polyethylene glycol 3350 17 Gram(s) Oral daily  senna 2 Tablet(s) Oral at bedtime    MEDICATIONS  (PRN):  acetaminophen     Tablet .. 650 milliGRAM(s) Oral every 6 hours PRN Temp greater or equal to 38C (100.4F)  melatonin 3 milliGRAM(s) Oral at bedtime PRN Insomnia  oxycodone    5 mG/acetaminophen 325 mG 1 Tablet(s) Oral every 6 hours PRN Moderate Pain      Vital Signs Last 24 Hrs  T(C): 36.4 (01 Jan 2022 05:00), Max: 36.9 (31 Dec 2021 20:35)  T(F): 97.5 (01 Jan 2022 05:00), Max: 98.4 (31 Dec 2021 20:35)  HR: 99 (01 Jan 2022 05:00) (59 - 99)  BP: 112/69 (01 Jan 2022 05:00) (112/69 - 124/66)  BP(mean): 82 (31 Dec 2021 20:35) (82 - 82)  RR: 18 (01 Jan 2022 05:00) (17 - 18)  SpO2: 100% (01 Jan 2022 05:00) (99% - 100%)    PHYSICAL EXAMINATION:  GENERAL: NAD, well built  HEAD:  Atraumatic, Normocephalic  EYES:  conjunctiva and sclera clear  NECK: Supple, No JVD, Normal thyroid  CHEST/LUNG: Clear to auscultation. Clear to percussion bilaterally; No rales, rhonchi, wheezing, or rubs  HEART: Regular rate and rhythm; No murmurs, rubs, or gallops  ABDOMEN: Soft, Nontender, Nondistended; Bowel sounds present, no pain or masses on palpation  NERVOUS SYSTEM:  Alert & Oriented X3  : voiding well  EXTREMITIES:  2+ Peripheral Pulses, No clubbing, cyanosis, or edema  SKIN: warm dry                          8.8    4.75  )-----------( 181      ( 01 Jan 2022 07:25 )             28.0     01-01    139  |  107  |  19<H>  ----------------------------<  162<H>  4.8   |  27  |  1.25    Ca    9.0      01 Jan 2022 07:25  Phos  3.5     01-01  Mg     2.1     01-01                I&O's Summary    31 Dec 2021 07:01  -  01 Jan 2022 07:00  --------------------------------------------------------  IN: 0 mL / OUT: 375 mL / NET: -375 mL                     PGY-1 Progress Note discussed with attending    PAGER #: [[phone]] TILL 5:00 PM  PLEASE CONTACT ON CALL TEAM:  - On Call Team (Please refer to Brie) FROM 5:00 PM - 8:30PM  - Nightfloat Team FROM 8:30 -7:30 AM    CHIEF COMPLAINT & BRIEF HOSPITAL COURSE:      INTERVAL HPI/OVERNIGHT EVENTS: patient refers he is feeling fine but complains of constipation even after Lactulose, Senna and Miralax. Will order one dose Mg citrate and if still not able to have a BM then will order enema.       MEDICATIONS  (STANDING):  apixaban 5 milliGRAM(s) Oral every 12 hours  artificial  tears Solution 1 Drop(s) Both EYES two times a day  ascorbic acid 500 milliGRAM(s) Oral daily  atorvastatin 40 milliGRAM(s) Oral at bedtime  buPROPion XL (24-Hour) . 150 milliGRAM(s) Oral daily  ceFAZolin   IVPB 2000 milliGRAM(s) IV Intermittent every 8 hours  chlorhexidine 2% Cloths 1 Application(s) Topical <User Schedule>  famotidine    Tablet 20 milliGRAM(s) Oral daily  ferrous    sulfate 325 milliGRAM(s) Oral two times a day  gabapentin 800 milliGRAM(s) Oral three times a day  insulin lispro (ADMELOG) corrective regimen sliding scale   SubCutaneous three times a day before meals  insulin lispro (ADMELOG) corrective regimen sliding scale   SubCutaneous at bedtime  levoFLOXacin  Tablet 750 milliGRAM(s) Oral every 24 hours  lidocaine 1% Injectable 10 milliLiter(s) Local Injection once  lisinopril 2.5 milliGRAM(s) Oral daily  melatonin 5 milliGRAM(s) Oral at bedtime  polyethylene glycol 3350 17 Gram(s) Oral daily  senna 2 Tablet(s) Oral at bedtime    MEDICATIONS  (PRN):  acetaminophen     Tablet .. 650 milliGRAM(s) Oral every 6 hours PRN Temp greater or equal to 38C (100.4F)  melatonin 3 milliGRAM(s) Oral at bedtime PRN Insomnia  oxycodone    5 mG/acetaminophen 325 mG 1 Tablet(s) Oral every 6 hours PRN Moderate Pain      Vital Signs Last 24 Hrs  T(C): 36.4 (01 Jan 2022 05:00), Max: 36.9 (31 Dec 2021 20:35)  T(F): 97.5 (01 Jan 2022 05:00), Max: 98.4 (31 Dec 2021 20:35)  HR: 99 (01 Jan 2022 05:00) (59 - 99)  BP: 112/69 (01 Jan 2022 05:00) (112/69 - 124/66)  BP(mean): 82 (31 Dec 2021 20:35) (82 - 82)  RR: 18 (01 Jan 2022 05:00) (17 - 18)  SpO2: 100% (01 Jan 2022 05:00) (99% - 100%)    PHYSICAL EXAMINATION:  GENERAL: NAD, well built, sat well on RA, pale  HEAD:  Atraumatic, Normocephalic  EYES:  conjunctiva and sclera clear  NECK: Supple, No JVD, Normal thyroid  CHEST/LUNG: Clear to auscultation. Clear to percussion bilaterally; No rales, rhonchi, wheezing, or rubs  HEART: Regular rate and rhythm; No murmurs, rubs, or gallops  ABDOMEN: Soft, Nontender, Nondistended; Bowel sounds present  NERVOUS SYSTEM:  Alert & Oriented X3, no neuro deficits     EXTREMITIES:  2+ Peripheral Pulses, No clubbing, cyanosis, or edema  SKIN: Left foot wound, DSD intact, wound vac functioning                         8.8    4.75  )-----------( 181      ( 01 Jan 2022 07:25 )             28.0     01-01    139  |  107  |  19<H>  ----------------------------<  162<H>  4.8   |  27  |  1.25    Ca    9.0      01 Jan 2022 07:25  Phos  3.5     01-01  Mg     2.1     01-01                I&O's Summary    31 Dec 2021 07:01  -  01 Jan 2022 07:00  --------------------------------------------------------  IN: 0 mL / OUT: 375 mL / NET: -375 mL

## 2022-01-01 NOTE — PROGRESS NOTE ADULT - SUBJECTIVE AND OBJECTIVE BOX
CHIEF COMPLAINT:Patient is a 76y old  Male who presents with a chief complaint of AMS.Pt appears comfortable.    	  REVIEW OF SYSTEMS:  CONSTITUTIONAL: No fever, weight loss, or fatigue  EYES: No eye pain, visual disturbances, or discharge  ENT:  No difficulty hearing, tinnitus, vertigo; No sinus or throat pain  NECK: No pain or stiffness  RESPIRATORY: No cough, wheezing, chills or hemoptysis; No Shortness of Breath  CARDIOVASCULAR: No chest pain, palpitations, passing out, dizziness, or leg swelling  GASTROINTESTINAL: No abdominal or epigastric pain. No nausea, vomiting, or hematemesis; No diarrhea or constipation. No melena or hematochezia.  GENITOURINARY: No dysuria, frequency, hematuria, or incontinence  NEUROLOGICAL: No headaches, memory loss, loss of strength, numbness, or tremors  SKIN: No itching, burning, rashes, or lesions   LYMPH Nodes: No enlarged glands  ENDOCRINE: No heat or cold intolerance; No hair loss  MUSCULOSKELETAL: No joint pain or swelling; No muscle, back, or extremity pain  PSYCHIATRIC: No depression, anxiety, mood swings, or difficulty sleeping  HEME/LYMPH: No easy bruising, or bleeding gums  ALLERGY AND IMMUNOLOGIC: No hives or eczema	      PHYSICAL EXAM:  T(C): 36.4 (01-01-22 @ 05:00), Max: 36.9 (12-31-21 @ 20:35)  HR: 99 (01-01-22 @ 05:00) (59 - 99)  BP: 112/69 (01-01-22 @ 05:00) (112/69 - 124/66)  RR: 18 (01-01-22 @ 05:00) (18 - 18)  SpO2: 100% (01-01-22 @ 05:00) (100% - 100%)  Wt(kg): --  I&O's Summary    31 Dec 2021 07:01  -  01 Jan 2022 07:00  --------------------------------------------------------  IN: 0 mL / OUT: 375 mL / NET: -375 mL        Appearance: Normal	  HEENT:   Normal oral mucosa, PERRL, EOMI	  Lymphatic: No lymphadenopathy  Cardiovascular: Normal S1 S2, No JVD, No murmurs, No edema  Respiratory: Lungs clear to auscultation	  Psychiatry: A & O x 3, Mood & affect appropriate  Gastrointestinal:  Soft, Non-tender, + BS	  Skin: No rashes, No ecchymoses, No cyanosis	  Neurologic: Non-focal  Extremities: Normal range of motion, No clubbing, cyanosis or edema  Vascular: Peripheral pulses palpable 2+ bilaterally    MEDICATIONS  (STANDING):  apixaban 5 milliGRAM(s) Oral every 12 hours  artificial  tears Solution 1 Drop(s) Both EYES two times a day  ascorbic acid 500 milliGRAM(s) Oral daily  atorvastatin 40 milliGRAM(s) Oral at bedtime  buPROPion XL (24-Hour) . 150 milliGRAM(s) Oral daily  ceFAZolin   IVPB 2000 milliGRAM(s) IV Intermittent every 8 hours  chlorhexidine 2% Cloths 1 Application(s) Topical <User Schedule>  famotidine    Tablet 20 milliGRAM(s) Oral daily  ferrous    sulfate 325 milliGRAM(s) Oral two times a day  gabapentin 800 milliGRAM(s) Oral three times a day  insulin lispro (ADMELOG) corrective regimen sliding scale   SubCutaneous three times a day before meals  insulin lispro (ADMELOG) corrective regimen sliding scale   SubCutaneous at bedtime  levoFLOXacin  Tablet 750 milliGRAM(s) Oral every 24 hours  lidocaine 1% Injectable 10 milliLiter(s) Local Injection once  lisinopril 2.5 milliGRAM(s) Oral daily  melatonin 5 milliGRAM(s) Oral at bedtime  polyethylene glycol 3350 17 Gram(s) Oral daily  senna 2 Tablet(s) Oral at bedtime    	  	  LABS:	 	                     8.8    4.75  )-----------( 181      ( 01 Jan 2022 07:25 )             28.0     01-01    139  |  107  |  19<H>  ----------------------------<  162<H>  4.8   |  27  |  1.25    Ca    9.0      01 Jan 2022 07:25  Phos  3.5     01-01  Mg     2.1     01-01

## 2022-01-02 LAB
ANION GAP SERPL CALC-SCNC: 7 MMOL/L — SIGNIFICANT CHANGE UP (ref 5–17)
BUN SERPL-MCNC: 22 MG/DL — HIGH (ref 7–18)
CALCIUM SERPL-MCNC: 9.1 MG/DL — SIGNIFICANT CHANGE UP (ref 8.4–10.5)
CHLORIDE SERPL-SCNC: 104 MMOL/L — SIGNIFICANT CHANGE UP (ref 96–108)
CO2 SERPL-SCNC: 26 MMOL/L — SIGNIFICANT CHANGE UP (ref 22–31)
CREAT SERPL-MCNC: 1.3 MG/DL — SIGNIFICANT CHANGE UP (ref 0.5–1.3)
GLUCOSE BLDC GLUCOMTR-MCNC: 152 MG/DL — HIGH (ref 70–99)
GLUCOSE BLDC GLUCOMTR-MCNC: 190 MG/DL — HIGH (ref 70–99)
GLUCOSE BLDC GLUCOMTR-MCNC: 207 MG/DL — HIGH (ref 70–99)
GLUCOSE BLDC GLUCOMTR-MCNC: 249 MG/DL — HIGH (ref 70–99)
GLUCOSE SERPL-MCNC: 196 MG/DL — HIGH (ref 70–99)
HCT VFR BLD CALC: 29.9 % — LOW (ref 39–50)
HGB BLD-MCNC: 9.4 G/DL — LOW (ref 13–17)
MAGNESIUM SERPL-MCNC: 2.5 MG/DL — SIGNIFICANT CHANGE UP (ref 1.6–2.6)
MCHC RBC-ENTMCNC: 28.1 PG — SIGNIFICANT CHANGE UP (ref 27–34)
MCHC RBC-ENTMCNC: 31.4 GM/DL — LOW (ref 32–36)
MCV RBC AUTO: 89.5 FL — SIGNIFICANT CHANGE UP (ref 80–100)
NRBC # BLD: 0 /100 WBCS — SIGNIFICANT CHANGE UP (ref 0–0)
PHOSPHATE SERPL-MCNC: 3.5 MG/DL — SIGNIFICANT CHANGE UP (ref 2.5–4.5)
PLATELET # BLD AUTO: 197 K/UL — SIGNIFICANT CHANGE UP (ref 150–400)
POTASSIUM SERPL-MCNC: 4.7 MMOL/L — SIGNIFICANT CHANGE UP (ref 3.5–5.3)
POTASSIUM SERPL-SCNC: 4.7 MMOL/L — SIGNIFICANT CHANGE UP (ref 3.5–5.3)
RBC # BLD: 3.34 M/UL — LOW (ref 4.2–5.8)
RBC # FLD: 13.9 % — SIGNIFICANT CHANGE UP (ref 10.3–14.5)
SODIUM SERPL-SCNC: 137 MMOL/L — SIGNIFICANT CHANGE UP (ref 135–145)
WBC # BLD: 4.58 K/UL — SIGNIFICANT CHANGE UP (ref 3.8–10.5)
WBC # FLD AUTO: 4.58 K/UL — SIGNIFICANT CHANGE UP (ref 3.8–10.5)

## 2022-01-02 PROCEDURE — 99232 SBSQ HOSP IP/OBS MODERATE 35: CPT

## 2022-01-02 RX ORDER — FERROUS SULFATE 325(65) MG
325 TABLET ORAL DAILY
Refills: 0 | Status: DISCONTINUED | OUTPATIENT
Start: 2022-01-02 | End: 2022-01-07

## 2022-01-02 RX ADMIN — APIXABAN 5 MILLIGRAM(S): 2.5 TABLET, FILM COATED ORAL at 05:47

## 2022-01-02 RX ADMIN — Medication 100 MILLIGRAM(S): at 13:44

## 2022-01-02 RX ADMIN — Medication 1 DROP(S): at 05:47

## 2022-01-02 RX ADMIN — Medication 2: at 12:20

## 2022-01-02 RX ADMIN — Medication 325 MILLIGRAM(S): at 05:49

## 2022-01-02 RX ADMIN — GABAPENTIN 800 MILLIGRAM(S): 400 CAPSULE ORAL at 05:47

## 2022-01-02 RX ADMIN — Medication 325 MILLIGRAM(S): at 12:20

## 2022-01-02 RX ADMIN — SENNA PLUS 2 TABLET(S): 8.6 TABLET ORAL at 23:04

## 2022-01-02 RX ADMIN — Medication 2: at 17:03

## 2022-01-02 RX ADMIN — OXYCODONE AND ACETAMINOPHEN 1 TABLET(S): 5; 325 TABLET ORAL at 23:12

## 2022-01-02 RX ADMIN — Medication 1 DROP(S): at 19:51

## 2022-01-02 RX ADMIN — Medication 1: at 08:31

## 2022-01-02 RX ADMIN — APIXABAN 5 MILLIGRAM(S): 2.5 TABLET, FILM COATED ORAL at 17:48

## 2022-01-02 RX ADMIN — BUPROPION HYDROCHLORIDE 150 MILLIGRAM(S): 150 TABLET, EXTENDED RELEASE ORAL at 13:44

## 2022-01-02 RX ADMIN — Medication 100 MILLIGRAM(S): at 22:47

## 2022-01-02 RX ADMIN — CHLORHEXIDINE GLUCONATE 1 APPLICATION(S): 213 SOLUTION TOPICAL at 05:47

## 2022-01-02 RX ADMIN — Medication 500 MILLIGRAM(S): at 13:44

## 2022-01-02 RX ADMIN — ATORVASTATIN CALCIUM 40 MILLIGRAM(S): 80 TABLET, FILM COATED ORAL at 22:46

## 2022-01-02 RX ADMIN — GABAPENTIN 800 MILLIGRAM(S): 400 CAPSULE ORAL at 13:44

## 2022-01-02 RX ADMIN — FAMOTIDINE 20 MILLIGRAM(S): 10 INJECTION INTRAVENOUS at 12:20

## 2022-01-02 RX ADMIN — Medication 100 MILLIGRAM(S): at 05:48

## 2022-01-02 RX ADMIN — Medication 5 MILLIGRAM(S): at 23:04

## 2022-01-02 RX ADMIN — GABAPENTIN 800 MILLIGRAM(S): 400 CAPSULE ORAL at 22:47

## 2022-01-02 NOTE — PROGRESS NOTE ADULT - SUBJECTIVE AND OBJECTIVE BOX
Patient seen and examined this morning.    Patient doing well; OOB to chair; No BM x 3 days; otherwise no new complaints; await Rehab  Auth; accepted to [facility]  Extended dwell was not flushing overnight; working okay today with some resistance as per CHELE Garay    Vital Signs Last 24 Hrs  T(C): 36.4 (02 Jan 2022 13:20), Max: 36.9 (01 Jan 2022 21:10)  T(F): 97.6 (02 Jan 2022 13:20), Max: 98.4 (01 Jan 2022 21:10)  HR: 62 (02 Jan 2022 13:20) (62 - 76)  BP: 116/69 (02 Jan 2022 13:20) (105/52 - 116/69)  BP(mean): 78 (02 Jan 2022 13:20) (78 - 78)  RR: 17 (02 Jan 2022 13:20) (17 - 18)  SpO2: 100% (02 Jan 2022 13:20) (100% - 100%)      P/E: As above; clinically unchanged    Labs: stable                                   9.4    4.58  )-----------( 197      ( 02 Jan 2022 09:13 )             29.9   01-02    137  |  104  |  22<H>  ----------------------------<  196<H>  4.7   |  26  |  1.30    Ca    9.1      02 Jan 2022 09:13  Phos  3.5     01-02  Mg     2.5     01-02         Patient seen and examined this morning.    Patient doing well; OOB to chair;finally had BM overnight. otherwise no new complaints; await Rehab  Auth; [facility]  Extended dwell was not flushing overnight; working okay today with some resistance as per CHELE Garay    Vital Signs Last 24 Hrs  T(C): 36.4 (02 Jan 2022 13:20), Max: 36.9 (01 Jan 2022 21:10)  T(F): 97.6 (02 Jan 2022 13:20), Max: 98.4 (01 Jan 2022 21:10)  HR: 62 (02 Jan 2022 13:20) (62 - 76)  BP: 116/69 (02 Jan 2022 13:20) (105/52 - 116/69)  BP(mean): 78 (02 Jan 2022 13:20) (78 - 78)  RR: 17 (02 Jan 2022 13:20) (17 - 18)  SpO2: 100% (02 Jan 2022 13:20) (100% - 100%)      P/E:   Psych: AAO x3  Neuro: No gross focal deficits;   CVS: S1S2 present, regular, no edema  Resp: BLAE+, No wheeze or Rhonchi  GI: Soft, BS+, Non tender, non distended  Extr: No  calf tenderness B/L Lower extremities  Left foot dressing intact, Wound Vac in place  Skin: Warm and moist without any rashes    Labs: stable                                   9.4    4.58  )-----------( 197      ( 02 Jan 2022 09:13 )             29.9   01-02    137  |  104  |  22<H>  ----------------------------<  196<H>  4.7   |  26  |  1.30    Ca    9.1      02 Jan 2022 09:13  Phos  3.5     01-02  Mg     2.5     01-02

## 2022-01-02 NOTE — PROGRESS NOTE ADULT - SUBJECTIVE AND OBJECTIVE BOX
CHIEF COMPLAINT:Patient is a 76y old  Male who presents with a chief complaint of AMS .Pt appears comfortable.    	  REVIEW OF SYSTEMS:  CONSTITUTIONAL: No fever, weight loss, or fatigue  EYES: No eye pain, visual disturbances, or discharge  ENT:  No difficulty hearing, tinnitus, vertigo; No sinus or throat pain  NECK: No pain or stiffness  RESPIRATORY: No cough, wheezing, chills or hemoptysis; No Shortness of Breath  CARDIOVASCULAR: No chest pain, palpitations, passing out, dizziness, or leg swelling  GASTROINTESTINAL: No abdominal or epigastric pain. No nausea, vomiting, or hematemesis; No diarrhea or constipation. No melena or hematochezia.  GENITOURINARY: No dysuria, frequency, hematuria, or incontinence  NEUROLOGICAL: No headaches, memory loss, loss of strength, numbness, or tremors  SKIN: No itching, burning, rashes, or lesions   LYMPH Nodes: No enlarged glands  ENDOCRINE: No heat or cold intolerance; No hair loss  MUSCULOSKELETAL: No joint pain or swelling; No muscle, back, or extremity pain  PSYCHIATRIC: No depression, anxiety, mood swings, or difficulty sleeping  HEME/LYMPH: No easy bruising, or bleeding gums  ALLERGY AND IMMUNOLOGIC: No hives or eczema	      PHYSICAL EXAM:  T(C): 36.6 (01-02-22 @ 05:00), Max: 36.9 (01-01-22 @ 21:10)  HR: 76 (01-02-22 @ 05:00) (66 - 76)  BP: 105/52 (01-02-22 @ 05:00) (103/70 - 112/78)  RR: 18 (01-02-22 @ 05:00) (18 - 18)  SpO2: 100% (01-02-22 @ 05:00) (100% - 100%)  Wt(kg): --  I&O's Summary      Appearance: Normal	  HEENT:   Normal oral mucosa, PERRL, EOMI	  Lymphatic: No lymphadenopathy  Cardiovascular: Normal S1 S2, No JVD, No murmurs, No edema  Respiratory: Lungs clear to auscultation	  Psychiatry: A & O x 3, Mood & affect appropriate  Gastrointestinal:  Soft, Non-tender, + BS	  Skin: No rashes, No ecchymoses, No cyanosis	  Neurologic: Non-focal  Extremities: Normal range of motion, No clubbing, cyanosis or edema      MEDICATIONS  (STANDING):  apixaban 5 milliGRAM(s) Oral every 12 hours  artificial  tears Solution 1 Drop(s) Both EYES two times a day  ascorbic acid 500 milliGRAM(s) Oral daily  atorvastatin 40 milliGRAM(s) Oral at bedtime  buPROPion XL (24-Hour) . 150 milliGRAM(s) Oral daily  ceFAZolin   IVPB 2000 milliGRAM(s) IV Intermittent every 8 hours  chlorhexidine 2% Cloths 1 Application(s) Topical <User Schedule>  famotidine    Tablet 20 milliGRAM(s) Oral daily  ferrous    sulfate 325 milliGRAM(s) Oral daily  gabapentin 800 milliGRAM(s) Oral three times a day  insulin lispro (ADMELOG) corrective regimen sliding scale   SubCutaneous three times a day before meals  insulin lispro (ADMELOG) corrective regimen sliding scale   SubCutaneous at bedtime  levoFLOXacin  Tablet 750 milliGRAM(s) Oral every 24 hours  lidocaine 1% Injectable 10 milliLiter(s) Local Injection once  lisinopril 2.5 milliGRAM(s) Oral daily  melatonin 5 milliGRAM(s) Oral at bedtime  polyethylene glycol 3350 17 Gram(s) Oral daily  senna 2 Tablet(s) Oral at bedtime    	  	  LABS:	 	                    9.4    4.58  )-----------( 197      ( 02 Jan 2022 09:13 )             29.9     01-02    137  |  104  |  22<H>  ----------------------------<  196<H>  4.7   |  26  |  1.30    Ca    9.1      02 Jan 2022 09:13  Phos  3.5     01-02  Mg     2.5     01-02      proBNP:   Lipid Profile:   HgA1c:   TSH:

## 2022-01-02 NOTE — CHART NOTE - NSCHARTNOTEFT_GEN_A_CORE
Notified by RN that patient's PICC line on Left Arm is having resistance to saline flush, and giving antibiotics would be difficult. RN stated he would try to administer antibiotics. As per RN, day team reported no difficulties of using PICC line. But patient reported difficulty. Would endorse to day team.

## 2022-01-02 NOTE — PROGRESS NOTE ADULT - ASSESSMENT
A/P:  MSSA and Strep bacteremia likely related to Left foot Diabetic ulcer with  Left foot OM  Anemia due to acute blood loss and anemia of chronic disease   Afib rate controlled  Type 2 DM controlled with likely Diabetic Neuropathy  HTN  HLD    Plan:   Cont Cefazolin IV q 8 hrs and Levofloxacin orally as planned  Bone biopsy from OR does not show OM but Patient with MRI evidence of OM; As d/w ID  agreed biopsy may not have been adequate and patient with bacteremia will need to complete 4 weeks of ABX from negative Cx Last day of Cefazolin 1/23/21 and Levaquin 1/24/21; Remove Extended dwell after antibiotic is completed  Podiatry follow up appreciated; Wound vac placed and patient stable for discharge with follow up with Dr. Hart outpatient and delayed closure after few weeks planned; d/w Patient.  Continue Gabapentin  still no BM after Lactulose last PM; given Mag. citrate today;  Senna HS; Miralax; If no BM by AM wll give Dulcolax suppository/ Enema; pt agrees    Hb stable, Patient covered for DOACs; Continue Apixaban 5 mg BID    Wound care orders:  Pt to have wound vac changed every 2 days including black foam with Tegaderm 4x4s, abd, Alyse and ACE. Pt to be nonwb to Left foot. Pt to keep dressing clean, dry and intact.     PT hema MORRISON for PT and IV Antibiotics; Accepted to [facility]; Await Authorization/ Bed   PATIENT MEDICALLY STABLE FOR DISCHARGE FOR CONTINUED WOUND CARE AND PODIATRY FOLLOW UP OUTPATIENT WITH IV ABX AS ABOVE  Discussed with  Yane;     Discussed with PGY2 DR. Pham and CHELE Soriano  Discussed with patient and updated plan and medically stable for discharge and neded follow up with Podiatry outpatient.  A/P:  MSSA and Strep bacteremia likely related to Left foot Diabetic ulcer with  Left foot OM  Anemia due to acute blood loss and anemia of chronic disease   Afib rate controlled  Type 2 DM controlled with likely Diabetic Neuropathy  HTN  HLD    Plan:   Cont Cefazolin IV q 8 hrs and Levofloxacin orally as planned  Bone biopsy from OR does not show OM but Patient with MRI evidence of OM; As d/w ID  agreed biopsy may not have been adequate and patient with bacteremia will need to complete 4 weeks of ABX from negative Cx Last day of Cefazolin 1/23/21 and Levaquin 1/24/21;   Remove Extended dwell after antibiotic is completed  Podiatry follow up appreciated; Wound vac placed and patient stable for discharge with follow up with Dr. Hart outpatient and delayed closure after few weeks planned;   Continue Gabapentin  Finally +BM; Continue Senna HS; Miralax;   Hb stable, Patient covered for DOACs; Continue Apixaban 5 mg BID  IR to check the Extended dwell tomorrow prior to discharge to ensure adequate patency    Wound care orders:  Pt to have wound vac changed every 2 days including black foam with Tegaderm 4x4s, abd, Alyse and ACE. Pt to be nonwb to Left foot. Pt to keep dressing clean, dry and intact.     PT eval PRINCE for PT and IV Antibiotics; Accepted to [facility]; Await Authorization/ Bed   PATIENT MEDICALLY STABLE FOR DISCHARGE FOR CONTINUED WOUND CARE AND PODIATRY FOLLOW UP OUTPATIENT WITH IV ABX AS ABOVE  Case mgmt f/u AM  Repeat COVID AM for [facility]INCE placement    Discussed with CHELE Garay  Discussed with patient and updated plan and medically stable for discharge and needed follow up with Podiatry outpatient.

## 2022-01-03 DIAGNOSIS — Z02.9 ENCOUNTER FOR ADMINISTRATIVE EXAMINATIONS, UNSPECIFIED: ICD-10-CM

## 2022-01-03 LAB
GLUCOSE BLDC GLUCOMTR-MCNC: 189 MG/DL — HIGH (ref 70–99)
GLUCOSE BLDC GLUCOMTR-MCNC: 215 MG/DL — HIGH (ref 70–99)
GLUCOSE BLDC GLUCOMTR-MCNC: 242 MG/DL — HIGH (ref 70–99)
GLUCOSE BLDC GLUCOMTR-MCNC: 245 MG/DL — HIGH (ref 70–99)
RAPID RVP RESULT: SIGNIFICANT CHANGE UP
SARS-COV-2 RNA SPEC QL NAA+PROBE: SIGNIFICANT CHANGE UP

## 2022-01-03 PROCEDURE — 99232 SBSQ HOSP IP/OBS MODERATE 35: CPT

## 2022-01-03 RX ADMIN — Medication 500 MILLIGRAM(S): at 13:14

## 2022-01-03 RX ADMIN — FAMOTIDINE 20 MILLIGRAM(S): 10 INJECTION INTRAVENOUS at 13:15

## 2022-01-03 RX ADMIN — Medication 2: at 16:54

## 2022-01-03 RX ADMIN — ATORVASTATIN CALCIUM 40 MILLIGRAM(S): 80 TABLET, FILM COATED ORAL at 21:55

## 2022-01-03 RX ADMIN — POLYETHYLENE GLYCOL 3350 17 GRAM(S): 17 POWDER, FOR SOLUTION ORAL at 13:15

## 2022-01-03 RX ADMIN — OXYCODONE AND ACETAMINOPHEN 1 TABLET(S): 5; 325 TABLET ORAL at 21:55

## 2022-01-03 RX ADMIN — Medication 1 DROP(S): at 16:54

## 2022-01-03 RX ADMIN — GABAPENTIN 800 MILLIGRAM(S): 400 CAPSULE ORAL at 13:14

## 2022-01-03 RX ADMIN — Medication 1: at 07:50

## 2022-01-03 RX ADMIN — SENNA PLUS 2 TABLET(S): 8.6 TABLET ORAL at 21:55

## 2022-01-03 RX ADMIN — GABAPENTIN 800 MILLIGRAM(S): 400 CAPSULE ORAL at 21:55

## 2022-01-03 RX ADMIN — APIXABAN 5 MILLIGRAM(S): 2.5 TABLET, FILM COATED ORAL at 06:19

## 2022-01-03 RX ADMIN — APIXABAN 5 MILLIGRAM(S): 2.5 TABLET, FILM COATED ORAL at 16:54

## 2022-01-03 RX ADMIN — Medication 100 MILLIGRAM(S): at 13:15

## 2022-01-03 RX ADMIN — Medication 1 DROP(S): at 06:19

## 2022-01-03 RX ADMIN — Medication 100 MILLIGRAM(S): at 06:19

## 2022-01-03 RX ADMIN — GABAPENTIN 800 MILLIGRAM(S): 400 CAPSULE ORAL at 06:20

## 2022-01-03 RX ADMIN — OXYCODONE AND ACETAMINOPHEN 1 TABLET(S): 5; 325 TABLET ORAL at 22:30

## 2022-01-03 RX ADMIN — Medication 5 MILLIGRAM(S): at 21:55

## 2022-01-03 RX ADMIN — Medication 2: at 12:23

## 2022-01-03 RX ADMIN — BUPROPION HYDROCHLORIDE 150 MILLIGRAM(S): 150 TABLET, EXTENDED RELEASE ORAL at 13:15

## 2022-01-03 RX ADMIN — OXYCODONE AND ACETAMINOPHEN 1 TABLET(S): 5; 325 TABLET ORAL at 00:30

## 2022-01-03 RX ADMIN — CHLORHEXIDINE GLUCONATE 1 APPLICATION(S): 213 SOLUTION TOPICAL at 06:18

## 2022-01-03 RX ADMIN — Medication 325 MILLIGRAM(S): at 13:14

## 2022-01-03 NOTE — PROGRESS NOTE ADULT - PROBLEM SELECTOR PLAN 9
hx of DM  Holding home po medications   A1C 6.2;  - Continue with admelog insulin SS  - blood glucose checks before meals and at bedtime  - DASH diet with consistent carb  - Target -180

## 2022-01-03 NOTE — PROGRESS NOTE ADULT - PROBLEM SELECTOR PLAN 5
- Acute blood loss anemia  - Patient noted to have low hgb 8-9 on admission, now 7>6.7>7.5>6.9>8.2-->9.4  - S/p I & D on 12/22 by Podiatry  - Iron panel compatible with KISHOR however anemia from blood loss as well  - C/w Ferrous sulfate and vitamin C  - Type and screen, transfuse if hgb <7  - Consent in the chart   - Bowel regimen   - S/p 2 units PRBC (12/23 and 12/24)  - Monitor CBC daily   - Monitor for any signs of bleeding or hemodynamic instability

## 2022-01-03 NOTE — PROGRESS NOTE ADULT - PROBLEM SELECTOR PLAN 6
- On admission, patient was admitted with acute infectious encephalopathy  - Resolved;  mental status back to baseline AAOx3

## 2022-01-03 NOTE — PROGRESS NOTE ADULT - PROBLEM SELECTOR PLAN 2
- Patient has hx of constipation  - On Senna and Miralax daily with parameters to hold if >3 bowel movements/day  - Monitor bowel movements

## 2022-01-03 NOTE — CHART NOTE - NSCHARTNOTEFT_GEN_A_CORE
Reassessment:     Patient is a 76y old  Male who presents with a chief complaint of AMS (2022 12:18)      Factors impacting intake: [ ] none [ ] nausea  [ ] vomiting [ ] diarrhea [ ] constipation  [ ]chewing problems [ ] swallowing issues  [X ] other: sepsis, sciatica, osteomyelitis of ankle/foot, MSSA bacteremia, diabetes, PATTY/CKD     Diet Prescription: Diet, Regular:   Consistent Carbohydrate {No Snacks}  DASH/ TLC {Sodium & Cholesterol Restricted}  Supplement Feeding Modality:  Oral  Glucerna Shake Cans or Servings Per Day:  1       Frequency:  Two Times a day (22 @ 11:26)    Intake: Patient visited, alert & having dinner meal, report po intake "has been fine", consuming ~40-50% of meals depending on "entree" served, pt. provided "few choices", updated kitchen, drinking at least ~1/2 container of Glucerna Shake, 8oz at mealtime, encourage po intake with meal set up, s/p I&D with bone biopsy, ongoing wound care, rec. c/w diet as ordered & Glucerna BID as medically feasible. RD available.     Daily Weight in k.1 (29 Dec 2021 04:53)    % Weight Change: slight wt. increase noted possible fluid shifts?     Pertinent Medications: MEDICATIONS  (STANDING):  apixaban 5 milliGRAM(s) Oral every 12 hours  artificial  tears Solution 1 Drop(s) Both EYES two times a day  ascorbic acid 500 milliGRAM(s) Oral daily  atorvastatin 40 milliGRAM(s) Oral at bedtime  buPROPion XL (24-Hour) . 150 milliGRAM(s) Oral daily  ceFAZolin   IVPB 2000 milliGRAM(s) IV Intermittent every 8 hours  chlorhexidine 2% Cloths 1 Application(s) Topical <User Schedule>  famotidine    Tablet 20 milliGRAM(s) Oral daily  ferrous    sulfate 325 milliGRAM(s) Oral daily  gabapentin 800 milliGRAM(s) Oral three times a day  insulin lispro (ADMELOG) corrective regimen sliding scale   SubCutaneous three times a day before meals  insulin lispro (ADMELOG) corrective regimen sliding scale   SubCutaneous at bedtime  levoFLOXacin  Tablet 750 milliGRAM(s) Oral every 24 hours  lisinopril 2.5 milliGRAM(s) Oral daily  melatonin 5 milliGRAM(s) Oral at bedtime  polyethylene glycol 3350 17 Gram(s) Oral daily  senna 2 Tablet(s) Oral at bedtime    MEDICATIONS  (PRN):  acetaminophen     Tablet .. 650 milliGRAM(s) Oral every 6 hours PRN Temp greater or equal to 38C (100.4F)  melatonin 3 milliGRAM(s) Oral at bedtime PRN Insomnia  oxycodone    5 mG/acetaminophen 325 mG 1 Tablet(s) Oral every 6 hours PRN Moderate Pain    Pertinent Labs:  Na137 mmol/L Glu 196 mg/dL<H> K+ 4.7 mmol/L Cr  1.30 mg/dL BUN 22 mg/dL<H>  Phos 3.5 mg/dL     CAPILLARY BLOOD GLUCOSE      POCT Blood Glucose.: 215 mg/dL (2022 16:34)  POCT Blood Glucose.: 245 mg/dL (2022 12:20)  POCT Blood Glucose.: 189 mg/dL (2022 07:47)  POCT Blood Glucose.: 190 mg/dL (2022 22:41)    Skin: ongoing wound care    Estimated Needs:   [X ] no change since previous assessment  [ ] recalculated:     Previous Nutrition Diagnosis:   [ ] Inadequate Energy Intake [ ]Inadequate Oral Intake [ ] Excessive Energy Intake   [ ] Underweight [ X] Increased Nutrient Needs [ ] Overweight/Obesity   [ ] Altered GI Function [ ] Unintended Weight Loss [ ] Food & Nutrition Related Knowledge Deficit [ ] Malnutrition     Nutrition Diagnosis is [ x] ongoing  [ ] resolved [ ] not applicable     New Nutrition Diagnosis: [ ] not applicable     Interventions: To meet nutrition needs   Recommend  [ ] Change Diet To:  [X ] Nutrition Supplement: Add MVI/minerals/Vit. C 500mg BID for wound care as medically feasible   [ ] Nutrition Support  [ X] Other: Nursing to continue feeding assistance and encouragement, aspiration precaution     Monitoring and Evaluation:   [X ] PO intake [ x ] Tolerance to diet prescription [ x ] weights [ x ] labs[ x ] follow up per protocol  [ ] other:

## 2022-01-03 NOTE — PROGRESS NOTE ADULT - SUBJECTIVE AND OBJECTIVE BOX
CHIEF COMPLAINT:Patient is a 76y old  Male who presents with a chief complaint of Altered mentation noted to have Diabetic foot ulcer and Bacteremia with MSSA .Pt appears comfortable.    	  REVIEW OF SYSTEMS:  CONSTITUTIONAL: No fever, weight loss, or fatigue  EYES: No eye pain, visual disturbances, or discharge  ENT:  No difficulty hearing, tinnitus, vertigo; No sinus or throat pain  NECK: No pain or stiffness  RESPIRATORY: No cough, wheezing, chills or hemoptysis; No Shortness of Breath  CARDIOVASCULAR: No chest pain, palpitations, passing out, dizziness, or leg swelling  GASTROINTESTINAL: No abdominal or epigastric pain. No nausea, vomiting, or hematemesis; No diarrhea or constipation. No melena or hematochezia.  GENITOURINARY: No dysuria, frequency, hematuria, or incontinence  NEUROLOGICAL: No headaches, memory loss, loss of strength, numbness, or tremors  SKIN: No itching, burning, rashes, or lesions   LYMPH Nodes: No enlarged glands  ENDOCRINE: No heat or cold intolerance; No hair loss  MUSCULOSKELETAL: No joint pain or swelling; No muscle, back, or extremity pain  PSYCHIATRIC: No depression, anxiety, mood swings, or difficulty sleeping  HEME/LYMPH: No easy bruising, or bleeding gums  ALLERGY AND IMMUNOLOGIC: No hives or eczema	    PHYSICAL EXAM:  T(C): 36.7 (01-03-22 @ 06:09), Max: 36.7 (01-03-22 @ 06:09)  HR: 64 (01-03-22 @ 06:09) (56 - 64)  BP: 110/65 (01-03-22 @ 06:09) (110/65 - 137/74)  RR: 17 (01-03-22 @ 06:09) (14 - 18)  SpO2: 100% (01-03-22 @ 06:09) (100% - 100%)  Wt(kg): --  I&O's Summary      Appearance: Normal	  HEENT:   Normal oral mucosa, PERRL, EOMI	  Lymphatic: No lymphadenopathy  Cardiovascular: Normal S1 S2, No JVD, No murmurs, No edema  Respiratory: Lungs clear to auscultation	  Psychiatry: A & O x 3, Mood & affect appropriate  Gastrointestinal:  Soft, Non-tender, + BS	  Skin: No rashes, No ecchymoses, No cyanosis	  Neurologic: Non-focal  Extremities: Normal range of motion, No clubbing, cyanosis or edema  Vascular: Peripheral pulses palpable 2+ bilaterally    MEDICATIONS  (STANDING):  apixaban 5 milliGRAM(s) Oral every 12 hours  artificial  tears Solution 1 Drop(s) Both EYES two times a day  ascorbic acid 500 milliGRAM(s) Oral daily  atorvastatin 40 milliGRAM(s) Oral at bedtime  buPROPion XL (24-Hour) . 150 milliGRAM(s) Oral daily  ceFAZolin   IVPB 2000 milliGRAM(s) IV Intermittent every 8 hours  chlorhexidine 2% Cloths 1 Application(s) Topical <User Schedule>  famotidine    Tablet 20 milliGRAM(s) Oral daily  ferrous    sulfate 325 milliGRAM(s) Oral daily  gabapentin 800 milliGRAM(s) Oral three times a day  insulin lispro (ADMELOG) corrective regimen sliding scale   SubCutaneous three times a day before meals  insulin lispro (ADMELOG) corrective regimen sliding scale   SubCutaneous at bedtime  levoFLOXacin  Tablet 750 milliGRAM(s) Oral every 24 hours  lisinopril 2.5 milliGRAM(s) Oral daily  melatonin 5 milliGRAM(s) Oral at bedtime  polyethylene glycol 3350 17 Gram(s) Oral daily  senna 2 Tablet(s) Oral at bedtime        	  LABS:	 	                       9.4    4.58  )-----------( 197      ( 02 Jan 2022 09:13 )             29.9     01-02    137  |  104  |  22<H>  ----------------------------<  196<H>  4.7   |  26  |  1.30    Ca    9.1      02 Jan 2022 09:13  Phos  3.5     01-02  Mg     2.5     01-02

## 2022-01-03 NOTE — PROGRESS NOTE ADULT - PROBLEM SELECTOR PLAN 8
- Patient has hx of atrial fibrillation not on any meds at home    - EKG A fib rate controlled  - CHADSVASC2 :4   - Eliquis prescription previously  sent to pharmacy, covered by insurance, co payment 45 dollars monthly  - Continue Eliquis   - Continue to monitor  - Cardio Dr Liz on board

## 2022-01-03 NOTE — PROGRESS NOTE ADULT - PROBLEM SELECTOR PLAN 7
- On admission, patient with Cr 1.8--->1.3  - PATTY most likely pre renal in the setting of acute infection, poor oral intake,   - Monitor I/Os daily  - Avoid nephrotoxins, NSAIDS, ACEI and ARBS  - Monitor BMP daily

## 2022-01-03 NOTE — PROGRESS NOTE ADULT - PROBLEM SELECTOR PLAN 3
- Patient with cellulitis of left foot and Charcot foot in diabetes   - Surgical swab 12/20 of left foot wound with Alpha hemolytic strep, S. aureus, Citrobacter koseri, Veillonella parvula.  - Off Vancomycin since 12/21, discontinued Unasyn today 11/24  - Started on Cefazolin 2 g q 8 hrs and Levaquin 750 mg daily (12/24) as per ID recommendations  - ID Dr Maxwell on board - to be  dced on Cefazolin 2 g q 8hrs for 4 weeks starting from 12/22 - 1/19/22  - Podiatry on board, s/p I&D (12/23)  - PT recommended [facility]-->await auth

## 2022-01-03 NOTE — PROGRESS NOTE ADULT - SUBJECTIVE AND OBJECTIVE BOX
A new, 6cm 20 Gauge Extended Dwell Catheter inserted via the Left Brachial vein has been placed.  The prior one has been removed.  There is  good blood flow, and a dressing was applied.  Maximum catheter dwell time is <29 days. Please make sure the clamp stays closed when not in use, to avoid the catheter getting clogged.

## 2022-01-03 NOTE — PROGRESS NOTE ADULT - ATTENDING COMMENTS
Patient seen and examined this morning.    Patient doing well; OOB to chair; finally had BM overnight. otherwise no new complaints; await Rehab  Auth; accepted to [facility]  Extended dwell was not flushing overnight; working okay today with some resistance as per RN     Vital Signs Last 24 Hrs  T(C): 36.9 (03 Jan 2022 13:46), Max: 36.9 (03 Jan 2022 13:46)  T(F): 98.4 (03 Jan 2022 13:46), Max: 98.4 (03 Jan 2022 13:46)  HR: 57 (03 Jan 2022 13:46) (56 - 64)  BP: 111/81 (03 Jan 2022 13:46) (110/65 - 137/74)  BP(mean): 85 (02 Jan 2022 20:45) (85 - 85)  RR: 18 (03 Jan 2022 13:46) (14 - 18)  SpO2: 100% (03 Jan 2022 13:46) (100% - 100%)      P/E:   Psych: AAO x3  Neuro: No gross focal deficits;   CVS: S1S2 present, regular, no edema  Resp: BLAE+, No wheeze or Rhonchi  GI: Soft, BS+, Non tender, non distended  Extr: No  calf tenderness B/L Lower extremities  Left foot dressing intact, Wound Vac in place  Skin: Warm and moist without any rashes    Labs: stable; No new                        9.4    4.58  )-----------( 197      ( 02 Jan 2022 09:13 )             29.9   01-02    137  |  104  |  22<H>  ----------------------------<  196<H>  4.7   |  26  |  1.30    Ca    9.1      02 Jan 2022 09:13  Phos  3.5     01-02  Mg     2.5     01-02                                A/P:  MSSA and Strep bacteremia likely related to Left foot Diabetic ulcer with  Left foot OM  Anemia due to acute blood loss and anemia of chronic disease   Afib rate controlled  Type 2 DM controlled with likely Diabetic Neuropathy  HTN  HLD    Plan:   Cont Cefazolin IV q 8 hrs and Levofloxacin orally as planned  Bone biopsy from OR does not show OM but Patient with MRI evidence of OM; As d/w ID  agreed biopsy may not have been adequate and patient with bacteremia will need to complete 4 weeks of ABX from negative Cx Last day of Cefazolin 1/23/21 and Levaquin 1/24/21;   Remove Extended dwell after antibiotic is completed  Podiatry follow up appreciated; Wound vac placed and patient stable for discharge with follow up with Dr. Hart outpatient and delayed closure after few weeks planned;   Continue Gabapentin  Finally +BM; Continue Senna HS; Miralax;   Hb stable, Patient covered for DOACs; Continue Apixaban 5 mg BID  IR to check the Extended dwell tomorrow prior to discharge to ensure adequate patency    Wound care orders:  Pt to have wound vac changed every 2 days including black foam with Tegaderm 4x4s, abd, Alyse and ACE. Pt to be nonwb to Left foot. Pt to keep dressing clean, dry and intact.     PT eval PRINCE for PT and IV Antibiotics; Accepted to [facility]; Await Authorization/ Bed   PATIENT MEDICALLY STABLE FOR DISCHARGE FOR CONTINUED WOUND CARE AND PODIATRY FOLLOW UP OUTPATIENT WITH IV ABX AS ABOVE  Case mgmt f/u AM  Repeat COVID AM for PRINCE placement    Discussed with RN   Discussed with patient and updated plan and medically stable for discharge and needed follow up with Podiatry outpatient. Patient seen and examined this afternoon    Patient doing well; OOB to chair; ++ BM; . otherwise no new complaints; await Rehab  Auth;   Called [facility] Medical Director unable to accept patient at this time due to limitation due to COVID pandemic  Extended dwell was not flushing overnight; IR informed    Vital Signs Last 24 Hrs  T(C): 36.9 (03 Jan 2022 13:46), Max: 36.9 (03 Jan 2022 13:46)  T(F): 98.4 (03 Jan 2022 13:46), Max: 98.4 (03 Jan 2022 13:46)  HR: 57 (03 Jan 2022 13:46) (56 - 64)  BP: 111/81 (03 Jan 2022 13:46) (110/65 - 137/74)  BP(mean): 85 (02 Jan 2022 20:45) (85 - 85)  RR: 18 (03 Jan 2022 13:46) (14 - 18)  SpO2: 100% (03 Jan 2022 13:46) (100% - 100%)    P/E:   Psych: AAO x3  Neuro: No gross focal deficits;   CVS: S1S2 present, regular, no edema  Resp: BLAE+, No wheeze or Rhonchi  GI: Soft, BS+, Non tender, non distended  Extr: No  calf tenderness B/L Lower extremities  Left foot dressing intact, Wound Vac in place  Skin: Warm and moist without any rashes    Labs: stable; No new                        9.4    4.58  )-----------( 197      ( 02 Jan 2022 09:13 )             29.9   01-02    137  |  104  |  22<H>  ----------------------------<  196<H>  4.7   |  26  |  1.30    Ca    9.1      02 Jan 2022 09:13  Phos  3.5     01-02  Mg     2.5     01-02    Respiratory Viral Panel with COVID-19 by MARQUIS (01.03.22 @ 07:55)   Rapid RVP Result: NotDete  SARS-CoV-2: NotDetec:     A/P:  MSSA and Strep bacteremia likely related to Left foot Diabetic ulcer with  Left foot OM  Anemia due to acute blood loss and anemia of chronic disease   Afib rate controlled  Type 2 DM controlled with likely Diabetic Neuropathy  PATTY due to infection and dehydration resolved  HTN  HLD    Plan:   Cont Cefazolin IV q 8 hrs and Levofloxacin orally as planned  Bone biopsy from OR did not show OM but Patient with MRI evidence of OM; As d/w ID  agreed biopsy may not have been adequate and patient with bacteremia will need to complete 4 weeks of ABX from negative Cx Last day of Cefazolin 1/23/21 and Levaquin 1/24/21;   Remove Extended dwell after antibiotic is completed;  IR was contacted this morning;  d/w ANN-MARIE Sykes; it was adjusted in IR this morning, patency established and working well   Podiatry follow up appreciated; Wound vac placed and patient stable for discharge with follow up with Dr. Hart outpatient and delayed closure after few weeks planned; PODIATRY F/U REQUESTED d/w Podiatry resident  Continue Gabapentin 800 mg TID  +BM; Continue Senna HS; Miralax;   Hb stable, Patient covered for DOACs; Continue Apixaban 5 mg BID; Monitor labs including Hgb and Renal fn and platelets twice weekly on ABX    Wound care orders as per Podiatry:  Pt to have wound vac changed every 2 days including black foam with Tegaderm 4x4s, abd, Alyse and ACE. Pt to be nonwb to Left foot. Pt to keep dressing clean, dry and intact.     PT eval PRINCE for PT and IV Antibiotics; Accepted to [facility] but needs a new facility due to logistic reasons; Await Authorization/ Bed   PATIENT MEDICALLY STABLE FOR DISCHARGE FOR CONTINUED WOUND CARE AND PODIATRY FOLLOW UP OUTPATIENT WITH IV ABX AS ABOVE  Case mgmt f/u; d/w  Supervisor Liv; NEEDS DAISY FOR ALTERNATE FACILITY BY PATIENT/ GIRLFRIEND  Repeat COVID for [facility] placement NEGATIVE TODAY    Discussed with RN Margy and JADEN Lopez  Discussed with patient adeline updated plan and medically stable for discharge and needed follow up with Podiatry outpatient and needing alternate [facility]

## 2022-01-03 NOTE — PROGRESS NOTE ADULT - PROBLEM SELECTOR PLAN 1
- Patient with MSSA and Streptococcus bacteremia most likely secondary to left foot cellulitis/abscess  - MR left foot showed Plantar soft tissue wound with tract extending to the undersurface of the first tarsometatarsal articulation. Edema within the base of the first tarsometatarsal articulation may represent sequela of early osteomyelitis. First second and third metatarsal head subchondral fractures.  - Surgical pathology showed Tendon, left foot, excision: Fibro-tendinous tissue with ischemic and degenerative changes. Bone, left foot, biopsy: Bone fragment with resorptive changes and focal myelofibrosis. Negative for acute osteomyelitis  - Bcx from 12/20 methicillin susceptible S. aureus, Streptococcus mitis/oralis group, and Alpha hemolytic strep, ESR and CRP elevated    - Repeat Bcx from 12/22 final no growth  - Off Vancomycin since 12/21, discontinued Unasyn  11/24  - Started on Cefazolin 2 g q8hers and Levaquin 750 mg daily (12/24) as per ID recommendations   - MRI lumbosacral no acute findings   - Echo showed aortic valve leaflets appear thickened, cannot exclude a vegetation. Mild aortic  regurgitation. Normal ventricular function, mild pulmonary hypertension  - ORLY no vegetations /negative for endocarditis   - Cardio Dr Liz on board  - ID Dr Maxwell on board - to be dced  on Cefazolin 2 g q 8hrs for 4 weeks starting from 12/22  - Podiatry on board, s/p I&D (12/23) to be discharged with wound Vac with f/u with Dr Hart for delayed wound closure   - PICC line placed by IR 12/30/21 for Cefazolin 12/22/21-1/19/22  - Monitor for any signs of hemodynamic instability  - Waiting for [facility] placement

## 2022-01-03 NOTE — PROGRESS NOTE ADULT - SUBJECTIVE AND OBJECTIVE BOX
NP Note discussed with  Primary Attending; Dr Grijalva    Patient is a 76y old  Male who presents with a chief complaint of AMS (03 Jan 2022 12:18)      INTERVAL HPI/OVERNIGHT EVENTS: Patient seen and examined earlier this am; no new complaints.       MEDICATIONS  (STANDING):  apixaban 5 milliGRAM(s) Oral every 12 hours  artificial  tears Solution 1 Drop(s) Both EYES two times a day  ascorbic acid 500 milliGRAM(s) Oral daily  atorvastatin 40 milliGRAM(s) Oral at bedtime  buPROPion XL (24-Hour) . 150 milliGRAM(s) Oral daily  ceFAZolin   IVPB 2000 milliGRAM(s) IV Intermittent every 8 hours  chlorhexidine 2% Cloths 1 Application(s) Topical <User Schedule>  famotidine    Tablet 20 milliGRAM(s) Oral daily  ferrous    sulfate 325 milliGRAM(s) Oral daily  gabapentin 800 milliGRAM(s) Oral three times a day  insulin lispro (ADMELOG) corrective regimen sliding scale   SubCutaneous three times a day before meals  insulin lispro (ADMELOG) corrective regimen sliding scale   SubCutaneous at bedtime  levoFLOXacin  Tablet 750 milliGRAM(s) Oral every 24 hours  lisinopril 2.5 milliGRAM(s) Oral daily  melatonin 5 milliGRAM(s) Oral at bedtime  polyethylene glycol 3350 17 Gram(s) Oral daily  senna 2 Tablet(s) Oral at bedtime    MEDICATIONS  (PRN):  acetaminophen     Tablet .. 650 milliGRAM(s) Oral every 6 hours PRN Temp greater or equal to 38C (100.4F)  melatonin 3 milliGRAM(s) Oral at bedtime PRN Insomnia  oxycodone    5 mG/acetaminophen 325 mG 1 Tablet(s) Oral every 6 hours PRN Moderate Pain      __________________________________________________  REVIEW OF SYSTEMS:    CONSTITUTIONAL: No fever,   RESPIRATORY: No cough; No shortness of breath  CARDIOVASCULAR: No chest pain, no palpitations  GASTROINTESTINAL: No pain. No nausea or vomiting; No diarrhea   NEUROLOGICAL: No headache or numbness, no tremors  MUSCULOSKELETAL: No joint pain, no muscle pain  GENITOURINARY: no dysuria, no frequency, no hematuria  ALL OTHER  ROS negative        Vital Signs Last 24 Hrs  T(C): 36.9 (03 Jan 2022 13:46), Max: 36.9 (03 Jan 2022 13:46)  T(F): 98.4 (03 Jan 2022 13:46), Max: 98.4 (03 Jan 2022 13:46)  HR: 57 (03 Jan 2022 13:46) (56 - 64)  BP: 111/81 (03 Jan 2022 13:46) (110/65 - 137/74)  BP(mean): 85 (02 Jan 2022 20:45) (85 - 85)  RR: 18 (03 Jan 2022 13:46) (14 - 18)  SpO2: 100% (03 Jan 2022 13:46) (100% - 100%)    ________________________________________________  PHYSICAL EXAM:  GENERAL: NAD  HEENT: Normocephalic;  atraumatic   CHEST/LUNG: Breathing nonlabored; clear to auscultation bilaterally  HEART: +S1 +S2  regular  ABDOMEN: Soft, Nontender, Nondistended; Bowel sounds present  EXTREMITIES: +2 bilateral radial pulses. No edema. RLE with ace wrap w/ wound vac in place   SKIN: warm and dry;  wound vac in place to RLE  NERVOUS SYSTEM:  Awake and alert; Oriented  to place, person and time ; no new deficits    _________________________________________________  LABS:                        9.4    4.58  )-----------( 197      ( 02 Jan 2022 09:13 )             29.9     01-02    137  |  104  |  22<H>  ----------------------------<  196<H>  4.7   |  26  |  1.30    Ca    9.1      02 Jan 2022 09:13  Phos  3.5     01-02  Mg     2.5     01-02          CAPILLARY BLOOD GLUCOSE      POCT Blood Glucose.: 215 mg/dL (03 Jan 2022 16:34)  POCT Blood Glucose.: 245 mg/dL (03 Jan 2022 12:20)  POCT Blood Glucose.: 189 mg/dL (03 Jan 2022 07:47)  POCT Blood Glucose.: 190 mg/dL (02 Jan 2022 22:41)

## 2022-01-04 LAB
ANION GAP SERPL CALC-SCNC: 9 MMOL/L — SIGNIFICANT CHANGE UP (ref 5–17)
BUN SERPL-MCNC: 29 MG/DL — HIGH (ref 7–18)
CALCIUM SERPL-MCNC: 9.4 MG/DL — SIGNIFICANT CHANGE UP (ref 8.4–10.5)
CHLORIDE SERPL-SCNC: 105 MMOL/L — SIGNIFICANT CHANGE UP (ref 96–108)
CO2 SERPL-SCNC: 25 MMOL/L — SIGNIFICANT CHANGE UP (ref 22–31)
CREAT SERPL-MCNC: 1.31 MG/DL — HIGH (ref 0.5–1.3)
GLUCOSE BLDC GLUCOMTR-MCNC: 189 MG/DL — HIGH (ref 70–99)
GLUCOSE BLDC GLUCOMTR-MCNC: 193 MG/DL — HIGH (ref 70–99)
GLUCOSE BLDC GLUCOMTR-MCNC: 194 MG/DL — HIGH (ref 70–99)
GLUCOSE BLDC GLUCOMTR-MCNC: 244 MG/DL — HIGH (ref 70–99)
GLUCOSE SERPL-MCNC: 178 MG/DL — HIGH (ref 70–99)
HCT VFR BLD CALC: 30 % — LOW (ref 39–50)
HGB BLD-MCNC: 9.5 G/DL — LOW (ref 13–17)
MCHC RBC-ENTMCNC: 28.1 PG — SIGNIFICANT CHANGE UP (ref 27–34)
MCHC RBC-ENTMCNC: 31.7 GM/DL — LOW (ref 32–36)
MCV RBC AUTO: 88.8 FL — SIGNIFICANT CHANGE UP (ref 80–100)
NRBC # BLD: 0 /100 WBCS — SIGNIFICANT CHANGE UP (ref 0–0)
PLATELET # BLD AUTO: 182 K/UL — SIGNIFICANT CHANGE UP (ref 150–400)
POTASSIUM SERPL-MCNC: 4.7 MMOL/L — SIGNIFICANT CHANGE UP (ref 3.5–5.3)
POTASSIUM SERPL-SCNC: 4.7 MMOL/L — SIGNIFICANT CHANGE UP (ref 3.5–5.3)
RBC # BLD: 3.38 M/UL — LOW (ref 4.2–5.8)
RBC # FLD: 14.1 % — SIGNIFICANT CHANGE UP (ref 10.3–14.5)
SODIUM SERPL-SCNC: 139 MMOL/L — SIGNIFICANT CHANGE UP (ref 135–145)
WBC # BLD: 4.62 K/UL — SIGNIFICANT CHANGE UP (ref 3.8–10.5)
WBC # FLD AUTO: 4.62 K/UL — SIGNIFICANT CHANGE UP (ref 3.8–10.5)

## 2022-01-04 PROCEDURE — 99232 SBSQ HOSP IP/OBS MODERATE 35: CPT

## 2022-01-04 PROCEDURE — 36569 INSJ PICC 5 YR+ W/O IMAGING: CPT

## 2022-01-04 PROCEDURE — 36573 INSJ PICC RS&I 5 YR+: CPT

## 2022-01-04 RX ORDER — SODIUM CHLORIDE 9 MG/ML
10 INJECTION INTRAMUSCULAR; INTRAVENOUS; SUBCUTANEOUS
Refills: 0 | Status: DISCONTINUED | OUTPATIENT
Start: 2022-01-04 | End: 2022-01-07

## 2022-01-04 RX ORDER — CHLORHEXIDINE GLUCONATE 213 G/1000ML
1 SOLUTION TOPICAL DAILY
Refills: 0 | Status: DISCONTINUED | OUTPATIENT
Start: 2022-01-05 | End: 2022-01-07

## 2022-01-04 RX ORDER — OXYCODONE AND ACETAMINOPHEN 5; 325 MG/1; MG/1
1 TABLET ORAL EVERY 6 HOURS
Refills: 0 | Status: DISCONTINUED | OUTPATIENT
Start: 2022-01-04 | End: 2022-01-07

## 2022-01-04 RX ADMIN — APIXABAN 5 MILLIGRAM(S): 2.5 TABLET, FILM COATED ORAL at 05:27

## 2022-01-04 RX ADMIN — LISINOPRIL 2.5 MILLIGRAM(S): 2.5 TABLET ORAL at 05:27

## 2022-01-04 RX ADMIN — GABAPENTIN 800 MILLIGRAM(S): 400 CAPSULE ORAL at 22:44

## 2022-01-04 RX ADMIN — OXYCODONE AND ACETAMINOPHEN 1 TABLET(S): 5; 325 TABLET ORAL at 23:45

## 2022-01-04 RX ADMIN — OXYCODONE AND ACETAMINOPHEN 1 TABLET(S): 5; 325 TABLET ORAL at 22:43

## 2022-01-04 RX ADMIN — Medication 100 MILLIGRAM(S): at 22:44

## 2022-01-04 RX ADMIN — CHLORHEXIDINE GLUCONATE 1 APPLICATION(S): 213 SOLUTION TOPICAL at 06:42

## 2022-01-04 RX ADMIN — Medication 1 DROP(S): at 06:41

## 2022-01-04 RX ADMIN — SENNA PLUS 2 TABLET(S): 8.6 TABLET ORAL at 22:43

## 2022-01-04 RX ADMIN — Medication 5 MILLIGRAM(S): at 13:20

## 2022-01-04 RX ADMIN — FAMOTIDINE 20 MILLIGRAM(S): 10 INJECTION INTRAVENOUS at 12:29

## 2022-01-04 RX ADMIN — GABAPENTIN 800 MILLIGRAM(S): 400 CAPSULE ORAL at 05:28

## 2022-01-04 RX ADMIN — Medication 500 MILLIGRAM(S): at 12:12

## 2022-01-04 RX ADMIN — GABAPENTIN 800 MILLIGRAM(S): 400 CAPSULE ORAL at 13:19

## 2022-01-04 RX ADMIN — ATORVASTATIN CALCIUM 40 MILLIGRAM(S): 80 TABLET, FILM COATED ORAL at 22:43

## 2022-01-04 RX ADMIN — Medication 1: at 08:21

## 2022-01-04 RX ADMIN — BUPROPION HYDROCHLORIDE 150 MILLIGRAM(S): 150 TABLET, EXTENDED RELEASE ORAL at 12:29

## 2022-01-04 RX ADMIN — Medication 1: at 12:08

## 2022-01-04 RX ADMIN — Medication 2: at 17:02

## 2022-01-04 RX ADMIN — Medication 5 MILLIGRAM(S): at 22:43

## 2022-01-04 RX ADMIN — POLYETHYLENE GLYCOL 3350 17 GRAM(S): 17 POWDER, FOR SOLUTION ORAL at 12:12

## 2022-01-04 RX ADMIN — Medication 100 MILLIGRAM(S): at 13:19

## 2022-01-04 RX ADMIN — Medication 325 MILLIGRAM(S): at 12:12

## 2022-01-04 RX ADMIN — APIXABAN 5 MILLIGRAM(S): 2.5 TABLET, FILM COATED ORAL at 17:03

## 2022-01-04 NOTE — PROGRESS NOTE ADULT - PROBLEM SELECTOR PLAN 10
- Patient has history of Hypertension on lisinopril at home   - C/w home med with parameters  - DASH diet  - Monitor BP and adjust meds as needed

## 2022-01-04 NOTE — PROGRESS NOTE ADULT - ASSESSMENT
76 year-old male from home with past medical history of atrial fibrillation (was on coumadin until 1month ago), hypertension, hyperlipidemia, diabetes mellitus presents to ED for AMS,bacteremia, anemia.  1.PT.  2.Bacteremia-ABX.Repaet blood cx-.  3.Afib-eliquis.  4.PT.  5.DM-Insulin.  6.HTN-ace.  7.PPI.  8.Lipid d/o-statin.

## 2022-01-04 NOTE — PROGRESS NOTE ADULT - ASSESSMENT
A:  Cellulitis, Left Foot and Ankle.  Abscess, Left Plantar Foot  Left Foot Plantar Wound  Diabetic Neuropathy, b/l  DM  S/P bedside I&D (12/20/21)  S/P OR I&D (12/23/21)    Plan:  -Patient examined and chart reviewed  -Explained all clinical findings to the patient to the patient's satisfaction.  -Educated the patient about the importance of glycemic control in wound healing  -Xrays reviewed  -MRI reviewed  -Cultures - hemolytic strep   -Applied wound vac today. DSD applied   -Patient to keep left foot dressings dry, clean, and intact.   -Patient to be WB to heel as tolerated in surgical shoe   -Recommend Left Lower Extremity elevation.  -Recommend antibiotics per ID team for Left Foot Cellulitis and Left Foot Wound  - Pt stable from podiatry  - Pt to be discharged to [facility] with wound vac  - WCO: Pt to have wound vac changed every 2 days including black foam with tegaderm, 4x4s, abd, dianna and ACE. Pt to be nonwb to Left Foot. Pt to keep dressing clean, dry and intact.   - Pt to follow up at [address] [facility] [address]. Please call [phone] to make an appointment.  - Pt can also follow up at [phone] [place] [address]. Call [phone] to make an appointment.   -Possible delayed primary closure as outpatient L foot  -Podiatry will follow while in house.  -Discussed with Dr. Hart, evaluated bedside with attending Dr. Castro

## 2022-01-04 NOTE — PROGRESS NOTE ADULT - SUBJECTIVE AND OBJECTIVE BOX
Podiatry Interval HPI: Patient seen bedside in no acute distress. Pt is s/p Left Foot I&D and bone biopsy. Pt denies any pain to the left foot. Patient states that he is aware that it is important to not put weight to the left foot and states that he only puts weight on the left foot when he is urinating. Denies any constitutional symptoms of N/V/C/F/SOB. Denies any other pedal complaints. Advised Heel WB to pt in surgical shoe.       Medications acetaminophen     Tablet .. 650 milliGRAM(s) Oral every 6 hours PRN  apixaban 5 milliGRAM(s) Oral every 12 hours  artificial  tears Solution 1 Drop(s) Both EYES two times a day  ascorbic acid 500 milliGRAM(s) Oral daily  atorvastatin 40 milliGRAM(s) Oral at bedtime  bisacodyl 5 milliGRAM(s) Oral once  buPROPion XL (24-Hour) . 150 milliGRAM(s) Oral daily  ceFAZolin   IVPB 2000 milliGRAM(s) IV Intermittent every 8 hours  chlorhexidine 2% Cloths 1 Application(s) Topical <User Schedule>  famotidine    Tablet 20 milliGRAM(s) Oral daily  ferrous    sulfate 325 milliGRAM(s) Oral daily  gabapentin 800 milliGRAM(s) Oral three times a day  insulin lispro (ADMELOG) corrective regimen sliding scale   SubCutaneous at bedtime  insulin lispro (ADMELOG) corrective regimen sliding scale   SubCutaneous three times a day before meals  levoFLOXacin  Tablet 750 milliGRAM(s) Oral every 24 hours  lisinopril 2.5 milliGRAM(s) Oral daily  melatonin 5 milliGRAM(s) Oral at bedtime  melatonin 3 milliGRAM(s) Oral at bedtime PRN  oxycodone    5 mG/acetaminophen 325 mG 1 Tablet(s) Oral every 6 hours PRN  polyethylene glycol 3350 17 Gram(s) Oral daily  senna 2 Tablet(s) Oral at bedtime    FHFamily history of coronary artery disease (Sibling)    Family history of breast cancer in sister (Sibling)    ,   PMHDiabetes    Hypertension    Osteoarthritis of right hip    Cataract    Atrial fibrillation    Vertigo    Sciatica       PSHS/P total hip arthroplasty        Labs                          9.5    4.62  )-----------( 182      ( 04 Jan 2022 06:54 )             30.0      01-04    139  |  105  |  29<H>  ----------------------------<  178<H>  4.7   |  25  |  1.31<H>    Ca    9.4      04 Jan 2022 06:54       Vital Signs Last 24 Hrs  T(C): 36.7 (04 Jan 2022 05:33), Max: 36.9 (03 Jan 2022 13:46)  T(F): 98.1 (04 Jan 2022 05:33), Max: 98.4 (03 Jan 2022 13:46)  HR: 48 (04 Jan 2022 05:33) (48 - 61)  BP: 107/58 (04 Jan 2022 05:33) (107/58 - 135/63)  BP(mean): --  RR: 20 (04 Jan 2022 05:33) (18 - 20)  SpO2: 100% (04 Jan 2022 05:33) (100% - 100%)  Sedimentation Rate, Erythrocyte: 86 mm/Hr (12-20-21 @ 06:15)  Sedimentation Rate, Erythrocyte: 95 mm/Hr (12-19-21 @ 19:15)         C-Reactive Protein, Serum: 107 mg/L (12-20-21 @ 09:36)  C-Reactive Protein, Serum: 94 mg/L (12-20-21 @ 04:01)   WBC Count: 4.62 K/uL (01-04-22 @ 06:54)      PHYSICAL EXAM  LE Focused:    Vasc:  DP/PT pulses were palpable, bilaterally. Generalized edema noted to the Left Foot  Derm: Left Plantar medial arch incision site shows decreased redness to the left foot. No warmth noted to th left foot. No malodor, no purulent drainage, +PTB was noted. No edema noted to the left foot.    12/23/21: Bleeding seized, no erythema, no edema noted, macerated wound borders Left plantar wound  12/22/21: Left Plantar medial arch wound noted measuring 3cm x 3cm x 3cm with 5cm tunnelling noted to the surrounding area with +PTB. Redness and Warmth noted to the left foot with no malodor, no purulent drainage noted upon expression today. Streaking up to the left ankle and plantar distal/lateral arch of the left foot. Edema was noted to have decreased from yesterday to the left foot.  12/20/21: Left Plantar medial arch wound noted measuring 3cm x 3cm x 3cm with 5cm tunnelling noted to the surrounding area with +ptb and mild fluctuance noted to the surrounding wound. Left Foot Wound has Redness, increased warmth noted, mild malodor, purulent drainage, streaking up to the left ankle and plantar distal/lateral arch.   1/4/22: no drainage noted, no malodor, no erythema, no edema noted   Post Bedside Incision and Drainage (12/20/21)  Neuro:  Protective sensation absent, bilaterally.   MSK: No pain on palpation to the Left Foot Wound. Denies any calf pain, bilaterally.     Imaging:     EXAM:  MR FOOT LT                          PROCEDURE DATE:  12/20/2021      INTERPRETATION:  LEFT FOOT MRI    CLINICAL INFORMATION: Left foot wound. Eval for osteomyelitis.  TECHNIQUE: Multiplanar, multisequence MRI was obtained of the left foot.    COMPARISON: Left foot MRI 12/23/2015.    FINDINGS:    Plantar soft tissue wound is present at the level of the first   tarsometatarsal articulation with tract extending to the bone. There is   edema along the plantar aspect of the distal medial cuneiform and base of   the first metatarsal with preservation of the T1 marrow signal. There is   moderate tarsometatarsal arthrosis and arthrosis of the articulation of   the navicular and lateral cuneiform. There are subchondral fractures of   the first second and third metatarsal heads. There is diffuse increased   muscle signal, most consistent with denervation. There is diffuse   subcutaneous edema.    IMPRESSION:    Plantar soft tissue wound with tract extending to the undersurface of the   first tarsometatarsal articulation.  Edema within the base of the first tarsometatarsal articulation adjacent   to the tract with preservation of the T1 marrow signal, which may   represent sequela of early osteomyelitis.  First second and third metatarsal head subchondral fractures.    Cultures:   Culture Results:   No growth (12.23.21 @ 04:15)

## 2022-01-04 NOTE — PROGRESS NOTE ADULT - PROBLEM SELECTOR PLAN 2
- Patient has hx of constipation  - On Senna and Miralax daily with parameters to hold if >3 bowel movements/day  - dulcolax x 1 today   - Monitor for  bowel movement

## 2022-01-04 NOTE — PROGRESS NOTE ADULT - ATTENDING COMMENTS
Patient seen and examined this afternoon around 12 PM    Patient doing well; OOB to chair; ++ BM daily; . otherwise no new complaints; await Rehab  Auth;   Patient last week accepted to [facility] unable to accept patient at this time due to limitation due to COVID pandemic  Extended dwell again with issues overnight d/w IR and placed PICC today.    Vital Signs Last 24 Hrs  T(C): 36.9 (04 Jan 2022 21:04), Max: 36.9 (04 Jan 2022 21:04)  T(F): 98.5 (04 Jan 2022 21:04), Max: 98.5 (04 Jan 2022 21:04)  HR: 57 (04 Jan 2022 21:04) (48 - 104)  BP: 127/72 (04 Jan 2022 21:04) (107/58 - 137/68)  RR: 20 (04 Jan 2022 21:04) (19 - 20)  SpO2: 100% (04 Jan 2022 21:04) (100% - 100%)    P/E:   Psych: AAO x3  Neuro: No gross focal deficits;   CVS: S1S2 present, regular, no edema  Resp: BLAE+, No wheeze or Rhonchi  GI: Soft, BS+, Non tender, non distended  Extr: No  calf tenderness B/L Lower extremities  Left foot dressing intact, Wound Vac in place  Skin: Warm and moist without any rashes    Labs:                       9.5    4.62  )-----------( 182      ( 04 Jan 2022 06:54 )             30.0   01-04    139  |  105  |  29<H>  ----------------------------<  178<H>  4.7   |  25  |  1.31<H>    Ca    9.4      04 Jan 2022 06:54    Respiratory Viral Panel with COVID-19 by MARQUIS (01.03.22 @ 07:55)   Rapid RVP Result: NotDetec  SARS-CoV-2: NotDetec:     A/P:  MSSA and Strep bacteremia likely related to Left foot Diabetic ulcer with  Left foot OM  Anemia due to acute blood loss and anemia of chronic disease   Afib rate controlled  Type 2 DM controlled with likely Diabetic Neuropathy  PATTY due to infection and dehydration resolved  HTN  HLD    Plan:   Cont Cefazolin IV q 8 hrs and Levofloxacin orally as planned  Bone biopsy from OR did not show OM but Patient with MRI evidence of OM; As d/w ID  agreed biopsy may not have been adequate and patient with bacteremia will need to complete 4 weeks of ABX from negative Cx Last day of Cefazolin 1/23/21 and Levaquin 1/24/21; May extend Merrem by one more day as patient missed couple of doses due to Extended dwell malfunction  Remove Extended dwell after antibiotic is completed;  Podiatry follow up appreciated; Wound vac placed and patient stable for discharge with follow up with Dr. Hart outpatient and delayed closure after few weeks planned; PODIATRY F/U REQUESTED d/w Podiatry resident; WOUND CARE ORDERS PLEASE PLACE ON DISCHARGE  Continue Gabapentin 800 mg TID  +BM; Continue Senna HS; Miralax;   Hb stable, Patient covered for DOACs; Continue Apixaban 5 mg BID; Monitor labs including Hgb and Renal fn and platelets twice weekly on ABX    Wound care orders as per Podiatry:  Pt to have wound vac changed every 2 days including black foam with Tegaderm 4x4s, abd, Alyse and ACE. Pt to be nonwb to Left foot. Pt to keep dressing clean, dry and intact.     PT eval [facility] for PT and IV Antibiotics; Accepted to [facility] but needs a new facility due to logistic reasons; Await Authorization/ Bed   PATIENT MEDICALLY STABLE FOR DISCHARGE FOR CONTINUED WOUND CARE AND PODIATRY FOLLOW UP OUTPATIENT WITH IV ABX AS ABOVE  Case mgmt f/u; d/w ; patient choose alternate facility [facility]   Repeat COVID for [facility] placement NEGATIVE 1/3/21    Discussed with RN Margy and JADEN Lopez  Discussed with patient and updated plan and medically stable for discharge and needed follow up with Podiatry outpatient   I will be away 1/5/21-1/10/22; Hospitalist colleague to assume care.

## 2022-01-04 NOTE — PROGRESS NOTE ADULT - SUBJECTIVE AND OBJECTIVE BOX
NP Note discussed with  Primary Attending; Dr Grijalva     Patient is a 76y old  Male who presents with a chief complaint of AMS (04 Jan 2022 11:24)      INTERVAL HPI/OVERNIGHT EVENTS: Patient seen and examined earlier today. + constipation.  + malfunctioning  left midline.     MEDICATIONS  (STANDING):  apixaban 5 milliGRAM(s) Oral every 12 hours  artificial  tears Solution 1 Drop(s) Both EYES two times a day  ascorbic acid 500 milliGRAM(s) Oral daily  atorvastatin 40 milliGRAM(s) Oral at bedtime  buPROPion XL (24-Hour) . 150 milliGRAM(s) Oral daily  ceFAZolin   IVPB 2000 milliGRAM(s) IV Intermittent every 8 hours  chlorhexidine 2% Cloths 1 Application(s) Topical <User Schedule>  famotidine    Tablet 20 milliGRAM(s) Oral daily  ferrous    sulfate 325 milliGRAM(s) Oral daily  gabapentin 800 milliGRAM(s) Oral three times a day  insulin lispro (ADMELOG) corrective regimen sliding scale   SubCutaneous three times a day before meals  insulin lispro (ADMELOG) corrective regimen sliding scale   SubCutaneous at bedtime  levoFLOXacin  Tablet 750 milliGRAM(s) Oral every 24 hours  lisinopril 2.5 milliGRAM(s) Oral daily  melatonin 5 milliGRAM(s) Oral at bedtime  polyethylene glycol 3350 17 Gram(s) Oral daily  senna 2 Tablet(s) Oral at bedtime    MEDICATIONS  (PRN):  acetaminophen     Tablet .. 650 milliGRAM(s) Oral every 6 hours PRN Temp greater or equal to 38C (100.4F)  melatonin 3 milliGRAM(s) Oral at bedtime PRN Insomnia  oxycodone    5 mG/acetaminophen 325 mG 1 Tablet(s) Oral every 6 hours PRN Moderate Pain  sodium chloride 0.9% lock flush 10 milliLiter(s) IV Push every 1 hour PRN Pre/post blood products, medications, blood draw, and to maintain line patency      __________________________________________________  REVIEW OF SYSTEMS:    CONSTITUTIONAL: No fever,   EYES: no acute visual disturbances  NECK: No pain or stiffness  RESPIRATORY: No cough; No shortness of breath  CARDIOVASCULAR: No chest pain, no palpitations  GASTROINTESTINAL: + constipation ; no pain. No nausea or vomiting; No diarrhea   NEUROLOGICAL: No headache or numbness, no tremors  MUSCULOSKELETAL: No joint pain, no muscle pain  GENITOURINARY: no dysuria, no frequency, no hematuria   ALL OTHER  ROS negative        Vital Signs Last 24 Hrs  T(C): 36.3 (04 Jan 2022 12:47), Max: 36.7 (04 Jan 2022 05:33)  T(F): 97.4 (04 Jan 2022 12:47), Max: 98.1 (04 Jan 2022 05:33)  HR: 104 (04 Jan 2022 12:47) (48 - 104)  BP: 137/68 (04 Jan 2022 12:47) (107/58 - 137/68)  BP(mean): --  RR: 19 (04 Jan 2022 12:47) (18 - 20)  SpO2: 100% (04 Jan 2022 12:47) (100% - 100%)    ________________________________________________  PHYSICAL EXAM:  GENERAL: NAD  HEENT: Normocephalic;  atraumatic   CHEST/LUNG: Breathing nonlabored; clear to auscultation bilaterally  HEART: +S1 +S2  regular  ABDOMEN: Soft, Nontender, Nondistended; Bowel sounds present  EXTREMITIES: no cyanosis; no edema; no calf tenderness  SKIN: warm and dry; Left foot w/ ace wrapped & wound vac in place.    NERVOUS SYSTEM:  Awake and alert; Oriented  to place, person and time ; no new deficits    _________________________________________________  LABS:                        9.5    4.62  )-----------( 182      ( 04 Jan 2022 06:54 )             30.0     01-04    139  |  105  |  29<H>  ----------------------------<  178<H>  4.7   |  25  |  1.31<H>    Ca    9.4      04 Jan 2022 06:54          CAPILLARY BLOOD GLUCOSE      POCT Blood Glucose.: 244 mg/dL (04 Jan 2022 16:35)  POCT Blood Glucose.: 189 mg/dL (04 Jan 2022 11:50)  POCT Blood Glucose.: 194 mg/dL (04 Jan 2022 08:11)  POCT Blood Glucose.: 242 mg/dL (03 Jan 2022 21:54)            Culture - Tissue with Gram Stain (12.23.21 @ 04:15)    Gram Stain:   Rare polymorphonuclear leukocytes seen per low power field  No organisms seen per oil power field    Specimen Source: Bone Left foot bone biopsy culture    Culture Results:   No growth    Culture - Blood in AM (12.22.21 @ 18:46)    Specimen Source: .Blood Blood    Culture Results:   No Growth Final        Culture - Surgical Swab (12.23.21 @ 04:13)    Specimen Source: .Surgical Swab Left foot deep wound culture    Culture Results:   No growth

## 2022-01-04 NOTE — PROGRESS NOTE ADULT - PROBLEM SELECTOR PLAN 1
- Patient with MSSA and Streptococcus bacteremia most likely secondary to left foot cellulitis/abscess  - MR left foot showed Plantar soft tissue wound with tract extending to the undersurface of the first tarsometatarsal articulation. Edema within the base of the first tarsometatarsal articulation may represent sequela of early osteomyelitis. First second and third metatarsal head subchondral fractures.  - Surgical pathology showed Tendon, left foot, excision: Fibro-tendinous tissue with ischemic and degenerative changes. Bone, left foot, biopsy: Bone fragment with resorptive changes and focal myelofibrosis. Negative for acute osteomyelitis  - Bcx from 12/20 methicillin susceptible S. aureus, Streptococcus mitis/oralis group, and Alpha hemolytic strep, ESR and CRP elevated    - Repeat Bcx from 12/22 final no growth  - Off Vancomycin since 12/21, discontinued Unasyn  11/24  - Started on Cefazolin 2 g q8hers and Levaquin 750 mg daily (12/24) as per ID recommendations   - MRI lumbosacral no acute findings   - Echo showed aortic valve leaflets appear thickened, cannot exclude a vegetation. Mild aortic  regurgitation. Normal ventricular function, mild pulmonary hypertension  - ORLY no vegetations /negative for endocarditis   - Cardio Dr Liz on board  - ID Dr Maxwell on board - to be dced  on Cefazolin 2 g q 8hrs for 4 weeks starting from 12/22  - Podiatry on board, s/p I&D (12/23) to be discharged with wound Vac with f/u with Dr Hart for delayed wound closure   - Midline placed by IR 12/30/21 for Cefazolin 12/22/21-1/23/22;--->midline malfunctioning ---> s/p left PICC 1/4   - Monitor for any signs of hemodynamic instability  - Waiting for [facility] placement

## 2022-01-04 NOTE — PROGRESS NOTE ADULT - PROBLEM SELECTOR PLAN 5
- Acute blood loss anemia  - Patient noted to have low hgb 8-9 on admission, now 7>6.7>7.5>6.9>8.2-->9.4-->9.5  - S/p I & D on 12/22 by Podiatry  - Iron panel compatible with [facility] however anemia from blood loss as well  - C/w Ferrous sulfate and vitamin C  - Type and screen, transfuse if hgb <7  - Consent in the chart   - Continue Bowel regimen   - S/p 2 units PRBC (12/23 and 12/24)  - Monitor CBC daily   - Monitor for any signs of bleeding or hemodynamic instability

## 2022-01-04 NOTE — PROGRESS NOTE ADULT - PROBLEM SELECTOR PLAN 4
- Plan as above - Plan as above  - Podiatry following . wound wac in place  - for outpatient f/u with Podiatry for delayed wound closure

## 2022-01-04 NOTE — PROGRESS NOTE ADULT - SUBJECTIVE AND OBJECTIVE BOX
CHIEF COMPLAINT:Patient is a 76y old  Male who presents with a chief complaint of AMS.Pt appears comfortable.    	  REVIEW OF SYSTEMS:  CONSTITUTIONAL: No fever, weight loss, or fatigue  EYES: No eye pain, visual disturbances, or discharge  ENT:  No difficulty hearing, tinnitus, vertigo; No sinus or throat pain  NECK: No pain or stiffness  RESPIRATORY: No cough, wheezing, chills or hemoptysis; No Shortness of Breath  CARDIOVASCULAR: No chest pain, palpitations, passing out, dizziness, or leg swelling  GASTROINTESTINAL: No abdominal or epigastric pain. No nausea, vomiting, or hematemesis; No diarrhea or constipation. No melena or hematochezia.  GENITOURINARY: No dysuria, frequency, hematuria, or incontinence  NEUROLOGICAL: No headaches, memory loss, loss of strength, numbness, or tremors  SKIN: No itching, burning, rashes, or lesions   LYMPH Nodes: No enlarged glands  ENDOCRINE: No heat or cold intolerance; No hair loss  MUSCULOSKELETAL: No joint pain or swelling; No muscle, back, or extremity pain  PSYCHIATRIC: No depression, anxiety, mood swings, or difficulty sleeping  HEME/LYMPH: No easy bruising, or bleeding gums  ALLERGY AND IMMUNOLOGIC: No hives or eczema	      PHYSICAL EXAM:  T(C): 36.7 (01-04-22 @ 05:33), Max: 36.9 (01-03-22 @ 13:46)  HR: 48 (01-04-22 @ 05:33) (48 - 61)  BP: 107/58 (01-04-22 @ 05:33) (107/58 - 135/63)  RR: 20 (01-04-22 @ 05:33) (18 - 20)  SpO2: 100% (01-04-22 @ 05:33) (100% - 100%)  Wt(kg): --  I&O's Summary    03 Jan 2022 07:01  -  04 Jan 2022 07:00  --------------------------------------------------------  IN: 0 mL / OUT: 600 mL / NET: -600 mL        Appearance: Normal	  HEENT:   Normal oral mucosa, PERRL, EOMI	  Lymphatic: No lymphadenopathy  Cardiovascular: Normal S1 S2, No JVD, No murmurs, No edema  Respiratory: Lungs clear to auscultation	  Psychiatry: A & O x 3, Mood & affect appropriate  Gastrointestinal:  Soft, Non-tender, + BS	  Skin: No rashes, No ecchymoses, No cyanosis	  Neurologic: Non-focal  Extremities: Normal range of motion, No clubbing, cyanosis or edema      MEDICATIONS  (STANDING):  apixaban 5 milliGRAM(s) Oral every 12 hours  artificial  tears Solution 1 Drop(s) Both EYES two times a day  ascorbic acid 500 milliGRAM(s) Oral daily  atorvastatin 40 milliGRAM(s) Oral at bedtime  bisacodyl 5 milliGRAM(s) Oral once  buPROPion XL (24-Hour) . 150 milliGRAM(s) Oral daily  ceFAZolin   IVPB 2000 milliGRAM(s) IV Intermittent every 8 hours  chlorhexidine 2% Cloths 1 Application(s) Topical <User Schedule>  famotidine    Tablet 20 milliGRAM(s) Oral daily  ferrous    sulfate 325 milliGRAM(s) Oral daily  gabapentin 800 milliGRAM(s) Oral three times a day  insulin lispro (ADMELOG) corrective regimen sliding scale   SubCutaneous at bedtime  insulin lispro (ADMELOG) corrective regimen sliding scale   SubCutaneous three times a day before meals  levoFLOXacin  Tablet 750 milliGRAM(s) Oral every 24 hours  lisinopril 2.5 milliGRAM(s) Oral daily  melatonin 5 milliGRAM(s) Oral at bedtime  polyethylene glycol 3350 17 Gram(s) Oral daily  senna 2 Tablet(s) Oral at bedtime        	  LABS:	 	                      9.5    4.62  )-----------( 182      ( 04 Jan 2022 06:54 )             30.0     01-04    139  |  105  |  29<H>  ----------------------------<  178<H>  4.7   |  25  |  1.31<H>    Ca    9.4      04 Jan 2022 06:54

## 2022-01-04 NOTE — PROGRESS NOTE ADULT - NSPROGADDITIONALINFOA_GEN_ALL_CORE
Discharge planning  PRINCE--> Pending auth Discharge planning  PR[facility]CE--> Pending auth  To be discharged w/ wound vac in place w/ outpatient Podiatry f/u   Covid neg 1/3

## 2022-01-04 NOTE — PROGRESS NOTE ADULT - PROBLEM SELECTOR PLAN 9
hx of DM  Holding home po medications   A1C 6.2;  - Continue with admelog insulin SS  - blood glucose checks before meals and at bedtime  - DASH diet with consistent carb  - Continue to treat  infection  - Target -180

## 2022-01-04 NOTE — PROCEDURE NOTE - PROCEDURE FINDINGS AND DETAILS
41 cm 4Fr  PICC inserted via the left brachial  vein.  Sterile dressing applied.  Tip location SVC/ RA Junction.

## 2022-01-04 NOTE — PROGRESS NOTE ADULT - PROBLEM SELECTOR PLAN 3
- Patient with cellulitis of left foot and Charcot foot in diabetes   - Surgical swab 12/20 of left foot wound with Alpha hemolytic strep, S. aureus, Citrobacter koseri, Veillonella parvula.  - Off Vancomycin since 12/21, discontinued Unasyn  11/24  - Started on Cefazolin 2 g q 8 hrs and Levaquin 750 mg daily (12/24) as per ID recommendations  - ID Dr Maxwell on board - to be  dced on Cefazolin 2 g q 8hrs for 4 weeks starting from 12/22 - 1/19/22  - Podiatry on board, s/p I&D (12/23)  - PT recommended [facility]-->await auth

## 2022-01-05 LAB
ANION GAP SERPL CALC-SCNC: 7 MMOL/L — SIGNIFICANT CHANGE UP (ref 5–17)
BUN SERPL-MCNC: 29 MG/DL — HIGH (ref 7–18)
CALCIUM SERPL-MCNC: 9 MG/DL — SIGNIFICANT CHANGE UP (ref 8.4–10.5)
CHLORIDE SERPL-SCNC: 104 MMOL/L — SIGNIFICANT CHANGE UP (ref 96–108)
CO2 SERPL-SCNC: 27 MMOL/L — SIGNIFICANT CHANGE UP (ref 22–31)
CREAT SERPL-MCNC: 1.36 MG/DL — HIGH (ref 0.5–1.3)
GLUCOSE BLDC GLUCOMTR-MCNC: 190 MG/DL — HIGH (ref 70–99)
GLUCOSE BLDC GLUCOMTR-MCNC: 203 MG/DL — HIGH (ref 70–99)
GLUCOSE BLDC GLUCOMTR-MCNC: 225 MG/DL — HIGH (ref 70–99)
GLUCOSE BLDC GLUCOMTR-MCNC: 259 MG/DL — HIGH (ref 70–99)
GLUCOSE SERPL-MCNC: 184 MG/DL — HIGH (ref 70–99)
HCT VFR BLD CALC: 30.5 % — LOW (ref 39–50)
HGB BLD-MCNC: 9.8 G/DL — LOW (ref 13–17)
MCHC RBC-ENTMCNC: 28.2 PG — SIGNIFICANT CHANGE UP (ref 27–34)
MCHC RBC-ENTMCNC: 32.1 GM/DL — SIGNIFICANT CHANGE UP (ref 32–36)
MCV RBC AUTO: 87.6 FL — SIGNIFICANT CHANGE UP (ref 80–100)
NRBC # BLD: 0 /100 WBCS — SIGNIFICANT CHANGE UP (ref 0–0)
PLATELET # BLD AUTO: 176 K/UL — SIGNIFICANT CHANGE UP (ref 150–400)
POTASSIUM SERPL-MCNC: 4.6 MMOL/L — SIGNIFICANT CHANGE UP (ref 3.5–5.3)
POTASSIUM SERPL-SCNC: 4.6 MMOL/L — SIGNIFICANT CHANGE UP (ref 3.5–5.3)
RBC # BLD: 3.48 M/UL — LOW (ref 4.2–5.8)
RBC # FLD: 14.5 % — SIGNIFICANT CHANGE UP (ref 10.3–14.5)
SODIUM SERPL-SCNC: 138 MMOL/L — SIGNIFICANT CHANGE UP (ref 135–145)
WBC # BLD: 3.91 K/UL — SIGNIFICANT CHANGE UP (ref 3.8–10.5)
WBC # FLD AUTO: 3.91 K/UL — SIGNIFICANT CHANGE UP (ref 3.8–10.5)

## 2022-01-05 PROCEDURE — 99232 SBSQ HOSP IP/OBS MODERATE 35: CPT

## 2022-01-05 RX ADMIN — Medication 3: at 11:34

## 2022-01-05 RX ADMIN — ATORVASTATIN CALCIUM 40 MILLIGRAM(S): 80 TABLET, FILM COATED ORAL at 22:57

## 2022-01-05 RX ADMIN — Medication 100 MILLIGRAM(S): at 13:03

## 2022-01-05 RX ADMIN — POLYETHYLENE GLYCOL 3350 17 GRAM(S): 17 POWDER, FOR SOLUTION ORAL at 11:31

## 2022-01-05 RX ADMIN — Medication 2: at 17:27

## 2022-01-05 RX ADMIN — CHLORHEXIDINE GLUCONATE 1 APPLICATION(S): 213 SOLUTION TOPICAL at 06:26

## 2022-01-05 RX ADMIN — OXYCODONE AND ACETAMINOPHEN 1 TABLET(S): 5; 325 TABLET ORAL at 23:07

## 2022-01-05 RX ADMIN — SENNA PLUS 2 TABLET(S): 8.6 TABLET ORAL at 22:57

## 2022-01-05 RX ADMIN — Medication 1: at 08:08

## 2022-01-05 RX ADMIN — Medication 1 DROP(S): at 17:27

## 2022-01-05 RX ADMIN — Medication 1 DROP(S): at 06:27

## 2022-01-05 RX ADMIN — Medication 500 MILLIGRAM(S): at 11:29

## 2022-01-05 RX ADMIN — Medication 100 MILLIGRAM(S): at 23:07

## 2022-01-05 RX ADMIN — Medication 3 MILLIGRAM(S): at 22:57

## 2022-01-05 RX ADMIN — Medication 100 MILLIGRAM(S): at 06:18

## 2022-01-05 RX ADMIN — Medication 5 MILLIGRAM(S): at 22:58

## 2022-01-05 RX ADMIN — APIXABAN 5 MILLIGRAM(S): 2.5 TABLET, FILM COATED ORAL at 06:18

## 2022-01-05 RX ADMIN — BUPROPION HYDROCHLORIDE 150 MILLIGRAM(S): 150 TABLET, EXTENDED RELEASE ORAL at 11:30

## 2022-01-05 RX ADMIN — FAMOTIDINE 20 MILLIGRAM(S): 10 INJECTION INTRAVENOUS at 11:30

## 2022-01-05 RX ADMIN — GABAPENTIN 800 MILLIGRAM(S): 400 CAPSULE ORAL at 06:27

## 2022-01-05 RX ADMIN — CHLORHEXIDINE GLUCONATE 1 APPLICATION(S): 213 SOLUTION TOPICAL at 11:34

## 2022-01-05 RX ADMIN — GABAPENTIN 800 MILLIGRAM(S): 400 CAPSULE ORAL at 23:07

## 2022-01-05 RX ADMIN — LISINOPRIL 2.5 MILLIGRAM(S): 2.5 TABLET ORAL at 06:27

## 2022-01-05 RX ADMIN — Medication 325 MILLIGRAM(S): at 11:29

## 2022-01-05 RX ADMIN — GABAPENTIN 800 MILLIGRAM(S): 400 CAPSULE ORAL at 13:03

## 2022-01-05 RX ADMIN — APIXABAN 5 MILLIGRAM(S): 2.5 TABLET, FILM COATED ORAL at 17:27

## 2022-01-05 NOTE — PROGRESS NOTE ADULT - ASSESSMENT
76 year-old male has past medical history of atrial fibrillation, hypertension, hyperlipidemia, diabetes mellitus was admitted to medicine for AMS and left diabetic foot ulcer followed by ID and podiatry.  Pt is S/P bedside I&D (12/20/21), S/P OR I&D (12/23/21).  Pt had a LUE single lumen picc placed on 1/4 to complete Cefazolin 2 g q 8hrs for 4 weeks starting from 12/22 - 1/19/22.  Pt is otherwise stable for D/C, pending auth for wound vac.

## 2022-01-05 NOTE — PROGRESS NOTE ADULT - SUBJECTIVE AND OBJECTIVE BOX
CHIEF COMPLAINT:Patient is a 76y old  Male who presents with a chief complaint of AMS.Pt appears comfortable.    	  REVIEW OF SYSTEMS:  CONSTITUTIONAL: No fever, weight loss, or fatigue  EYES: No eye pain, visual disturbances, or discharge  ENT:  No difficulty hearing, tinnitus, vertigo; No sinus or throat pain  NECK: No pain or stiffness  RESPIRATORY: No cough, wheezing, chills or hemoptysis; No Shortness of Breath  CARDIOVASCULAR: No chest pain, palpitations, passing out, dizziness, or leg swelling  GASTROINTESTINAL: No abdominal or epigastric pain. No nausea, vomiting, or hematemesis; No diarrhea or constipation. No melena or hematochezia.  GENITOURINARY: No dysuria, frequency, hematuria, or incontinence  NEUROLOGICAL: No headaches, memory loss, loss of strength, numbness, or tremors  SKIN: No itching, burning, rashes, or lesions   LYMPH Nodes: No enlarged glands  ENDOCRINE: No heat or cold intolerance; No hair loss  MUSCULOSKELETAL: No joint pain or swelling; No muscle, back, or extremity pain  PSYCHIATRIC: No depression, anxiety, mood swings, or difficulty sleeping  HEME/LYMPH: No easy bruising, or bleeding gums  ALLERGY AND IMMUNOLOGIC: No hives or eczema	        PHYSICAL EXAM:  T(C): 37.1 (01-05-22 @ 12:39), Max: 37.2 (01-05-22 @ 05:29)  HR: 63 (01-05-22 @ 12:39) (56 - 104)  BP: 122/72 (01-05-22 @ 12:39) (116/75 - 137/68)  RR: 18 (01-05-22 @ 12:39) (18 - 20)  SpO2: 99% (01-05-22 @ 12:39) (98% - 100%)        Appearance: Normal	  HEENT:   Normal oral mucosa, PERRL, EOMI	  Lymphatic: No lymphadenopathy  Cardiovascular: Normal S1 S2, No JVD, No murmurs, No edema  Respiratory: Lungs clear to auscultation	  Psychiatry: A & O x 3, Mood & affect appropriate  Gastrointestinal:  Soft, Non-tender, + BS	  Skin: No rashes, No ecchymoses, No cyanosis	  Neurologic: Non-focal  Extremities: Normal range of motion, No clubbing, cyanosis or edema  Vascular: Peripheral pulses palpable 2+ bilaterally    MEDICATIONS  (STANDING):  apixaban 5 milliGRAM(s) Oral every 12 hours  artificial  tears Solution 1 Drop(s) Both EYES two times a day  ascorbic acid 500 milliGRAM(s) Oral daily  atorvastatin 40 milliGRAM(s) Oral at bedtime  buPROPion XL (24-Hour) . 150 milliGRAM(s) Oral daily  ceFAZolin   IVPB 2000 milliGRAM(s) IV Intermittent every 8 hours  chlorhexidine 2% Cloths 1 Application(s) Topical daily  chlorhexidine 2% Cloths 1 Application(s) Topical <User Schedule>  famotidine    Tablet 20 milliGRAM(s) Oral daily  ferrous    sulfate 325 milliGRAM(s) Oral daily  gabapentin 800 milliGRAM(s) Oral three times a day  insulin lispro (ADMELOG) corrective regimen sliding scale   SubCutaneous three times a day before meals  insulin lispro (ADMELOG) corrective regimen sliding scale   SubCutaneous at bedtime  levoFLOXacin  Tablet 750 milliGRAM(s) Oral every 24 hours  lisinopril 2.5 milliGRAM(s) Oral daily  melatonin 5 milliGRAM(s) Oral at bedtime  polyethylene glycol 3350 17 Gram(s) Oral daily  senna 2 Tablet(s) Oral at bedtime      	  	  LABS:	 	                       9.8    3.91  )-----------( 176      ( 05 Jan 2022 08:21 )             30.5     01-05    138  |  104  |  29<H>  ----------------------------<  184<H>  4.6   |  27  |  1.36<H>    Ca    9.0      05 Jan 2022 08:21

## 2022-01-05 NOTE — PROGRESS NOTE ADULT - SUBJECTIVE AND OBJECTIVE BOX
NP Note discussed with  Primary Attending    Patient is a 76y old  Male who presents with a chief complaint of AMS (05 Jan 2022 12:42)      INTERVAL HPI/OVERNIGHT EVENTS: no new complaints    MEDICATIONS  (STANDING):  apixaban 5 milliGRAM(s) Oral every 12 hours  artificial  tears Solution 1 Drop(s) Both EYES two times a day  ascorbic acid 500 milliGRAM(s) Oral daily  atorvastatin 40 milliGRAM(s) Oral at bedtime  buPROPion XL (24-Hour) . 150 milliGRAM(s) Oral daily  ceFAZolin   IVPB 2000 milliGRAM(s) IV Intermittent every 8 hours  chlorhexidine 2% Cloths 1 Application(s) Topical daily  chlorhexidine 2% Cloths 1 Application(s) Topical <User Schedule>  famotidine    Tablet 20 milliGRAM(s) Oral daily  ferrous    sulfate 325 milliGRAM(s) Oral daily  gabapentin 800 milliGRAM(s) Oral three times a day  insulin lispro (ADMELOG) corrective regimen sliding scale   SubCutaneous three times a day before meals  insulin lispro (ADMELOG) corrective regimen sliding scale   SubCutaneous at bedtime  levoFLOXacin  Tablet 750 milliGRAM(s) Oral every 24 hours  lisinopril 2.5 milliGRAM(s) Oral daily  melatonin 5 milliGRAM(s) Oral at bedtime  polyethylene glycol 3350 17 Gram(s) Oral daily  senna 2 Tablet(s) Oral at bedtime    MEDICATIONS  (PRN):  acetaminophen     Tablet .. 650 milliGRAM(s) Oral every 6 hours PRN Temp greater or equal to 38C (100.4F)  melatonin 3 milliGRAM(s) Oral at bedtime PRN Insomnia  oxycodone    5 mG/acetaminophen 325 mG 1 Tablet(s) Oral every 6 hours PRN Moderate Pain (4 - 6)  sodium chloride 0.9% lock flush 10 milliLiter(s) IV Push every 1 hour PRN Pre/post blood products, medications, blood draw, and to maintain line patency      __________________________________________________  REVIEW OF SYSTEMS:    CONSTITUTIONAL: No fever,   EYES: no acute visual disturbances  NECK: No pain or stiffness  RESPIRATORY: No cough; No shortness of breath  CARDIOVASCULAR: No chest pain, no palpitations  GASTROINTESTINAL: No pain. No nausea or vomiting; No diarrhea   NEUROLOGICAL: No headache or numbness, no tremors  MUSCULOSKELETAL: No joint pain, no muscle pain  GENITOURINARY: no dysuria, no frequency, no hesitancy  PSYCHIATRY: no depression , no anxiety  ALL OTHER  ROS negative        Vital Signs Last 24 Hrs  T(C): 37.1 (05 Jan 2022 12:39), Max: 37.2 (05 Jan 2022 05:29)  T(F): 98.7 (05 Jan 2022 12:39), Max: 98.9 (05 Jan 2022 05:29)  HR: 63 (05 Jan 2022 12:39) (56 - 63)  BP: 122/72 (05 Jan 2022 12:39) (116/75 - 127/72)  BP(mean): --  RR: 18 (05 Jan 2022 12:39) (18 - 20)  SpO2: 99% (05 Jan 2022 12:39) (98% - 100%)    ________________________________________________  PHYSICAL EXAM:  GENERAL: NAD  HEENT: Normocephalic;  conjunctivae and sclerae clear; moist mucous membranes;   NECK : supple  CHEST/LUNG: Clear to auscultation bilaterally with good air entry   HEART: S1 S2  regular; no murmurs, gallops or rubs  ABDOMEN: Soft, Nontender, Nondistended; Bowel sounds present  EXTREMITIES: LUE single lumen picc line, site w/o early signs of infection, left foot dressing in place, wound vac intact and functioning, able to wiggle his toes, no cyanosis; no edema; no calf tenderness  SKIN: warm and dry; no rash  NERVOUS SYSTEM:  Awake and alert; Oriented  to place, person and time ; no new deficits    _________________________________________________  LABS:                        9.8    3.91  )-----------( 176      ( 05 Jan 2022 08:21 )             30.5     01-05    138  |  104  |  29<H>  ----------------------------<  184<H>  4.6   |  27  |  1.36<H>    Ca    9.0      05 Jan 2022 08:21          CAPILLARY BLOOD GLUCOSE      POCT Blood Glucose.: 259 mg/dL (05 Jan 2022 11:10)  POCT Blood Glucose.: 190 mg/dL (05 Jan 2022 07:44)  POCT Blood Glucose.: 193 mg/dL (04 Jan 2022 21:10)  POCT Blood Glucose.: 244 mg/dL (04 Jan 2022 16:35)        RADIOLOGY & ADDITIONAL TESTS:    Imaging  Reviewed:  YES/NO    Consultant(s) Notes Reviewed:   YES/ No      Plan of care was discussed with patient and /or primary care giver; all questions and concerns were addressed

## 2022-01-05 NOTE — PROGRESS NOTE ADULT - PROBLEM SELECTOR PLAN 3
- Patient with cellulitis of left foot and Charcot foot in diabetes   - Surgical swab 12/20 of left foot wound with Alpha hemolytic strep, S. aureus, Citrobacter koseri, Veillonella parvula.  - Off Vancomycin since 12/21, discontinued Unasyn  11/24  - Started on Cefazolin 2 g q 8 hrs and Levaquin 750 mg daily (12/24) as per ID recommendations  - ID Dr Maxwell on board - to be  dced on Cefazolin 2 g q 8hrs for 4 weeks starting from 12/22 - 1/19/22  - Podiatry on board, s/p I&D (12/23)  - PT recommended [facility]-->await auth for wound vac

## 2022-01-05 NOTE — PROGRESS NOTE ADULT - PROBLEM SELECTOR PLAN 4
- Plan as above  - Podiatry following . wound wac in place  - for outpatient f/u with Podiatry for delayed wound closure

## 2022-01-06 ENCOUNTER — TRANSCRIPTION ENCOUNTER (OUTPATIENT)
Age: 77
End: 2022-01-06

## 2022-01-06 DIAGNOSIS — R78.81 BACTEREMIA: ICD-10-CM

## 2022-01-06 LAB
ANION GAP SERPL CALC-SCNC: 4 MMOL/L — LOW (ref 5–17)
BUN SERPL-MCNC: 29 MG/DL — HIGH (ref 7–18)
CALCIUM SERPL-MCNC: 9.3 MG/DL — SIGNIFICANT CHANGE UP (ref 8.4–10.5)
CHLORIDE SERPL-SCNC: 105 MMOL/L — SIGNIFICANT CHANGE UP (ref 96–108)
CO2 SERPL-SCNC: 29 MMOL/L — SIGNIFICANT CHANGE UP (ref 22–31)
CREAT SERPL-MCNC: 1.42 MG/DL — HIGH (ref 0.5–1.3)
GLUCOSE BLDC GLUCOMTR-MCNC: 165 MG/DL — HIGH (ref 70–99)
GLUCOSE BLDC GLUCOMTR-MCNC: 210 MG/DL — HIGH (ref 70–99)
GLUCOSE BLDC GLUCOMTR-MCNC: 228 MG/DL — HIGH (ref 70–99)
GLUCOSE BLDC GLUCOMTR-MCNC: 293 MG/DL — HIGH (ref 70–99)
GLUCOSE SERPL-MCNC: 159 MG/DL — HIGH (ref 70–99)
HCT VFR BLD CALC: 30.8 % — LOW (ref 39–50)
HGB BLD-MCNC: 9.6 G/DL — LOW (ref 13–17)
MCHC RBC-ENTMCNC: 27.7 PG — SIGNIFICANT CHANGE UP (ref 27–34)
MCHC RBC-ENTMCNC: 31.2 GM/DL — LOW (ref 32–36)
MCV RBC AUTO: 89 FL — SIGNIFICANT CHANGE UP (ref 80–100)
NRBC # BLD: 0 /100 WBCS — SIGNIFICANT CHANGE UP (ref 0–0)
PLATELET # BLD AUTO: 171 K/UL — SIGNIFICANT CHANGE UP (ref 150–400)
POTASSIUM SERPL-MCNC: 4.2 MMOL/L — SIGNIFICANT CHANGE UP (ref 3.5–5.3)
POTASSIUM SERPL-SCNC: 4.2 MMOL/L — SIGNIFICANT CHANGE UP (ref 3.5–5.3)
RBC # BLD: 3.46 M/UL — LOW (ref 4.2–5.8)
RBC # FLD: 14.2 % — SIGNIFICANT CHANGE UP (ref 10.3–14.5)
SODIUM SERPL-SCNC: 138 MMOL/L — SIGNIFICANT CHANGE UP (ref 135–145)
WBC # BLD: 3.89 K/UL — SIGNIFICANT CHANGE UP (ref 3.8–10.5)
WBC # FLD AUTO: 3.89 K/UL — SIGNIFICANT CHANGE UP (ref 3.8–10.5)

## 2022-01-06 PROCEDURE — 99239 HOSP IP/OBS DSCHRG MGMT >30: CPT

## 2022-01-06 RX ADMIN — CHLORHEXIDINE GLUCONATE 1 APPLICATION(S): 213 SOLUTION TOPICAL at 06:52

## 2022-01-06 RX ADMIN — Medication 3: at 11:20

## 2022-01-06 RX ADMIN — CHLORHEXIDINE GLUCONATE 1 APPLICATION(S): 213 SOLUTION TOPICAL at 11:06

## 2022-01-06 RX ADMIN — GABAPENTIN 800 MILLIGRAM(S): 400 CAPSULE ORAL at 22:06

## 2022-01-06 RX ADMIN — Medication 325 MILLIGRAM(S): at 11:05

## 2022-01-06 RX ADMIN — OXYCODONE AND ACETAMINOPHEN 1 TABLET(S): 5; 325 TABLET ORAL at 22:03

## 2022-01-06 RX ADMIN — OXYCODONE AND ACETAMINOPHEN 1 TABLET(S): 5; 325 TABLET ORAL at 00:15

## 2022-01-06 RX ADMIN — Medication 2: at 16:50

## 2022-01-06 RX ADMIN — Medication 5 MILLIGRAM(S): at 22:03

## 2022-01-06 RX ADMIN — APIXABAN 5 MILLIGRAM(S): 2.5 TABLET, FILM COATED ORAL at 07:00

## 2022-01-06 RX ADMIN — GABAPENTIN 800 MILLIGRAM(S): 400 CAPSULE ORAL at 05:27

## 2022-01-06 RX ADMIN — FAMOTIDINE 20 MILLIGRAM(S): 10 INJECTION INTRAVENOUS at 11:05

## 2022-01-06 RX ADMIN — Medication 500 MILLIGRAM(S): at 11:05

## 2022-01-06 RX ADMIN — APIXABAN 5 MILLIGRAM(S): 2.5 TABLET, FILM COATED ORAL at 17:01

## 2022-01-06 RX ADMIN — Medication 100 MILLIGRAM(S): at 07:03

## 2022-01-06 RX ADMIN — SENNA PLUS 2 TABLET(S): 8.6 TABLET ORAL at 22:03

## 2022-01-06 RX ADMIN — BUPROPION HYDROCHLORIDE 150 MILLIGRAM(S): 150 TABLET, EXTENDED RELEASE ORAL at 11:05

## 2022-01-06 RX ADMIN — OXYCODONE AND ACETAMINOPHEN 1 TABLET(S): 5; 325 TABLET ORAL at 22:33

## 2022-01-06 RX ADMIN — ATORVASTATIN CALCIUM 40 MILLIGRAM(S): 80 TABLET, FILM COATED ORAL at 22:03

## 2022-01-06 RX ADMIN — Medication 100 MILLIGRAM(S): at 14:28

## 2022-01-06 RX ADMIN — Medication 1: at 07:57

## 2022-01-06 RX ADMIN — Medication 1 DROP(S): at 07:01

## 2022-01-06 RX ADMIN — GABAPENTIN 800 MILLIGRAM(S): 400 CAPSULE ORAL at 14:28

## 2022-01-06 RX ADMIN — Medication 100 MILLIGRAM(S): at 22:03

## 2022-01-06 RX ADMIN — POLYETHYLENE GLYCOL 3350 17 GRAM(S): 17 POWDER, FOR SOLUTION ORAL at 11:06

## 2022-01-06 NOTE — PROGRESS NOTE ADULT - PROBLEM SELECTOR PLAN 12
DVT: on Eliquis for Afib  GI: continue famotidine
DVT: on Eliquis for Afib  GI: continue famotidine
IMPROVE VTE Individual Risk Assessment  RISK                                                                Points  [  ] Previous VTE                                                  3  [  ] Thrombophilia                                               2  [  ] Lower limb paralysis                                      2        (unable to hold up >15 seconds)    [  ] Current Cancer                                              2         (within 6 months)  [  ] Immobilization > 24 hrs                                1  [  ] ICU/CCU stay > 24 hours                              1  [  ] Age > 60                                                      1  IMPROVE VTE Score _________    PPI for GI prophylaxis  C/w Eliquis full AC
DVT: on Eliquis for Afib  GI: continue famotidine
IMPROVE VTE Individual Risk Assessment  RISK                                                                Points  [  ] Previous VTE                                                  3  [  ] Thrombophilia                                               2  [  ] Lower limb paralysis                                      2        (unable to hold up >15 seconds)    [  ] Current Cancer                                              2         (within 6 months)  [  ] Immobilization > 24 hrs                                1  [  ] ICU/CCU stay > 24 hours                              1  [  ] Age > 60                                                      1  IMPROVE VTE Score _________    PPI for GI prophylaxis
DVT: on Eliquis for Afib  GI: continue famotidine

## 2022-01-06 NOTE — PROGRESS NOTE ADULT - SUBJECTIVE AND OBJECTIVE BOX
Patient is a 76y old  Male who presents with a chief complaint of AMS (06 Jan 2022 11:59)    INTERVAL HPI/OVERNIGHT EVENTS: NO nex acute events overnight    REVIEW OF SYSTEMS:  CONSTITUTIONAL: No fever, chills  ENMT:  No difficulty hearing, no change in vision  NECK: No pain or stiffness  RESPIRATORY: No cough, SOB  CARDIOVASCULAR: No chest pain, palpitations  GASTROINTESTINAL: No abdominal pain. No nausea, vomiting, or diarrhea  GENITOURINARY: No dysuria  NEUROLOGICAL: No HA  SKIN: No itching, burning, rashes, or lesions   LYMPH NODES: No enlarged glands  ENDOCRINE: No heat or cold intolerance; No hair loss  MUSCULOSKELETAL: No joint pain or swelling; No muscle, back, or extremity pain  PSYCHIATRIC: No depression, anxiety  HEME/LYMPH: No easy bruising, or bleeding gums    T(C): 36.5 (01-06-22 @ 06:50), Max: 37.2 (01-05-22 @ 21:30)  HR: 58 (01-06-22 @ 06:50) (56 - 63)  BP: 108/64 (01-06-22 @ 06:50) (108/64 - 123/60)  RR: 18 (01-06-22 @ 06:50) (18 - 19)  SpO2: 96% (01-06-22 @ 06:50) (96% - 100%)  Wt(kg): --Vital Signs Last 24 Hrs  T(C): 36.5 (06 Jan 2022 06:50), Max: 37.2 (05 Jan 2022 21:30)  T(F): 97.7 (06 Jan 2022 06:50), Max: 98.9 (05 Jan 2022 21:30)  HR: 58 (06 Jan 2022 06:50) (56 - 63)  BP: 108/64 (06 Jan 2022 06:50) (108/64 - 123/60)  BP(mean): 74 (05 Jan 2022 21:30) (74 - 74)  RR: 18 (06 Jan 2022 06:50) (18 - 19)  SpO2: 96% (06 Jan 2022 06:50) (96% - 100%)    PHYSICAL EXAM:  GENERAL: NAD  EYES: clear conjunctiva; EOMI  ENMT: Moist mucous membranes  NECK: Supple, No JVD, Normal thyroid  CHEST/LUNG: Clear to auscultation bilaterally; No rales, rhonchi, wheezing, or rubs  HEART: S1, S2, Regular rate and rhythm  ABDOMEN: Soft, Nontender, Nondistended; Bowel sounds present  NEURO: Alert & Oriented X3  EXTREMITIES: No LE edema, no calf tenderness  LYMPH: No lymphadenopathy noted  SKIN: No rashes or lesions    Consultant(s) Notes Reviewed:  [x ] YES  [ ] NO  Care Discussed with Consultants/Other Providers [ x] YES  [ ] NO    MEDICATIONS  (STANDING):  apixaban 5 milliGRAM(s) Oral every 12 hours  artificial  tears Solution 1 Drop(s) Both EYES two times a day  ascorbic acid 500 milliGRAM(s) Oral daily  atorvastatin 40 milliGRAM(s) Oral at bedtime  buPROPion XL (24-Hour) . 150 milliGRAM(s) Oral daily  ceFAZolin   IVPB 2000 milliGRAM(s) IV Intermittent every 8 hours  chlorhexidine 2% Cloths 1 Application(s) Topical daily  chlorhexidine 2% Cloths 1 Application(s) Topical <User Schedule>  famotidine    Tablet 20 milliGRAM(s) Oral daily  ferrous    sulfate 325 milliGRAM(s) Oral daily  gabapentin 800 milliGRAM(s) Oral three times a day  insulin lispro (ADMELOG) corrective regimen sliding scale   SubCutaneous three times a day before meals  insulin lispro (ADMELOG) corrective regimen sliding scale   SubCutaneous at bedtime  levoFLOXacin  Tablet 750 milliGRAM(s) Oral every 24 hours  melatonin 5 milliGRAM(s) Oral at bedtime  polyethylene glycol 3350 17 Gram(s) Oral daily  senna 2 Tablet(s) Oral at bedtime    MEDICATIONS  (PRN):  acetaminophen     Tablet .. 650 milliGRAM(s) Oral every 6 hours PRN Temp greater or equal to 38C (100.4F)  melatonin 3 milliGRAM(s) Oral at bedtime PRN Insomnia  oxycodone    5 mG/acetaminophen 325 mG 1 Tablet(s) Oral every 6 hours PRN Moderate Pain (4 - 6)  sodium chloride 0.9% lock flush 10 milliLiter(s) IV Push every 1 hour PRN Pre/post blood products, medications, blood draw, and to maintain line patency      LABS:                        9.6    3.89  )-----------( 171      ( 06 Jan 2022 07:24 )             30.8     01-06    138  |  105  |  29<H>  ----------------------------<  159<H>  4.2   |  29  |  1.42<H>    Ca    9.3      06 Jan 2022 07:24        CAPILLARY BLOOD GLUCOSE  POCT Blood Glucose.: 293 mg/dL (06 Jan 2022 11:18)  POCT Blood Glucose.: 165 mg/dL (06 Jan 2022 07:49)  POCT Blood Glucose.: 203 mg/dL (05 Jan 2022 21:44)  POCT Blood Glucose.: 225 mg/dL (05 Jan 2022 17:02)    RADIOLOGY & ADDITIONAL TESTS:    Imaging Personally Reviewed:  [ x] YES  [ ] NO  < from: Xray Foot AP + Lateral, Left (12.23.21 @ 13:39) >    ACC: [id number] EXAM:  XR FOOT 2 VIEWS LT                          PROCEDURE DATE:  12/23/2021          INTERPRETATION:  LEFT foot    CLINICAL INFORMATION: Postoperative radiographs.  Comparison: LEFT foot radiographs 12/19/2021  TECHNIQUE: AP,lateral and oblique views.    FINDINGS:  Status post incision and drainage of the LEFT foot abscess on plantar   surface. 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  No acute fracture.  No gross radiographic evidence of osteomyelitis.      IMPRESSION:    Status post incision and drainage of LEFT foot abscess.  Please see MRI LEFT foot 12/20/2021.    --- End of Report ---            RUTH BLACKMAN MD; Attending Radiologist  This document has been electronically signed. Dec 24 2021 10:48AM    < end of copied text >  < from: MR Lumbar Spine No Cont (12.21.21 @ 18:36) >    ACC: [id number] EXAM:  MR SPINE LUMBAR                          PROCEDURE DATE:  12/21/2021          INTERPRETATION:  Clinical indication: Bacteremia. Back pain.    MRI of the lumbar spine was performed using sagittal T1-T2 and STIR   sequence. AxialT1-T2 and coronal T1-weighted sequence was performed.    Loss of the normal lumbar lordosis is seen. This could be due to   positioning or muscle spasm.    There is evidence of a grade 1 anterolisthesis seen involving L5 and S1   with bilateral lysisdefects suspected. This can be confirmed with plain   film.    Disc desiccation is seen involving the L1-2, L3-4 and L5-S1 levels. These   findings are secondary to chronic degenerative change    T12-L1: Normal.    L1-2: Bilateral hypertrophic facet joint change are seen. Moderate   narrowing of both neural foramen.    L2-3: Bilateral hypertrophic facet joint changes. No significant commas   of the spinal canal or either neural foramen.    L3-4: Disc bulge and bilateral hypertrophic facet joint changes. Mild   narrowing of the spinal canal. Moderate narrowing of both neural foramen    L4-5: Disc bulge and bilateral hypertrophic facet changes. No significant   commas of the spinal canal or either neural foramen    L5-S1: Bilateral hypertrophicfacet changes are seen. Severe narrowing of   both neural foramen    The conus ends at L2 and appears normal    Evaluation of the paraspinal soft tissues is limited by lack of IV   contrast though grossly normal    IMPRESSION: Degenerative changes described above    Grade 1 anterolisthesis seen involving L5-S1 level with associated lysis   defects suspected.    --- End of Report ---            KIM LORENZO MD; Attending Radiologist  This document has been electronically signed. Dec 22 2021  9:33AM    < end of copied text >  < from: MR Lumbar Spine No Cont (12.21.21 @ 18:36) >    ACC: [id number] EXAM:  MR SPINE LUMBAR                          PROCEDURE DATE:  12/21/2021          INTERPRETATION:  Clinical indication: Bacteremia. Back pain.    MRI of the lumbar spine was performed using sagittal T1-T2 and STIR   sequence. AxialT1-T2 and coronal T1-weighted sequence was performed.    Loss of the normal lumbar lordosis is seen. This could be due to   positioning or muscle spasm.    There is evidence of a grade 1 anterolisthesis seen involving L5 and S1   with bilateral lysisdefects suspected. This can be confirmed with plain   film.    Disc desiccation is seen involving the L1-2, L3-4 and L5-S1 levels. These   findings are secondary to chronic degenerative change    T12-L1: Normal.    L1-2: Bilateral hypertrophic facet joint change are seen. Moderate   narrowing of both neural foramen.    L2-3: Bilateral hypertrophic facet joint changes. No significant commas   of the spinal canal or either neural foramen.    L3-4: Disc bulge and bilateral hypertrophic facet joint changes. Mild   narrowing of the spinal canal. Moderate narrowing of both neural foramen    L4-5: Disc bulge and bilateral hypertrophic facet changes. No significant   commas of the spinal canal or either neural foramen    L5-S1: Bilateral hypertrophicfacet changes are seen. Severe narrowing of   both neural foramen    The conus ends at L2 and appears normal    Evaluation of the paraspinal soft tissues is limited by lack of IV   contrast though grossly normal    IMPRESSION: Degenerative changes described above    Grade 1 anterolisthesis seen involving L5-S1 level with associated lysis   defects suspected.    --- End of Report ---            KIM LORENZO MD; Attending Radiologist  This document has been electronically signed. Dec 22 2021  9:33AM    < end of copied text >

## 2022-01-06 NOTE — PROGRESS NOTE ADULT - ASSESSMENT
76 year-old male from home with past medical history of atrial fibrillation (was on coumadin until 1month ago), hypertension, hyperlipidemia, diabetes mellitus presents to ED for AMS,bacteremia, anemia.  1.PT.  2.Bacteremia-ABX.Repaet blood cx-.  3.Afib-eliquis.  4.DM-Insulin.  5.HTN-d/c ace given rising cr..  6.PPI.  7.Lipid d/o-statin.

## 2022-01-06 NOTE — PROGRESS NOTE ADULT - ATTENDING SUPERVISION STATEMENT
Resident
ACP
Resident
ACP
Resident
ACP
ACP
Resident
ACP
Resident
Resident

## 2022-01-06 NOTE — PROGRESS NOTE ADULT - SUBJECTIVE AND OBJECTIVE BOX
CHIEF COMPLAINT:Patient is a 76y old  Male who presents with a chief complaint of AMS.Pt appears comfortable.    	  REVIEW OF SYSTEMS:  CONSTITUTIONAL: No fever, weight loss, or fatigue  EYES: No eye pain, visual disturbances, or discharge  ENT:  No difficulty hearing, tinnitus, vertigo; No sinus or throat pain  NECK: No pain or stiffness  RESPIRATORY: No cough, wheezing, chills or hemoptysis; No Shortness of Breath  CARDIOVASCULAR: No chest pain, palpitations, passing out, dizziness, or leg swelling  GASTROINTESTINAL: No abdominal or epigastric pain. No nausea, vomiting, or hematemesis; No diarrhea or constipation. No melena or hematochezia.  GENITOURINARY: No dysuria, frequency, hematuria, or incontinence  NEUROLOGICAL: No headaches, memory loss, loss of strength, numbness, or tremors  SKIN: No itching, burning, rashes, or lesions   LYMPH Nodes: No enlarged glands  ENDOCRINE: No heat or cold intolerance; No hair loss  MUSCULOSKELETAL: No joint pain or swelling; No muscle, back, or extremity pain  PSYCHIATRIC: No depression, anxiety, mood swings, or difficulty sleeping  HEME/LYMPH: No easy bruising, or bleeding gums  ALLERGY AND IMMUNOLOGIC: No hives or eczema	        PHYSICAL EXAM:  T(C): 36.5 (01-06-22 @ 06:50), Max: 37.2 (01-05-22 @ 21:30)  HR: 58 (01-06-22 @ 06:50) (56 - 63)  BP: 108/64 (01-06-22 @ 06:50) (108/64 - 123/60)  RR: 18 (01-06-22 @ 06:50) (18 - 19)  SpO2: 96% (01-06-22 @ 06:50) (96% - 100%)        Appearance: Normal	  HEENT:   Normal oral mucosa, PERRL, EOMI	  Lymphatic: No lymphadenopathy  Cardiovascular: Normal S1 S2, No JVD, No murmurs, No edema  Respiratory: Lungs clear to auscultation	  Psychiatry: A & O x 3, Mood & affect appropriate  Gastrointestinal:  Soft, Non-tender, + BS	  Skin: No rashes, No ecchymoses, No cyanosis	  Neurologic: Non-focal  Extremities: Normal range of motion, No clubbing, cyanosis or edema  Vascular: Peripheral pulses palpable 2+ bilaterally    MEDICATIONS  (STANDING):  apixaban 5 milliGRAM(s) Oral every 12 hours  artificial  tears Solution 1 Drop(s) Both EYES two times a day  ascorbic acid 500 milliGRAM(s) Oral daily  atorvastatin 40 milliGRAM(s) Oral at bedtime  buPROPion XL (24-Hour) . 150 milliGRAM(s) Oral daily  ceFAZolin   IVPB 2000 milliGRAM(s) IV Intermittent every 8 hours  chlorhexidine 2% Cloths 1 Application(s) Topical daily  chlorhexidine 2% Cloths 1 Application(s) Topical <User Schedule>  famotidine    Tablet 20 milliGRAM(s) Oral daily  ferrous    sulfate 325 milliGRAM(s) Oral daily  gabapentin 800 milliGRAM(s) Oral three times a day  insulin lispro (ADMELOG) corrective regimen sliding scale   SubCutaneous three times a day before meals  insulin lispro (ADMELOG) corrective regimen sliding scale   SubCutaneous at bedtime  levoFLOXacin  Tablet 750 milliGRAM(s) Oral every 24 hours  lisinopril 2.5 milliGRAM(s) Oral daily  melatonin 5 milliGRAM(s) Oral at bedtime  polyethylene glycol 3350 17 Gram(s) Oral daily  senna 2 Tablet(s) Oral at bedtime        LABS:	 	                      9.6    3.89  )-----------( 171      ( 06 Jan 2022 07:24 )             30.8     01-06    138  |  105  |  29<H>  ----------------------------<  159<H>  4.2   |  29  |  1.42<H>    Ca    9.3      06 Jan 2022 07:24

## 2022-01-06 NOTE — PROGRESS NOTE ADULT - ASSESSMENT
Pt is a 76 year old male from home with PMHx of atrial fibrillation, hypertension, hyperlipidemia, DM admitted for sepsis with acute encephalopathy secondary to diabetic foot ulcer. Pt received one dose of levaquine in ED and switched to vancomycin (d/c'ed 12/21) and amp/sulbactam (d/c'ed today). Xray of left foot with multiple metatarsal Fx but without radiographic evidence of osteomyelitis. However, MRI of left foot with early osteomyelitis of the 1st tarsometatarsal site, multiple metatarsal Fx and diffuse SQ edema. BCxs from 12/20 growing methicillin susceptible S. aureus, Streptococcus mitis/oralis group, and Alpha hemolytic strep. Pt underwent bedside debridement of L foot on 12/20 by Podiatry. Surgical swab 12/20 of left foot wound with Alpha hemolytic strep, S. aureus, Citrobacter koseri, Veillonella parvula. Pt underwent OR debridement of left foot 12/22. Bone Cx from OR with no growth to date. Repeat surgical swab no growth to date. Repeat BC 12/22 negative.  Pt will need at least 4wks of Ab from the time of 12/22 debridement to Rx bacteremia and osteomyelitis. ID Recommended cefazolin to cover  MSSA and strep from initial BC.  Pt had a LUE single lumen picc placed on 1/4 to complete Cefazolin 2 g q 8hrs for 4 weeks starting from 12/22 - 1/19/22.  Pt is otherwise stable for D/C, pending auth for wound vac.

## 2022-01-06 NOTE — DISCHARGE NOTE NURSING/CASE MANAGEMENT/SOCIAL WORK - PATIENT PORTAL LINK FT
You can access the FollowMyHealth Patient Portal offered by [facility] by registering at the following website: [email or web address]. By joining [facility]’s FollowMyHealth portal, you will also be able to view your health information using other applications (apps) compatible with our system.

## 2022-01-06 NOTE — PROGRESS NOTE ADULT - SUBJECTIVE AND OBJECTIVE BOX
Podiatry Interval: Patient seen bedside in no acute distress. Pt is s/p Left Foot I&D and bone biopsy. Pt denies any pain to the left foot. Wound vac dressing intact.     Podiatry HPI: 77 y/o male with a PMHx of DM, HTN, A Fib, Sciatica, Cataract, OA of Right Hip presented to the ED for altered mental status. Podiatry was consulted for Left Foot Cellulitis/Wound. Patient states that his podiatrist was debriding a callus on the Left Foot and the podiatrist went too deep which caused the initial wound about 3 weeks ago and since then it has been getting worse. Denies any pain to the Left Foot. States that he cannot feel any painful stimuli to his left foot due to diabetic neuropathy. States that he would like to get back on his feet and exercise because that is how he is able to maintain his sugar levels and patient states that he has always had a left foot flat arch. Denies any trauma to the area. Patient states that yesterday he was having chills but today he denies any constitutional symptoms of N/V/C/F/SOB. Denies any other pedal complaints at this moment.     HPI: Patient is a 76 year-old male from home with past medical history of atrial fibrillation (was on coumadin until 1month ago), hypertension, hyperlipidemia, diabetes mellitus presents to ED for AMS. Pt states that today he started feeling freezing cold and was shivering. Talked to wife for more information and she stated that patient was feeling very cold though house was warm. He stopped shivering for a while and the started again. During his second episode of shivering from feeling extremely cold he got very confused and could not talk, understand what she was saying and was not making sense. After a while the shivering stopped and he regained his normal mental status. Denies fever, chest pain. abdominal pain, diarrhea, constipation. Pt states he has incontinence and an ulcer on his left foot. He states his podiatrist scraped a callus which caused the ulcer. He has diabetic neuropathy so does not feel pain around the ulcer. He did notice the left foot got more erythematous today.   He stopped taking warfarin 1 month ago as he states it was too much of an hassle to get INR checked and avoid eating certain foods.   (19 Dec 2021 22:54)      Medications acetaminophen     Tablet .. 650 milliGRAM(s) Oral every 6 hours PRN  apixaban 5 milliGRAM(s) Oral every 12 hours  artificial  tears Solution 1 Drop(s) Both EYES two times a day  ascorbic acid 500 milliGRAM(s) Oral daily  atorvastatin 40 milliGRAM(s) Oral at bedtime  buPROPion XL (24-Hour) . 150 milliGRAM(s) Oral daily  ceFAZolin   IVPB 2000 milliGRAM(s) IV Intermittent every 8 hours  chlorhexidine 2% Cloths 1 Application(s) Topical daily  chlorhexidine 2% Cloths 1 Application(s) Topical <User Schedule>  famotidine    Tablet 20 milliGRAM(s) Oral daily  ferrous    sulfate 325 milliGRAM(s) Oral daily  gabapentin 800 milliGRAM(s) Oral three times a day  insulin lispro (ADMELOG) corrective regimen sliding scale   SubCutaneous at bedtime  insulin lispro (ADMELOG) corrective regimen sliding scale   SubCutaneous three times a day before meals  levoFLOXacin  Tablet 750 milliGRAM(s) Oral every 24 hours  lisinopril 2.5 milliGRAM(s) Oral daily  melatonin 5 milliGRAM(s) Oral at bedtime  melatonin 3 milliGRAM(s) Oral at bedtime PRN  oxycodone    5 mG/acetaminophen 325 mG 1 Tablet(s) Oral every 6 hours PRN  polyethylene glycol 3350 17 Gram(s) Oral daily  senna 2 Tablet(s) Oral at bedtime  sodium chloride 0.9% lock flush 10 milliLiter(s) IV Push every 1 hour PRN    FH: Family history of coronary artery disease (Sibling)    Family history of breast cancer in sister (Sibling)    PMH: Diabetes    Hypertension    Osteoarthritis of right hip    Cataract    Atrial fibrillation    Vertigo    Sciatica    PSH: S/P total hip arthroplasty    Labs                          9.8    3.91  )-----------( 176      ( 05 Jan 2022 08:21 )             30.5      01-05    138  |  104  |  29<H>  ----------------------------<  184<H>  4.6   |  27  |  1.36<H>    Ca    9.0      05 Jan 2022 08:21    Vital Signs Last 24 Hrs  T(C): 36.5 (06 Jan 2022 06:50), Max: 37.2 (05 Jan 2022 21:30)  T(F): 97.7 (06 Jan 2022 06:50), Max: 98.9 (05 Jan 2022 21:30)  HR: 58 (06 Jan 2022 06:50) (56 - 63)  BP: 108/64 (06 Jan 2022 06:50) (108/64 - 123/60)  BP(mean): 74 (05 Jan 2022 21:30) (74 - 74)  RR: 18 (06 Jan 2022 06:50) (18 - 19)  SpO2: 96% (06 Jan 2022 06:50) (96% - 100%)  Sedimentation Rate, Erythrocyte: 86 mm/Hr (12-20-21 @ 06:15)  Sedimentation Rate, Erythrocyte: 95 mm/Hr (12-19-21 @ 19:15)         C-Reactive Protein, Serum: 107 mg/L (12-20-21 @ 09:36)  C-Reactive Protein, Serum: 94 mg/L (12-20-21 @ 04:01)  WBC Count: 3.91 K/uL (01-05-22 @ 08:21)    CAPILLARY BLOOD GLUCOSE  POCT Blood Glucose.: 203 mg/dL (05 Jan 2022 21:44)  POCT Blood Glucose.: 225 mg/dL (05 Jan 2022 17:02)  POCT Blood Glucose.: 259 mg/dL (05 Jan 2022 11:10)  POCT Blood Glucose.: 190 mg/dL (05 Jan 2022 07:44)    ROS: All others negative unless otherwise stated in the HPI      PHYSICAL EXAM  LE Focused:    Vasc:  DP/PT pulses were palpable, bilaterally. Generalized edema noted to the Left Foot  Derm: Left Plantar medial arch incision site shows decreased redness to the left foot. No warmth noted to th left foot. No malodor, no purulent drainage, +PTB was noted. No edema noted to the left foot.    12/23/21: Bleeding seized, no erythema, no edema noted, macerated wound borders Left plantar wound  12/22/21: Left Plantar medial arch wound noted measuring 3cm x 3cm x 3cm with 5cm tunnelling noted to the surrounding area with +PTB. Redness and Warmth noted to the left foot with no malodor, no purulent drainage noted upon expression today. Streaking up to the left ankle and plantar distal/lateral arch of the left foot. Edema was noted to have decreased from yesterday to the left foot.  12/20/21: Left Plantar medial arch wound noted measuring 3cm x 3cm x 3cm with 5cm tunnelling noted to the surrounding area with +ptb and mild fluctuance noted to the surrounding wound. Left Foot Wound has Redness, increased warmth noted, mild malodor, purulent drainage, streaking up to the left ankle and plantar distal/lateral arch.   1/4/22: no drainage noted, no malodor, no erythema, no edema noted   1/5: Wound vac dressing intact to the left foot, no drainage noted, no signs of infection noted   Post Bedside Incision and Drainage (12/20/21)  Neuro:  Protective sensation absent, bilaterally.   MSK: No pain on palpation to the Left Foot Wound. Denies any calf pain, bilaterally.       Imaging:     EXAM:  MR FOOT LT                          PROCEDURE DATE:  12/20/2021      INTERPRETATION:  LEFT FOOT MRI    CLINICAL INFORMATION: Left foot wound. Eval for osteomyelitis.  TECHNIQUE: Multiplanar, multisequence MRI was obtained of the left foot.    COMPARISON: Left foot MRI 12/23/2015.    FINDINGS:    Plantar soft tissue wound is present at the level of the first   tarsometatarsal articulation with tract extending to the bone. There is   edema along the plantar aspect of the distal medial cuneiform and base of   the first metatarsal with preservation of the T1 marrow signal. There is   moderate tarsometatarsal arthrosis and arthrosis of the articulation of   the navicular and lateral cuneiform. There are subchondral fractures of   the first second and third metatarsal heads. There is diffuse increased   muscle signal, most consistent with denervation. There is diffuse   subcutaneous edema.    IMPRESSION:    Plantar soft tissue wound with tract extending to the undersurface of the   first tarsometatarsal articulation.  Edema within the base of the first tarsometatarsal articulation adjacent   to the tract with preservation of the T1 marrow signal, which may   represent sequela of early osteomyelitis.  First second and third metatarsal head subchondral fractures.      Cultures:   Culture Results:   No growth (12.23.21 @ 04:15)

## 2022-01-06 NOTE — PROGRESS NOTE ADULT - ASSESSMENT
A:  Left Foot Plantar Wound  S/P bedside I&D (12/20/21)  S/P OR I&D (12/23/21)    Plan:  -Patient seen and evaluated   -Surgical path and bone biopsy reviewed - no OM   -Removed wound vac on the left foot - no drainage noted, no signs of acute infection noted   -Applied wound vac dressing   -Pt to WB to heel on left foot with surgical shoe    -C/w leg elevation   Pod plan  -Pt stable from podiatry standpoint - will need wound vac upon DC   -Will continue to change wound vac 3x a week while in house   -WCO: Wound vac changed every other day using black foam with Tegaderm 4x4s, abd, dianna and ACE on the left foot surgical site   -D/w and evaluated at bedside with attending Dr. Hart

## 2022-01-06 NOTE — DISCHARGE NOTE NURSING/CASE MANAGEMENT/SOCIAL WORK - NSDCVIVACCINE_GEN_ALL_CORE_FT
influenza, injectable, quadrivalent, preservative free; 30-Sep-2015 11:44; Mary Tovar (RN); [employer]; 2F9P4; IntraMuscular; Deltoid Right.; 0.5 milliLiter(s); VIS (VIS Published: 07-Aug-2015, VIS Presented: 30-Sep-2015);

## 2022-01-07 VITALS
TEMPERATURE: 98 F | SYSTOLIC BLOOD PRESSURE: 114 MMHG | HEART RATE: 63 BPM | DIASTOLIC BLOOD PRESSURE: 63 MMHG | OXYGEN SATURATION: 100 % | RESPIRATION RATE: 18 BRPM

## 2022-01-07 LAB
ANION GAP SERPL CALC-SCNC: 5 MMOL/L — SIGNIFICANT CHANGE UP (ref 5–17)
BUN SERPL-MCNC: 30 MG/DL — HIGH (ref 7–18)
CALCIUM SERPL-MCNC: 9.1 MG/DL — SIGNIFICANT CHANGE UP (ref 8.4–10.5)
CHLORIDE SERPL-SCNC: 103 MMOL/L — SIGNIFICANT CHANGE UP (ref 96–108)
CO2 SERPL-SCNC: 31 MMOL/L — SIGNIFICANT CHANGE UP (ref 22–31)
CREAT SERPL-MCNC: 1.44 MG/DL — HIGH (ref 0.5–1.3)
GLUCOSE SERPL-MCNC: 159 MG/DL — HIGH (ref 70–99)
POTASSIUM SERPL-MCNC: 4.3 MMOL/L — SIGNIFICANT CHANGE UP (ref 3.5–5.3)
POTASSIUM SERPL-SCNC: 4.3 MMOL/L — SIGNIFICANT CHANGE UP (ref 3.5–5.3)
SARS-COV-2 RNA SPEC QL NAA+PROBE: SIGNIFICANT CHANGE UP
SODIUM SERPL-SCNC: 139 MMOL/L — SIGNIFICANT CHANGE UP (ref 135–145)

## 2022-01-07 PROCEDURE — P9040: CPT

## 2022-01-07 PROCEDURE — 93312 ECHO TRANSESOPHAGEAL: CPT

## 2022-01-07 PROCEDURE — 87086 URINE CULTURE/COLONY COUNT: CPT

## 2022-01-07 PROCEDURE — 86803 HEPATITIS C AB TEST: CPT

## 2022-01-07 PROCEDURE — 81001 URINALYSIS AUTO W/SCOPE: CPT

## 2022-01-07 PROCEDURE — 87070 CULTURE OTHR SPECIMN AEROBIC: CPT

## 2022-01-07 PROCEDURE — 82746 ASSAY OF FOLIC ACID SERUM: CPT

## 2022-01-07 PROCEDURE — 84100 ASSAY OF PHOSPHORUS: CPT

## 2022-01-07 PROCEDURE — 87186 SC STD MICRODIL/AGAR DIL: CPT

## 2022-01-07 PROCEDURE — 85025 COMPLETE CBC W/AUTO DIFF WBC: CPT

## 2022-01-07 PROCEDURE — 87150 DNA/RNA AMPLIFIED PROBE: CPT

## 2022-01-07 PROCEDURE — 86140 C-REACTIVE PROTEIN: CPT

## 2022-01-07 PROCEDURE — 86900 BLOOD TYPING SEROLOGIC ABO: CPT

## 2022-01-07 PROCEDURE — C1889: CPT

## 2022-01-07 PROCEDURE — 86850 RBC ANTIBODY SCREEN: CPT

## 2022-01-07 PROCEDURE — 36573 INSJ PICC RS&I 5 YR+: CPT

## 2022-01-07 PROCEDURE — 83921 ORGANIC ACID SINGLE QUANT: CPT

## 2022-01-07 PROCEDURE — 36430 TRANSFUSION BLD/BLD COMPNT: CPT

## 2022-01-07 PROCEDURE — 71045 X-RAY EXAM CHEST 1 VIEW: CPT

## 2022-01-07 PROCEDURE — 36415 COLL VENOUS BLD VENIPUNCTURE: CPT

## 2022-01-07 PROCEDURE — 97161 PT EVAL LOW COMPLEX 20 MIN: CPT

## 2022-01-07 PROCEDURE — 80307 DRUG TEST PRSMV CHEM ANLYZR: CPT

## 2022-01-07 PROCEDURE — 73590 X-RAY EXAM OF LOWER LEG: CPT

## 2022-01-07 PROCEDURE — 0225U NFCT DS DNA&RNA 21 SARSCOV2: CPT

## 2022-01-07 PROCEDURE — 99285 EMERGENCY DEPT VISIT HI MDM: CPT | Mod: 25

## 2022-01-07 PROCEDURE — 85027 COMPLETE CBC AUTOMATED: CPT

## 2022-01-07 PROCEDURE — 87635 SARS-COV-2 COVID-19 AMP PRB: CPT

## 2022-01-07 PROCEDURE — 86923 COMPATIBILITY TEST ELECTRIC: CPT

## 2022-01-07 PROCEDURE — 87040 BLOOD CULTURE FOR BACTERIA: CPT

## 2022-01-07 PROCEDURE — 85610 PROTHROMBIN TIME: CPT

## 2022-01-07 PROCEDURE — 87075 CULTR BACTERIA EXCEPT BLOOD: CPT

## 2022-01-07 PROCEDURE — 82607 VITAMIN B-12: CPT

## 2022-01-07 PROCEDURE — 93320 DOPPLER ECHO COMPLETE: CPT

## 2022-01-07 PROCEDURE — 80048 BASIC METABOLIC PNL TOTAL CA: CPT

## 2022-01-07 PROCEDURE — 85730 THROMBOPLASTIN TIME PARTIAL: CPT

## 2022-01-07 PROCEDURE — 73718 MRI LOWER EXTREMITY W/O DYE: CPT

## 2022-01-07 PROCEDURE — 85652 RBC SED RATE AUTOMATED: CPT

## 2022-01-07 PROCEDURE — 96365 THER/PROPH/DIAG IV INF INIT: CPT

## 2022-01-07 PROCEDURE — 83036 HEMOGLOBIN GLYCOSYLATED A1C: CPT

## 2022-01-07 PROCEDURE — 93005 ELECTROCARDIOGRAM TRACING: CPT

## 2022-01-07 PROCEDURE — 88304 TISSUE EXAM BY PATHOLOGIST: CPT

## 2022-01-07 PROCEDURE — 73620 X-RAY EXAM OF FOOT: CPT

## 2022-01-07 PROCEDURE — 82803 BLOOD GASES ANY COMBINATION: CPT

## 2022-01-07 PROCEDURE — 83605 ASSAY OF LACTIC ACID: CPT

## 2022-01-07 PROCEDURE — 83550 IRON BINDING TEST: CPT

## 2022-01-07 PROCEDURE — 87077 CULTURE AEROBIC IDENTIFY: CPT

## 2022-01-07 PROCEDURE — 83540 ASSAY OF IRON: CPT

## 2022-01-07 PROCEDURE — 93325 DOPPLER ECHO COLOR FLOW MAPG: CPT

## 2022-01-07 PROCEDURE — 82962 GLUCOSE BLOOD TEST: CPT

## 2022-01-07 PROCEDURE — 80053 COMPREHEN METABOLIC PANEL: CPT

## 2022-01-07 PROCEDURE — 96366 THER/PROPH/DIAG IV INF ADDON: CPT

## 2022-01-07 PROCEDURE — 77001 FLUOROGUIDE FOR VEIN DEVICE: CPT

## 2022-01-07 PROCEDURE — 86901 BLOOD TYPING SEROLOGIC RH(D): CPT

## 2022-01-07 PROCEDURE — 72148 MRI LUMBAR SPINE W/O DYE: CPT

## 2022-01-07 PROCEDURE — 88305 TISSUE EXAM BY PATHOLOGIST: CPT

## 2022-01-07 PROCEDURE — 88311 DECALCIFY TISSUE: CPT

## 2022-01-07 PROCEDURE — 93306 TTE W/DOPPLER COMPLETE: CPT

## 2022-01-07 PROCEDURE — 83090 ASSAY OF HOMOCYSTEINE: CPT

## 2022-01-07 PROCEDURE — 82550 ASSAY OF CK (CPK): CPT

## 2022-01-07 PROCEDURE — C1751: CPT

## 2022-01-07 PROCEDURE — 83735 ASSAY OF MAGNESIUM: CPT

## 2022-01-07 PROCEDURE — 76937 US GUIDE VASCULAR ACCESS: CPT

## 2022-01-07 RX ADMIN — APIXABAN 5 MILLIGRAM(S): 2.5 TABLET, FILM COATED ORAL at 06:51

## 2022-01-07 RX ADMIN — Medication 100 MILLIGRAM(S): at 06:52

## 2022-01-07 RX ADMIN — CHLORHEXIDINE GLUCONATE 1 APPLICATION(S): 213 SOLUTION TOPICAL at 06:54

## 2022-01-07 RX ADMIN — Medication 1 DROP(S): at 06:51

## 2022-01-07 RX ADMIN — Medication 3 MILLIGRAM(S): at 01:14

## 2022-01-07 RX ADMIN — GABAPENTIN 800 MILLIGRAM(S): 400 CAPSULE ORAL at 06:54

## 2022-01-07 RX ADMIN — Medication 2: at 08:35

## 2022-02-17 ENCOUNTER — APPOINTMENT (OUTPATIENT)
Dept: WOUND CARE | Facility: CLINIC | Age: 77
End: 2022-02-17

## 2022-02-17 ENCOUNTER — OUTPATIENT (OUTPATIENT)
Dept: OUTPATIENT SERVICES | Facility: HOSPITAL | Age: 77
LOS: 1 days | End: 2022-02-17
Payer: MEDICARE

## 2022-02-17 VITALS
WEIGHT: 178.13 LBS | RESPIRATION RATE: 18 BRPM | OXYGEN SATURATION: 98 % | SYSTOLIC BLOOD PRESSURE: 149 MMHG | HEART RATE: 77 BPM | BODY MASS INDEX: 22.86 KG/M2 | HEIGHT: 74 IN | DIASTOLIC BLOOD PRESSURE: 87 MMHG

## 2022-02-17 DIAGNOSIS — Z80.9 FAMILY HISTORY OF MALIGNANT NEOPLASM, UNSPECIFIED: ICD-10-CM

## 2022-02-17 DIAGNOSIS — L97.529 NON-PRESSURE CHRONIC ULCER OF OTHER PART OF LEFT FOOT WITH UNSPECIFIED SEVERITY: ICD-10-CM

## 2022-02-17 DIAGNOSIS — Z82.49 FAMILY HISTORY OF ISCHEMIC HEART DISEASE AND OTHER DISEASES OF THE CIRCULATORY SYSTEM: ICD-10-CM

## 2022-02-17 DIAGNOSIS — Z87.898 PERSONAL HISTORY OF OTHER SPECIFIED CONDITIONS: ICD-10-CM

## 2022-02-17 DIAGNOSIS — I48.91 UNSPECIFIED ATRIAL FIBRILLATION: ICD-10-CM

## 2022-02-17 DIAGNOSIS — Z00.00 ENCOUNTER FOR GENERAL ADULT MEDICAL EXAMINATION WITHOUT ABNORMAL FINDINGS: ICD-10-CM

## 2022-02-17 DIAGNOSIS — E11.9 TYPE 2 DIABETES MELLITUS W/OUT COMPLICATIONS: ICD-10-CM

## 2022-02-17 DIAGNOSIS — Z78.9 OTHER SPECIFIED HEALTH STATUS: ICD-10-CM

## 2022-02-17 DIAGNOSIS — I10 ESSENTIAL (PRIMARY) HYPERTENSION: ICD-10-CM

## 2022-02-17 DIAGNOSIS — Z96.60 PRESENCE OF UNSPECIFIED ORTHOPEDIC JOINT IMPLANT: Chronic | ICD-10-CM

## 2022-02-17 DIAGNOSIS — E78.5 HYPERLIPIDEMIA, UNSPECIFIED: ICD-10-CM

## 2022-02-17 DIAGNOSIS — G93.41 METABOLIC ENCEPHALOPATHY: ICD-10-CM

## 2022-02-17 PROCEDURE — G0463: CPT

## 2022-02-17 PROCEDURE — 11042 DBRDMT SUBQ TIS 1ST 20SQCM/<: CPT

## 2022-02-17 RX ORDER — APIXABAN 5 MG/1
TABLET, FILM COATED ORAL
Refills: 0 | Status: ACTIVE | COMMUNITY

## 2022-02-17 RX ORDER — GABAPENTIN 400 MG/1
400 CAPSULE ORAL
Refills: 0 | Status: ACTIVE | COMMUNITY

## 2022-02-17 RX ORDER — FAMOTIDINE 10 MG/1
TABLET, FILM COATED ORAL
Refills: 0 | Status: ACTIVE | COMMUNITY

## 2022-02-17 RX ORDER — OXYCODONE HYDROCHLORIDE AND ACETAMINOPHEN 5; 325 MG/1; MG/1
5-325 TABLET ORAL
Refills: 0 | Status: ACTIVE | COMMUNITY

## 2022-02-17 RX ORDER — SIMVASTATIN 80 MG/1
TABLET, FILM COATED ORAL
Refills: 0 | Status: ACTIVE | COMMUNITY

## 2022-02-17 RX ORDER — FERROUS SULFATE TAB EC 325 MG (65 MG FE EQUIVALENT) 325 (65 FE) MG
325 (65 FE) TABLET DELAYED RESPONSE ORAL
Refills: 0 | Status: ACTIVE | COMMUNITY

## 2022-02-17 RX ORDER — BUPROPION HYDROCHLORIDE 150 MG/1
150 TABLET, FILM COATED, EXTENDED RELEASE ORAL
Refills: 0 | Status: ACTIVE | COMMUNITY

## 2022-02-17 RX ORDER — CHLORHEXIDINE GLUCONATE 4 %
LIQUID (ML) TOPICAL
Refills: 0 | Status: ACTIVE | COMMUNITY

## 2022-02-17 RX ORDER — METFORMIN HYDROCHLORIDE 500 MG/1
500 TABLET, COATED ORAL
Refills: 0 | Status: ACTIVE | COMMUNITY

## 2022-02-17 RX ORDER — SENNOSIDES 8.6 MG TABLETS 8.6 MG/1
8.6 TABLET ORAL
Refills: 0 | Status: ACTIVE | COMMUNITY

## 2022-02-17 RX ORDER — LISINOPRIL 2.5 MG/1
2.5 TABLET ORAL
Refills: 0 | Status: ACTIVE | COMMUNITY

## 2022-02-17 RX ORDER — LORATADINE 10 MG
17 TABLET,DISINTEGRATING ORAL
Refills: 0 | Status: ACTIVE | COMMUNITY

## 2022-02-17 NOTE — HISTORY OF PRESENT ILLNESS
[FreeTextEntry1] : HPI: 76 y.o male presents to clinic for follow up for left foot wound. Pt was admitted to  [facility] [place] in December. Pt has a PMHx of DM, HTN, A Fib, Sciatica, Cataract, OA of Right Hip.  Podiatry was consulted for Left Foot Cellulitis/Wound. States that he cannot feel any painful stimuli to his left foot due to diabetic neuropathy. States that he would like to get back on his feet and exercise because that is how he is able to maintain his sugar levels and patient states that he has always had a left foot flat arch. Denies any trauma to the area. Patient states that yesterday he was having chills but today he denies any constitutional symptoms of N/V/C/F/SOB. Denies any other pedal complaints at this moment. Pt was sent to rehab with a wound vac, still being applied at the rehab center. \par \par Denies smoking, alcohol consumption. NKDFA \par

## 2022-02-17 NOTE — PLAN
[FreeTextEntry1] : Physical exam: \par Vasc: DP/PT 2/4, CFT intact to digits \par Neuro: Protective sensations diminished\par Ortho: 4/5 muscle strength \par Derm: medial wound measuring 3cm x 8 cm x .2cm, -PTB, no malodor, no tunneling, no undermining, no clinical signs of infection, 95% granular tissue with 5% fibrotic bed, periwound maceration noted \par \par A: \par S/p I&D left foot \par \par Plan: \par Pt was evaluated and chart was reviewed\par Verbal consent obtained. Sharp excisional debridement of left foot wound down to subQ tissue. Pt tolerated the procedure well. \par Recommend pt take d/c the wound vac and continue daily dressing changes L foot\par Keep dressing clean dry intact \par applied santyl, DSD\par Recommend WBAT in surgical shoe\par Advised pt to go to ED if he notices any clinical signs of infection \par RTC: 2 weeks

## 2022-02-18 DIAGNOSIS — L97.522 NON-PRESSURE CHRONIC ULCER OF OTHER PART OF LEFT FOOT WITH FAT LAYER EXPOSED: ICD-10-CM

## 2022-02-18 DIAGNOSIS — E11.621 TYPE 2 DIABETES MELLITUS WITH FOOT ULCER: ICD-10-CM

## 2022-03-03 ENCOUNTER — APPOINTMENT (OUTPATIENT)
Dept: WOUND CARE | Facility: CLINIC | Age: 77
End: 2022-03-03

## 2022-03-03 ENCOUNTER — OUTPATIENT (OUTPATIENT)
Dept: OUTPATIENT SERVICES | Facility: HOSPITAL | Age: 77
LOS: 1 days | End: 2022-03-03
Payer: MEDICARE

## 2022-03-03 VITALS
WEIGHT: 178.13 LBS | RESPIRATION RATE: 18 BRPM | HEIGHT: 74 IN | TEMPERATURE: 97 F | DIASTOLIC BLOOD PRESSURE: 70 MMHG | SYSTOLIC BLOOD PRESSURE: 134 MMHG | BODY MASS INDEX: 22.86 KG/M2 | OXYGEN SATURATION: 97 % | HEART RATE: 56 BPM

## 2022-03-03 DIAGNOSIS — Z96.60 PRESENCE OF UNSPECIFIED ORTHOPEDIC JOINT IMPLANT: Chronic | ICD-10-CM

## 2022-03-03 DIAGNOSIS — Z00.00 ENCOUNTER FOR GENERAL ADULT MEDICAL EXAMINATION WITHOUT ABNORMAL FINDINGS: ICD-10-CM

## 2022-03-03 DIAGNOSIS — L97.421 NON-PRESSURE CHRONIC ULCER OF LEFT HEEL AND MIDFOOT LIMITED TO BREAKDOWN OF SKIN: ICD-10-CM

## 2022-03-03 PROCEDURE — G0463: CPT

## 2022-03-03 NOTE — HISTORY OF PRESENT ILLNESS
[FreeTextEntry1] : 76 y.o male returns to clinic for management of left foot wound. Pt was sent to rehab with wound vac on the left foot. Wound vac was discontinued during last visit and has been applying santyl at his nursing home. Pt states that the nurses change his dressing 3 times a week. Pt denies any pain or any new complaints. Denies constitutional symptoms of N/V/C/F/Sob. \par \par

## 2022-03-03 NOTE — PHYSICAL EXAM
[FreeTextEntry1] : medial foot  [FreeTextEntry2] : 5 [FreeTextEntry3] : 3 [de-identified] : santyl [TWNoteComboBox1] : Left [TWNoteComboBox2] : 1 [TWNoteComboBox4] : None [TWNoteComboBox5] : No [TWNoteComboBox6] : Diabetic [de-identified] : No [de-identified] : Normal [de-identified] : None [de-identified] : None [de-identified] : >75%

## 2022-03-03 NOTE — PLAN
[FreeTextEntry1] : Physical exam: \par Vasc: DP/PT 2/4, CFT intact to digits \par Neuro: Protective sensations diminished\par Ortho: 4/5 muscle strength \par Derm: medial wound measuring 3cm x 8 cm x .2cm, -PTB, no malodor, no tunneling, no undermining, no clinical signs of infection, 95% granular tissue with 5% fibrotic bed, periwound maceration noted \par \par A: \par S/p I&D left foot \par \par Plan: \par Pt was evaluated and chart was reviewed\par Cleansed the left foot wound with chlorhexidine\par Hypergranulation tissue noted \par Applied silver nitrate \par Applied santyl, DSD \par Keep dressing clean dry intact \par Recommend WBAT to the left heel in surgical shoes \par Advised pt to go to ED if he notices any clinical signs of infection \par RTC: 2 weeks

## 2022-03-17 ENCOUNTER — OUTPATIENT (OUTPATIENT)
Dept: OUTPATIENT SERVICES | Facility: HOSPITAL | Age: 77
LOS: 1 days | End: 2022-03-17
Payer: MEDICARE

## 2022-03-17 ENCOUNTER — APPOINTMENT (OUTPATIENT)
Dept: WOUND CARE | Facility: CLINIC | Age: 77
End: 2022-03-17

## 2022-03-17 VITALS
HEART RATE: 75 BPM | TEMPERATURE: 97.2 F | SYSTOLIC BLOOD PRESSURE: 160 MMHG | BODY MASS INDEX: 22.84 KG/M2 | RESPIRATION RATE: 18 BRPM | HEIGHT: 74 IN | OXYGEN SATURATION: 98 % | DIASTOLIC BLOOD PRESSURE: 79 MMHG | WEIGHT: 178 LBS

## 2022-03-17 DIAGNOSIS — Z96.60 PRESENCE OF UNSPECIFIED ORTHOPEDIC JOINT IMPLANT: Chronic | ICD-10-CM

## 2022-03-17 DIAGNOSIS — Z00.00 ENCOUNTER FOR GENERAL ADULT MEDICAL EXAMINATION WITHOUT ABNORMAL FINDINGS: ICD-10-CM

## 2022-03-17 DIAGNOSIS — L97.512 NON-PRESSURE CHRONIC ULCER OF OTHER PART OF RIGHT FOOT WITH FAT LAYER EXPOSED: ICD-10-CM

## 2022-03-17 DIAGNOSIS — E11.621 TYPE 2 DIABETES MELLITUS WITH FOOT ULCER: ICD-10-CM

## 2022-03-17 PROCEDURE — G0463: CPT

## 2022-03-17 PROCEDURE — 11042 DBRDMT SUBQ TIS 1ST 20SQCM/<: CPT

## 2022-03-22 NOTE — PLAN
[FreeTextEntry1] : Physical exam: \par Vasc: DP/PT 2/4, CFT intact to digits \par Neuro: Protective sensations diminished\par Ortho: 4/5 muscle strength \par Derm: medial wound measuring 3cm x 8 cm x .2cm, -PTB, no malodor, no tunneling, no undermining, no clinical signs of infection, 95% granular tissue with 5% fibrotic bed, periwound maceration noted \par \par A: \par S/p I&D left foot \par \par Plan: \par Pt was evaluated and chart was reviewed\par Cleansed the left foot wound with chlorhexidine\par Applied santyl, DSD \par Keep dressing clean dry intact \par Recommend WBAT to the left heel in surgical shoes \par Advised pt to go to ED if he notices any clinical signs of infection \par RTC: 2 weeks

## 2022-03-22 NOTE — PHYSICAL EXAM
[FreeTextEntry1] : medial foot  [FreeTextEntry2] : 5 [FreeTextEntry3] : 3 [de-identified] : santyl [TWNoteComboBox1] : Left [TWNoteComboBox2] : 1 [TWNoteComboBox4] : None [TWNoteComboBox5] : No [TWNoteComboBox6] : Diabetic [de-identified] : No [de-identified] : Normal [de-identified] : None [de-identified] : None [de-identified] : >75%

## 2022-03-23 ENCOUNTER — APPOINTMENT (OUTPATIENT)
Dept: WOUND CARE | Facility: CLINIC | Age: 77
End: 2022-03-23

## 2022-03-23 ENCOUNTER — OUTPATIENT (OUTPATIENT)
Dept: OUTPATIENT SERVICES | Facility: HOSPITAL | Age: 77
LOS: 1 days | End: 2022-03-23
Payer: MEDICARE

## 2022-03-23 VITALS
TEMPERATURE: 97 F | OXYGEN SATURATION: 97 % | BODY MASS INDEX: 22.84 KG/M2 | HEART RATE: 60 BPM | DIASTOLIC BLOOD PRESSURE: 71 MMHG | SYSTOLIC BLOOD PRESSURE: 128 MMHG | WEIGHT: 178 LBS | HEIGHT: 74 IN | RESPIRATION RATE: 18 BRPM

## 2022-03-23 DIAGNOSIS — Z96.60 PRESENCE OF UNSPECIFIED ORTHOPEDIC JOINT IMPLANT: Chronic | ICD-10-CM

## 2022-03-23 DIAGNOSIS — Z00.00 ENCOUNTER FOR GENERAL ADULT MEDICAL EXAMINATION WITHOUT ABNORMAL FINDINGS: ICD-10-CM

## 2022-03-23 PROCEDURE — 11042 DBRDMT SUBQ TIS 1ST 20SQCM/<: CPT

## 2022-03-23 PROCEDURE — G0463: CPT

## 2022-03-23 NOTE — PLAN
[FreeTextEntry1] : Physical exam: \par Vasc: DP/PT 2/4, CFT intact to digits \par Neuro: Protective sensations diminished\par Ortho: 4/5 muscle strength \par Derm: medial wound measuring 1.5 cm, 4cm, -PTB, no malodor, no tunneling, no undermining, no clinical signs of infection, 95% granular tissue with 5% fibrotic bed, periwound hyperkeraotisis noted \par \par A: \par S/p I&D left foot \par Left foot ulcer\par \par Plan: \par Pt was evaluated and chart was reviewed\par Cleansed the left foot wound with chlorhexidine\par Applied santyl, DSD \par Sharp excisional debridement of hyperkeratotic lesion down to skin and subcutaneous tissue. \par Keep dressing clean dry intact \par Recommend WBAT to the left heel in surgical shoes \par Advised pt to go to ED if he notices any clinical signs of infection \par Pt to get diabetic shoes from [facility] \par RTC: 1 weeks

## 2022-03-23 NOTE — PHYSICAL EXAM
[FreeTextEntry1] : medial foot  [FreeTextEntry2] : 5 [FreeTextEntry3] : 3 [de-identified] : santyl [TWNoteComboBox1] : Left [TWNoteComboBox2] : 1 [TWNoteComboBox4] : None [TWNoteComboBox5] : No [TWNoteComboBox6] : Diabetic [de-identified] : No [de-identified] : Normal [de-identified] : None [de-identified] : None [de-identified] : >75%

## 2022-03-24 DIAGNOSIS — E11.621 TYPE 2 DIABETES MELLITUS WITH FOOT ULCER: ICD-10-CM

## 2022-03-24 DIAGNOSIS — L97.522 NON-PRESSURE CHRONIC ULCER OF OTHER PART OF LEFT FOOT WITH FAT LAYER EXPOSED: ICD-10-CM

## 2022-03-30 ENCOUNTER — APPOINTMENT (OUTPATIENT)
Dept: WOUND CARE | Facility: CLINIC | Age: 77
End: 2022-03-30

## 2022-03-30 ENCOUNTER — OUTPATIENT (OUTPATIENT)
Dept: OUTPATIENT SERVICES | Facility: HOSPITAL | Age: 77
LOS: 1 days | End: 2022-03-30
Payer: MEDICARE

## 2022-03-30 VITALS
HEART RATE: 71 BPM | BODY MASS INDEX: 22.84 KG/M2 | WEIGHT: 178 LBS | HEIGHT: 74 IN | SYSTOLIC BLOOD PRESSURE: 147 MMHG | DIASTOLIC BLOOD PRESSURE: 77 MMHG | RESPIRATION RATE: 18 BRPM | TEMPERATURE: 98 F

## 2022-03-30 DIAGNOSIS — Z96.60 PRESENCE OF UNSPECIFIED ORTHOPEDIC JOINT IMPLANT: Chronic | ICD-10-CM

## 2022-03-30 DIAGNOSIS — Z00.00 ENCOUNTER FOR GENERAL ADULT MEDICAL EXAMINATION WITHOUT ABNORMAL FINDINGS: ICD-10-CM

## 2022-03-30 PROCEDURE — G0463: CPT

## 2022-03-30 PROCEDURE — 11042 DBRDMT SUBQ TIS 1ST 20SQCM/<: CPT

## 2022-03-30 NOTE — PLAN
[FreeTextEntry1] : Physical exam: \par Vasc: DP/PT 2/4, CFT intact to digits \par Neuro: Protective sensations diminished\par Ortho: 4/5 muscle strength \par Derm: medial wound with measurements as noted above. Superficial. -PTB, no malodor, no tunneling, no undermining, no clinical signs of infection, 95% granular tissue with 5% fibrotic bed, periwound scant hyperkeratosis noted \par \par A: \par S/p I&D left foot \par Left foot ulcer\par \par Plan: \par Pt was evaluated and chart was reviewed\par Cleansed the left foot wound with chlorhexidine\par Applied santyl, DSD \par Sharp excisional debridement of hyperkeratotic lesion down to skin and subcutaneous tissue. \par Keep dressing clean dry intact \par Recommend WBAT to the left heel in surgical shoes \par Advised pt to go to ED if he notices any clinical signs of infection \par Pt to get diabetic shoes from [facility], discussed with orthotist\par RTC: 1 weeks

## 2022-03-30 NOTE — PHYSICAL EXAM
[FreeTextEntry1] : medial foot  [FreeTextEntry2] : 1.5 [FreeTextEntry3] : 1 [de-identified] : santyl [TWNoteComboBox1] : Left [TWNoteComboBox2] : 1 [TWNoteComboBox4] : None [TWNoteComboBox5] : No [TWNoteComboBox6] : Diabetic [de-identified] : No [de-identified] : Normal [de-identified] : None [de-identified] : None [de-identified] : >75%

## 2022-03-31 DIAGNOSIS — L97.522 NON-PRESSURE CHRONIC ULCER OF OTHER PART OF LEFT FOOT WITH FAT LAYER EXPOSED: ICD-10-CM

## 2022-03-31 DIAGNOSIS — E11.610 TYPE 2 DIABETES MELLITUS WITH DIABETIC NEUROPATHIC ARTHROPATHY: ICD-10-CM

## 2022-04-06 ENCOUNTER — APPOINTMENT (OUTPATIENT)
Dept: WOUND CARE | Facility: CLINIC | Age: 77
End: 2022-04-06

## 2022-04-06 ENCOUNTER — OUTPATIENT (OUTPATIENT)
Dept: OUTPATIENT SERVICES | Facility: HOSPITAL | Age: 77
LOS: 1 days | End: 2022-04-06
Payer: MEDICARE

## 2022-04-06 VITALS
HEIGHT: 74 IN | WEIGHT: 178 LBS | RESPIRATION RATE: 18 BRPM | HEART RATE: 75 BPM | TEMPERATURE: 97 F | SYSTOLIC BLOOD PRESSURE: 148 MMHG | BODY MASS INDEX: 22.84 KG/M2 | DIASTOLIC BLOOD PRESSURE: 85 MMHG

## 2022-04-06 DIAGNOSIS — Z96.60 PRESENCE OF UNSPECIFIED ORTHOPEDIC JOINT IMPLANT: Chronic | ICD-10-CM

## 2022-04-06 DIAGNOSIS — Z00.00 ENCOUNTER FOR GENERAL ADULT MEDICAL EXAMINATION WITHOUT ABNORMAL FINDINGS: ICD-10-CM

## 2022-04-06 PROCEDURE — 11042 DBRDMT SUBQ TIS 1ST 20SQCM/<: CPT

## 2022-04-06 NOTE — PHYSICAL EXAM
[FreeTextEntry1] : medial foot  [FreeTextEntry2] : 1.4cm [FreeTextEntry3] : 0.5cm [FreeTextEntry4] : 0.2cm [de-identified] : santyl [de-identified] : Santyl,adaptic ,DSD [TWNoteComboBox1] : Left [TWNoteComboBox2] : 1 [TWNoteComboBox4] : None [TWNoteComboBox5] : No [TWNoteComboBox6] : Diabetic [de-identified] : No [de-identified] : Normal [de-identified] : None [de-identified] : None [de-identified] : >75%

## 2022-04-06 NOTE — PLAN
[FreeTextEntry1] : Physical exam: \par Vasc: DP/PT 2/4, CFT intact to digits \par Neuro: Protective sensations diminished\par Derm: medial wound with measurements as noted above. Superficial. -PTB, no malodor, no tunneling, no undermining, no clinical signs of infection, 95% granular tissue with 5% fibrotic bed, periwound scant hyperkeratosis noted \par MSK: muscle strength 4/5 in all compartment\par \par \par A: \par S/p I&D left foot \par Left foot ulcer\par \par Plan: \par Pt was evaluated and chart was reviewed\par Cleansed the left foot wound with chlorhexidine\par Sharp excisional debridement of hyperkeratotic lesion down to skin and subcutaneous tissue. \par Applied santyl, DSD \par Keep dressing clean dry intact \par Recommend WBAT to the left heel in surgical shoes \par Advised pt to go to ED if he notices any clinical signs of infection \par Pt to get diabetic shoes from [facility], discussed with orthotist\par Recommended patient to see endocrinologist for maintaining blood sugar\par RTC 1 week

## 2022-04-06 NOTE — HISTORY OF PRESENT ILLNESS
[FreeTextEntry1] : 76 y.o male returns to clinic for management of left foot wound. Pt states that the nurses change his dressing every day. Pt denies any pain or any new complaints. Denies constitutional symptoms of N/V/C/F/Sob. \par  mg/dl 4/6/22\par \par

## 2022-04-07 DIAGNOSIS — L97.422 NON-PRESSURE CHRONIC ULCER OF LEFT HEEL AND MIDFOOT WITH FAT LAYER EXPOSED: ICD-10-CM

## 2022-04-07 DIAGNOSIS — E11.621 TYPE 2 DIABETES MELLITUS WITH FOOT ULCER: ICD-10-CM

## 2022-04-20 ENCOUNTER — OUTPATIENT (OUTPATIENT)
Dept: OUTPATIENT SERVICES | Facility: HOSPITAL | Age: 77
LOS: 1 days | End: 2022-04-20
Payer: MEDICARE

## 2022-04-20 ENCOUNTER — APPOINTMENT (OUTPATIENT)
Dept: WOUND CARE | Facility: CLINIC | Age: 77
End: 2022-04-20

## 2022-04-20 VITALS
BODY MASS INDEX: 22.84 KG/M2 | HEIGHT: 74 IN | RESPIRATION RATE: 18 BRPM | SYSTOLIC BLOOD PRESSURE: 189 MMHG | DIASTOLIC BLOOD PRESSURE: 88 MMHG | TEMPERATURE: 97.2 F | WEIGHT: 178 LBS | HEART RATE: 68 BPM | OXYGEN SATURATION: 99 %

## 2022-04-20 DIAGNOSIS — Z96.60 PRESENCE OF UNSPECIFIED ORTHOPEDIC JOINT IMPLANT: Chronic | ICD-10-CM

## 2022-04-20 DIAGNOSIS — Z00.00 ENCOUNTER FOR GENERAL ADULT MEDICAL EXAMINATION WITHOUT ABNORMAL FINDINGS: ICD-10-CM

## 2022-04-20 PROCEDURE — 97597 DBRDMT OPN WND 1ST 20 CM/<: CPT

## 2022-04-20 NOTE — PLAN
[FreeTextEntry1] : Physical exam: \par Vasc: DP/PT 2/4, CFT intact to digits \par Neuro: Protective sensations diminished\par Derm: medial wound with measurements as noted above. Superficial. -PTB, no malodor, no tunneling, no undermining, no clinical signs of infection, 95% granular tissue with 5% fibrotic bed, periwound scant hyperkeratosis noted \par 4/20: Wound closed, elongated and thickened toenails x10 \par MSK: muscle strength 4/5 in all compartment\par \par \par A: \par S/p I&D left foot \par Left foot ulcer\par \par Plan: \par Pt was evaluated and chart was reviewed\par Cleansed the left foot wound with chlorhexidine\par Sharp excisional debridement of the left foot wound was performed down to and including nonviable dermal tissue.\par Wound noted to be closed upon debridement  \par Applied betadine, DSD \par Pt doesn't need santyl \par Aseptic debridement of the toenails b/l using sterile nipper \par Keep dressing clean dry intact \par Advised pt to go to ED if he notices any clinical signs of infection \par Pt to get diabetic shoes from [employer], spoke with Imtiaz today, shoes still pending\par Recommended patient to see endocrinologist for maintaining blood sugar\par RTC 2 weeks

## 2022-04-20 NOTE — HISTORY OF PRESENT ILLNESS
[FreeTextEntry1] : 76 y.o male returns to clinic for management of left foot wound. Pt states that the nurse changes his dressing every day using Santyl. States that the foot wound looks better. Pt denies any pain or any new complaints. Denies constitutional symptoms of N/V/C/F/Sob. Checked his sugar this morning and it was 150 mg/dl. \par \par \par

## 2022-04-20 NOTE — PHYSICAL EXAM
[FreeTextEntry1] : medial foot  [de-identified] : santyl [de-identified] : Closed [TWNoteComboBox1] : Left [TWNoteComboBox2] : False [TWNoteComboBox4] : None [TWNoteComboBox5] : False [TWNoteComboBox6] : False [de-identified] : No [de-identified] : False [de-identified] : False [de-identified] : None [de-identified] : False

## 2022-04-27 DIAGNOSIS — B35.1 TINEA UNGUIUM: ICD-10-CM

## 2022-04-27 DIAGNOSIS — L97.421 NON-PRESSURE CHRONIC ULCER OF LEFT HEEL AND MIDFOOT LIMITED TO BREAKDOWN OF SKIN: ICD-10-CM

## 2022-05-04 ENCOUNTER — APPOINTMENT (OUTPATIENT)
Dept: WOUND CARE | Facility: CLINIC | Age: 77
End: 2022-05-04

## 2022-05-04 ENCOUNTER — OUTPATIENT (OUTPATIENT)
Dept: OUTPATIENT SERVICES | Facility: HOSPITAL | Age: 77
LOS: 1 days | End: 2022-05-04
Payer: MEDICARE

## 2022-05-04 VITALS
DIASTOLIC BLOOD PRESSURE: 84 MMHG | OXYGEN SATURATION: 99 % | HEART RATE: 96 BPM | SYSTOLIC BLOOD PRESSURE: 138 MMHG | HEIGHT: 74 IN | BODY MASS INDEX: 22.84 KG/M2 | TEMPERATURE: 97 F | WEIGHT: 178 LBS | RESPIRATION RATE: 18 BRPM

## 2022-05-04 DIAGNOSIS — Z96.60 PRESENCE OF UNSPECIFIED ORTHOPEDIC JOINT IMPLANT: Chronic | ICD-10-CM

## 2022-05-04 DIAGNOSIS — Z00.00 ENCOUNTER FOR GENERAL ADULT MEDICAL EXAMINATION WITHOUT ABNORMAL FINDINGS: ICD-10-CM

## 2022-05-04 PROCEDURE — G0463: CPT

## 2022-05-04 NOTE — PHYSICAL EXAM
[FreeTextEntry1] : medial foot  [de-identified] : Closed [TWNoteComboBox1] : Left [TWNoteComboBox4] : None [de-identified] : No [de-identified] : None

## 2022-05-04 NOTE — HISTORY OF PRESENT ILLNESS
[FreeTextEntry1] : 77 y.o male returns to clinic for management of left foot wound. Last seen 2 weeks ago, states wound has improved and he has not been bandaging it for the past 2 days. Continues to ambulate in surgical shoe has directed. Previously has [facility] nurse changing his dressing every day using Santyl. States that the foot wound looks better. Pt denies any pain or any new complaints. Denies constitutional symptoms of N/V/C/F/Sob. Checked his sugar this morning and it was 149 mg/dl. State he has been compliant with medication. \par \par \par

## 2022-05-04 NOTE — PLAN
[FreeTextEntry1] : Physical exam: \par Vasc: DP/PT 2/4, CFT intact to digits, skin temperature gradient wnl, no focal increae in warmth. Minimal erythema or edema. \par Neuro: Protective sensations diminished\par Derm: medial arch wound left foot now healed with overylying hyperkeratotic tissue, post debridement no open lesions. NO other open lesions, no interdigital maceration, and thickened toenails x10 of hygenic lengths. \par MSK: muscle strength 4/5 in all compartment\par \par \par A: \par S/p I&D left foot \par Left foot wound - healed \par \par Plan: \par Pt was evaluated and chart was reviewed\par Cleansed the left foot wound with chlorhexidine\par Sharp excisional debridement of the left foot wound was performed down to and including nonviable dermal tissue. NO wound noted\par Wound noted to be closed upon debridement  \par Applied A & D ointment \par DC santyl \par Continue daily foot inspection\par May shower\par Continue to offload left foot gina prominence\par Advised pt to go to ED if he notices any clinical signs of infection \par Pt to get diabetic shoes from [facility], pending PCP paperwork/documentation\par [facility] to follow up\par Patient will benefit from diabetic shoes and insoles for offloading to assist in safe ambulation and prevention of skin breakdown to be worn greater than 6 months \par All questions addressed\par RTC 2 weeks for continued close monitoring

## 2022-05-05 DIAGNOSIS — M79.672 PAIN IN LEFT FOOT: ICD-10-CM

## 2022-05-05 DIAGNOSIS — E11.610 TYPE 2 DIABETES MELLITUS WITH DIABETIC NEUROPATHIC ARTHROPATHY: ICD-10-CM

## 2022-05-05 DIAGNOSIS — L97.421 NON-PRESSURE CHRONIC ULCER OF LEFT HEEL AND MIDFOOT LIMITED TO BREAKDOWN OF SKIN: ICD-10-CM

## 2022-05-05 DIAGNOSIS — M21.42 FLAT FOOT [PES PLANUS] (ACQUIRED), LEFT FOOT: ICD-10-CM

## 2022-05-24 ENCOUNTER — APPOINTMENT (OUTPATIENT)
Dept: WOUND CARE | Facility: CLINIC | Age: 77
End: 2022-05-24

## 2022-06-09 ENCOUNTER — APPOINTMENT (OUTPATIENT)
Dept: WOUND CARE | Facility: CLINIC | Age: 77
End: 2022-06-09

## 2022-06-09 ENCOUNTER — OUTPATIENT (OUTPATIENT)
Dept: OUTPATIENT SERVICES | Facility: HOSPITAL | Age: 77
LOS: 1 days | End: 2022-06-09
Payer: MEDICARE

## 2022-06-09 VITALS
WEIGHT: 178 LBS | HEART RATE: 67 BPM | SYSTOLIC BLOOD PRESSURE: 149 MMHG | BODY MASS INDEX: 22.84 KG/M2 | DIASTOLIC BLOOD PRESSURE: 75 MMHG | OXYGEN SATURATION: 98 % | TEMPERATURE: 97.5 F | RESPIRATION RATE: 18 BRPM | HEIGHT: 74 IN

## 2022-06-09 DIAGNOSIS — Z96.60 PRESENCE OF UNSPECIFIED ORTHOPEDIC JOINT IMPLANT: Chronic | ICD-10-CM

## 2022-06-09 DIAGNOSIS — Z00.00 ENCOUNTER FOR GENERAL ADULT MEDICAL EXAMINATION WITHOUT ABNORMAL FINDINGS: ICD-10-CM

## 2022-06-09 PROCEDURE — G0463: CPT

## 2022-06-09 PROCEDURE — 11042 DBRDMT SUBQ TIS 1ST 20SQCM/<: CPT

## 2022-06-13 DIAGNOSIS — E11.65 TYPE 2 DIABETES MELLITUS WITH HYPERGLYCEMIA: ICD-10-CM

## 2022-06-13 DIAGNOSIS — L97.522 NON-PRESSURE CHRONIC ULCER OF OTHER PART OF LEFT FOOT WITH FAT LAYER EXPOSED: ICD-10-CM

## 2022-06-16 NOTE — PLAN
[FreeTextEntry1] : Physical exam: \par Vasc: DP/PT 2/4, CFT intact to digits, skin temperature gradient wnl, no focal increae in warmth. Minimal erythema or edema. \par Neuro: Protective sensations diminished\par Derm: medial arch wound left foot now healed with overylying hyperkeratotic tissue, post debridement superficial blister partial thickness wound Left foot \par MSK: muscle strength 4/5 in all compartment\par \par \par A: \par S/p I&D left foot \par Left foot wound - healing\par \par Plan: \par Pt was evaluated and chart was reviewed\par Cleansed the left foot wound with chlorhexidine\par Sharp excisional debridement of the left foot wound was performed down to and including nonviable dermal tissue. NO wound noted\par Wound noted to be closed upon debridement  \par Applied iodosorb L foot \par DC ammonium lactate \par Continue daily foot inspection\par Continue to offload left foot gina prominence\par Advised pt to go to ED if he notices any clinical signs of infection \par Pt to get diabetic shoes from [facility], pending PCP paperwork/documentation\par [facility] to follow up\par Patient will benefit from diabetic shoes and insoles for offloading to assist in safe ambulation and prevention of skin breakdown to be worn greater than 6 months \par All questions addressed\par RTC 2 weeks for continued close monitoring

## 2022-06-16 NOTE — HISTORY OF PRESENT ILLNESS
[FreeTextEntry1] : 77 y.o male returns to clinic for management of left foot wound. Last seen 2 weeks ago, states wound has improved and he has not been bandaging it. Has not showered or walked on left foot. Continues to ambulate in surgical shoe has directed. Previously has [facility] nurse changing his dressing every day using ammonium lactate. States that the foot wound looks better. Pt denies any pain or any new complaints. Denies constitutional symptoms of N/V/C/F/Sob. Checked his sugar this morning and it was 149 mg/dl. State he has been compliant with medication. \par \par \par

## 2022-06-16 NOTE — PHYSICAL EXAM
[FreeTextEntry1] : medial foot  [FreeTextEntry2] : 4 [FreeTextEntry3] : 3 [FreeTextEntry4] : 0.1 [de-identified] : superficial partial thickness serous wound - blister  [TWNoteComboBox1] : Left [TWNoteComboBox4] : Small [TWNoteComboBox6] : Surgical [de-identified] : No [de-identified] : None

## 2022-06-23 ENCOUNTER — APPOINTMENT (OUTPATIENT)
Dept: WOUND CARE | Facility: CLINIC | Age: 77
End: 2022-06-23

## 2022-06-23 ENCOUNTER — OUTPATIENT (OUTPATIENT)
Dept: OUTPATIENT SERVICES | Facility: HOSPITAL | Age: 77
LOS: 1 days | End: 2022-06-23
Payer: MEDICARE

## 2022-06-23 VITALS
HEART RATE: 60 BPM | HEIGHT: 74 IN | DIASTOLIC BLOOD PRESSURE: 83 MMHG | RESPIRATION RATE: 18 BRPM | WEIGHT: 178 LBS | SYSTOLIC BLOOD PRESSURE: 144 MMHG | OXYGEN SATURATION: 98 % | TEMPERATURE: 97 F | BODY MASS INDEX: 22.84 KG/M2

## 2022-06-23 DIAGNOSIS — Z00.00 ENCOUNTER FOR GENERAL ADULT MEDICAL EXAMINATION WITHOUT ABNORMAL FINDINGS: ICD-10-CM

## 2022-06-23 DIAGNOSIS — Z96.60 PRESENCE OF UNSPECIFIED ORTHOPEDIC JOINT IMPLANT: Chronic | ICD-10-CM

## 2022-06-23 PROCEDURE — G0463: CPT

## 2022-06-23 PROCEDURE — 11042 DBRDMT SUBQ TIS 1ST 20SQCM/<: CPT

## 2022-06-23 NOTE — PLAN
[FreeTextEntry1] : Physical exam: \par Vasc: DP/PT 2/4, CFT intact to digits, skin temperature gradient wnl, no focal increae in warmth. Minimal erythema or edema. \par Neuro: Protective sensations diminished\par Derm: medial arch wound left foot nearly healed with overylying hyperkeratotic tissue, post debridement superficial blister partial thickness wound Left foot \par MSK: muscle strength 4/5 in all compartment\par \par \par A: \par S/p I&D left foot \par Left foot wound - healing\par \par Plan: \par Pt was evaluated and chart was reviewed\par Cleansed the left foot wound with chlorhexidine\par Sharp excisional debridement of the left foot wound was performed down to and including nonviable dermal tissue. NO wound noted\par Wound noted to be closed upon debridement  \par Applied iodosorb L foot \par DC ammonium lactate \par Continue daily foot inspection\par Continue to offload left foot gina prominence\par Advised pt to go to ED if he notices any clinical signs of infection \par Pt to get diabetic shoes from [facility], pending PCP paperwork/documentation\par [facility] to follow up\par Patient will benefit from diabetic shoes and insoles for offloading to assist in safe ambulation and prevention of skin breakdown to be worn greater than 6 months \par All questions addressed\par RTC 2 weeks for continued close monitoring

## 2022-06-23 NOTE — PHYSICAL EXAM
[FreeTextEntry1] : medial foot  [FreeTextEntry2] : 1.5 [FreeTextEntry3] : .3 [FreeTextEntry4] : 0.1 [de-identified] : superficial partial thickness serous wound - blister  [TWNoteComboBox1] : Left [TWNoteComboBox4] : Small [TWNoteComboBox6] : Surgical [de-identified] : No [de-identified] : None

## 2022-06-24 DIAGNOSIS — E11.621 TYPE 2 DIABETES MELLITUS WITH FOOT ULCER: ICD-10-CM

## 2022-06-24 DIAGNOSIS — L97.522 NON-PRESSURE CHRONIC ULCER OF OTHER PART OF LEFT FOOT WITH FAT LAYER EXPOSED: ICD-10-CM

## 2022-07-07 ENCOUNTER — APPOINTMENT (OUTPATIENT)
Dept: WOUND CARE | Facility: CLINIC | Age: 77
End: 2022-07-07

## 2022-07-07 ENCOUNTER — OUTPATIENT (OUTPATIENT)
Dept: OUTPATIENT SERVICES | Facility: HOSPITAL | Age: 77
LOS: 1 days | End: 2022-07-07
Payer: MEDICARE

## 2022-07-07 VITALS
OXYGEN SATURATION: 99 % | WEIGHT: 178 LBS | SYSTOLIC BLOOD PRESSURE: 143 MMHG | HEIGHT: 74 IN | DIASTOLIC BLOOD PRESSURE: 76 MMHG | RESPIRATION RATE: 18 BRPM | BODY MASS INDEX: 22.84 KG/M2 | HEART RATE: 72 BPM | TEMPERATURE: 97 F

## 2022-07-07 DIAGNOSIS — Z96.60 PRESENCE OF UNSPECIFIED ORTHOPEDIC JOINT IMPLANT: Chronic | ICD-10-CM

## 2022-07-07 DIAGNOSIS — Z00.00 ENCOUNTER FOR GENERAL ADULT MEDICAL EXAMINATION WITHOUT ABNORMAL FINDINGS: ICD-10-CM

## 2022-07-07 DIAGNOSIS — L97.522 NON-PRESSURE CHRONIC ULCER OF OTHER PART OF LEFT FOOT WITH FAT LAYER EXPOSED: ICD-10-CM

## 2022-07-07 DIAGNOSIS — L97.422 NON-PRESSURE CHRONIC ULCER OF LEFT HEEL AND MIDFOOT WITH FAT LAYER EXPOSED: ICD-10-CM

## 2022-07-07 PROCEDURE — G0463: CPT

## 2022-07-07 PROCEDURE — 11042 DBRDMT SUBQ TIS 1ST 20SQCM/<: CPT

## 2022-07-12 NOTE — PLAN
[FreeTextEntry1] : Physical exam: \par Vasc: DP/PT 2/4, CFT intact to digits, skin temperature gradient wnl, no focal increae in warmth. Minimal erythema or edema. \par Neuro: Protective sensations diminished\par Derm: medial arch wound left foot nearly healed with overylying hyperkeratotic tissue, post debridement superficial blister partial thickness wound Left foot. Dorsal 1st MPJ hyperkeratotic tissue and pre-ulcerative lesion noted.\par MSK: muscle strength 4/5 in all compartment\par \par \par A: \par S/p I&D left foot \par Left foot wound - healing\par \par Plan: \par Pt was evaluated and chart was reviewed\par Cleansed the left foot wound with chlorhexidine\par Sharp excisional debridement of the left foot wound was performed down to and including nonviable dermal tissue. NO wound noted\par Wound noted to be closed upon debridement  \par Debrided hyperkeratotic tissue at dorsal 1st MPJ, no underlying open ulcer noted.\par Applied Bacitracin and DSD, ACE to both ulcer sites.\par DC ammonium lactate \par Continue daily foot inspection\par Continue to offload left foot gina prominence\par Advised pt to go to ED if he notices any clinical signs of infection \par Pt to get diabetic shoes from [facility], pending PCP paperwork/documentation\par [facility] to follow up\par Patient will benefit from diabetic shoes and insoles for offloading to assist in safe ambulation and prevention of skin breakdown to be worn greater than 6 months \par All questions addressed\par RTC 2 weeks for continued close monitoring

## 2022-07-12 NOTE — PHYSICAL EXAM
[FreeTextEntry1] : medial foot  [FreeTextEntry2] : 1.5 [FreeTextEntry3] : .3 [FreeTextEntry4] : 0.1 [de-identified] : superficial partial thickness serous wound - blister  [TWNoteComboBox1] : Left [TWNoteComboBox4] : Small [TWNoteComboBox6] : Surgical [de-identified] : No [de-identified] : None

## 2022-07-21 ENCOUNTER — APPOINTMENT (OUTPATIENT)
Dept: WOUND CARE | Facility: CLINIC | Age: 77
End: 2022-07-21

## 2022-07-21 ENCOUNTER — OUTPATIENT (OUTPATIENT)
Dept: OUTPATIENT SERVICES | Facility: HOSPITAL | Age: 77
LOS: 1 days | End: 2022-07-21
Payer: MEDICARE

## 2022-07-21 VITALS
RESPIRATION RATE: 18 BRPM | SYSTOLIC BLOOD PRESSURE: 116 MMHG | HEIGHT: 74 IN | BODY MASS INDEX: 22.84 KG/M2 | DIASTOLIC BLOOD PRESSURE: 73 MMHG | WEIGHT: 178 LBS | OXYGEN SATURATION: 98 % | TEMPERATURE: 97.8 F | HEART RATE: 77 BPM

## 2022-07-21 DIAGNOSIS — Z00.00 ENCOUNTER FOR GENERAL ADULT MEDICAL EXAMINATION WITHOUT ABNORMAL FINDINGS: ICD-10-CM

## 2022-07-21 DIAGNOSIS — Z96.60 PRESENCE OF UNSPECIFIED ORTHOPEDIC JOINT IMPLANT: Chronic | ICD-10-CM

## 2022-07-21 PROCEDURE — 11042 DBRDMT SUBQ TIS 1ST 20SQCM/<: CPT

## 2022-07-21 PROCEDURE — G0463: CPT

## 2022-07-22 DIAGNOSIS — L97.522 NON-PRESSURE CHRONIC ULCER OF OTHER PART OF LEFT FOOT WITH FAT LAYER EXPOSED: ICD-10-CM

## 2022-07-22 DIAGNOSIS — E11.9 TYPE 2 DIABETES MELLITUS WITHOUT COMPLICATIONS: ICD-10-CM

## 2022-08-02 NOTE — HISTORY OF PRESENT ILLNESS
[FreeTextEntry1] : 77 y.o male returns to clinic for management of left foot wound. Last seen 2 weeks ago, states wound has improved and he has been putting topical antibiotic and DSD on wound. Has not showered or walked on left foot. Continues to ambulate in surgical shoe has directed. Previously has [facility] nurse changing his dressing every day however; admits he currently does his own daily dressing changes. Admits he noticed a small blister on the outside of his left foot recently that he thinks is from his surgical shoe. Pt denies any pain or any new complaints. Denies any recent acute trauma. Denies constitutional symptoms of N/V/C/F/Sob. \par \par \par

## 2022-08-02 NOTE — PHYSICAL EXAM
[FreeTextEntry1] : medial foot  [FreeTextEntry2] : 1.2 [FreeTextEntry3] : .3 [FreeTextEntry4] : 0.1 [de-identified] : superficial partial thickness serous wound - blister  [FreeTextEntry7] : lateral foot [FreeTextEntry8] : .6 [FreeTextEntry9] : .8 [de-identified] : superficial [de-identified] : serous [TWNoteComboBox1] : Left [TWNoteComboBox4] : Small [TWNoteComboBox6] : Surgical [de-identified] : None [de-identified] : No [TWNoteComboBox9] : Left [de-identified] : Small

## 2022-08-02 NOTE — PLAN
[FreeTextEntry1] : Physical exam: \par Vasc: DP/PT 2/4, CFT intact to digits, skin temperature gradient wnl, no focal increase in warmth. Minimal erythema or edema. \par Neuro: Protective sensations diminished b/l \par Derm: medial arch wound left foot nearly healed with overylying hyperkeratotic tissue, post debridement; left lateral wound superficial with no surrounding erythema or edema with scant serous drainage. Dorsal 1st MPJ hyperkeratotic tissue and pre-ulcerative lesion noted; no open wound noted to site. \par MSK: muscle strength 4/5 in all compartment, pes planus noted to b/l foot \par \par \par A: \par S/p I&D left foot \par Left foot wound - healing\par \par Plan: \par Pt was evaluated and chart was reviewed\par Cleansed the left foot wound with chlorhexidine\par Sharp excisional debridement of the left foot wounds was performed down to and including nonviable dermal tissue. \par Debrided hyperkeratotic tissue at dorsal 1st MPJ, no underlying open ulcer noted.\par Applied Bacitracin and DSD, ACE to both ulcer sites.\par DC ammonium lactate \par Continue daily foot inspection\par Continue to offload left foot gina prominence; spoke to [facility] about potential offloading richie brace; [facility] to follow up\par Advised pt to go to ED if he notices any clinical signs of infection \par Patient will benefit from diabetic shoes and insoles for offloading to assist in safe ambulation and prevention of skin breakdown to be worn greater than 6 months \par All questions addressed\par RTC 2 weeks for continued close monitoring \par \par Dx: Pes planus left foot, patient is ambulatory. Patient requires a custom molded hinged AFO to preserve ankle motion while stabilizing talar and subtalar joints. Device will give a more natural gait without destabilizing the knee and hip. Device requires a low density lining to protect prominent gina areas of effected skin. Patient will need the device for a term of greater than 9 months. No reasonable prefabricated orthosis exists.

## 2022-08-04 ENCOUNTER — RESULT REVIEW (OUTPATIENT)
Age: 77
End: 2022-08-04

## 2022-08-04 ENCOUNTER — OUTPATIENT (OUTPATIENT)
Dept: OUTPATIENT SERVICES | Facility: HOSPITAL | Age: 77
LOS: 1 days | End: 2022-08-04
Payer: MEDICARE

## 2022-08-04 ENCOUNTER — APPOINTMENT (OUTPATIENT)
Dept: WOUND CARE | Facility: CLINIC | Age: 77
End: 2022-08-04

## 2022-08-04 VITALS
HEART RATE: 71 BPM | RESPIRATION RATE: 18 BRPM | WEIGHT: 178 LBS | DIASTOLIC BLOOD PRESSURE: 66 MMHG | SYSTOLIC BLOOD PRESSURE: 122 MMHG | HEIGHT: 74 IN | OXYGEN SATURATION: 99 % | BODY MASS INDEX: 22.84 KG/M2 | TEMPERATURE: 97.2 F

## 2022-08-04 DIAGNOSIS — Z96.60 PRESENCE OF UNSPECIFIED ORTHOPEDIC JOINT IMPLANT: Chronic | ICD-10-CM

## 2022-08-04 DIAGNOSIS — Z00.00 ENCOUNTER FOR GENERAL ADULT MEDICAL EXAMINATION WITHOUT ABNORMAL FINDINGS: ICD-10-CM

## 2022-08-04 DIAGNOSIS — L97.529 NON-PRESSURE CHRONIC ULCER OF OTHER PART OF LEFT FOOT WITH UNSPECIFIED SEVERITY: ICD-10-CM

## 2022-08-04 PROCEDURE — 73630 X-RAY EXAM OF FOOT: CPT

## 2022-08-04 PROCEDURE — 11042 DBRDMT SUBQ TIS 1ST 20SQCM/<: CPT

## 2022-08-04 PROCEDURE — G0463: CPT

## 2022-08-04 PROCEDURE — 73630 X-RAY EXAM OF FOOT: CPT | Mod: 26,LT

## 2022-08-04 RX ORDER — DOXYCYCLINE HYCLATE 100 MG/1
100 TABLET ORAL
Qty: 14 | Refills: 0 | Status: ACTIVE | COMMUNITY
Start: 2022-08-04 | End: 1900-01-01

## 2022-08-04 NOTE — HISTORY OF PRESENT ILLNESS
[FreeTextEntry1] : 77 y.o male returns to clinic for management of left foot wound. Pt was ambulating in [facility] with a cane. Pt states he had a fever x days with discharge and malodor from the Left foot wound. States his girlfriend changes his dressing. Previously has VNS nurse changing his dressing every day. Admits he noticed a small blister on the outside of his left foot recently that he thinks is from his surgical shoe. Pt denies any pain or any new complaints. Denies any recent acute trauma. Denies constitutional symptoms of N/V/C/F/Sob. States he has not checked his FBS for 3 weeks. \par \par \par

## 2022-08-04 NOTE — PLAN
[FreeTextEntry1] : Physical exam: \par Vasc: DP/PT 2/4, CFT intact to digits, skin temperature gradient wnl, increased focal warmed noted to medial foot. \par Neuro: Protective sensations diminished b/l \par Derm: medial arch wound left foot nearly healed with overlying hyperkeratotic tissue, maceration noted to periwound area, post debridement; left lateral wound superficial with no surrounding erythema or edema with scant serous drainage. Dorsal 1st MPJ hyperkeratotic tissue and pre-ulcerative lesion noted; no drainage noted, no malodor noted \par MSK: muscle strength 4/5 in all compartment, pes planus noted to b/l foot \par \par \par A: \par S/p I&D left foot \par Left foot wound - healing\par \par Plan: \par Pt was evaluated and chart was reviewed\par Ordered Xrays of L foot \par Ordered ESR, CRP, CBC with diff, A1c \par Rx Docxy x 7 days \par Took deep wound cultures of medial foot wound \par Cleansed the left foot wound with chlorhexidine\par Sharp excisional debridement of the left foot wounds was performed down to and including nonviable dermal tissue. \par Debrided hyperkeratotic tissue at dorsal 1st MPJ, no underlying open ulcer noted.\par Applied santly DSD to wounds, abd pad, dianna, ACE\par Recommend pt not use band aids to cover wound as it may be the cause of the maceration \par DC ammonium lactate \par Continue daily foot inspection\par Continue to offload left foot gina prominence; spoke to [facility] about potential offloading richie brace; [facility] to follow up\par Advised pt to go to ED if he notices any clinical signs of infection \par Patient will benefit from diabetic shoes and insoles for offloading to assist in safe ambulation and prevention of skin breakdown to be worn greater than 6 months \par All questions addressed\par RTC 1 weeks for continued close monitoring \par \par Dx: Pes planus left foot, patient is ambulatory. Patient requires a custom molded hinged AFO to preserve ankle motion while stabilizing talar and subtalar joints. Device will give a more natural gait without destabilizing the knee and hip. Device requires a low density lining to protect prominent gina areas of effected skin. Patient will need the device for a term of greater than 9 months. No reasonable prefabricated orthosis exists.

## 2022-08-04 NOTE — PHYSICAL EXAM
[FreeTextEntry1] : medial foot  [FreeTextEntry2] : 1.2 [FreeTextEntry3] : .3 [FreeTextEntry4] : 0.1 [de-identified] : superficial partial thickness serous wound - blister  [FreeTextEntry7] : lateral foot [FreeTextEntry8] : .6 [FreeTextEntry9] : .8 [de-identified] : superficial [de-identified] : serous [TWNoteComboBox1] : Left [TWNoteComboBox4] : Small [TWNoteComboBox6] : Surgical [de-identified] : No [de-identified] : None [de-identified] : None [de-identified] : >75% [TWNoteComboBox9] : Left [de-identified] : Small [de-identified] : Pressure [de-identified] : No [de-identified] : Macerated [de-identified] : None [de-identified] : None [de-identified] : >75%

## 2022-08-05 DIAGNOSIS — L97.522 NON-PRESSURE CHRONIC ULCER OF OTHER PART OF LEFT FOOT WITH FAT LAYER EXPOSED: ICD-10-CM

## 2022-08-05 DIAGNOSIS — E11.621 TYPE 2 DIABETES MELLITUS WITH FOOT ULCER: ICD-10-CM

## 2022-08-11 ENCOUNTER — APPOINTMENT (OUTPATIENT)
Dept: WOUND CARE | Facility: CLINIC | Age: 77
End: 2022-08-11

## 2022-08-11 ENCOUNTER — OUTPATIENT (OUTPATIENT)
Dept: OUTPATIENT SERVICES | Facility: HOSPITAL | Age: 77
LOS: 1 days | End: 2022-08-11
Payer: MEDICARE

## 2022-08-11 VITALS
BODY MASS INDEX: 22.84 KG/M2 | SYSTOLIC BLOOD PRESSURE: 134 MMHG | HEART RATE: 69 BPM | DIASTOLIC BLOOD PRESSURE: 67 MMHG | HEIGHT: 74 IN | RESPIRATION RATE: 18 BRPM | OXYGEN SATURATION: 99 % | TEMPERATURE: 97 F | WEIGHT: 178 LBS

## 2022-08-11 DIAGNOSIS — Z00.00 ENCOUNTER FOR GENERAL ADULT MEDICAL EXAMINATION WITHOUT ABNORMAL FINDINGS: ICD-10-CM

## 2022-08-11 DIAGNOSIS — Z96.60 PRESENCE OF UNSPECIFIED ORTHOPEDIC JOINT IMPLANT: Chronic | ICD-10-CM

## 2022-08-11 PROCEDURE — 11042 DBRDMT SUBQ TIS 1ST 20SQCM/<: CPT

## 2022-08-11 PROCEDURE — G0463: CPT

## 2022-08-12 DIAGNOSIS — E11.42 TYPE 2 DIABETES MELLITUS WITH DIABETIC POLYNEUROPATHY: ICD-10-CM

## 2022-08-12 DIAGNOSIS — L97.522 NON-PRESSURE CHRONIC ULCER OF OTHER PART OF LEFT FOOT WITH FAT LAYER EXPOSED: ICD-10-CM

## 2022-08-18 ENCOUNTER — OUTPATIENT (OUTPATIENT)
Dept: OUTPATIENT SERVICES | Facility: HOSPITAL | Age: 77
LOS: 1 days | End: 2022-08-18
Payer: MEDICARE

## 2022-08-18 ENCOUNTER — APPOINTMENT (OUTPATIENT)
Dept: WOUND CARE | Facility: CLINIC | Age: 77
End: 2022-08-18

## 2022-08-18 VITALS
HEIGHT: 74 IN | BODY MASS INDEX: 22.84 KG/M2 | WEIGHT: 178 LBS | RESPIRATION RATE: 18 BRPM | TEMPERATURE: 97.1 F | HEART RATE: 52 BPM | SYSTOLIC BLOOD PRESSURE: 136 MMHG | DIASTOLIC BLOOD PRESSURE: 71 MMHG | OXYGEN SATURATION: 99 %

## 2022-08-18 DIAGNOSIS — Z96.60 PRESENCE OF UNSPECIFIED ORTHOPEDIC JOINT IMPLANT: Chronic | ICD-10-CM

## 2022-08-18 DIAGNOSIS — Z00.00 ENCOUNTER FOR GENERAL ADULT MEDICAL EXAMINATION WITHOUT ABNORMAL FINDINGS: ICD-10-CM

## 2022-08-18 PROCEDURE — G0463: CPT

## 2022-08-18 PROCEDURE — 11042 DBRDMT SUBQ TIS 1ST 20SQCM/<: CPT

## 2022-08-19 DIAGNOSIS — L97.522 NON-PRESSURE CHRONIC ULCER OF OTHER PART OF LEFT FOOT WITH FAT LAYER EXPOSED: ICD-10-CM

## 2022-08-19 DIAGNOSIS — E11.42 TYPE 2 DIABETES MELLITUS WITH DIABETIC POLYNEUROPATHY: ICD-10-CM

## 2022-08-23 NOTE — PHYSICAL EXAM
[FreeTextEntry1] : medial foot  [FreeTextEntry2] : 1.2 [FreeTextEntry3] : 2.0 [FreeTextEntry4] : 0.1 [de-identified] : superficial partial thickness serous wound - blister  [FreeTextEntry7] : lateral foot [FreeTextEntry8] : .6 [FreeTextEntry9] : .8 [de-identified] : superficial [de-identified] : serous [TWNoteComboBox1] : Left [TWNoteComboBox4] : Small [TWNoteComboBox6] : Surgical [de-identified] : No [de-identified] : None [de-identified] : None [de-identified] : >75% [TWNoteComboBox9] : Left [de-identified] : Small [de-identified] : Pressure [de-identified] : No [de-identified] : Macerated [de-identified] : None [de-identified] : None [de-identified] : >75%

## 2022-08-23 NOTE — HISTORY OF PRESENT ILLNESS
[FreeTextEntry1] : 77 y.o male returns to clinic for management of left foot wound. Pt was ambulating in [facility] with a cane and has been breaking in his new diabetic shoes everyday as well. States his girlfriend changes his dressing. Previously has [facility]NS nurse changing his dressing every day. Admits he noticed a small blister on the outside of his left foot recently that he thinks is from his surgical shoe. Pt denies any pain or any new complaints. Denies any recent acute trauma. Denies constitutional symptoms of N/V/C/F/Sob. States he has not checked his FBS for 3 weeks. \par \par \par

## 2022-08-23 NOTE — PLAN
[FreeTextEntry1] : Physical exam: \par Vasc: DP/PT 2/4, CFT intact to digits, skin temperature gradient wnl, increased focal warmed noted to medial foot. \par Neuro: Protective sensations diminished b/l \par Derm: medial arch wound left foot nearly healed with overlying hyperkeratotic tissue, maceration noted to periwound area, post debridement; left lateral wound superficial with no surrounding erythema or edema with scant serous drainage. Dorsal 1st MPJ hyperkeratotic tissue and pre-ulcerative lesion noted; no drainage noted, no malodor noted \par MSK: muscle strength 4/5 in all compartment, pes planus noted to b/l foot \par \par \par A: \par S/p I&D left foot \par Left foot wound - healing\par \par Plan: \par Pt was evaluated and chart was reviewed\par Reviewed Xrays of L foot; no OM noted \par Reviewed deep wound cultures of medial foot wound \par Cleansed the left foot wound with chlorhexidine\par Sharp excisional debridement of the left foot wounds was performed down to and including nonviable dermal tissue. \par Debrided hyperkeratotic tissue at dorsal 1st MPJ, no underlying open ulcer noted.\par Applied santly DSD to wounds, abd pad, dianna, ACE\par Recommend pt not use band aids to cover wound as it may be the cause of the maceration \par \par Rx: Total contact CROW Boot right foot, [facility], pending prior auth\par Continue daily foot inspection and daily Blood glucose \par Continue to offload left foot gina prominence; spoke to [facility] about potential offloading custom total contact crow boot; [facility] to follow up\par Advised pt to go to ED if he notices any clinical signs of infection \par Patient will benefit from diabetic shoes and insoles for offloading to assist in safe ambulation and prevention of skin breakdown to be worn greater than 6 months \par All questions addressed\par RTC 1 weeks for continued close monitoring \par \par Dx: Pes planus left foot, patient is ambulatory. Patient requires a custom total contact crow boot to preserve ankle motion while stabilizing talar and subtalar joints and offload platarflexed navicular. Device will give a more natural gait without destabilizing the knee and hip. Device requires a low density lining to protect prominent gina areas of effected skin. Patient will need the device for a term of greater than 9 months. No reasonable prefabricated orthosis exists.

## 2022-08-25 NOTE — PHYSICAL EXAM
[FreeTextEntry1] : medial foot  [FreeTextEntry2] : 1.2 [FreeTextEntry3] : .3 [FreeTextEntry4] : 0.1 [de-identified] : superficial partial thickness serous wound - blister  [FreeTextEntry7] : lateral foot [FreeTextEntry8] : .6 [FreeTextEntry9] : .8 [de-identified] : superficial [de-identified] : serous [TWNoteComboBox1] : Left [TWNoteComboBox4] : Small [TWNoteComboBox6] : Surgical [de-identified] : No [de-identified] : None [de-identified] : None [de-identified] : >75% [TWNoteComboBox9] : Left [de-identified] : Small [de-identified] : Pressure [de-identified] : No [de-identified] : Macerated [de-identified] : None [de-identified] : None [de-identified] : >75%

## 2022-08-25 NOTE — HISTORY OF PRESENT ILLNESS
[FreeTextEntry1] : 77 y.o male returns to clinic for management of left foot wound. Pt was ambulating in [facility] with a cane. Pt states he had a fever x days with discharge and malodor from the Left foot wound. States his girlfriend changes his dressing. Previously has [facility] nurse changing his dressing every day. Admits he noticed a small blister on the outside of his left foot recently that he thinks is from his surgical shoe. Pt denies any pain or any new complaints. Denies any recent acute trauma. Denies constitutional symptoms of N/V/C/F/Sob. States he has not checked his FBS for 3 weeks. \par \par \par

## 2022-08-25 NOTE — PLAN
[FreeTextEntry1] : Physical exam: \par Vasc: DP/PT 2/4, CFT intact to digits, skin temperature gradient wnl, increased focal warmed noted to medial foot. \par Neuro: Protective sensations diminished b/l \par Derm: medial arch wound left foot nearly healed with overlying hyperkeratotic tissue, maceration noted to periwound area, post debridement; left lateral wound superficial with no surrounding erythema or edema with scant serous drainage. Dorsal 1st MPJ hyperkeratotic tissue and pre-ulcerative lesion noted; no drainage noted, no malodor noted \par MSK: muscle strength 4/5 in all compartment, pes planus noted to b/l foot \par \par \par A: \par S/p I&D left foot \par Left foot wound - healing\par \par Plan: \par Pt was evaluated and chart was reviewed\par Reviewed Xrays of L foot; no OM noted \par Reviewed ESR, CRP, CBC with diff, A1c \par Reviewed deep wound cultures of medial foot wound \par Cleansed the left foot wound with chlorhexidine\par Sharp excisional debridement of the left foot wounds was performed down to and including nonviable dermal tissue. \par Debrided hyperkeratotic tissue at dorsal 1st MPJ, no underlying open ulcer noted.\par Applied santly DSD to wounds, abd pad, dianna, ACE\par Recommend pt not use band aids to cover wound as it may be the cause of the maceration \par Rx: Total contact CROW Boot right foot, [facility]\par Continue daily foot inspection and daily Blood glucose \par Continue to offload left foot gina prominence; spoke to [facility] about potential offloading custom total contact crow boot; [facility] to follow up\par Advised pt to go to ED if he notices any clinical signs of infection \par Patient will benefit from diabetic shoes and insoles for offloading to assist in safe ambulation and prevention of skin breakdown to be worn greater than 6 months \par All questions addressed\par RTC 1 weeks for continued close monitoring \par \par Dx: Pes planus left foot, patient is ambulatory. Patient requires a custom total contact crow boot to preserve ankle motion while stabilizing talar and subtalar joints and offload platarflexed navicular. Device will give a more natural gait without destabilizing the knee and hip. Device requires a low density lining to protect prominent gina areas of effected skin. Patient will need the device for a term of greater than 9 months. No reasonable prefabricated orthosis exists.

## 2022-09-01 ENCOUNTER — APPOINTMENT (OUTPATIENT)
Dept: WOUND CARE | Facility: CLINIC | Age: 77
End: 2022-09-01

## 2022-09-01 ENCOUNTER — OUTPATIENT (OUTPATIENT)
Dept: OUTPATIENT SERVICES | Facility: HOSPITAL | Age: 77
LOS: 1 days | End: 2022-09-01
Payer: MEDICARE

## 2022-09-01 VITALS
HEART RATE: 61 BPM | DIASTOLIC BLOOD PRESSURE: 75 MMHG | WEIGHT: 178 LBS | SYSTOLIC BLOOD PRESSURE: 145 MMHG | BODY MASS INDEX: 22.84 KG/M2 | TEMPERATURE: 97.3 F | HEIGHT: 74 IN | OXYGEN SATURATION: 99 % | RESPIRATION RATE: 16 BRPM

## 2022-09-01 DIAGNOSIS — E11.9 TYPE 2 DIABETES MELLITUS WITHOUT COMPLICATIONS: ICD-10-CM

## 2022-09-01 DIAGNOSIS — L97.529 NON-PRESSURE CHRONIC ULCER OF OTHER PART OF LEFT FOOT WITH UNSPECIFIED SEVERITY: ICD-10-CM

## 2022-09-01 DIAGNOSIS — Z96.60 PRESENCE OF UNSPECIFIED ORTHOPEDIC JOINT IMPLANT: Chronic | ICD-10-CM

## 2022-09-01 DIAGNOSIS — Z00.00 ENCOUNTER FOR GENERAL ADULT MEDICAL EXAMINATION WITHOUT ABNORMAL FINDINGS: ICD-10-CM

## 2022-09-01 PROCEDURE — 11042 DBRDMT SUBQ TIS 1ST 20SQCM/<: CPT

## 2022-09-01 PROCEDURE — G0463: CPT

## 2022-09-01 PROCEDURE — 15275 SKIN SUB GRAFT FACE/NK/HF/G: CPT

## 2022-09-08 NOTE — PHYSICAL EXAM
[FreeTextEntry1] : medial foot  [FreeTextEntry2] : 1 [FreeTextEntry3] : 1.9 [FreeTextEntry4] : 0.1 [de-identified] : superficial partial thickness serous wound - blister  [TWNoteComboBox1] : Left [TWNoteComboBox4] : Small [TWNoteComboBox6] : Surgical [de-identified] : No [de-identified] : None [de-identified] : None [de-identified] : 100% [de-identified] : No [TWNoteComboBox7] : Prosper [TWNoteComboBox9] : False [de-identified] : False [de-identified] : False [de-identified] : False [de-identified] : False [de-identified] : False [de-identified] : False [de-identified] : False
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
## 2022-09-08 NOTE — PLAN
[FreeTextEntry1] : Physical exam: \par Vasc: DP/PT 2/4, CFT < 3 secs x 10. TG: warm to warm. Pedal hair absent. Halina-malleolar varicosities present\par Neuro: Protective sensations diminished b/l \par Derm: medial arch wound left foot nearly healed with overlying hyperkeratotic tissue, maceration noted to periwound area, post debridement; left lateral wound superficial with no surrounding erythema or edema with scant serous drainage\par MSK: muscle strength 4/5 in all compartment, pes planus noted to b/l foot \par \par \par A: \par S/p I&D left foot \par Left foot wound - healing\par Onychomycosis\par \par Plan: \par Pt was evaluated and chart was reviewed\par Cleansed the left foot wound with chlorhexidine\par Sharp excisional debridement of the left foot wounds was performed down to and including nonviable dermal tissue. \par Trimmed and debrided elongated and thickened toenails\par Applied santly DSD to wounds, abd pad, dianna, ACE\par Continue daily foot inspection and daily Blood glucose \par Continue to offload left foot gina prominence; spoke to [facility] about potential offloading custom total contact crow boot; [facility] to follow up, waiting for authorization\par Advised pt to go to ED if he notices any clinical signs of infection \par All questions addressed\par RTC 2 weeks for continued close monitoring

## 2022-09-15 ENCOUNTER — APPOINTMENT (OUTPATIENT)
Dept: WOUND CARE | Facility: CLINIC | Age: 77
End: 2022-09-15

## 2022-09-15 ENCOUNTER — OUTPATIENT (OUTPATIENT)
Dept: OUTPATIENT SERVICES | Facility: HOSPITAL | Age: 77
LOS: 1 days | End: 2022-09-15
Payer: MEDICARE

## 2022-09-15 VITALS
OXYGEN SATURATION: 100 % | SYSTOLIC BLOOD PRESSURE: 113 MMHG | RESPIRATION RATE: 18 BRPM | BODY MASS INDEX: 22.84 KG/M2 | WEIGHT: 178 LBS | TEMPERATURE: 97 F | HEART RATE: 64 BPM | HEIGHT: 74 IN | DIASTOLIC BLOOD PRESSURE: 68 MMHG

## 2022-09-15 DIAGNOSIS — Z00.00 ENCOUNTER FOR GENERAL ADULT MEDICAL EXAMINATION WITHOUT ABNORMAL FINDINGS: ICD-10-CM

## 2022-09-15 DIAGNOSIS — Z96.60 PRESENCE OF UNSPECIFIED ORTHOPEDIC JOINT IMPLANT: Chronic | ICD-10-CM

## 2022-09-15 DIAGNOSIS — L97.522 NON-PRESSURE CHRONIC ULCER OF OTHER PART OF LEFT FOOT WITH FAT LAYER EXPOSED: ICD-10-CM

## 2022-09-15 DIAGNOSIS — E11.42 TYPE 2 DIABETES MELLITUS WITH DIABETIC POLYNEUROPATHY: ICD-10-CM

## 2022-09-15 PROCEDURE — G0463: CPT

## 2022-09-15 PROCEDURE — 11042 DBRDMT SUBQ TIS 1ST 20SQCM/<: CPT

## 2022-09-15 NOTE — PHYSICAL EXAM
[FreeTextEntry1] : medial foot  [FreeTextEntry2] : 1.5 [FreeTextEntry3] : 2.5 [FreeTextEntry4] : 0.1 [TWNoteComboBox1] : Left [TWNoteComboBox4] : Small [TWNoteComboBox6] : Surgical [de-identified] : No [de-identified] : None [de-identified] : None [de-identified] : 100% [de-identified] : No [TWNoteComboBox7] : Prosper

## 2022-09-15 NOTE — HISTORY OF PRESENT ILLNESS
[FreeTextEntry1] : 77 y.o male returns to clinic for management of left foot wound. Patient ambulates with his new diabetic shoes from [employer]. Patient reports that a visiting nurse changes his dressings every day. He is still using santyl. Patient states that he has LLD after hip surgery as well and states with the diabetic shoes hes feeling the difference when walking more. Patient is a diabetic, has not checked his FBS recently.  Denies constitutional symptoms of N/V/C/F/Sob. \par \par

## 2022-09-15 NOTE — PLAN
[FreeTextEntry1] : Physical exam: \par Vasc: DP/PT 2/4, CFT < 3 secs x 10. TG: warm to warm. Pedal hair absent. Halina-malleolar varicosities present\par Neuro: Protective sensations diminished b/l \par Derm: medial arch wound left foot nearly healed with overlying hyperkeratotic tissue, maceration noted to periwound area, post debridement; left lateral wound superficial almost epithelialized with hyperkeratotic tissue with no surrounding erythema or edema with scant serous drainage\par MSK: muscle strength 4/5 in all compartment, pes planus noted to b/l foot \par \par \par A: \par S/p I&D left foot \par Left foot wound - healing\par Onychomycosis\par \par Plan: \par Pt was evaluated and chart was reviewed\par Cleansed the left foot wound with chlorhexidine\par Sharp excisional debridement of the left foot wounds was performed down to and including nonviable subcutaneous tissue. \par Applied santyl DSD to wounds, abd pad, dianna, ACE\par Recommend Eo2 for left plantar wound \par Continue daily foot inspection and daily Blood glucose \par Continue to offload left foot gina prominence; spoke to [facility] about potential offloading custom total contact crow boot; [facility] to follow up, waiting for authorization\par Advised pt to go to ED if he notices any clinical signs of infection \par All questions addressed\par RTC 2 weeks for follow up

## 2022-09-29 ENCOUNTER — APPOINTMENT (OUTPATIENT)
Dept: WOUND CARE | Facility: CLINIC | Age: 77
End: 2022-09-29

## 2022-09-29 ENCOUNTER — OUTPATIENT (OUTPATIENT)
Dept: OUTPATIENT SERVICES | Facility: HOSPITAL | Age: 77
LOS: 1 days | End: 2022-09-29
Payer: MEDICARE

## 2022-09-29 VITALS
TEMPERATURE: 97.7 F | DIASTOLIC BLOOD PRESSURE: 92 MMHG | HEIGHT: 74 IN | WEIGHT: 178 LBS | HEART RATE: 76 BPM | SYSTOLIC BLOOD PRESSURE: 169 MMHG | BODY MASS INDEX: 22.84 KG/M2 | RESPIRATION RATE: 18 BRPM

## 2022-09-29 DIAGNOSIS — Z96.60 PRESENCE OF UNSPECIFIED ORTHOPEDIC JOINT IMPLANT: Chronic | ICD-10-CM

## 2022-09-29 DIAGNOSIS — Z00.00 ENCOUNTER FOR GENERAL ADULT MEDICAL EXAMINATION WITHOUT ABNORMAL FINDINGS: ICD-10-CM

## 2022-09-29 PROCEDURE — G0463: CPT

## 2022-09-29 PROCEDURE — 11042 DBRDMT SUBQ TIS 1ST 20SQCM/<: CPT

## 2022-09-29 NOTE — HISTORY OF PRESENT ILLNESS
[FreeTextEntry1] : 77 y.o male returns to clinic for management of left foot wound. Pt has started using EO2 2 days ago in place of Santyl. Dressing changes every other day. Patient ambulates with his diabetic shoes. Patient states that he has LLD after hip surgery as well and states with the diabetic shoes hes feeling the difference when walking more. Patient's nails are also elongated and thickened and he is unable to cut them himself so he requests they be debrided today. Patient is a diabetic, has not checked his FBS recently.  Denies constitutional symptoms of N/V/C/F/Sob. \par \par

## 2022-09-29 NOTE — PLAN
[FreeTextEntry1] : Physical exam: \par Vasc: DP/PT 2/4, CFT < 3 secs x 10. TG: warm to warm. Pedal hair absent. Halina-malleolar varicosities present\par Neuro: Protective sensations diminished b/l \par Derm: medial arch wound left foot with measurements as noted above, overlying hyperkeratotic tissue and hyperkeratotic, maceration noted to periwound area post debridement; left lateral wound superficial epithelialized; no surrounding erythema or edema, no other clinical signs of infection\par MSK: muscle strength 4/5 in all compartment, pes planus noted to b/l foot \par \par \par A: \par S/p I&D left foot \par Left foot wound - healing\par Onychomycosis\par \par Plan: \par Pt was evaluated and chart was reviewed\par Cleansed the left foot wound with chlorhexidine\par Verbal consent obtained for debridement of wound and nails\par Debrided toenails without incident\par Sharp excisional debridement of the left foot wound down to and including subcutaneous tissue, and debridement of hyperkeratotic tissue down to and including dermal tissue, without incident.\par Applied EO2, DSD, ACE to left foot ulcer\par Continue EO2 dressing changes at home\par Continue daily foot inspection\par Advised pt to check FBS daily as part of glycemic control  \par Continue to offload left foot gina prominence; spoke to [facility] about potential offloading custom total contact crow boot - [facility] states insurance denied \par Advised pt to go to ED if he notices any clinical signs of infection \par All questions addressed\par RTC 2 weeks for follow up

## 2022-09-29 NOTE — PHYSICAL EXAM
[FreeTextEntry1] : medial foot  [FreeTextEntry2] : 1.9 [FreeTextEntry3] : 0.5 [FreeTextEntry4] : 0.1 [de-identified] : hyperkeratotic rim [TWNoteComboBox1] : Left [TWNoteComboBox4] : Small [TWNoteComboBox6] : Surgical [de-identified] : No [de-identified] : None [de-identified] : None [de-identified] : 100% [de-identified] : No [TWNoteComboBox7] : Prosper

## 2022-09-30 DIAGNOSIS — E11.621 TYPE 2 DIABETES MELLITUS WITH FOOT ULCER: ICD-10-CM

## 2022-09-30 DIAGNOSIS — L97.522 NON-PRESSURE CHRONIC ULCER OF OTHER PART OF LEFT FOOT WITH FAT LAYER EXPOSED: ICD-10-CM

## 2022-10-12 ENCOUNTER — APPOINTMENT (OUTPATIENT)
Dept: WOUND CARE | Facility: CLINIC | Age: 77
End: 2022-10-12

## 2022-10-12 ENCOUNTER — OUTPATIENT (OUTPATIENT)
Dept: OUTPATIENT SERVICES | Facility: HOSPITAL | Age: 77
LOS: 1 days | End: 2022-10-12
Payer: MEDICARE

## 2022-10-12 VITALS
HEART RATE: 79 BPM | BODY MASS INDEX: 22.84 KG/M2 | HEIGHT: 74 IN | WEIGHT: 178 LBS | DIASTOLIC BLOOD PRESSURE: 75 MMHG | SYSTOLIC BLOOD PRESSURE: 129 MMHG | RESPIRATION RATE: 18 BRPM | OXYGEN SATURATION: 97 % | TEMPERATURE: 97.7 F

## 2022-10-12 DIAGNOSIS — Z96.60 PRESENCE OF UNSPECIFIED ORTHOPEDIC JOINT IMPLANT: Chronic | ICD-10-CM

## 2022-10-12 DIAGNOSIS — Z00.00 ENCOUNTER FOR GENERAL ADULT MEDICAL EXAMINATION WITHOUT ABNORMAL FINDINGS: ICD-10-CM

## 2022-10-12 PROCEDURE — 11042 DBRDMT SUBQ TIS 1ST 20SQCM/<: CPT

## 2022-10-12 PROCEDURE — G0463: CPT

## 2022-10-12 RX ORDER — AMOXICILLIN AND CLAVULANATE POTASSIUM 500; 125 MG/1; MG/1
500-125 TABLET, FILM COATED ORAL
Qty: 14 | Refills: 0 | Status: ACTIVE | COMMUNITY
Start: 2022-10-12 | End: 1900-01-01

## 2022-10-12 NOTE — PLAN
lateral and inferior to L eye
[FreeTextEntry1] : Physical exam: \par Vasc: DP/PT 2/4, CFT < 3 secs x 10. TG: warm to warm. Pedal hair absent. Halina-malleolar varicosities present\par Neuro: Protective sensations diminished b/l \par Derm: medial arch wound left foot with measurements as noted above, overlying hyperkeratotic tissue; mild edema surrounding area of wound; erythema spreading from wound dorsally. Left lateral wound superficial epithelialized; no surrounding erythema or edema, no other clinical signs of infection\par MSK: muscle strength 4/5 in all compartment, pes planus noted to b/l foot \par \par \par A: \par S/p I&D left foot \par Left foot wound - healing\par Left foot cellulitis\par Onychomycosis\par \par Plan: \par Pt was evaluated and chart was reviewed\par Cleansed the left foot wound with chlorhexidine\par Verbal consent obtained for debridement of wound \par Sharp excisional debridement of the left foot wound down to and including subcutaneous tissue, and debridement of hyperkeratotic tissue down to and including dermal tissue, without incident\par Rx Augmentin 875 BID\par Applied EO2, DSD, ACE to left foot ulcer\par Continue EO2 dressing changes at home\par Continue daily foot inspection\par Advised pt to check FBS daily as part of glycemic control  \par Continue to offload left foot gina prominence\par Advised pt to go to ED if he notices any further changes or feels unwell\par All questions addressed\par RTC 1 week for follow up

## 2022-10-12 NOTE — PHYSICAL EXAM
[FreeTextEntry1] : medial foot  [FreeTextEntry2] : 0.7 [FreeTextEntry3] : 0.7 [FreeTextEntry4] : 0.1 [de-identified] : hyperkeratotic rim [TWNoteComboBox1] : Left [TWNoteComboBox4] : Small [TWNoteComboBox5] : No [TWNoteComboBox6] : Surgical [de-identified] : No [de-identified] : None [de-identified] : None [de-identified] : 100% [de-identified] : No [TWNoteComboBox7] : Prosper [de-identified] : Debridement performed of all devitalized tissue to bleeding viable tissue

## 2022-10-12 NOTE — HISTORY OF PRESENT ILLNESS
[FreeTextEntry1] : 77 y.o male returns to clinic for management of left foot wound. Pt has started using EO2 about 9 days ago in place of Santyl. Dressing changes every other day. Patient ambulates with his diabetic shoes. Patient reports today that he believes his left foot wound is infected. He is feeling a little dizzy and the area around his wound is more red than usual. Patient states that he has LLD after hip surgery as well and states with the diabetic shoes hes feeling the difference when walking more. Patient is a diabetic, has not checked his FBS recently.  Denies constitutional symptoms of N/V/C/F/Sob. \par \par

## 2022-10-13 DIAGNOSIS — L97.429 NON-PRESSURE CHRONIC ULCER OF LEFT HEEL AND MIDFOOT WITH UNSPECIFIED SEVERITY: ICD-10-CM

## 2022-10-13 DIAGNOSIS — E11.9 TYPE 2 DIABETES MELLITUS WITHOUT COMPLICATIONS: ICD-10-CM

## 2022-10-13 DIAGNOSIS — L03.116 CELLULITIS OF LEFT LOWER LIMB: ICD-10-CM

## 2022-10-19 ENCOUNTER — APPOINTMENT (OUTPATIENT)
Dept: WOUND CARE | Facility: CLINIC | Age: 77
End: 2022-10-19

## 2022-10-19 ENCOUNTER — OUTPATIENT (OUTPATIENT)
Dept: OUTPATIENT SERVICES | Facility: HOSPITAL | Age: 77
LOS: 1 days | End: 2022-10-19
Payer: MEDICARE

## 2022-10-19 VITALS
BODY MASS INDEX: 22.84 KG/M2 | TEMPERATURE: 97.3 F | HEART RATE: 64 BPM | WEIGHT: 178 LBS | OXYGEN SATURATION: 97 % | HEIGHT: 74 IN | DIASTOLIC BLOOD PRESSURE: 71 MMHG | SYSTOLIC BLOOD PRESSURE: 114 MMHG | RESPIRATION RATE: 18 BRPM

## 2022-10-19 DIAGNOSIS — Z00.00 ENCOUNTER FOR GENERAL ADULT MEDICAL EXAMINATION WITHOUT ABNORMAL FINDINGS: ICD-10-CM

## 2022-10-19 DIAGNOSIS — Z96.60 PRESENCE OF UNSPECIFIED ORTHOPEDIC JOINT IMPLANT: Chronic | ICD-10-CM

## 2022-10-19 DIAGNOSIS — L03.116 CELLULITIS OF LEFT LOWER LIMB: ICD-10-CM

## 2022-10-19 DIAGNOSIS — E11.621 TYPE 2 DIABETES MELLITUS WITH FOOT ULCER: ICD-10-CM

## 2022-10-19 DIAGNOSIS — L97.522 NON-PRESSURE CHRONIC ULCER OF OTHER PART OF LEFT FOOT WITH FAT LAYER EXPOSED: ICD-10-CM

## 2022-10-19 PROCEDURE — G0463: CPT

## 2022-10-19 PROCEDURE — 11042 DBRDMT SUBQ TIS 1ST 20SQCM/<: CPT

## 2022-10-19 RX ORDER — AMOXICILLIN AND CLAVULANATE POTASSIUM 500; 125 MG/1; MG/1
500-125 TABLET, FILM COATED ORAL
Qty: 14 | Refills: 0 | Status: ACTIVE | COMMUNITY
Start: 2022-10-19 | End: 1900-01-01

## 2022-10-19 NOTE — HISTORY OF PRESENT ILLNESS
[FreeTextEntry1] : 77 y.o male returns to clinic for management of left foot wound. Pt is continuing to use EO2 and is changing dressing every other day at home. Pt taking antibiotics since last visit and feels that the redness and swelling has significantly improved to the left foot. Pt has started using EO2 about 9 days ago in place of Santyl. Dressing changes every other day. Patient ambulates with his diabetic shoes.Patient finished his antibiotics. Patient states that he has LLD after hip surgery as well and states with the diabetic shoes he is feeling the difference when walking more. Patient is a diabetic, has not checked his FBS recently.  Denies constitutional symptoms of N/V/C/F/Sob. \par \par

## 2022-10-19 NOTE — PLAN
[FreeTextEntry1] : Physical exam: \par Vasc: DP/PT 2/4, CFT < 3 secs x 10. TG: warm to warm. Pedal hair absent. Halina-malleolar varicosities present\par Neuro: Protective sensations diminished b/l \par Derm: medial arch wound left foot with measurements as noted above, overlying hyperkeratotic tissue; mild edema surrounding area of wound; erythema spreading from wound dorsally. moderate drainage noted \par MSK: muscle strength 4/5 in all compartment, pes planus noted to b/l foot \par \par \par A: \par S/p I&D left foot \par Left foot wound - healing\par Left foot cellulitis\par Onychomycosis\par \par Plan: \par Pt was evaluated and chart was reviewed\par Cleansed the left foot wound with chlorhexidine\par Verbal consent obtained for debridement of wound \par Sharp excisional debridement of the left foot wound down to and including subcutaneous tissue, and debridement of hyperkeratotic tissue down to and including dermal tissue, without incident\par Rx Augmentin 875 BID for 1 more week 10/19/22\par Applied  DSD, ACE to left foot ulcer\par Pt forgot to bring EO2 pad\par Continue EO2 dressing changes at home\par Continue daily foot inspection\par Advised pt to check FBS daily as part of glycemic control  \par Continue to offload left foot gina prominence\par Advised pt to go to ED if he notices any further changes or feels unwell\par All questions addressed\par RTC 1 week for follow up

## 2022-10-19 NOTE — PHYSICAL EXAM
[FreeTextEntry1] : medial foot  [FreeTextEntry2] : 0.2cm [FreeTextEntry3] : 0.2cm [FreeTextEntry4] : 0.1cm [de-identified] : hyperkeratotic rim [de-identified] : VIVIANE [TWNoteComboBox1] : Left [TWNoteComboBox4] : Moderate [TWNoteComboBox5] : No [TWNoteComboBox6] : Surgical [de-identified] : No [de-identified] : None [de-identified] : None [de-identified] : 100% [de-identified] : No [TWNoteComboBox7] : Prosper [de-identified] : Debridement performed of all devitalized tissue to bleeding viable tissue

## 2022-10-26 ENCOUNTER — OUTPATIENT (OUTPATIENT)
Dept: OUTPATIENT SERVICES | Facility: HOSPITAL | Age: 77
LOS: 1 days | End: 2022-10-26
Payer: MEDICARE

## 2022-10-26 ENCOUNTER — APPOINTMENT (OUTPATIENT)
Dept: WOUND CARE | Facility: CLINIC | Age: 77
End: 2022-10-26

## 2022-10-26 VITALS
TEMPERATURE: 97 F | RESPIRATION RATE: 18 BRPM | DIASTOLIC BLOOD PRESSURE: 77 MMHG | HEART RATE: 70 BPM | BODY MASS INDEX: 22.84 KG/M2 | SYSTOLIC BLOOD PRESSURE: 101 MMHG | OXYGEN SATURATION: 96 % | WEIGHT: 178 LBS | HEIGHT: 74 IN

## 2022-10-26 DIAGNOSIS — Z00.00 ENCOUNTER FOR GENERAL ADULT MEDICAL EXAMINATION WITHOUT ABNORMAL FINDINGS: ICD-10-CM

## 2022-10-26 DIAGNOSIS — Z96.60 PRESENCE OF UNSPECIFIED ORTHOPEDIC JOINT IMPLANT: Chronic | ICD-10-CM

## 2022-10-26 DIAGNOSIS — L97.421 NON-PRESSURE CHRONIC ULCER OF LEFT HEEL AND MIDFOOT LIMITED TO BREAKDOWN OF SKIN: ICD-10-CM

## 2022-10-26 DIAGNOSIS — E11.621 TYPE 2 DIABETES MELLITUS WITH FOOT ULCER: ICD-10-CM

## 2022-10-26 PROCEDURE — G0463: CPT

## 2022-10-26 PROCEDURE — 97597 DBRDMT OPN WND 1ST 20 CM/<: CPT

## 2022-10-26 NOTE — PHYSICAL EXAM
[FreeTextEntry1] : 0 [TWNoteComboBox1] : False [TWNoteComboBox4] : False [TWNoteComboBox5] : False [TWNoteComboBox6] : False [de-identified] : False [de-identified] : False [de-identified] : False [de-identified] : False [de-identified] : False [TWNoteComboBox7] : False [de-identified] : False

## 2022-10-26 NOTE — PLAN
[FreeTextEntry1] : Physical exam: \par Vasc: DP/PT 2/4, CFT < 3 secs x 10. TG: warm to warm. Pedal hair absent. Halina-malleolar varicosities present\par Neuro: Protective sensations diminished b/l \par Derm: medial arch wound left foot closed, with overlying hyperkeratotic tissue; improved edema and erythema, no drainage noted \par MSK: muscle strength 4/5 in all compartment, pes planus noted to b/l foot \par \par \par A: \par S/p I&D left foot \par Left foot wound - healing\par Left foot cellulitis\par Onychomycosis\par \par Plan: \par Pt was evaluated and chart was reviewed\par Excisional partial thickness debridement of the left foot nonviable tissue and hyperkeratotic tissue overlying previous wound, down to and including dermal tissue, using #15 blade, without incident\par Applied  DSD to left foot ulcer\par Pt forgot to bring EO2 pad. Will apply at home\par Continue EO2 dressing changes at home\par Continue daily foot inspection\par Advised pt to check FBS daily as part of glycemic control  \par Continue to offload left foot gina prominence\par Advised pt to go to ED if he notices any further changes or feels unwell\par All questions addressed\par RTC 1 week for follow up\par \par Authored by resident Adriano Coleman PGY1

## 2022-10-26 NOTE — HISTORY OF PRESENT ILLNESS
[FreeTextEntry1] : 77 y.o male returns to clinic for management of left foot wound. Pt is continuing to use EO2 and is changing dressing every other day at home. No further redness and swelling to the left foot since he finished his antibiotics. Dressing changes every other day. Patient ambulates with his diabetic shoes.Patient finished his antibiotics. Patient states that he has LLD after hip surgery as well and states with the diabetic shoes he is feeling the difference when walking more. Patient is a diabetic, has not checked his FBS recently.  Denies constitutional symptoms of N/V/C/F/Sob. \par \par

## 2022-11-02 ENCOUNTER — APPOINTMENT (OUTPATIENT)
Dept: WOUND CARE | Facility: CLINIC | Age: 77
End: 2022-11-02

## 2022-11-02 ENCOUNTER — OUTPATIENT (OUTPATIENT)
Dept: OUTPATIENT SERVICES | Facility: HOSPITAL | Age: 77
LOS: 1 days | End: 2022-11-02
Payer: MEDICARE

## 2022-11-02 VITALS
HEART RATE: 66 BPM | WEIGHT: 178 LBS | HEIGHT: 74 IN | OXYGEN SATURATION: 10 % | RESPIRATION RATE: 18 BRPM | TEMPERATURE: 97 F | DIASTOLIC BLOOD PRESSURE: 67 MMHG | BODY MASS INDEX: 22.84 KG/M2 | SYSTOLIC BLOOD PRESSURE: 111 MMHG

## 2022-11-02 DIAGNOSIS — Z96.60 PRESENCE OF UNSPECIFIED ORTHOPEDIC JOINT IMPLANT: Chronic | ICD-10-CM

## 2022-11-02 DIAGNOSIS — L97.411 NON-PRESSURE CHRONIC ULCER OF RIGHT HEEL AND MIDFOOT LIMITED TO BREAKDOWN OF SKIN: ICD-10-CM

## 2022-11-02 DIAGNOSIS — E11.42 TYPE 2 DIABETES MELLITUS WITH DIABETIC POLYNEUROPATHY: ICD-10-CM

## 2022-11-02 DIAGNOSIS — Z00.00 ENCOUNTER FOR GENERAL ADULT MEDICAL EXAMINATION WITHOUT ABNORMAL FINDINGS: ICD-10-CM

## 2022-11-02 PROCEDURE — 97597 DBRDMT OPN WND 1ST 20 CM/<: CPT

## 2022-11-02 PROCEDURE — G0463: CPT

## 2022-11-02 NOTE — HISTORY OF PRESENT ILLNESS
[FreeTextEntry1] : 77 y.o male returns to clinic for management of left foot wound. Pt is continuing to use EO2 and is changing dressing every other day at home. Dressing changes every other day by his partner. States he finished his Abxa few weeks ago.. Patient ambulates with his diabetic shoes. Patient states that he has LLD after hip surgery as well and states with the diabetic shoes he is feeling the difference when walking more. Patient is a diabetic, has not checked his FBS recently. Latest A1C 6.1%  Denies constitutional symptoms of N/V/C/F/Sob. \par \par

## 2022-11-02 NOTE — PLAN
[FreeTextEntry1] : Physical exam: \par Vasc: DP/PT 2/4, CFT < 3 secs x 10. TG: warm to warm. Pedal hair absent. Halina-malleolar varicosities present\par Neuro: Protective sensations diminished b/l \par Derm: medial arch wound left foot closed, with overlying hyperkeratotic tissue; improved edema and erythema, no drainage noted. Hyperkeratotic lesion lesion noted to plantar navicular of L foot.\par MSK: muscle strength 4/5 in all compartment, pes planus noted to b/l foot \par \par \par A: \par S/p I&D left foot \par Left foot wound - healing partial thickness\par Left foot cellulitis\par Onychomycosis\par \par Plan: \par Pt was evaluated and chart was reviewed\par Excisional partial thickness debridement of the left foot plantar navicular nonviable tissue and hyperkeratotic tissue overlying previous wound, down to and including dermal tissue, using #15 blade, without incident\par Applied E02 DSD to healed left foot ulcer\par Continue EO2 dressing changes at home for two weeks\par Advised patient to not swim yet-will reassess in two weeks \par Continue daily foot inspection\par Advised pt to check FBS daily as part of glycemic control  \par Continue to offload left foot gina prominence\par Advised pt to go to ED if he notices any further changes or feels unwell\par All questions addressed\par RTC 1 week for follow up\par \par Authored by resident Taya Vega PGY1

## 2022-11-16 ENCOUNTER — APPOINTMENT (OUTPATIENT)
Dept: WOUND CARE | Facility: CLINIC | Age: 77
End: 2022-11-16

## 2022-11-16 ENCOUNTER — OUTPATIENT (OUTPATIENT)
Dept: OUTPATIENT SERVICES | Facility: HOSPITAL | Age: 77
LOS: 1 days | End: 2022-11-16
Payer: MEDICARE

## 2022-11-16 VITALS
RESPIRATION RATE: 18 BRPM | HEART RATE: 78 BPM | SYSTOLIC BLOOD PRESSURE: 130 MMHG | HEIGHT: 74 IN | BODY MASS INDEX: 22.84 KG/M2 | TEMPERATURE: 97 F | OXYGEN SATURATION: 97 % | WEIGHT: 178 LBS | DIASTOLIC BLOOD PRESSURE: 85 MMHG

## 2022-11-16 DIAGNOSIS — E11.42 TYPE 2 DIABETES MELLITUS WITH DIABETIC POLYNEUROPATHY: ICD-10-CM

## 2022-11-16 DIAGNOSIS — Z00.00 ENCOUNTER FOR GENERAL ADULT MEDICAL EXAMINATION WITHOUT ABNORMAL FINDINGS: ICD-10-CM

## 2022-11-16 DIAGNOSIS — L97.521 NON-PRESSURE CHRONIC ULCER OF OTHER PART OF LEFT FOOT LIMITED TO BREAKDOWN OF SKIN: ICD-10-CM

## 2022-11-16 DIAGNOSIS — Z96.60 PRESENCE OF UNSPECIFIED ORTHOPEDIC JOINT IMPLANT: Chronic | ICD-10-CM

## 2022-11-16 PROCEDURE — 97597 DBRDMT OPN WND 1ST 20 CM/<: CPT

## 2022-11-16 PROCEDURE — G0463: CPT

## 2022-11-16 NOTE — PHYSICAL EXAM
[FreeTextEntry1] : 0 [FreeTextEntry2] : 0 [de-identified] : E02 [de-identified] : left foot wound closed

## 2022-11-16 NOTE — PLAN
[FreeTextEntry1] : Physical exam: \par Vasc: DP/PT 2/4, CFT < 3 secs x 10. TG: warm to warm. Pedal hair absent. Halina-malleolar varicosities present\par Neuro: Protective sensations diminished b/l \par Derm: medial arch wound left foot closed, with overlying hyperkeratotic tissue; no edema , no drainage noted. Hyperkeratotic lesion lesion noted to plantar navicular of L foot.\par MSK: muscle strength 4/5 in all compartment, pes planus noted to b/l foot \par \par \par A: \par S/p I&D left foot \par Left foot wound - closed\par Onychomycosis\par \par Plan: \par Pt was evaluated and chart was reviewed\par Excisional partial thickness debridement of the left foot hyperkeratotic tissue overlying previous wound, down to and including dermal tissue, using curette\par left foot wound healed , no need  to use EO2 or dressing 11/16/22\par patient can start  swimming now 11/16/22, starting in small increments \par Continue daily foot inspection\par Advised pt to check FBS daily as part of glycemic control  \par Continue to offload left foot gina prominence\par Advised pt to go to ED if he notices any further changes or feels unwell\par All questions addressed\par RTC 2 weeks\par \par Authored by resident Taya Vega PGY1 [facility]

## 2022-11-16 NOTE — HISTORY OF PRESENT ILLNESS
[FreeTextEntry1] : 77 y.o male returns to clinic for management of left foot wound. Pt is continuing to use EO2 and is changing dressing every other day at home. Dressing changes every other day by his partner. Patient ambulates with his diabetic shoes. Patient states that he has LLD after hip surgery as well and states with the diabetic shoes he is feeling the difference when walking more. Latest A1C 6.1%  Denies constitutional symptoms of N/V/C/F/Sob. \par \par  mg/dl 11/16/22\par \par

## 2022-11-30 ENCOUNTER — APPOINTMENT (OUTPATIENT)
Dept: WOUND CARE | Facility: CLINIC | Age: 77
End: 2022-11-30

## 2022-11-30 ENCOUNTER — OUTPATIENT (OUTPATIENT)
Dept: OUTPATIENT SERVICES | Facility: HOSPITAL | Age: 77
LOS: 1 days | End: 2022-11-30
Payer: MEDICARE

## 2022-11-30 VITALS
HEIGHT: 74 IN | SYSTOLIC BLOOD PRESSURE: 131 MMHG | DIASTOLIC BLOOD PRESSURE: 78 MMHG | TEMPERATURE: 97.9 F | RESPIRATION RATE: 18 BRPM | HEART RATE: 68 BPM | OXYGEN SATURATION: 97 %

## 2022-11-30 DIAGNOSIS — Z00.00 ENCOUNTER FOR GENERAL ADULT MEDICAL EXAMINATION WITHOUT ABNORMAL FINDINGS: ICD-10-CM

## 2022-11-30 DIAGNOSIS — Z96.60 PRESENCE OF UNSPECIFIED ORTHOPEDIC JOINT IMPLANT: Chronic | ICD-10-CM

## 2022-11-30 PROCEDURE — G0463: CPT

## 2022-11-30 PROCEDURE — 97597 DBRDMT OPN WND 1ST 20 CM/<: CPT

## 2022-11-30 NOTE — PHYSICAL EXAM
[FreeTextEntry1] : 0 [FreeTextEntry2] : 0 [de-identified] : E02 [de-identified] : left foot wound closed

## 2022-11-30 NOTE — HISTORY OF PRESENT ILLNESS
[FreeTextEntry1] : 77 y.o male returns to clinic for management of left foot wound. Pt is no longer using EO2 as the left midfoot wound has healed. Patient still ambulates in his diabetic shoes. Notes he has not been using any dressing as he was told he no longer has a wound. Patient states that he has LLD after hip surgery as well and states with the diabetic shoes he is feeling the difference when walking more. Latest A1C 6.1%  Denies constitutional symptoms of N/V/C/F/Sob. \par \par  mg/dl 11/16/22\par \par

## 2022-11-30 NOTE — PLAN
[FreeTextEntry1] : Physical exam: \par Vasc: DP/PT 2/4, CFT < 3 secs x 10. TG: warm to warm. Pedal hair absent. Halina-malleolar varicosities present\par Neuro: Protective sensations diminished b/l \par Derm: medial arch wound left foot closed, with overlying hyperkeratotic tissue; no edema , no drainage noted. Hyperkeratotic lesion lesion noted to plantar navicular of L foot.\par MSK: muscle strength 4/5 in all compartment, pes planus noted to b/l foot \par \par \par A: \par S/p I&D left foot \par Left foot partial thickness wound - closed\par Onychomycosis\par \par Plan: \par Pt was evaluated and chart was reviewed\par Excisional partial thickness debridement of the left foot hyperkeratotic and dermal tissue overlying previous wound, down to and including dermal tissue, using curette\par left foot wound healed , no need  to use EO2 or dressing \par patient can start  swimming now, starting in small increments \par Continue daily foot inspection\par Encouraged patient to use prema board for home debridement of callous 1-2x/week as needed \par Advised pt to check FBS daily as part of glycemic control  \par Continue to offload left foot gina prominence\par Advised pt to go to ED if he notices any further changes or feels unwell; educated pt on signs and symptoms of infection (erythema, edema, drainage, nausea, vomiting, fever, chills); pt demonstrated full understanding \par All questions addressed\par RTC 4 weeks or sooner if needed \par \par Authored by resident Taya Vega PGY1

## 2022-12-01 DIAGNOSIS — E11.621 TYPE 2 DIABETES MELLITUS WITH FOOT ULCER: ICD-10-CM

## 2022-12-01 DIAGNOSIS — L97.421 NON-PRESSURE CHRONIC ULCER OF LEFT HEEL AND MIDFOOT LIMITED TO BREAKDOWN OF SKIN: ICD-10-CM

## 2022-12-15 LAB
BACTERIA WND CULT: ABNORMAL
BASOPHILS # BLD AUTO: 0.02 K/UL
BASOPHILS NFR BLD AUTO: 0.4 %
CRP SERPL-MCNC: 29 MG/L
EOSINOPHIL # BLD AUTO: 0.1 K/UL
EOSINOPHIL NFR BLD AUTO: 1.9 %
ERYTHROCYTE [SEDIMENTATION RATE] IN BLOOD BY WESTERGREN METHOD: 47 MM/HR
ESTIMATED AVERAGE GLUCOSE: 151 MG/DL
HBA1C MFR BLD HPLC: 6.9 %
HCT VFR BLD CALC: 33.5 %
HGB BLD-MCNC: 10.6 G/DL
IMM GRANULOCYTES NFR BLD AUTO: 0.2 %
LYMPHOCYTES # BLD AUTO: 0.83 K/UL
LYMPHOCYTES NFR BLD AUTO: 15.7 %
MAN DIFF?: NORMAL
MCHC RBC-ENTMCNC: 28.8 PG
MCHC RBC-ENTMCNC: 31.6 GM/DL
MCV RBC AUTO: 91 FL
MONOCYTES # BLD AUTO: 0.45 K/UL
MONOCYTES NFR BLD AUTO: 8.5 %
NEUTROPHILS # BLD AUTO: 3.87 K/UL
NEUTROPHILS NFR BLD AUTO: 73.3 %
PLATELET # BLD AUTO: 146 K/UL
RBC # BLD: 3.68 M/UL
RBC # FLD: 13.4 %
WBC # FLD AUTO: 5.28 K/UL

## 2023-03-07 NOTE — PROGRESS NOTE ADULT - PROBLEM SELECTOR PLAN 11
IMPROVE VTE Individual Risk Assessment  RISK                                                                Points  [  ] Previous VTE                                                  3  [  ] Thrombophilia                                               2  [  ] Lower limb paralysis                                      2        (unable to hold up >15 seconds)    [  ] Current Cancer                                              2         (within 6 months)  [  ] Immobilization > 24 hrs                                1  [  ] ICU/CCU stay > 24 hours                              1  [  ] Age > 60                                                      1  IMPROVE VTE Score _________    PPI for GI prophylaxis  Heparin for DVT PPX Medical Necessity Statement: Based on my medical judgement, Mohs surgery is the most appropriate treatment for this cancer compared to other treatments.
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
## 2023-12-27 NOTE — DIETITIAN INITIAL EVALUATION ADULT. - PROBLEM SELECTOR PLAN 4
Influenza (flu) is a virus. Antibiotics do not work for viruses.   Drink lots of fluids  Activity as tolerated. You may need more rest than usual. Fatigue is usually the last symptom to improve.   Acetaminophen (tylenol) or ibuprofen (motrin/advil) can be taken for body aches/pain/fever.    Sometimes the flu can cause a secondary bacterial illness. Monitor for return of fever after resolves, pain/pressure in the face that returns after resolving, ear pain, or new/worsening cough or chest pain.      We will send the lab out for culture. Typically takes 2-3 days for results. Results will either be via your [employer] account or phone call. Be aware that the phone call may show up as a block or restricted number.      Left foot ulcer infection  Will start on renally dosed empiric antibiotics with vancomycin and unasyn for polymicrobial coverage  May need MRI to r/o osteoarthritis ; defer to podiatry  Follow blood cultures   Podiatry consulted  ID consult

## 2024-12-09 NOTE — PROGRESS NOTE ADULT - PROBLEM SELECTOR PROBLEM 3
Cellulitis of left foot Diabetic foot ulcer [General Appearance - Alert] : alert [General Appearance - In No Acute Distress] : in no acute distress [Sclera] : the sclera and conjunctiva were normal [PERRL With Normal Accommodation] : pupils were equal in size, round, and reactive to light [Outer Ear] : the ears and nose were normal in appearance [Extraocular Movements] : extraocular movements were intact [Oropharynx] : the oropharynx was normal [Neck Appearance] : the appearance of the neck was normal [Neck Cervical Mass (___cm)] : no neck mass was observed [Jugular Venous Distention Increased] : there was no jugular-venous distention [Thyroid Diffuse Enlargement] : the thyroid was not enlarged [Thyroid Nodule] : there were no palpable thyroid nodules [Auscultation Breath Sounds / Voice Sounds] : lungs were clear to auscultation bilaterally [Heart Rate And Rhythm] : heart rate was normal and rhythm regular [Heart Sounds] : normal S1 and S2 [Heart Sounds Gallop] : no gallops [Heart Sounds Pericardial Friction Rub] : no pericardial rub [Arterial Pulses Carotid] : carotid pulses were normal with no bruits [Abdominal Aorta] : the abdominal aorta was normal [Arterial Pulses Femoral] : femoral pulses were normal without bruits [Edema] : there was no peripheral edema [Full Pulse] : the pedal pulses are present [Bowel Sounds] : normal bowel sounds [Abdomen Soft] : soft [Abdomen Tenderness] : non-tender [Abdomen Mass (___ Cm)] : no abdominal mass palpated [Normal Sphincter Tone] : normal sphincter tone [Prostate Enlarged] : was enlarged [Cervical Lymph Nodes Enlarged Posterior Bilaterally] : posterior cervical [Cervical Lymph Nodes Enlarged Anterior Bilaterally] : anterior cervical [Supraclavicular Lymph Nodes Enlarged Bilaterally] : supraclavicular [Axillary Lymph Nodes Enlarged Bilaterally] : axillary [Femoral Lymph Nodes Enlarged Bilaterally] : femoral [Inguinal Lymph Nodes Enlarged Bilaterally] : inguinal [No CVA Tenderness] : no ~M costovertebral angle tenderness [No Spinal Tenderness] : no spinal tenderness [Abnormal Walk] : normal gait [Nail Clubbing] : no clubbing  or cyanosis of the fingernails [Motor Tone] : muscle strength and tone were normal [Musculoskeletal - Swelling] : no joint swelling seen [Skin Color & Pigmentation] : normal skin color and pigmentation [Skin Turgor] : normal skin turgor [] : no rash [No Focal Deficits] : no focal deficits [Oriented To Time, Place, And Person] : oriented to person, place, and time [Impaired Insight] : insight and judgment were intact [Affect] : the affect was normal [Prostate Nodule] : did not have a nodule [Prostate Tenderness] : was not tender [Prostate Fluctuant] : was not fluctuant [FreeTextEntry1] : Chronic stasis changes right leg